# Patient Record
Sex: FEMALE | Race: OTHER | HISPANIC OR LATINO | URBAN - METROPOLITAN AREA
[De-identification: names, ages, dates, MRNs, and addresses within clinical notes are randomized per-mention and may not be internally consistent; named-entity substitution may affect disease eponyms.]

---

## 2020-08-05 ENCOUNTER — INPATIENT (INPATIENT)
Facility: HOSPITAL | Age: 80
LOS: 14 days | Discharge: HOME HEALTH | End: 2020-08-20
Attending: INTERNAL MEDICINE | Admitting: INTERNAL MEDICINE
Payer: MEDICARE

## 2020-08-05 VITALS
OXYGEN SATURATION: 99 % | TEMPERATURE: 97 F | DIASTOLIC BLOOD PRESSURE: 47 MMHG | SYSTOLIC BLOOD PRESSURE: 73 MMHG | RESPIRATION RATE: 18 BRPM | HEART RATE: 78 BPM

## 2020-08-05 DIAGNOSIS — E87.5 HYPERKALEMIA: ICD-10-CM

## 2020-08-05 DIAGNOSIS — N19 UNSPECIFIED KIDNEY FAILURE: ICD-10-CM

## 2020-08-05 DIAGNOSIS — N17.9 ACUTE KIDNEY FAILURE, UNSPECIFIED: ICD-10-CM

## 2020-08-05 DIAGNOSIS — E87.2 ACIDOSIS: ICD-10-CM

## 2020-08-05 LAB
ALBUMIN SERPL ELPH-MCNC: 4.2 G/DL — SIGNIFICANT CHANGE UP (ref 3.3–5)
ALP SERPL-CCNC: 74 U/L — SIGNIFICANT CHANGE UP (ref 40–120)
ALT FLD-CCNC: 21 U/L — SIGNIFICANT CHANGE UP (ref 4–33)
ANION GAP SERPL CALC-SCNC: 36 MMO/L — HIGH (ref 7–14)
ANION GAP SERPL CALC-SCNC: 45 MMO/L — HIGH (ref 7–14)
APPEARANCE UR: SIGNIFICANT CHANGE UP
APTT BLD: 19.7 SEC — LOW (ref 27–36.3)
AST SERPL-CCNC: 35 U/L — HIGH (ref 4–32)
BACTERIA # UR AUTO: SIGNIFICANT CHANGE UP
BASE EXCESS BLDV CALC-SCNC: -11.6 MMOL/L — SIGNIFICANT CHANGE UP
BASE EXCESS BLDV CALC-SCNC: -13.4 MMOL/L — SIGNIFICANT CHANGE UP
BASOPHILS # BLD AUTO: 0.02 K/UL — SIGNIFICANT CHANGE UP (ref 0–0.2)
BASOPHILS NFR BLD AUTO: 0.2 % — SIGNIFICANT CHANGE UP (ref 0–2)
BILIRUB SERPL-MCNC: 0.5 MG/DL — SIGNIFICANT CHANGE UP (ref 0.2–1.2)
BILIRUB UR-MCNC: SIGNIFICANT CHANGE UP
BLOOD GAS VENOUS - CREATININE: 12.5 MG/DL — HIGH (ref 0.5–1.3)
BLOOD GAS VENOUS - FIO2: 21 — SIGNIFICANT CHANGE UP
BLOOD UR QL VISUAL: SIGNIFICANT CHANGE UP
BUN SERPL-MCNC: 153 MG/DL — HIGH (ref 7–23)
BUN SERPL-MCNC: 176 MG/DL — HIGH (ref 7–23)
CALCIUM SERPL-MCNC: 10.1 MG/DL — SIGNIFICANT CHANGE UP (ref 8.4–10.5)
CALCIUM SERPL-MCNC: 10.4 MG/DL — SIGNIFICANT CHANGE UP (ref 8.4–10.5)
CHLORIDE BLDV-SCNC: 96 MMOL/L — SIGNIFICANT CHANGE UP (ref 96–108)
CHLORIDE SERPL-SCNC: 71 MMOL/L — LOW (ref 98–107)
CHLORIDE SERPL-SCNC: 86 MMOL/L — LOW (ref 98–107)
CO2 SERPL-SCNC: 11 MMOL/L — LOW (ref 22–31)
CO2 SERPL-SCNC: 13 MMOL/L — LOW (ref 22–31)
COLOR SPEC: SIGNIFICANT CHANGE UP
CREAT SERPL-MCNC: 14.07 MG/DL — HIGH (ref 0.5–1.3)
CREAT SERPL-MCNC: 18.74 MG/DL — HIGH (ref 0.5–1.3)
EOSINOPHIL # BLD AUTO: 0 K/UL — SIGNIFICANT CHANGE UP (ref 0–0.5)
EOSINOPHIL NFR BLD AUTO: 0 % — SIGNIFICANT CHANGE UP (ref 0–6)
GAS PNL BLDV: 126 MMOL/L — LOW (ref 136–146)
GLUCOSE BLDV-MCNC: 165 MG/DL — HIGH (ref 70–99)
GLUCOSE SERPL-MCNC: 127 MG/DL — HIGH (ref 70–99)
GLUCOSE SERPL-MCNC: 178 MG/DL — HIGH (ref 70–99)
GLUCOSE UR-MCNC: 30 — SIGNIFICANT CHANGE UP
HCO3 BLDV-SCNC: 13 MMOL/L — LOW (ref 20–27)
HCO3 BLDV-SCNC: 14 MMOL/L — LOW (ref 20–27)
HCT VFR BLD CALC: 43.2 % — SIGNIFICANT CHANGE UP (ref 34.5–45)
HCT VFR BLDV CALC: 38 % — SIGNIFICANT CHANGE UP (ref 34.5–45)
HGB BLD-MCNC: 14.3 G/DL — SIGNIFICANT CHANGE UP (ref 11.5–15.5)
HGB BLDV-MCNC: 12.3 G/DL — SIGNIFICANT CHANGE UP (ref 11.5–15.5)
IMM GRANULOCYTES NFR BLD AUTO: 0.7 % — SIGNIFICANT CHANGE UP (ref 0–1.5)
INR BLD: 1.04 — SIGNIFICANT CHANGE UP (ref 0.88–1.16)
KETONES UR-MCNC: NEGATIVE — SIGNIFICANT CHANGE UP
LACTATE BLDV-MCNC: 3.8 MMOL/L — HIGH (ref 0.5–2)
LEUKOCYTE ESTERASE UR-ACNC: SIGNIFICANT CHANGE UP
LIDOCAIN IGE QN: 396.8 U/L — HIGH (ref 7–60)
LYMPHOCYTES # BLD AUTO: 1 K/UL — SIGNIFICANT CHANGE UP (ref 1–3.3)
LYMPHOCYTES # BLD AUTO: 12 % — LOW (ref 13–44)
MCHC RBC-ENTMCNC: 24.4 PG — LOW (ref 27–34)
MCHC RBC-ENTMCNC: 33.1 % — SIGNIFICANT CHANGE UP (ref 32–36)
MCV RBC AUTO: 73.7 FL — LOW (ref 80–100)
MONOCYTES # BLD AUTO: 0.99 K/UL — HIGH (ref 0–0.9)
MONOCYTES NFR BLD AUTO: 11.9 % — SIGNIFICANT CHANGE UP (ref 2–14)
NEUTROPHILS # BLD AUTO: 6.26 K/UL — SIGNIFICANT CHANGE UP (ref 1.8–7.4)
NEUTROPHILS NFR BLD AUTO: 75.2 % — SIGNIFICANT CHANGE UP (ref 43–77)
NITRITE UR-MCNC: NEGATIVE — SIGNIFICANT CHANGE UP
NRBC # FLD: 0 K/UL — SIGNIFICANT CHANGE UP (ref 0–0)
PCO2 BLDV: 35 MMHG — LOW (ref 41–51)
PCO2 BLDV: 39 MMHG — LOW (ref 41–51)
PH BLDV: 7.2 PH — CRITICAL LOW (ref 7.32–7.43)
PH BLDV: 7.21 PH — LOW (ref 7.32–7.43)
PH UR: 5 — SIGNIFICANT CHANGE UP (ref 5–8)
PLATELET # BLD AUTO: 332 K/UL — SIGNIFICANT CHANGE UP (ref 150–400)
PMV BLD: 10.8 FL — SIGNIFICANT CHANGE UP (ref 7–13)
PO2 BLDV: 28 MMHG — LOW (ref 35–40)
PO2 BLDV: 31 MMHG — LOW (ref 35–40)
POTASSIUM BLDV-SCNC: 4.7 MMOL/L — HIGH (ref 3.4–4.5)
POTASSIUM SERPL-MCNC: 4.9 MMOL/L — SIGNIFICANT CHANGE UP (ref 3.5–5.3)
POTASSIUM SERPL-MCNC: 6.8 MMOL/L — CRITICAL HIGH (ref 3.5–5.3)
POTASSIUM SERPL-SCNC: 4.9 MMOL/L — SIGNIFICANT CHANGE UP (ref 3.5–5.3)
POTASSIUM SERPL-SCNC: 6.8 MMOL/L — CRITICAL HIGH (ref 3.5–5.3)
PROT SERPL-MCNC: 8.1 G/DL — SIGNIFICANT CHANGE UP (ref 6–8.3)
PROT UR-MCNC: 50 — SIGNIFICANT CHANGE UP
PROTHROM AB SERPL-ACNC: 11.9 SEC — SIGNIFICANT CHANGE UP (ref 10.6–13.6)
RBC # BLD: 5.86 M/UL — HIGH (ref 3.8–5.2)
RBC # FLD: 14.6 % — HIGH (ref 10.3–14.5)
RBC CASTS # UR COMP ASSIST: SIGNIFICANT CHANGE UP (ref 0–?)
SAO2 % BLDV: 31.4 % — LOW (ref 60–85)
SAO2 % BLDV: 36.2 % — LOW (ref 60–85)
SODIUM SERPL-SCNC: 129 MMOL/L — LOW (ref 135–145)
SODIUM SERPL-SCNC: 133 MMOL/L — LOW (ref 135–145)
SP GR SPEC: 1.03 — SIGNIFICANT CHANGE UP (ref 1–1.04)
SQUAMOUS # UR AUTO: SIGNIFICANT CHANGE UP
UROBILINOGEN FLD QL: NORMAL — SIGNIFICANT CHANGE UP
WBC # BLD: 8.33 K/UL — SIGNIFICANT CHANGE UP (ref 3.8–10.5)
WBC # FLD AUTO: 8.33 K/UL — SIGNIFICANT CHANGE UP (ref 3.8–10.5)
WBC UR QL: SIGNIFICANT CHANGE UP (ref 0–?)

## 2020-08-05 PROCEDURE — 71045 X-RAY EXAM CHEST 1 VIEW: CPT | Mod: 26

## 2020-08-05 PROCEDURE — 74176 CT ABD & PELVIS W/O CONTRAST: CPT | Mod: 26

## 2020-08-05 PROCEDURE — 99291 CRITICAL CARE FIRST HOUR: CPT

## 2020-08-05 RX ORDER — CALCIUM GLUCONATE 100 MG/ML
2 VIAL (ML) INTRAVENOUS ONCE
Refills: 0 | Status: COMPLETED | OUTPATIENT
Start: 2020-08-05 | End: 2020-08-05

## 2020-08-05 RX ORDER — SODIUM CHLORIDE 9 MG/ML
1000 INJECTION INTRAMUSCULAR; INTRAVENOUS; SUBCUTANEOUS ONCE
Refills: 0 | Status: COMPLETED | OUTPATIENT
Start: 2020-08-05 | End: 2020-08-05

## 2020-08-05 RX ORDER — SODIUM BICARBONATE 1 MEQ/ML
50 SYRINGE (ML) INTRAVENOUS ONCE
Refills: 0 | Status: COMPLETED | OUTPATIENT
Start: 2020-08-05 | End: 2020-08-05

## 2020-08-05 RX ORDER — SODIUM CHLORIDE 9 MG/ML
1000 INJECTION, SOLUTION INTRAVENOUS
Refills: 0 | Status: DISCONTINUED | OUTPATIENT
Start: 2020-08-05 | End: 2020-08-05

## 2020-08-05 RX ORDER — SODIUM ZIRCONIUM CYCLOSILICATE 10 G/10G
10 POWDER, FOR SUSPENSION ORAL ONCE
Refills: 0 | Status: COMPLETED | OUTPATIENT
Start: 2020-08-05 | End: 2020-08-05

## 2020-08-05 RX ORDER — INSULIN HUMAN 100 [IU]/ML
10 INJECTION, SOLUTION SUBCUTANEOUS ONCE
Refills: 0 | Status: DISCONTINUED | OUTPATIENT
Start: 2020-08-05 | End: 2020-08-05

## 2020-08-05 RX ORDER — INSULIN HUMAN 100 [IU]/ML
5 INJECTION, SOLUTION SUBCUTANEOUS ONCE
Refills: 0 | Status: COMPLETED | OUTPATIENT
Start: 2020-08-05 | End: 2020-08-05

## 2020-08-05 RX ORDER — ONDANSETRON 8 MG/1
4 TABLET, FILM COATED ORAL ONCE
Refills: 0 | Status: COMPLETED | OUTPATIENT
Start: 2020-08-05 | End: 2020-08-05

## 2020-08-05 RX ORDER — DEXTROSE 50 % IN WATER 50 %
50 SYRINGE (ML) INTRAVENOUS ONCE
Refills: 0 | Status: COMPLETED | OUTPATIENT
Start: 2020-08-05 | End: 2020-08-05

## 2020-08-05 RX ORDER — SODIUM CHLORIDE 9 MG/ML
1000 INJECTION, SOLUTION INTRAVENOUS
Refills: 0 | Status: DISCONTINUED | OUTPATIENT
Start: 2020-08-05 | End: 2020-08-06

## 2020-08-05 RX ADMIN — Medication 200 GRAM(S): at 20:11

## 2020-08-05 RX ADMIN — Medication 50 MILLILITER(S): at 20:12

## 2020-08-05 RX ADMIN — INSULIN HUMAN 5 UNIT(S): 100 INJECTION, SOLUTION SUBCUTANEOUS at 20:11

## 2020-08-05 RX ADMIN — Medication 50 MILLIEQUIVALENT(S): at 20:12

## 2020-08-05 RX ADMIN — SODIUM CHLORIDE 1000 MILLILITER(S): 9 INJECTION INTRAMUSCULAR; INTRAVENOUS; SUBCUTANEOUS at 17:52

## 2020-08-05 RX ADMIN — ONDANSETRON 4 MILLIGRAM(S): 8 TABLET, FILM COATED ORAL at 18:10

## 2020-08-05 RX ADMIN — SODIUM ZIRCONIUM CYCLOSILICATE 10 GRAM(S): 10 POWDER, FOR SUSPENSION ORAL at 22:44

## 2020-08-05 RX ADMIN — SODIUM CHLORIDE 150 MILLILITER(S): 9 INJECTION, SOLUTION INTRAVENOUS at 23:30

## 2020-08-05 NOTE — ED PROVIDER NOTE - ATTENDING CONTRIBUTION TO CARE
79F poor historian with unknown PMH p/w 1 day n/v. Pt reports pain only vomiting. +dark stools. No diarrhea or lui blood in bowel movements. Last BM this AM. Denies fever/chills. No sick contacts/travels. Contacted son for collateral info but unable to provide.  Gen: appears slow but unclear baseline, answering questions, following commands  CV: rrr, no murmur  Pulm: clear lungs  Abd: obese, soft, nt, nd  Ext: no edema, no rashes, FROM  MDM: 79F poor historian with unknown PMH p/w n/v x1 day, hypotensive to 70s/50s on arrival - 2L IVF now, check CBC, CMP, type/screen, lipase, VBG with lacate, cultures, UA, CXR, EKG, ct abd/.pel -

## 2020-08-05 NOTE — ED ADULT NURSE REASSESSMENT NOTE - NS ED NURSE REASSESS COMMENT FT1
RN facilitator: pt awake and alert, c/o feeling cold. rectal temp 95.9 rectally. no pressure ulcers noted. CritiCore Metcalf inserted using sterile technique. , minimal output noted. COVID swab sent. RN unable to draw repeat labs due to difficult access. MD Mott aware. RN facilitator: pt awake and alert, c/o feeling cold. rectal temp 95.9. no pressure ulcers noted. CritiCore Metcalf inserted using sterile technique. minimal output noted. COVID swab sent. RN unable to draw repeat labs due to difficult access. MD Mott aware.

## 2020-08-05 NOTE — ED PROVIDER NOTE - PROGRESS NOTE DETAILS
Daniel PGY-3:  Signout from Karsten GREENWOOD:  nephrology consulted awaiting recs | unclear etiology of elevated lipase no tenderness to abdomen awaitin CT A/P | hyperkalemic given cocktail, no changes on EKG, pt awake and alert BP systolic 115. Awaiting MICU and nephrology input POCUS showing no obstruction. Metcalf placed with ~100cc UOP. Will give hyperK+ cocktail, repeat labs 1-2hr post-meds. Renal following Daniel PGY-3:  D/W hospitalist need for GI consult based on CT abd read and lipase

## 2020-08-05 NOTE — CONSULT NOTE ADULT - PROBLEM SELECTOR RECOMMENDATION 3
Pt. with anion gap metabolic acidosis in the setting of kidney failure and starvation ketosis. Serum CO2 low at 13 on admission. Venous pH of 7.21. Pt. s/p one amp of sodium bicarbonate in ER. Pt. with likely an underlying metabolic alkalosis secondary to vomiting in addition to primary metabolic acidosis. Check serum lactate. Repeat BMP. Recommend ABG to better assess acid base physiology. Consider bicarbonate drip if acidosis persists. Plans for HD as outlined above. Monitor CO2.    If you have any questions, please feel free to contact me  Quentin Salinas  Nephrology Fellow  [phone]  (After 5pm or on weekends please page the on-call fellow)

## 2020-08-05 NOTE — ED ADULT NURSE NOTE - OBJECTIVE STATEMENT
80 y/o female presents to Trauma room A complaining of abdominal pain and chest pain. Pt endorses vomiting for 3 days. Pt presents hypotensive on arrival. Vitals as per flowsheets. MD at bedside for evaluation. 18g IV placed to right and left AC. Labs obtained as per orders. Will monitor.

## 2020-08-05 NOTE — ED PROVIDER NOTE - OBJECTIVE STATEMENT
79 y.o female with a PMhx of HTN, dementia, brought in as patient has been having abdominal pain for the past 3 days along with vomiting. As per brother she was eating  and a few hours later she was having several episodes of vomiting, as per brother it was mostly "black fluids". As per patient she hasn't been able to tolerate PO and has not been drinking many fluids,, patient states that she has been urinating, last urine was 2 hours prior. Pt states that her last bowel movement was last night and was also normal.  pt denies having any nausea, vomiting, diarrhea, CP, SOB, FANG, headaches, dizziness. 79 y.o female with a PMhx of HTN, dementia, brought in as patient has been having abdominal pain for the past 3 days along with vomiting. As per brother she was eating  and a few hours later she was having several episodes of vomiting, as per brother it was mostly "black fluids". As per patient she hasn't been able to tolerate PO and has not been drinking many fluids,, patient states that she has been urinating, last urine was 2 hours prior. Pt states that her last bowel movement was last night and was also normal.  pt denies having any diarrhea, CP, SOB, FANG, headaches, dizziness. Pt denies having any history of kidney failure or kidney disease.

## 2020-08-05 NOTE — ED ADULT TRIAGE NOTE - CHIEF COMPLAINT QUOTE
Patient has c/o abdominal pain and vomiting for three days. Very low blood pressure, Patient has c/o abdominal pain and vomiting for three days. Very low blood pressure,  Brothers: Michael: [phone]/ Kelin: [phone]

## 2020-08-05 NOTE — ED ADULT NURSE NOTE - CHIEF COMPLAINT QUOTE
Patient has c/o abdominal pain and vomiting for three days. Very low blood pressure,  Brothers: Michael: [phone]/ Kelin: [phone]

## 2020-08-05 NOTE — CONSULT NOTE ADULT - SUBJECTIVE AND OBJECTIVE BOX
[facility] DIVISION OF KIDNEY DISEASES AND HYPERTENSION -- [phone]  -- INITIAL CONSULT NOTE  --------------------------------------------------------------------------------  HPI: 79 year old female with unclear underlying medical history with questionable dementia was admitted to [facility] on 8/5/2020 with abdominal pain and N/V of 2 days duration. Admission labs relevant for Scr of 18.74 with a potassium level of 6.8 for which Nephrology consultation was requested. Upon review of [facility]/ [facility] / [facility], no prior labs available for review. History is limited and primarily obtained via provider hand-off/chart review. Pt. has been complaining of abdominal pain for the last 2-3 days and has had episodes of vomiting. Pt. was accompanied by brother Steven Neville who mentioned that pt. has not been eating/drinking well for the last 2 days and also complained of abdominal pain associated with vomiting episodes. On arrival to the ER pt. noted to have BP of 73/47 with SCr of 18.74, K of 6.8, serum CO2 of 13 and venous pH of 7.21. Straight catheterization done in ER with 100cc of UOP. POCUS of kidney and bladder per ER team not concerning for urinary obstruction. Pt. initiated on IV fluids with 2 liters of NS and received one ampule sodium bicarbonate. Pt. also scheduled to receive Insulin with D50 and Calcium gluconate for medical management of hyperkalemia.    Pt. seen and evaluated at the bedside. Pt. appeared in NAD and mentioned complaining of acid reflux and abdominal pain. Pt. alert and awake, however unable to provide complete ROS. Pt. denies shortness of breath or chest pain. BP at the time of evaluation was noted to be 112/70.      PAST HISTORY  --------------------------------------------------------------------------------  PAST MEDICAL & SURGICAL HISTORY: Unable to obtain due to medical condition.    FAMILY HISTORY: Unclear    PAST SOCIAL HISTORY: Lives with her brother. Limited due to medical condition.     ALLERGIES & MEDICATIONS  --------------------------------------------------------------------------------  Allergies    No Known Allergies    Intolerances    Standing Inpatient Medications    REVIEW OF SYSTEMS  --------------------------------------------------------------------------------  Limited ROS due to medical condition. See HPI.    VITALS/PHYSICAL EXAM  --------------------------------------------------------------------------------  T(C): 36.1 (08-05-20 @ 16:30), Max: 36.1 (08-05-20 @ 16:30)  HR: 78 (08-05-20 @ 19:00) (78 - 78)  BP: 112/70 (08-05-20 @ 19:00) (71/52 - 112/70)  RR: 20 (08-05-20 @ 19:00) (18 - 20)  SpO2: 98% (08-05-20 @ 19:00) (98% - 99%)  Wt(kg): --    Physical Exam:  	Gen: NAD  	HEENT: No JVD  	Pulm: Fair air entry B/L on anterior auscultation  	CV: S1S2, tachycardiac  	Abd: Soft, +BS, no distention  	Ext: No LE edema B/L  	Neuro: Awake, alerted , however oriented to self/person only  	Skin: Warm and dry    LABS/STUDIES  --------------------------------------------------------------------------------              14.3   8.33  >-----------<  332      [08-05-20 @ 17:40]              43.2     129  |  71  |  176  ----------------------------<  127      [08-05-20 @ 17:40]  6.8   |  13  |  18.74        Ca     10.1     [08-05-20 @ 17:40]    Creatinine Trend:  SCr 18.74 [08-05 @ 17:40]    Urinalysis - [08-05-20 @ 17:33]      Color DK YELLOW / Appearance TURBID / SG 1.028 / pH 5.0      Gluc 30 / Ketone NEGATIVE  / Bili NEGATIVEN / Urobili NORMAL       Blood TRACE / Protein 50 / Leuk Est TRACE / Nitrite NEGATIVE      RBC 3-5 / WBC 3-5 / Hyaline  / Gran  / Sq Epi FEW / Non Sq Epi  / Bacteria FEW [facility] DIVISION OF KIDNEY DISEASES AND HYPERTENSION -- [phone]  -- INITIAL CONSULT NOTE  --------------------------------------------------------------------------------  HPI: 79 year old female with unclear underlying medical history with questionable dementia was admitted to [facility] on 8/5/2020 with abdominal pain and N/V of 2 days duration. Admission labs relevant for Scr of 18.74 with a potassium level of 6.8 for which Nephrology consultation was requested. Upon review of [facility]/ [facility], no prior labs available for review. History is limited and primarily obtained via provider hand-off/chart review. Pt. has been complaining of abdominal pain for the last 2-3 days and has had episodes of vomiting. Pt. was accompanied by brother Steven Neville who mentioned that pt. has not been eating/drinking well for the last 2 days and also complained of abdominal pain associated with vomiting episodes. On arrival to the ER pt. noted to have BP of 73/47 with SCr of 18.74, K of 6.8, serum CO2 of 13 and venous pH of 7.21. Straight catheterization done in ER with 100cc of UOP. POCUS of kidney and bladder per ER team not concerning for urinary obstruction. Pt. initiated on IV fluids with 2 liters of NS and received one ampule sodium bicarbonate. Pt. also scheduled to receive Insulin with D50 and Calcium gluconate for medical management of hyperkalemia.    Pt. seen and evaluated at the bedside. Pt. appeared in NAD and mentioned complaining of acid reflux and abdominal pain. Pt. alert and awake, however unable to provide complete ROS. Pt. denies shortness of breath or chest pain. BP at the time of evaluation was noted to be 112/70.    PAST HISTORY  --------------------------------------------------------------------------------  PAST MEDICAL & SURGICAL HISTORY: Unable to obtain due to medical condition.    FAMILY HISTORY: Unclear    PAST SOCIAL HISTORY: Lives with her brother. Limited due to medical condition.     ALLERGIES & MEDICATIONS  --------------------------------------------------------------------------------  Allergies    No Known Allergies    Intolerances    Standing Inpatient Medications    REVIEW OF SYSTEMS  --------------------------------------------------------------------------------  Limited ROS due to medical condition. See HPI.    VITALS/PHYSICAL EXAM  --------------------------------------------------------------------------------  T(C): 36.1 (08-05-20 @ 16:30), Max: 36.1 (08-05-20 @ 16:30)  HR: 78 (08-05-20 @ 19:00) (78 - 78)  BP: 112/70 (08-05-20 @ 19:00) (71/52 - 112/70)  RR: 20 (08-05-20 @ 19:00) (18 - 20)  SpO2: 98% (08-05-20 @ 19:00) (98% - 99%)  Wt(kg): --    Physical Exam:  	Gen: NAD  	HEENT: No JVD  	Pulm: Fair air entry B/L on anterior auscultation  	CV: S1S2, tachycardiac  	Abd: Soft, +BS, no distention  	Ext: No LE edema B/L  	Neuro: Awake, alerted , however oriented to self/person only  	Skin: Warm and dry    LABS/STUDIES  --------------------------------------------------------------------------------              14.3   8.33  >-----------<  332      [08-05-20 @ 17:40]              43.2     129  |  71  |  176  ----------------------------<  127      [08-05-20 @ 17:40]  6.8   |  13  |  18.74        Ca     10.1     [08-05-20 @ 17:40]    08-05    133<L>  |  86<L>  |  153<H>  ----------------------------<  178<H>  4.9   |  11<L>  |  14.07<H>    Ca    10.4      05 Aug 2020 21:23  Phos  14.2     08-05  Mg     3.1     08-05    TPro  8.1  /  Alb  4.2  /  TBili  0.5  /  DBili  x   /  AST  35<H>  /  ALT  21  /  AlkPhos  74  08-05    Creatinine Trend:  SCr 18.74 [08-05 @ 17:40]    Urinalysis - [08-05-20 @ 17:33]      Color DK YELLOW / Appearance TURBID / SG 1.028 / pH 5.0      Gluc 30 / Ketone NEGATIVE  / Bili NEGATIVEN / Urobili NORMAL       Blood TRACE / Protein 50 / Leuk Est TRACE / Nitrite NEGATIVE      RBC 3-5 / WBC 3-5 / Hyaline  / Gran  / Sq Epi FEW / Non Sq Epi  / Bacteria FEW

## 2020-08-05 NOTE — ED PROVIDER NOTE - CRITICAL CARE PROVIDED
direct patient care (not related to procedure) additional history taking/direct patient care (not related to procedure)/interpretation of diagnostic studies/consultation with other physicians/documentation/telephone consultation with the patient's family

## 2020-08-05 NOTE — ED PROVIDER NOTE - SECONDARY DIAGNOSIS.
Hyperkalemia Metabolic acidosis Acute pancreatitis, unspecified complication status, unspecified pancreatitis type

## 2020-08-05 NOTE — ED PROVIDER NOTE - CARE PLAN
Principal Discharge DX:	VANDANA (acute kidney injury)  Secondary Diagnosis:	Hyperkalemia  Secondary Diagnosis:	Metabolic acidosis  Secondary Diagnosis:	Acute pancreatitis, unspecified complication status, unspecified pancreatitis type

## 2020-08-05 NOTE — CONSULT NOTE ADULT - PROBLEM SELECTOR RECOMMENDATION 9
Pt with kidney failure in the setting of significant hypotension and poor PO intake. Exact duration of kidney failure is however unknown. On arrival pt with SBP in the 70s. Scr elevated at 18.74 on admission labs with BUN of 176. No prior labs available for review on [facility]/ [facility]. It is unclear if pt. has underlying CKD. UA grossly unremarkable, POCUS kidney and bladder USG as per ER team not concerning for urinary obstruction. Pt. with hemodynamically mediated kidney failure with likely ATN physiology. Pt. s/p 2 L of NS in ER, agree with IV hydration for now. Repeat stat BMP. Recommend fu catheter placement and getting formal renal sonogram/non-contrast CT abdomen pelvis to rule out hydronephrosis. Closely monitor UOP. Check urine electrolytes, spot urine TP/CR. Recommend Fu catheter placement. Agree with IVF NS for now.  Will need to consider HD if renal failure continues to worsen. Pt's brother consented for possible dialysis therapy via phone. Consent placed in chart. Pt. will need to MICU evaluation in case urgent HD is planned. Monitor labs. Avoid NSAIDs, ACEI/ARBS, RCA and nephrotoxins. Dose medications as per eGFR.     Note: Please obtain records from PCP office. Brother personally advised to bring in medications in the morning. Pt with kidney failure in the setting of significant hypotension and poor PO intake. Exact duration of kidney failure is however unknown. On arrival pt with SBP in the 70s. Scr elevated at 18.74 on admission labs with BUN of 176. No prior labs available for review on [facility]/ [facility]. It is unclear if pt. has underlying CKD. UA grossly unremarkable, POCUS of kidney and bladder per ER team not concerning for urinary obstruction. Pt. with hemodynamically mediated kidney failure with likely ATN physiology. Pt. s/p 2 L of NS in ER, agree with IV hydration for now. Repeat stat BMP. Recommend fu catheter placement and getting formal renal sonogram/non-contrast CT abdomen pelvis to rule out hydronephrosis. Closely monitor UOP. Check urine electrolytes, spot urine TP/CR. Recommend Fu catheter placement. Agree with IVF NS for now.  Will need to consider HD if renal failure continues to worsen. Pt's brother consented for possible dialysis therapy via phone. Consent placed in chart. Pt. will need to MICU evaluation in case urgent HD is planned. Monitor labs. Avoid NSAIDs, ACEI/ARBS, RCA and nephrotoxins. Dose medications as per eGFR.     Note: Please obtain records from PCP office. Brother personally advised to bring in medications in the morning. Pt with kidney failure in the setting of significant hypotension and poor PO intake. Exact duration of kidney failure is however unknown. On arrival pt with SBP in the 70s. Scr elevated at 18.74 on admission labs with BUN of 176. No prior labs available for review on [facility]/ [facility]. It is unclear if pt. has underlying CKD. UA grossly unremarkable, POCUS of kidney and bladder per ER team not concerning for urinary obstruction. Pt. with hemodynamically mediated kidney failure with likely ATN physiology. Pt. s/p 2 L of NS in ER, agree with IV hydration for now. Repeat stat BMP. Recommend fu catheter placement and getting formal renal sonogram/non-contrast CT abdomen pelvis to rule out hydronephrosis. Closely monitor UOP. Check urine electrolytes, spot urine TP/CR. Will need to consider HD if renal failure continues to worsen. Pt's brother consented for possible dialysis therapy via phone. Consent placed in chart. Pt. will need to MICU evaluation in case urgent HD is planned. Monitor labs. Avoid NSAIDs, ACEI/ARBS, RCA and nephrotoxins. Dose medications as per eGFR.     Note: Please obtain records from PCP office. Brother personally advised to bring in medications in the morning. Pt. with kidney failure in the setting of significant hypotension and poor PO intake. Exact duration of kidney failure is however unknown. On arrival pt with SBP in the 70s. Scr elevated at 18.74 on admission labs with BUN of 176. No prior labs available for review on [facility]/ [facility]. It is unclear if pt. has underlying CKD. UA grossly unremarkable, POCUS of kidney and bladder per ER team not concerning for urinary obstruction. Pt. with hemodynamically mediated kidney failure with likely ATN physiology. Pt. s/p 2 L of NS in ER, agree with IV hydration for now. Repeat stat BMP. Recommend fu catheter placement and getting formal renal sonogram/non-contrast CT abdomen pelvis to rule out hydronephrosis. Closely monitor UOP. Check urine electrolytes, spot urine TP/CR. Will need to consider HD if renal failure continues to worsen. Pt's brother consented for possible dialysis therapy via phone. Consent placed in chart. Pt. will need to MICU evaluation in case urgent HD is planned. Monitor labs. Avoid NSAIDs, ACEI/ARBS, RCA and nephrotoxins. Dose medications as per eGFR.     Note: Please obtain records from PCP office. Brother personally advised to bring in medications in the morning.

## 2020-08-05 NOTE — CONSULT NOTE ADULT - PROBLEM SELECTOR RECOMMENDATION 2
Pt. with moderate hyperkalemia in the setting of kidney failure. Serum potassium elevated at 6.8 on admission labs, although specimen moderately hemolyzed. No evidence of EKG changes. Pt. s/p 2 L of NS, one amp of bicarbonate. Agree with Insulin/D50 and Calcium as ordered by ER. Recommend one dose of Lokelma 10mg. If BP tolerated can consider one dose of IV Lasix 40mg. Plans for HD as outlined above if hyperkalemia persists. Monitor serum potassium. Pt. with moderate hyperkalemia in the setting of kidney failure. Serum potassium elevated at 6.8 on admission labs, although specimen moderately hemolyzed. No evidence of EKG changes. Pt. s/p 2 L of NS, one amp of bicarbonate. Agree with Insulin/D50 and Calcium as ordered by ER. Recommend one dose of Lokelma 10mg. If BP tolerates can consider one dose of IV Lasix 60mg. Plans for HD as outlined above if hyperkalemia persists. Monitor serum potassium.

## 2020-08-05 NOTE — ED PROVIDER NOTE - CLINICAL SUMMARY MEDICAL DECISION MAKING FREE TEXT BOX
79 y.o female with a PMhx of HTN, dementia, brought in as patient has been having abdominal pain for the past 3 days along with vomiting. Abdominal pain-labs, medication, ct abd/pelvis, hypotension-iv fluids, labs, ekg, chest xray, ct abd/pelvis will reassess

## 2020-08-06 DIAGNOSIS — E87.2 ACIDOSIS: ICD-10-CM

## 2020-08-06 DIAGNOSIS — I82.409 ACUTE EMBOLISM AND THROMBOSIS OF UNSPECIFIED DEEP VEINS OF UNSPECIFIED LOWER EXTREMITY: ICD-10-CM

## 2020-08-06 DIAGNOSIS — T68.XXXA HYPOTHERMIA, INITIAL ENCOUNTER: ICD-10-CM

## 2020-08-06 DIAGNOSIS — K80.50 CALCULUS OF BILE DUCT WITHOUT CHOLANGITIS OR CHOLECYSTITIS WITHOUT OBSTRUCTION: ICD-10-CM

## 2020-08-06 DIAGNOSIS — I95.9 HYPOTENSION, UNSPECIFIED: ICD-10-CM

## 2020-08-06 DIAGNOSIS — N19 UNSPECIFIED KIDNEY FAILURE: ICD-10-CM

## 2020-08-06 DIAGNOSIS — M79.602 PAIN IN LEFT ARM: ICD-10-CM

## 2020-08-06 DIAGNOSIS — Z02.9 ENCOUNTER FOR ADMINISTRATIVE EXAMINATIONS, UNSPECIFIED: ICD-10-CM

## 2020-08-06 DIAGNOSIS — N17.9 ACUTE KIDNEY FAILURE, UNSPECIFIED: ICD-10-CM

## 2020-08-06 DIAGNOSIS — Z29.9 ENCOUNTER FOR PROPHYLACTIC MEASURES, UNSPECIFIED: ICD-10-CM

## 2020-08-06 LAB
ALBUMIN SERPL ELPH-MCNC: 3.2 G/DL — LOW (ref 3.3–5)
ALP SERPL-CCNC: 61 U/L — SIGNIFICANT CHANGE UP (ref 40–120)
ALT FLD-CCNC: 18 U/L — SIGNIFICANT CHANGE UP (ref 4–33)
ANION GAP SERPL CALC-SCNC: 31 MMO/L — HIGH (ref 7–14)
ANION GAP SERPL CALC-SCNC: 38 MMO/L — HIGH (ref 7–14)
APTT BLD: 98.4 SEC — HIGH (ref 27–36.3)
AST SERPL-CCNC: 34 U/L — HIGH (ref 4–32)
BASE EXCESS BLDV CALC-SCNC: -7.1 MMOL/L — SIGNIFICANT CHANGE UP
BILIRUB SERPL-MCNC: 0.5 MG/DL — SIGNIFICANT CHANGE UP (ref 0.2–1.2)
BUN SERPL-MCNC: 161 MG/DL — HIGH (ref 7–23)
BUN SERPL-MCNC: 180 MG/DL — HIGH (ref 7–23)
CALCIUM SERPL-MCNC: 9.1 MG/DL — SIGNIFICANT CHANGE UP (ref 8.4–10.5)
CALCIUM SERPL-MCNC: 9.9 MG/DL — SIGNIFICANT CHANGE UP (ref 8.4–10.5)
CHLORIDE SERPL-SCNC: 80 MMOL/L — LOW (ref 98–107)
CHLORIDE SERPL-SCNC: 84 MMOL/L — LOW (ref 98–107)
CO2 SERPL-SCNC: 15 MMOL/L — LOW (ref 22–31)
CO2 SERPL-SCNC: 19 MMOL/L — LOW (ref 22–31)
CREAT ?TM UR-MCNC: 225.9 MG/DL — SIGNIFICANT CHANGE UP
CREAT SERPL-MCNC: 10.75 MG/DL — HIGH (ref 0.5–1.3)
CREAT SERPL-MCNC: 13.08 MG/DL — HIGH (ref 0.5–1.3)
GAS PNL BLDV: 129 MMOL/L — LOW (ref 136–146)
GLUCOSE BLDV-MCNC: 99 MG/DL — SIGNIFICANT CHANGE UP (ref 70–99)
GLUCOSE SERPL-MCNC: 131 MG/DL — HIGH (ref 70–99)
GLUCOSE SERPL-MCNC: 95 MG/DL — SIGNIFICANT CHANGE UP (ref 70–99)
HBA1C BLD-MCNC: 6.3 % — HIGH (ref 4–5.6)
HCO3 BLDV-SCNC: 19 MMOL/L — LOW (ref 20–27)
HCT VFR BLD CALC: 34.9 % — SIGNIFICANT CHANGE UP (ref 34.5–45)
HCT VFR BLD CALC: 38.3 % — SIGNIFICANT CHANGE UP (ref 34.5–45)
HCT VFR BLDV CALC: 40.3 % — SIGNIFICANT CHANGE UP (ref 34.5–45)
HGB BLD-MCNC: 11.3 G/DL — LOW (ref 11.5–15.5)
HGB BLD-MCNC: 12.8 G/DL — SIGNIFICANT CHANGE UP (ref 11.5–15.5)
HGB BLDV-MCNC: 13.1 G/DL — SIGNIFICANT CHANGE UP (ref 11.5–15.5)
MAGNESIUM SERPL-MCNC: 2.7 MG/DL — HIGH (ref 1.6–2.6)
MAGNESIUM SERPL-MCNC: 3.1 MG/DL — HIGH (ref 1.6–2.6)
MAGNESIUM SERPL-MCNC: 3.2 MG/DL — HIGH (ref 1.6–2.6)
MCHC RBC-ENTMCNC: 24.1 PG — LOW (ref 27–34)
MCHC RBC-ENTMCNC: 24.1 PG — LOW (ref 27–34)
MCHC RBC-ENTMCNC: 32.4 % — SIGNIFICANT CHANGE UP (ref 32–36)
MCHC RBC-ENTMCNC: 33.4 % — SIGNIFICANT CHANGE UP (ref 32–36)
MCV RBC AUTO: 72 FL — LOW (ref 80–100)
MCV RBC AUTO: 74.6 FL — LOW (ref 80–100)
NRBC # FLD: 0 K/UL — SIGNIFICANT CHANGE UP (ref 0–0)
NRBC # FLD: 0 K/UL — SIGNIFICANT CHANGE UP (ref 0–0)
PCO2 BLDV: 28 MMHG — LOW (ref 41–51)
PH BLDV: 7.4 PH — SIGNIFICANT CHANGE UP (ref 7.32–7.43)
PHOSPHATE SERPL-MCNC: 10.7 MG/DL — HIGH (ref 2.5–4.5)
PHOSPHATE SERPL-MCNC: 13.5 MG/DL — HIGH (ref 2.5–4.5)
PHOSPHATE SERPL-MCNC: 14.2 MG/DL — HIGH (ref 2.5–4.5)
PLATELET # BLD AUTO: 232 K/UL — SIGNIFICANT CHANGE UP (ref 150–400)
PLATELET # BLD AUTO: 243 K/UL — SIGNIFICANT CHANGE UP (ref 150–400)
PMV BLD: 10.3 FL — SIGNIFICANT CHANGE UP (ref 7–13)
PMV BLD: 10.8 FL — SIGNIFICANT CHANGE UP (ref 7–13)
PO2 BLDV: 193 MMHG — HIGH (ref 35–40)
POTASSIUM BLDV-SCNC: 4.3 MMOL/L — SIGNIFICANT CHANGE UP (ref 3.4–4.5)
POTASSIUM SERPL-MCNC: 4.2 MMOL/L — SIGNIFICANT CHANGE UP (ref 3.5–5.3)
POTASSIUM SERPL-MCNC: 4.6 MMOL/L — SIGNIFICANT CHANGE UP (ref 3.5–5.3)
POTASSIUM SERPL-SCNC: 4.2 MMOL/L — SIGNIFICANT CHANGE UP (ref 3.5–5.3)
POTASSIUM SERPL-SCNC: 4.6 MMOL/L — SIGNIFICANT CHANGE UP (ref 3.5–5.3)
PROT SERPL-MCNC: 6.4 G/DL — SIGNIFICANT CHANGE UP (ref 6–8.3)
PROT UR-MCNC: 67.9 MG/DL — SIGNIFICANT CHANGE UP
RBC # BLD: 4.68 M/UL — SIGNIFICANT CHANGE UP (ref 3.8–5.2)
RBC # BLD: 5.32 M/UL — HIGH (ref 3.8–5.2)
RBC # FLD: 14.5 % — SIGNIFICANT CHANGE UP (ref 10.3–14.5)
RBC # FLD: 14.6 % — HIGH (ref 10.3–14.5)
SAO2 % BLDV: 98.8 % — HIGH (ref 60–85)
SARS-COV-2 IGG SERPL QL IA: NEGATIVE — SIGNIFICANT CHANGE UP
SARS-COV-2 IGM SERPL IA-ACNC: <3.8 AU/ML — SIGNIFICANT CHANGE UP
SARS-COV-2 RNA SPEC QL NAA+PROBE: SIGNIFICANT CHANGE UP
SODIUM SERPL-SCNC: 133 MMOL/L — LOW (ref 135–145)
SODIUM SERPL-SCNC: 134 MMOL/L — LOW (ref 135–145)
SODIUM UR-SCNC: 44 MMOL/L — SIGNIFICANT CHANGE UP
UUN UR-MCNC: 569 MG/DL — SIGNIFICANT CHANGE UP
WBC # BLD: 6.3 K/UL — SIGNIFICANT CHANGE UP (ref 3.8–10.5)
WBC # BLD: 7.04 K/UL — SIGNIFICANT CHANGE UP (ref 3.8–10.5)
WBC # FLD AUTO: 6.3 K/UL — SIGNIFICANT CHANGE UP (ref 3.8–10.5)
WBC # FLD AUTO: 7.04 K/UL — SIGNIFICANT CHANGE UP (ref 3.8–10.5)

## 2020-08-06 PROCEDURE — 99223 1ST HOSP IP/OBS HIGH 75: CPT | Mod: GC

## 2020-08-06 PROCEDURE — 93931 UPPER EXTREMITY STUDY: CPT | Mod: 26,LT

## 2020-08-06 PROCEDURE — 12345: CPT | Mod: NC,GC

## 2020-08-06 PROCEDURE — 93971 EXTREMITY STUDY: CPT | Mod: 26

## 2020-08-06 PROCEDURE — 93010 ELECTROCARDIOGRAM REPORT: CPT

## 2020-08-06 PROCEDURE — 99222 1ST HOSP IP/OBS MODERATE 55: CPT | Mod: GC

## 2020-08-06 RX ORDER — SODIUM CHLORIDE 9 MG/ML
1000 INJECTION, SOLUTION INTRAVENOUS
Refills: 0 | Status: DISCONTINUED | OUTPATIENT
Start: 2020-08-06 | End: 2020-08-06

## 2020-08-06 RX ORDER — SODIUM CHLORIDE 9 MG/ML
1000 INJECTION, SOLUTION INTRAVENOUS ONCE
Refills: 0 | Status: COMPLETED | OUTPATIENT
Start: 2020-08-06 | End: 2020-08-06

## 2020-08-06 RX ORDER — SODIUM CHLORIDE 9 MG/ML
1000 INJECTION INTRAMUSCULAR; INTRAVENOUS; SUBCUTANEOUS
Refills: 0 | Status: DISCONTINUED | OUTPATIENT
Start: 2020-08-06 | End: 2020-08-07

## 2020-08-06 RX ORDER — HEPARIN SODIUM 5000 [USP'U]/ML
INJECTION INTRAVENOUS; SUBCUTANEOUS
Qty: 25000 | Refills: 0 | Status: DISCONTINUED | OUTPATIENT
Start: 2020-08-06 | End: 2020-08-07

## 2020-08-06 RX ORDER — SODIUM CHLORIDE 9 MG/ML
500 INJECTION INTRAMUSCULAR; INTRAVENOUS; SUBCUTANEOUS ONCE
Refills: 0 | Status: COMPLETED | OUTPATIENT
Start: 2020-08-06 | End: 2020-08-06

## 2020-08-06 RX ORDER — PIPERACILLIN AND TAZOBACTAM 4; .5 G/20ML; G/20ML
3.38 INJECTION, POWDER, LYOPHILIZED, FOR SOLUTION INTRAVENOUS ONCE
Refills: 0 | Status: COMPLETED | OUTPATIENT
Start: 2020-08-06 | End: 2020-08-06

## 2020-08-06 RX ORDER — PIPERACILLIN AND TAZOBACTAM 4; .5 G/20ML; G/20ML
3.38 INJECTION, POWDER, LYOPHILIZED, FOR SOLUTION INTRAVENOUS EVERY 12 HOURS
Refills: 0 | Status: DISCONTINUED | OUTPATIENT
Start: 2020-08-06 | End: 2020-08-10

## 2020-08-06 RX ADMIN — SODIUM CHLORIDE 75 MILLILITER(S): 9 INJECTION, SOLUTION INTRAVENOUS at 12:36

## 2020-08-06 RX ADMIN — SODIUM CHLORIDE 100 MILLILITER(S): 9 INJECTION INTRAMUSCULAR; INTRAVENOUS; SUBCUTANEOUS at 16:39

## 2020-08-06 RX ADMIN — SODIUM CHLORIDE 1000 MILLILITER(S): 9 INJECTION INTRAMUSCULAR; INTRAVENOUS; SUBCUTANEOUS at 18:45

## 2020-08-06 RX ADMIN — PIPERACILLIN AND TAZOBACTAM 25 GRAM(S): 4; .5 INJECTION, POWDER, LYOPHILIZED, FOR SOLUTION INTRAVENOUS at 16:39

## 2020-08-06 RX ADMIN — SODIUM CHLORIDE 75 MILLILITER(S): 9 INJECTION, SOLUTION INTRAVENOUS at 01:56

## 2020-08-06 RX ADMIN — HEPARIN SODIUM 1400 UNIT(S)/HR: 5000 INJECTION INTRAVENOUS; SUBCUTANEOUS at 11:29

## 2020-08-06 RX ADMIN — PIPERACILLIN AND TAZOBACTAM 200 GRAM(S): 4; .5 INJECTION, POWDER, LYOPHILIZED, FOR SOLUTION INTRAVENOUS at 05:29

## 2020-08-06 RX ADMIN — SODIUM CHLORIDE 2000 MILLILITER(S): 9 INJECTION, SOLUTION INTRAVENOUS at 10:50

## 2020-08-06 RX ADMIN — SODIUM CHLORIDE 75 MILLILITER(S): 9 INJECTION, SOLUTION INTRAVENOUS at 00:17

## 2020-08-06 RX ADMIN — SODIUM CHLORIDE 100 MILLILITER(S): 9 INJECTION, SOLUTION INTRAVENOUS at 13:41

## 2020-08-06 NOTE — PROGRESS NOTE ADULT - ASSESSMENT
79F with PMHx probable HTN, probable dementia presents with hyperkalemia and uremia 2/2 acute renal failure of unknown etiology. 79F with PMHx probable HTN, probable dementia presents with hyperkalemia (resolved) and uremia 2/2 acute renal failure of unknown etiology. 79F with PMHx probable HTN, probable dementia presents with hyperkalemia (resolved) and uremia 2/2 acute renal failure of unknown etiology (possibly pre-renal). Course c/b hypothermia, hypotension, imaging +choledocholelithiasis, on broad spectrum antibiotics, also found to have LUE DVT, started on therapeutic heparin gtt.

## 2020-08-06 NOTE — PROGRESS NOTE ADULT - ATTENDING COMMENTS
Patient seen and examined, d/w Dr. Black, agree with above.    Estonian translation provided by Dr. Tripathi. 78 yo F w/ dementia, HTN, p/w c/o abdominal pain, nausea/vomiting/inability to tolerate PO, a/w acute renal failure c/b hyperkalemia and metabolic acidosis, with hypothermia/hypotension and choledocholithiasis noted on imaging, found to have LUE DVT.  FeUrea 23.2% suggesting pre-renal etiology setting of poor PO intake, hypotension, Cr downtrending (18.74 to 10.75) with IV fluids. Likewise hyperkalemia also resolved (K now 4.2), with improvement in AGMA (AG 45 -> 31), serum bicarb increasing 13->19, pH 7.4 on recent vBG. With episodes of hypothermia and hypotension (SBP 70s this AM, but in no acute distress, s/p additional IV fluids with improvement to SBP 90s), will continue with broad spectrum antibiotics (Renally dosed IV Zosyn) pending culture results, clinical improvement, GI input appreciated, to reassess need for potential ERCP after stabilization. C/w heparin gtt (therapeutic A/C) for LUE DVT noted on duplex, monitor pulses (LUE cooler to touch) and for signs of bleed.

## 2020-08-06 NOTE — H&P ADULT - PROBLEM SELECTOR PLAN 3
K 6.8->4.9 with Lokelma, Insulin 5u/D50 1 amp, Bicarb.  - Monitor BMP q6h  - Currently on 1/2NS+Bicarb gtt  - EKG without HyperK changes, s/p 2g Calcium gluconate K 6.8->4.9 with Lokelma, Insulin 5u/D50, 1 amp of sodium bicarb. In setting of acute renal failure and acidosis.   - Monitor BMP q6hrs for now  - Currently on bicarb gtt, which should help with hyperkalemia   - EKG without HyperK changes, s/p 2g IV calcium gluconate  - Monitor on tele for now, as pt at increased risk of recurrent hyperkalemia given extent of acute renal failure

## 2020-08-06 NOTE — PROVIDER CONTACT NOTE (OTHER) - SITUATION
patient still hypotensive but trending up s/p bolus. patient asymptomatic. no acute distress noted. patient alert and able to follow commands. as per MD, will order more fluids and reassess in 1hr.

## 2020-08-06 NOTE — H&P ADULT - NSHPPHYSICALEXAM_GEN_ALL_CORE
PHYSICAL EXAM:  Vital Signs Last 24 Hrs  T(C): 35.6 (08-06-20 @ 02:05)  T(F): 96.1 (08-06-20 @ 02:05), Max: 97.9 (08-05-20 @ 23:48)  HR: 75 (08-06-20 @ 02:05) (73 - 83)  BP: 97/63 (08-06-20 @ 02:05)  BP(mean): --  RR: 16 (08-06-20 @ 02:05) (16 - 20)  SpO2: 96% (08-06-20 @ 02:05) (96% - 100%)  Wt(kg): --    Constitutional: NAD, awake and alert, comfortable appearing  EYES: EOMI  ENT:  Normal Hearing, no tonsillar exudates, MMM  Neck: Soft and supple, no thyromegaly   Respiratory: Breath sounds are clear bilaterally, No wheezing, rales or rhonchi  Cardiovascular: S1 and S2, regular rate and rhythm, soft systolic murmur best heard at RUSB, gallops or rubs, no JVD,    Gastrointestinal: Bowel Sounds present, soft, nontender, nondistended, no guarding, no rebound  : Metcalf catheter in place, draining clear yellow urine  Extremities: Bilateral upper extremities cool to touch. Left upper extremity tender above elbow, painful with movement, skin appears slightly purple. 2+ radial pulses bilaterally, equal.  Vascular: 2+ peripheral pulses lower ex  Neurological: No focal deficits, CN II-XII intact bilaterally, sensation to light touch intact in all extremities. Pupils are equally reactive to light and symmetrical in size.   Musculoskeletal: 5/5 strength b/l upper and lower extremities; no joint swelling.  Skin: No rashes  Psych:  AAOx1-2, knows is in hospital, not which one, knows year but not president.  HEME: no bruises, no nose bleeds PHYSICAL EXAM:  Vital Signs Last 24 Hrs  T(C): 35.6 (08-06-20 @ 02:05)  T(F): 96.1 (08-06-20 @ 02:05), Max: 97.9 (08-05-20 @ 23:48)  HR: 75 (08-06-20 @ 02:05) (73 - 83)  BP: 97/63 (08-06-20 @ 02:05)  BP(mean): --  RR: 16 (08-06-20 @ 02:05) (16 - 20)  SpO2: 96% (08-06-20 @ 02:05) (96% - 100%)  Wt(kg): --    Constitutional: NAD, awake and alert, comfortable appearing  EYES: EOMI  ENT:  Normal Hearing, no tonsillar exudates, MMM  Neck: Soft and supple, no thyromegaly   Respiratory: Breath sounds are clear bilaterally, No wheezing, rales or rhonchi  Cardiovascular: S1 and S2, regular rate and rhythm, soft systolic murmur best heard at RUSB, gallops or rubs, no JVD,    Gastrointestinal: Bowel Sounds present, soft, nontender, nondistended, no guarding, no rebound, Negative Lockhart sign  : Metcalf catheter in place, draining clear yellow urine  Extremities: Bilateral upper extremities cool to touch. Left upper extremity tender above elbow, painful with movement, skin appears slightly purple. 2+ radial pulses bilaterally, equal.  Vascular: 2+ peripheral pulses lower ex  Neurological: No focal deficits, CN II-XII intact bilaterally, sensation to light touch intact in all extremities. Pupils are equally reactive to light and symmetrical in size.   Musculoskeletal: 5/5 strength b/l upper and lower extremities; no joint swelling.  Skin: No rashes  Psych:  AAOx1-2, knows is in hospital, not which one, knows year but not president.  HEME: no bruises, no nose bleeds PHYSICAL EXAM:  Vital Signs Last 24 Hrs  T(C): 35.6 (08-06-20 @ 02:05)  T(F): 96.1 (08-06-20 @ 02:05), Max: 97.9 (08-05-20 @ 23:48)  HR: 75 (08-06-20 @ 02:05) (73 - 83)  BP: 97/63 (08-06-20 @ 02:05)  BP(mean): --  RR: 16 (08-06-20 @ 02:05) (16 - 20)  SpO2: 96% (08-06-20 @ 02:05) (96% - 100%)  Wt(kg): --    Constitutional: NAD, awake and alert, comfortable appearing  Head: Normocephalic, atraumatic  Eyes: EOMI, no conjunctival pallor or scleral icterus  Mouth: Dry MM, no oropharyngeal exudates  Neck: Soft and supple, no thyromegaly, trachea midline, no JVD, no LAD   Respiratory: Nonlabored breathing. Good inspiratory effort. Breath sounds are clear bilaterally, no wheezing, rales or rhonchi  Cardiovascular: Normal S1 and S2, regular rate and rhythm, soft systolic murmur best heard at RUSB, no gallops or rubs. No LE edema b/l.  Gastrointestinal: Bowel sounds present, soft, mild tenderness in epigastric area, nondistended, no guarding, no rebound  : Metcalf catheter in place, draining clear yellow urine  Extremities: Bilateral upper extremities cool to touch. Left upper extremity tender above elbow, painful with movement, skin appears slightly purple. 2+ radial pulses bilaterally, equal.  Neurological: A&Ox1-2 (to person and place). No focal deficits, CN II-XII grossly intact, sensation to light touch intact in all extremities, 5/5 strength in b/l upper and lower extremities. Pupils are equally reactive to light and symmetrical in size.   Musculoskeletal: No spinal or paraspinal tenderness.

## 2020-08-06 NOTE — H&P ADULT - PROBLEM SELECTOR PLAN 9
DVT PPx: Holding in case of need for catheter placement  Diet: DASH diet, renal restrictions  Med Rec- Must be obtained, brother to bring in medications in AM DVT PPx: Holding in case of need for catheter placement  Diet: NPO until GI eval  Med Rec- Must be obtained, brother to bring in medications in AM DVT PPx: Holding for now pending GI eval for CBD dilation/choledocholithiasis  Diet: NPO until GI eval  Med Rec: Must be obtained, brother to bring in medications in AM

## 2020-08-06 NOTE — H&P ADULT - PROBLEM SELECTOR PLAN 5
Possibly 2/2 hypovolemia -> vasoconstriction  - C/w IVF  - Monitor temps, improving Fluid responsive, initially hypotensive to 70s/40s. Likely from recent N/V and poor PO intake.  - Currently on fluids with 1/2NS at 75cc/hr  - Bolus as necessary to keep MAP>65  - Monitor urine output

## 2020-08-06 NOTE — PROGRESS NOTE ADULT - PROBLEM SELECTOR PLAN 4
AMS possible i/s/o uremic encephalopathy worsening already present dementia.  A&Ox1-2 on exam.  - Monitor for improvement or decline. Will attempt during the day to reach out to family regarding baseline mental status, as attempts unsuccessful overnight.  - Hold off on CTH for now, given likely sources of AMS  - Monitor for uremic bleeding  - Aspiration precautions, fall risk protocol Fluid responsive, initially hypotensive to 70s/40s. Likely from recent N/V and poor PO intake.  - IVF   - Bolus as necessary to keep MAP>65  - Monitor urine output  - SBP in 90s this afternoon

## 2020-08-06 NOTE — H&P ADULT - NSHPSOCIALHISTORY_GEN_ALL_CORE
Lives with brother  Denies alcohol, tobacco, or other drug use Lives with brother  Denies alcohol, tobacco, or illicit drug use

## 2020-08-06 NOTE — H&P ADULT - PROBLEM SELECTOR PLAN 8
Moderately severe L arm pain after IV infiltration, however entire arm appears purple, possibly 2/2 vasoconstriction, but asymmetric compared to right.  Pulses intact, strength intact.  - F/u VA Duplex venous, arterial LUE  - F.u lactate Moderately severe L arm pain after IV infiltration, however entire arm appears purple, possibly 2/2 vasoconstriction, but asymmetric compared to right.  Pulses intact, strength intact.  - F/u VA Duplex venous, arterial LUE

## 2020-08-06 NOTE — CONSULT NOTE ADULT - SUBJECTIVE AND OBJECTIVE BOX
Chief Complaint:  Patient is a 79y old  Female who presents with a chief complaint of Acute renal failure, high anion gap metabolic acidosis (06 Aug 2020 02:49)    HPI:  79 year old woman with hx of dementia and Hypertension presented with two days of epigastric abdominal pain, nausea, vomiting x 2 days. Patient reports epigastric pain that was constant, radiating "everywhere" and associated with black emesis. No recent reports of fevers, chills, chest pain, shortness of breath, melena, hematochezia, hematemesis and cough. GI consulted for elevated lipase and choledocholithiasis on imaging.  Today she reports her abdominal pain has resolved    In ED, Tmin 94.8, BP 73/47, improved to 112/70 with 2L NS, HR 78, SpO2 99% on RA, RR 18.   Na 129, K 6.8, AG 45, , Cr 18.74 -> 14.07  Given 2g Calcium gluconate, 5u Insulin/1 amp D50, Lokelma x1 - K improved to 4.9. Started Bicarb gtt @ 75cc/hr  Last Colonoscopy:  Last Endoscopy:    Allergies:  No Known Allergies    Home Medications:  Reviewed    Hospital Medications:  heparin  Infusion.  Unit(s)/Hr IV Continuous <Continuous>  piperacillin/tazobactam IVPB.. 3.375 Gram(s) IV Intermittent every 12 hours  sodium chloride 0.45% 1000 milliLiter(s) IV Continuous <Continuous>    PMHX/PSHX:  Dementia  Hypertension  No significant past surgical history    Family history:  No pertinent family history in first degree relatives    Social History:   No history of tobacco use, EtOH use or illicit drug use     ROS:   General:  No fevers, chills or night sweats.  ENT:  No sore throat or dysphagia  CV:  No pain or palpitations  Resp:  No dyspnea, cough, wheezing  GI:  No pain, No nausea, No vomiting, No diarrhea, No constipation, No weight loss, No pruritis, No rectal bleeding, No tarry stools  Skin:  No rash or edema    PHYSICAL EXAM:   GENERAL:  NAD, Appears stated age  HEENT:  NC/AT,  conjunctivae clear and pink, sclera -anicteric  CHEST:  CTA B/L, Normal effort  HEART:  RRR S1/S2, No murmurs  ABDOMEN:  Soft, non-tender, non-distended, BS+  EXTEREMITIES:  No cyanosis  SKIN:  Warm & Dry.  NEURO:  Alert, oriented    Vital Signs:  Vital Signs Last 24 Hrs  T(C): 36.3 (06 Aug 2020 10:40), Max: 36.6 (05 Aug 2020 23:48)  T(F): 97.3 (06 Aug 2020 10:40), Max: 97.9 (05 Aug 2020 23:48)  HR: 78 (06 Aug 2020 10:40) (73 - 83)  BP: 76/54 (06 Aug 2020 10:40) (71/52 - 112/70)  BP(mean): --  RR: 16 (06 Aug 2020 10:40) (16 - 20)  SpO2: 100% (06 Aug 2020 10:40) (96% - 100%)  Daily Height in cm: 154.94 (06 Aug 2020 02:05)    Daily     LABS:                        12.8   7.04  )-----------( 243      ( 06 Aug 2020 06:00 )             38.3     Mean Cell Volume: 72.0 fL (-20 @ 06:00)    08-06    133<L>  |  80<L>  |  180<H>  ----------------------------<  95  4.6   |  15<L>  |  13.08<H>    Ca    9.9      06 Aug 2020 06:00  Phos  13.5     08-06  Mg     3.2     08-06    TPro  6.4  /  Alb  3.2<L>  /  TBili  0.5  /  DBili  x   /  AST  34<H>  /  ALT  18  /  AlkPhos  61  08-06    LIVER FUNCTIONS - ( 06 Aug 2020 06:00 )  Alb: 3.2 g/dL / Pro: 6.4 g/dL / ALK PHOS: 61 u/L / ALT: 18 u/L / AST: 34 u/L / GGT: x           PT/INR - ( 05 Aug 2020 17:40 )   PT: 11.9 SEC;   INR: 1.04          PTT - ( 05 Aug 2020 17:40 )  PTT:19.7 SEC  Urinalysis Basic - ( 05 Aug 2020 17:33 )    Color: DK YELLOW / Appearance: TURBID / S.028 / pH: 5.0  Gluc: 30 / Ketone: NEGATIVE  / Bili: NEGATIVEN / Urobili: NORMAL   Blood: TRACE / Protein: 50 / Nitrite: NEGATIVE   Leuk Esterase: TRACE / RBC: 3-5 / WBC 3-5   Sq Epi: FEW / Non Sq Epi: x / Bacteria: FEW    Amylase Serum--      Lipase [id number]       Ammonia--                          12.8   7.04  )-----------( 243      ( 06 Aug 2020 06:00 )             38.3                         14.3   8.33  )-----------( 332      ( 05 Aug 2020 17:40 )             43.2     Imaging: Chief Complaint:  Patient is a 79y old  Female who presents with a chief complaint of Acute renal failure, high anion gap metabolic acidosis (06 Aug 2020 02:49)    HPI:  79 year old woman with hx of dementia and Hypertension presented with two days of epigastric abdominal pain, nausea, vomiting x 2 days. Patient reports epigastric pain that was constant, radiating "everywhere" and associated with black emesis. No recent reports of fevers, chills, chest pain, shortness of breath, melena, hematochezia, hematemesis and cough. GI consulted for elevated lipase and choledocholithiasis on imaging.  Today she reports her abdominal pain has resolved    Allergies:  No Known Allergies    Home Medications:  Reviewed    Hospital Medications:  heparin  Infusion.  Unit(s)/Hr IV Continuous <Continuous>  piperacillin/tazobactam IVPB.. 3.375 Gram(s) IV Intermittent every 12 hours  sodium chloride 0.45% 1000 milliLiter(s) IV Continuous <Continuous>    PMHX/PSHX:  Dementia  Hypertension  No significant past surgical history    Family history:  No pertinent family history in first degree relatives    Social History:   No history of tobacco use, EtOH use or illicit drug use     ROS:   General:  No fevers, chills or night sweats.  ENT:  No sore throat or dysphagia  CV:  No pain or palpitations  Resp:  No dyspnea, cough, wheezing  GI:  No pain, No nausea, No vomiting, No diarrhea, No constipation, No weight loss, No pruritis, No rectal bleeding, No tarry stools  Skin:  No rash or edema    PHYSICAL EXAM:   GENERAL:  NAD, Appears stated age  HEENT:  NC/AT,  conjunctivae clear and pink, sclera -anicteric  CHEST:  CTA B/L, Normal effort  HEART:  RRR S1/S2, No murmurs  ABDOMEN:  Soft, non-tender, non-distended, BS+  EXTEREMITIES:  No cyanosis  SKIN:  Warm & Dry.  NEURO:  Alert, oriented    Vital Signs:  Vital Signs Last 24 Hrs  T(C): 36.3 (06 Aug 2020 10:40), Max: 36.6 (05 Aug 2020 23:48)  T(F): 97.3 (06 Aug 2020 10:40), Max: 97.9 (05 Aug 2020 23:48)  HR: 78 (06 Aug 2020 10:40) (73 - 83)  BP: 76/54 (06 Aug 2020 10:40) (71/52 - 112/70)  BP(mean): --  RR: 16 (06 Aug 2020 10:40) (16 - 20)  SpO2: 100% (06 Aug 2020 10:40) (96% - 100%)  Daily Height in cm: 154.94 (06 Aug 2020 02:05)    Daily     LABS:                        12.8   7.04  )-----------( 243      ( 06 Aug 2020 06:00 )             38.3     Mean Cell Volume: 72.0 fL (20 @ 06:00)    08-    133<L>  |  80<L>  |  180<H>  ----------------------------<  95  4.6   |  15<L>  |  13.08<H>    Ca    9.9      06 Aug 2020 06:00  Phos  13.5     08-06  Mg     3.2     08-06    TPro  6.4  /  Alb  3.2<L>  /  TBili  0.5  /  DBili  x   /  AST  34<H>  /  ALT  18  /  AlkPhos  61  08-06    LIVER FUNCTIONS - ( 06 Aug 2020 06:00 )  Alb: 3.2 g/dL / Pro: 6.4 g/dL / ALK PHOS: 61 u/L / ALT: 18 u/L / AST: 34 u/L / GGT: x           PT/INR - ( 05 Aug 2020 17:40 )   PT: 11.9 SEC;   INR: 1.04          PTT - ( 05 Aug 2020 17:40 )  PTT:19.7 SEC  Urinalysis Basic - ( 05 Aug 2020 17:33 )    Color: DK YELLOW / Appearance: TURBID / S.028 / pH: 5.0  Gluc: 30 / Ketone: NEGATIVE  / Bili: NEGATIVEN / Urobili: NORMAL   Blood: TRACE / Protein: 50 / Nitrite: NEGATIVE   Leuk Esterase: TRACE / RBC: 3-5 / WBC 3-5   Sq Epi: FEW / Non Sq Epi: x / Bacteria: FEW    Amylase Serum--      Lipase [id number]       Ammonia--                          12.8   7.04  )-----------( 243      ( 06 Aug 2020 06:00 )             38.3                         14.3   8.33  )-----------( 332      ( 05 Aug 2020 17:40 )             43.2     Imaging:

## 2020-08-06 NOTE — PROGRESS NOTE ADULT - PROBLEM SELECTOR PLAN 9
DVT PPx: on heparin ggt, LUE DVT  Diet: DASH/TLC diet  Med Rec: Must be obtained, brother to bring in medications Transitions of Care Status:  1.  Name of PCP:  2.  PCP Contacted on Admission: [ ] Y    [x] N    3.  PCP contacted at Discharge: [ ] Y    [ ] N    [ ] N/A  4.  Post-Discharge Appointment Date and Location:  5.  Summary of Handoff given to PCP:

## 2020-08-06 NOTE — H&P ADULT - PROBLEM SELECTOR PLAN 6
AMS possible i/s/o uremic encephalopathy worsening already present dementia.  A&Ox1-2  - Monitor for improvement or decline  - Hold off on CTH for now, given obvious source of AMS  - Monitor for uremic bleeding Possibly in setting of infection vs. excess fluid loss from pancreatitis.  - Monitor temps, improving  - Plan as below for choledocholithiasis

## 2020-08-06 NOTE — H&P ADULT - HISTORY OF PRESENT ILLNESS
79 year old woman with PMHx dementia, HTN, unclear other medical history initially presents with abdominal pain, nausea, and vomiting "black fluids" for three days, with inability to tolerate PO, found to be in acute renal failure with hyperkalemia. History is limited by patient's dementia, unable to contact patient's brother, with whom she lives. Patient reports she came in because she had "lots of acid in my stomach." On arrival, noted to by hypotensive and in acute renal failure with high anion gap metabolic acidosis, hyperkalemic. Evaluated by Nephrology, determined not to be candidate for urgent HD. Denies chest pain, shortness of breath, vomiting, diarrhea, constipation, though history not reliable.     In ED, Tmin 94.8, BP 73/47, improved to 112/70 with 2L IVF,B HR 78, SpO2 99% on RA, RR 18. 79 year old woman with PMHx dementia, HTN, unclear other medical history initially presents with abdominal pain, nausea, and vomiting "black fluids" for three days, with inability to tolerate PO, found to be in acute renal failure with hyperkalemia. History is limited by patient's dementia, unable to contact patient's brother, with whom she lives. Patient reports she came in because she had "lots of acid in my stomach." On arrival, noted to by hypotensive and in acute renal failure with high anion gap metabolic acidosis, hyperkalemic. Evaluated by Nephrology, determined not to be candidate for urgent HD. Denies chest pain, shortness of breath, vomiting, diarrhea, constipation, though history not reliable.     On interview, complains of left arm pain.     In ED, Tmin 94.8, BP 73/47, improved to 112/70 with 2L NS, HR 78, SpO2 99% on RA, RR 18.   Na 129, K 6.8, AG 45, , Cr 18.74 -> 14.07  Given Calcium gluconate, 5u Insulin/1 amp D50, Lokelma x1 - K improved to 4.9. Started Bicarb gtt @ 75cc/hr 79 year old woman with PMHx dementia, HTN, unclear other medical history initially presents with abdominal pain, nausea, and vomiting "black fluids" for three days, with inability to tolerate PO, found to be in acute renal failure with hyperkalemia. History is limited by patient's dementia, unable to contact patient's brother, with whom she lives. Patient reports she came in because she had "lots of acid in my stomach." On arrival, noted to by hypotensive and in acute renal failure with high anion gap metabolic acidosis, hyperkalemic. Evaluated by Nephrology, determined not to be candidate for urgent HD. Denies chest pain, shortness of breath, vomiting, diarrhea, constipation, though history not reliable.     On interview, complains of left arm pain.     In ED, Tmin 94.8, BP 73/47, improved to 112/70 with 2L NS, HR 78, SpO2 99% on RA, RR 18.   Na 129, K 6.8, AG 45, , Cr 18.74 -> 14.07  Given 2g Calcium gluconate, 5u Insulin/1 amp D50, Lokelma x1 - K improved to 4.9. Started Bicarb gtt @ 75cc/hr 79 year old woman with PMHx dementia, HTN, unclear other medical history initially presents with abdominal pain, nausea, and vomiting "black fluids" for three days, with inability to tolerate PO, found to be in acute renal failure with hyperkalemia. History is limited by patient's dementia, unable to contact patient's brother, with whom she lives. Patient reports she came in because she had "lots of acid in my stomach." On arrival, noted to by hypotensive and in acute renal failure with high anion gap metabolic acidosis and hyperkalemia. Evaluated by Nephrology, determined not to be candidate for urgent HD. Denies chest pain, shortness of breath, vomiting, diarrhea, constipation, though history not reliable.     On interview, complains of left arm pain.     In ED, Tmin 94.8, BP 73/47, improved to 112/70 with 2L NS, HR 78, SpO2 99% on RA, RR 18.   Na 129, K 6.8, AG 45, , Cr 18.74 -> 14.07  Given 2g Calcium gluconate, 5u Insulin/1 amp D50, Lokelma x1 - K improved to 4.9. Started Bicarb gtt @ 75cc/hr

## 2020-08-06 NOTE — PROVIDER CONTACT NOTE (OTHER) - SITUATION
patient blood pressure has been trending low. patient asymptomatic patient denies SOB, chest pain. NAD noted. no signs and symptoms of respiratory distress.

## 2020-08-06 NOTE — PROGRESS NOTE ADULT - PROBLEM SELECTOR PLAN 3
K 6.8->4.9 with Lokelma, Insulin 5u/D50, 1 amp of sodium bicarb. In setting of acute renal failure and acidosis.   - Monitor BMP q6hrs for now   - EKG without HyperK changes, s/p 2g IV calcium gluconate  - Monitor on tele for now, as pt at increased risk of recurrent hyperkalemia given extent of acute renal failure  - K of 4.6 this am AMS possible i/s/o uremic encephalopathy worsening already present dementia.  A&Ox1-2 on exam.  - Monitor for improvement or decline. Will attempt during the day to reach out to family regarding baseline mental status, as attempts unsuccessful overnight.  - Hold off on CTH for now, given likely sources of AMS  - Monitor for uremic bleeding  - Aspiration precautions, fall risk protocol

## 2020-08-06 NOTE — H&P ADULT - PROBLEM SELECTOR PLAN 1
Cr 18->14 with IVF, continues to make urine. POCUS in ED and Abdominal CT shows no obvious hydroureteronephrosis, only mild perinephric stranding bilaterally. Determined not to be urgent HD candidate.  - If creatinine worsens or fails to improve, or if UOP drops, consider MICU eval for urgent HD.  - Nephrology following, appreciate recs  - Avoid nephrotoxic medications  - Obtain outpatient records regarding baseline kidney function  - Monitor BMP  - Consider 60mg IVP lasix if BP improves, currently still low Cr 18->14 with IVF, continues to make urine. POCUS in ED and Abdominal CT shows no obvious hydroureteronephrosis, only mild perinephric stranding bilaterally. Determined not to be urgent HD candidate by Renal.  - Low threshold for MICU eval for urgent HD if hyperkalemia persists, acidosis worsens, or acute renal failure worsens  - Nephrology following, appreciate recs  - Avoid NSAIDs, ACEi/ARBs, contrast, nephrotoxic medications  - Obtain outpatient records regarding baseline kidney function  - Monitor BMP - HCO3, K, Ca, Phos  - Will likely need to be started on phosphate binder  - Metcalf catheter placed in ED to accurately monitor I&Os

## 2020-08-06 NOTE — H&P ADULT - ASSESSMENT
79F with PMHx probably HTN, probable dementia presents with hyperkalemia and uremia 2/2 acute renal failure of unknown etiology. 79F with PMHx probable HTN, probable dementia presents with hyperkalemia and uremia 2/2 acute renal failure of unknown etiology.

## 2020-08-06 NOTE — PROVIDER CONTACT NOTE (OTHER) - SITUATION
patient hypotensive with blood pressure 76/54. blood pressure checked manually and is consistent with hypotensive. patient denies SOB, chest pain, dizziness. NAD noted.

## 2020-08-06 NOTE — H&P ADULT - NSHPLABSRESULTS_GEN_ALL_CORE
14.3   8.33  )-----------( 332      ( 05 Aug 2020 17:40 )             43.2     08-05    133<L>  |  86<L>  |  153<H>  ----------------------------<  178<H>  4.9   |  11<L>  |  14.07<H>    Ca    10.4      05 Aug 2020 21:23  Phos  14.2     08-05  Mg     3.1     08-05    TPro  8.1  /  Alb  4.2  /  TBili  0.5  /  DBili  x   /  AST  35<H>  /  ALT  21  /  AlkPhos  74  08-05    PT/INR - ( 05 Aug 2020 17:40 )   PT: 11.9 SEC;   INR: 1.04       PTT - ( 05 Aug 2020 17:40 )  PTT:19.7 SEC    Urinalysis (08.05.20 @ 17:33)    Color: DK YELLOW    Urine Appearance: TURBID    Glucose: 30    Bilirubin: NEGATIVE    Ketone - Urine: NEGATIVE    Specific Gravity: 1.028    Blood: TRACE    pH - Urine: 5.0    Protein, Urine: 50    Urobilinogen: NORMAL    Nitrite: NEGATIVE    Leukocyte Esterase Concentration: TRACE    Red Blood Cell - Urine: 3-5    White Blood Cell - Urine: 3-5    Bacteria: FEW    Squamous Epithelial: FEW      < from: CT Abdomen and Pelvis No Cont (08.05.20 @ 21:13) >    IMPRESSION:    Choledocholithiasis as described. Recommend clinical correlation to assess acute cholangitis.  No significant peripancreatic inflammatory change or drainable fluid collection. Recommend clinical correlation to assess acute pancreatitis.  Endocervical thickening. Neoplasm cannot be excluded in a postmenopausal patient. Recommend clinical correlation and follow-up.  Additional findings as described.  < end of copied text >    < from: Xray Chest 1 View-PORTABLE IMMEDIATE (08.05.20 @ 19:09) >  INTERPRETATION:  Clearlungs.  < end of copied text >    EKG NSR 14.3   8.33  )-----------( 332      ( 05 Aug 2020 17:40 )             43.2     08-05    133<L>  |  86<L>  |  153<H>  ----------------------------<  178<H>  4.9   |  11<L>  |  14.07<H>    Ca    10.4      05 Aug 2020 21:23  Phos  14.2     08-05  Mg     3.1     08-05    TPro  8.1  /  Alb  4.2  /  TBili  0.5  /  DBili  x   /  AST  35<H>  /  ALT  21  /  AlkPhos  74  08-05    PT/INR - ( 05 Aug 2020 17:40 )   PT: 11.9 SEC;   INR: 1.04       PTT - ( 05 Aug 2020 17:40 )  PTT:19.7 SEC    Blood Gas Venous Comprehensive (08.05.20 @ 21:23)    Blood Gas Venous - Lactate: 3.8: Please note updated reference range. mmol/L    Blood Gas Venous - Chloride: 96 mmol/L    Blood Gas Venous - Creatinine: 12.50 mg/dL    pH, Venous: 7.20 pH    pCO2, Venous: 35 mmHg    pO2, Venous: 31 mmHg    HCO3, Venous: 13 mmol/L    Blood Gas Venous - FIO2: 21    Base Excess, Venous: -13.4: REFERENCE RANGE = -3 + 2 mmol/L mmol/L    Oxygen Saturation, Venous: 36.2 %    Blood Gas Venous - Sodium: 126 mmol/L    Blood Gas Venous - Potassium: 4.7 mmol/L    Blood Gas Venous - Glucose: 165 mg/dL    Blood Gas Venous - Hemoglobin: 12.3 g/dL    Blood Gas Venous - Hematocrit: 38.0 %      Urinalysis (08.05.20 @ 17:33)    Color: DK YELLOW    Urine Appearance: TURBID    Glucose: 30    Bilirubin: NEGATIVE    Ketone - Urine: NEGATIVE    Specific Gravity: 1.028    Blood: TRACE    pH - Urine: 5.0    Protein, Urine: 50    Urobilinogen: NORMAL    Nitrite: NEGATIVE    Leukocyte Esterase Concentration: TRACE    Red Blood Cell - Urine: 3-5    White Blood Cell - Urine: 3-5    Bacteria: FEW    Squamous Epithelial: FEW      < from: CT Abdomen and Pelvis No Cont (08.05.20 @ 21:13) >    IMPRESSION:    Choledocholithiasis as described. Recommend clinical correlation to assess acute cholangitis.  No significant peripancreatic inflammatory change or drainable fluid collection. Recommend clinical correlation to assess acute pancreatitis.  Endocervical thickening. Neoplasm cannot be excluded in a postmenopausal patient. Recommend clinical correlation and follow-up.  Additional findings as described.  < end of copied text >    < from: Xray Chest 1 View-PORTABLE IMMEDIATE (08.05.20 @ 19:09) >  INTERPRETATION:  Clearlungs.  < end of copied text >    EKG NSR 14.3   8.33  )-----------( 332      ( 05 Aug 2020 17:40 )             43.2     08-05    133<L>  |  86<L>  |  153<H>  ----------------------------<  178<H>  4.9   |  11<L>  |  14.07<H>    Ca    10.4      05 Aug 2020 21:23  Phos  14.2     08-05  Mg     3.1     08-05    TPro  8.1  /  Alb  4.2  /  TBili  0.5  /  DBili  x   /  AST  35<H>  /  ALT  21  /  AlkPhos  74  08-05    PT/INR - ( 05 Aug 2020 17:40 )   PT: 11.9 SEC;   INR: 1.04       PTT - ( 05 Aug 2020 17:40 )  PTT:19.7 SEC    Blood Gas Venous Comprehensive (08.05.20 @ 21:23)    Blood Gas Venous - Lactate: 3.8: Please note updated reference range. mmol/L    Blood Gas Venous - Chloride: 96 mmol/L    Blood Gas Venous - Creatinine: 12.50 mg/dL    pH, Venous: 7.20 pH    pCO2, Venous: 35 mmHg    pO2, Venous: 31 mmHg    HCO3, Venous: 13 mmol/L    Blood Gas Venous - FIO2: 21    Base Excess, Venous: -13.4: REFERENCE RANGE = -3 + 2 mmol/L mmol/L    Oxygen Saturation, Venous: 36.2 %    Blood Gas Venous - Sodium: 126 mmol/L    Blood Gas Venous - Potassium: 4.7 mmol/L    Blood Gas Venous - Glucose: 165 mg/dL    Blood Gas Venous - Hemoglobin: 12.3 g/dL    Blood Gas Venous - Hematocrit: 38.0 %      Urinalysis (08.05.20 @ 17:33)    Color: DK YELLOW    Urine Appearance: TURBID    Glucose: 30    Bilirubin: NEGATIVE    Ketone - Urine: NEGATIVE    Specific Gravity: 1.028    Blood: TRACE    pH - Urine: 5.0    Protein, Urine: 50    Urobilinogen: NORMAL    Nitrite: NEGATIVE    Leukocyte Esterase Concentration: TRACE    Red Blood Cell - Urine: 3-5    White Blood Cell - Urine: 3-5    Bacteria: FEW    Squamous Epithelial: FEW    Imaging personally reviewed.  < from: CT Abdomen and Pelvis No Cont (08.05.20 @ 21:13) >    IMPRESSION:    Choledocholithiasis as described. Recommend clinical correlation to assess acute cholangitis.  No significant peripancreatic inflammatory change or drainable fluid collection. Recommend clinical correlation to assess acute pancreatitis.  Endocervical thickening. Neoplasm cannot be excluded in a postmenopausal patient. Recommend clinical correlation and follow-up.  Additional findings as described.  < end of copied text >    < from: Xray Chest 1 View-PORTABLE IMMEDIATE (08.05.20 @ 19:09) >  INTERPRETATION:  Clearlungs.    EKG personally reviewed.  NSR at 73 bpm. No acute ischemic changes. QTc 453 ms.

## 2020-08-06 NOTE — PROGRESS NOTE ADULT - PROBLEM SELECTOR PLAN 7
Incidentally found on abdominal CT. Possible acute biliary pancreatitis vs. cholangitis given lipase elevated at 400 and dilated CBD with presence of stone. No current abdominal pain or symptoms. Mild epigastric tenderness on exam.   - GI consult to be emailed / possible MRCP vs. ERCP  - Zosyn for now to cover for possible cholangitis given hypothermia, hypotension, both possibly in setting of infection  - C/w IVF for possible acute biliary pancreatitis Incidentally found on abdominal CT. Possible acute biliary pancreatitis vs. cholangitis given lipase elevated at 400 and dilated CBD with presence of stone. No current abdominal pain or symptoms. Mild epigastric tenderness on exam.   - GI consult to be emailed / possible MRCP vs. ERCP once stable  - Zosyn for now to cover for possible cholangitis given hypothermia, hypotension, both possibly in setting of infection  - C/w IVF for possible acute biliary pancreatitis

## 2020-08-06 NOTE — H&P ADULT - PROBLEM SELECTOR PLAN 2
pH 7.2, AG 45->36  - Currently on NaBicarb in 1/2NS at 75cc/hr  - Monitor VBGs  - Will obtain ABG  - Trend lactate pH 7.2, HCO3 13->11, AG 45->36. Likely 2/2 acute renal failure and starvation ketosis.  - S/p 1 amp of sodium bicarb in ED. Currently on Bicarb gtt with 1/2NS at 75cc/hr as per Nephrology recs.  - Monitor BMP and VBG  - Will obtain ABG x1 to better assess acid-base status  - Trend lactate

## 2020-08-06 NOTE — PROGRESS NOTE ADULT - SUBJECTIVE AND OBJECTIVE BOX
Britany Black, PGY-1    CHIEF COMPLAINT: Patient is a 79y old  Female who presents with a chief complaint of Acute renal failure, high anion gap metabolic acidosis (06 Aug 2020 13:38)      INTERVAL HPI/OVERNIGHT EVENTS: Pt admitted ON, hypothermic to 96.1F, HR 70s, BP 90-97/54-63, RR 16, SpO2% 96-98 on RA. This am pt w/ drop in BP to 76/54, asymptomatic at this time. Pt lying in bed upon arrival, denies HA, CP, SOB, abd pain, fever, chills, weakness. Offered  and attempted to call niece for Faroese translation, however pt prefers to speak in English during interview.    MEDICATIONS (STANDING):  heparin  Infusion.  Unit(s)/Hr IV Continuous <Continuous>  lactated ringers. 1000 milliLiter(s) IV Continuous <Continuous>  piperacillin/tazobactam IVPB.. 3.375 Gram(s) IV Intermittent every 12 hours    MEDICATIONS  (PRN):      REVIEW OF SYSTEMS:  CONSTITUTIONAL: No fever  RESPIRATORY: No shortness of breath  CARDIOVASCULAR: No chest pain, dizziness  GASTROINTESTINAL: No abdominal or epigastric pain. No diarrhea or constipation. No melena or hematochezia  GENITOURINARY: No dysuria, hematuria  NEUROLOGICAL: No headaches  MUSCULOSKELETAL: No muscle or joint pain    T(F): 97.3 (20 @ 12:24), Max: 97.9 (20 @ 23:48)  HR: 78 (20 @ 13:35) (73 - 83)  BP: 88/51 (20 @ 13:35) (71/52 - 112/70)  RR: 18 (20 @ 13:35) (16 - 20)  SpO2: 100% (20 @ 13:35) (96% - 100%)  Wt(kg): --  CAPILLARY BLOOD GLUCOSE      POCT Blood Glucose.: 106 mg/dL (05 Aug 2020 19:54)    I&O's Summary      PHYSICAL EXAM:  GENERAL: Obese appearing woman, anxious appearing in context of nurse placing IV, however pleasant to interview.   HEAD: Atraumatic, Normocephalic  EYES: PERRL, conjunctiva and sclera clear, wearing glasses  ENMT: No tonsillar erythema, exudates, or enlargement; +dry mucous membranes  NECK: Supple  CHEST/LUNG: Clear to auscultation bilaterally; No rales, rhonchi, wheezing, or rubs  HEART: Regular rate and rhythm; No murmurs, rubs, or gallops  ABDOMEN: Soft, Nontender, Nondistended; Bowel sounds present  EXTREMITIES:  Pulses present, No edema. +cool extremities, LUE with purple discoloration and relatively more cool to palpation    LABS:                        12.8   7.04  )-----------( 243      ( 06 Aug 2020 06:00 )             38.3     08-06    133<L>  |  80<L>  |  180<H>  ----------------------------<  95  4.6   |  15<L>  |  13.08<H>    Ca    9.9      06 Aug 2020 06:00  Phos  13.5     08-06  Mg     3.2     08-06    TPro  6.4  /  Alb  3.2<L>  /  TBili  0.5  /  DBili  x   /  AST  34<H>  /  ALT  18  /  AlkPhos  61  08-06    PT/INR - ( 05 Aug 2020 17:40 )   PT: 11.9 SEC;   INR: 1.04          PTT - ( 05 Aug 2020 17:40 )  PTT:19.7 SEC  Urinalysis Basic - ( 05 Aug 2020 17:33 )    Color: DK YELLOW / Appearance: TURBID / S.028 / pH: 5.0  Gluc: 30 / Ketone: NEGATIVE  / Bili: NEGATIVEN / Urobili: NORMAL   Blood: TRACE / Protein: 50 / Nitrite: NEGATIVE   Leuk Esterase: TRACE / RBC: 3-5 / WBC 3-5   Sq Epi: FEW / Non Sq Epi: x / Bacteria: FEW          RADIOLOGY & ADDITIONAL TESTS: Britany Black, PGY-1    CHIEF COMPLAINT: Patient is a 79y old  Female who presents with a chief complaint of Acute renal failure, high anion gap metabolic acidosis (06 Aug 2020 13:38)      INTERVAL HPI/OVERNIGHT EVENTS: Pt admitted ON, hypothermic to 96.1F, HR 70s, BP 90-97/54-63, RR 16, SpO2% 96-98 on RA. This am pt w/ drop in BP to 76/54, asymptomatic at this time. Pt lying in bed upon arrival, denies HA, CP, SOB, abd pain, fever, chills, weakness. Offered  and attempted to call niece for Persian translation, however pt prefers to speak in English during interview.    MEDICATIONS (STANDING):  heparin  Infusion.  Unit(s)/Hr IV Continuous <Continuous>  lactated ringers. 1000 milliLiter(s) IV Continuous <Continuous>  piperacillin/tazobactam IVPB.. 3.375 Gram(s) IV Intermittent every 12 hours    MEDICATIONS  (PRN):      REVIEW OF SYSTEMS:  CONSTITUTIONAL: No fever  RESPIRATORY: No shortness of breath  CARDIOVASCULAR: No chest pain, dizziness  GASTROINTESTINAL: No abdominal or epigastric pain. No diarrhea or constipation. No melena or hematochezia  GENITOURINARY: No dysuria, hematuria  NEUROLOGICAL: No headaches  MUSCULOSKELETAL: No muscle or joint pain    T(F): 97.3 (20 @ 12:24), Max: 97.9 (20 @ 23:48)  HR: 78 (20 @ 13:35) (73 - 83)  BP: 88/51 (20 @ 13:35) (71/52 - 112/70)  RR: 18 (20 @ 13:35) (16 - 20)  SpO2: 100% (20 @ 13:35) (96% - 100%)  Wt(kg): --  CAPILLARY BLOOD GLUCOSE      POCT Blood Glucose.: 106 mg/dL (05 Aug 2020 19:54)    I&O's Summary      PHYSICAL EXAM:  GENERAL: Obese appearing woman, anxious appearing in context of nurse placing IV, however pleasant to interview.   HEAD: Atraumatic, Normocephalic  EYES: PERRL, conjunctiva and sclera clear, wearing glasses  ENMT: No tonsillar erythema, exudates, or enlargement; +dry mucous membranes  NECK: Supple  CHEST/LUNG: Clear to auscultation bilaterally; No rales, rhonchi, wheezing, or rubs  HEART: Regular rate and rhythm; No murmurs, rubs, or gallops  ABDOMEN: Soft, Nontender, Nondistended; Bowel sounds present  EXTREMITIES:  Pulses present, No edema. +cool extremities, LUE with purple discoloration and relatively more cool to palpation    LABS:                        12.8   7.04  )-----------( 243      ( 06 Aug 2020 06:00 )             38.3     08-06    133<L>  |  80<L>  |  180<H>  ----------------------------<  95  4.6   |  15<L>  |  13.08<H>    Ca    9.9      06 Aug 2020 06:00  Phos  13.5     08-06  Mg     3.2     08-06    TPro  6.4  /  Alb  3.2<L>  /  TBili  0.5  /  DBili  x   /  AST  34<H>  /  ALT  18  /  AlkPhos  61  08-06    PT/INR - ( 05 Aug 2020 17:40 )   PT: 11.9 SEC;   INR: 1.04          PTT - ( 05 Aug 2020 17:40 )  PTT:19.7 SEC  Urinalysis Basic - ( 05 Aug 2020 17:33 )    Color: DK YELLOW / Appearance: TURBID / S.028 / pH: 5.0  Gluc: 30 / Ketone: NEGATIVE  / Bili: NEGATIVEN / Urobili: NORMAL   Blood: TRACE / Protein: 50 / Nitrite: NEGATIVE   Leuk Esterase: TRACE / RBC: 3-5 / WBC 3-5   Sq Epi: FEW / Non Sq Epi: x / Bacteria: FEW    Telemetry personally reviewed: Sinus 70s    Consultant notes reviewed: GI    RADIOLOGY & ADDITIONAL TESTS:    < from: VA Duplex Ext Veins Upper Comp, Left. (20 @ 09:15) >  RESULT:  ------------------------------------------------------------------------  LEFT:  Deep venous thrombosis: Yes  Superficial venous thrombosis: Yes  ------------------------------------------------------------------------  Right Findings: Superficial vein thrombosis visualized in  the basilic (upper arm) vein.  Left Findings: Deep vein thrombosis visualized in the left  brachial and radial veins.  Superficial vein thrombosis visualized in the basilic and  cephalic veins.  The left internal jugular, subclavian, axillary, and ulnar  veins are patent, and demonstrate full compressibility  with phasic Doppler waveforms.  No evidence of thrombosis.  ------------------------------------------------------------------------  Summary/Impressions:  Deep vein thrombosis visualized in the left brachial and  radial veins.  Superficial vein thrombosis visualized in the basilic and  cephalic veins.    < end of copied text >

## 2020-08-06 NOTE — PROGRESS NOTE ADULT - PROBLEM SELECTOR PLAN 6
Possibly in setting of infection vs. excess fluid loss from pancreatitis.  - Monitor temps, improving  - Plan as below for choledocholithiasis

## 2020-08-06 NOTE — H&P ADULT - NSHPREVIEWOFSYSTEMS_GEN_ALL_CORE
ROS limited by patient's mental status  CONSTITUTIONAL: No fevers or chills  NECK: No pain  RESPIRATORY: No cough, wheezing, hemoptysis; No shortness of breath  CARDIOVASCULAR: No chest pain or palpitations  GASTROINTESTINAL: No abdominal or epigastric pain. No nausea, vomiting, (reported vomiting dark fluid at home from brother), No diarrhea or constipation. No melena or hematochezia.  GENITOURINARY: No dysuria, frequency or hematuria  SKIN: No itching, burning, rashes, or lesions   EXTREMITIES: +Left arm pain  All other review of systems is negative unless indicated above. ROS limited by patient's dementia and poor mental status  CONSTITUTIONAL: No fevers or chills  NECK: No pain  RESPIRATORY: No SOB, cough, wheezing, or hemoptysis  CARDIOVASCULAR: No chest pain or palpitations  GASTROINTESTINAL: No abdominal or epigastric pain. No nausea, vomiting, (reported vomiting dark fluid at home from brother). No diarrhea or constipation. No melena or hematochezia.  GENITOURINARY: No dysuria, urinary frequency, or hematuria  SKIN: No itching, burning, rashes, or lesions   EXTREMITIES: +Left arm pain  All other review of systems is negative unless indicated above.

## 2020-08-06 NOTE — H&P ADULT - PROBLEM SELECTOR PLAN 7
Incidentally found on abdominal CT. No current abdominal pain or symptoms. Abdomen nontender.  - Consider surgery consult Incidentally found on abdominal CT. Possible acute biliary pancreatitis. No current abdominal pain or symptoms. Abdomen nontender on exam. Lipase elevated at 400. Dilated CBD, stone in CBD  - Consider surgery consult  - GI consult/ consider MRCP or ERCP  - Zosyn to cover for acute pancreatitis or cholangitis given hypothermia, hypotension. Incidentally found on abdominal CT. Possible acute biliary pancreatitis vs. cholangitis given lipase elevated at 400 and dilated CBD with presence of stone. No current abdominal pain or symptoms. Mild epigastric tenderness on exam.   - GI consult to be emailed / possible MRCP vs. ERCP  - Zosyn for now to cover for possible cholangitis given hypothermia, hypotension, both possibly in setting of infection  - C/w IVF for possible acute biliary pancreatitis

## 2020-08-06 NOTE — PROGRESS NOTE ADULT - PROBLEM SELECTOR PLAN 8
Moderately severe L arm pain after IV infiltration, however entire arm appears purple, possibly 2/2 vasoconstriction, but asymmetric compared to right.  Pulses intact, strength intact.  - F/u VA Duplex venous, arterial LUE  - preliminary read LUE DVT - will f/u official report  - started on heparin ggt Moderately severe L arm pain after IV infiltration, however entire arm appears purple, possibly 2/2 vasoconstriction, but asymmetric compared to right.  Pulses intact, strength intact.  - F/u VA Duplex venous, arterial LUE  - VA duplex venous w/ evidence of DVT in L brachial and radial veins, superficial venous thrombosis in L basilic and cephalic and R basilic.   - no evidence of arterial occlusion LUE  - started on heparin ggt DVT PPx: on heparin ggt, LUE DVT  Diet: DASH/TLC diet  Med Rec: Must be obtained, brother to bring in medications    Moderately severe L arm pain after IV infiltration, however entire arm appears purple, possibly 2/2 vasoconstriction, but asymmetric compared to right.  Pulses intact, strength intact.  - F/u VA Duplex venous, arterial LUE  - VA duplex venous w/ evidence of DVT in L brachial and radial veins, superficial venous thrombosis in L basilic and cephalic and R basilic.   - no evidence of arterial occlusion LUE  - started on heparin ggt DVT PPx: on heparin ggt, LUE DVT  Diet: DASH/TLC diet  Med Rec: Must be obtained, brother to bring in medications    Moderately severe L arm pain after IV infiltration, however entire arm appears purple, possibly 2/2 vasoconstriction, but asymmetric compared to right.  Pulses intact, strength intact.  - VA duplex venous w/ evidence of DVT in L brachial and radial veins, superficial venous thrombosis in L basilic and cephalic and R basilic.   - no evidence of arterial occlusion LUE  - started on heparin ggt

## 2020-08-06 NOTE — H&P ADULT - PROBLEM SELECTOR PLAN 4
Fluid responsive, initially to 70s/40s  - Currently on fluids at 75cc/hr  - Bolus as necessary to keep MAP>65  - Monitor urine output AMS possible i/s/o uremic encephalopathy worsening already present dementia.  A&Ox1-2 on exam.  - Monitor for improvement or decline. Will attempt during the day to reach out to family regarding baseline mental status, as attempts unsuccessful overnight.  - Hold off on CTH for now, given likely sources of AMS  - Monitor for uremic bleeding  - Aspiration precautions, fall risk protocol

## 2020-08-06 NOTE — PROGRESS NOTE ADULT - PROBLEM SELECTOR PLAN 5
Fluid responsive, initially hypotensive to 70s/40s. Likely from recent N/V and poor PO intake.  - Currently on fluids with 1/2NS at 75cc/hr  - Bolus as necessary to keep MAP>65  - Monitor urine output Moderately severe L arm pain after IV infiltration, however entire arm appears purple, possibly 2/2 vasoconstriction, but asymmetric compared to right.  Pulses intact, strength intact.  - VA duplex venous w/ evidence of DVT in L brachial and radial veins, superficial venous thrombosis in L basilic and cephalic and R basilic.   - no evidence of arterial occlusion LUE  - started on heparin ggt Moderately severe L arm pain after IV infiltration, however entire arm appears purple, possibly 2/2 vasoconstriction, but asymmetric compared to right.  Pulses intact, strength intact.  - VA duplex venous w/ evidence of DVT in L brachial and radial veins, superficial venous thrombosis in L basilic and cephalic and R basilic.   - no evidence of arterial occlusion LUE  - started on heparin gtt

## 2020-08-06 NOTE — CONSULT NOTE ADULT - ASSESSMENT
Impression:  # Choledocholithiasis Impression:  # Choledocholithiasis (1 x .5) with dilated CBD. Bile duct also dilated with normal liver enzymes. No fevers/leukocytosis and normal bilirubin - low suspicion for acute cholangitis  # Abdominal Pain: Concerning pancreatitis based on elevated lipase and pain. However description of pain not consistent with pancreatitis. CT unremarkable, however limited due to lack of contrast. Elevations in lipase may be seen in the setting of renal insufficiency and hypotension.    Recommendation:  - Pending optimization [renal failure, electrolytes, etc.) can consider ERCP for stone removal  - Trend CMP and monitor for signs of sepsis  - Supportive care per primary team    Jennifer Hurt MD  Gastroenterology Fellow  [phone]  Available on Microsoft Teams  Please page on call fellow on weekends and after 5pm on weekdays: [phone]

## 2020-08-06 NOTE — PROGRESS NOTE ADULT - PROBLEM SELECTOR PLAN 2
pH 7.2, HCO3 13->11, AG 45->36. Likely 2/2 acute renal failure and starvation ketosis.  - S/p 1 amp of sodium bicarb in ED. Currently on Bicarb gtt with 1/2NS at 75cc/hr as per Nephrology recs.  - Monitor BMP and VBG  - Will obtain ABG x1 to better assess acid-base status  - Trend lactate  - AG 38 this am pH 7.2, HCO3 13->11, AG 45->36. Likely 2/2 acute renal failure and starvation ketosis.  - S/p 1 amp of sodium bicarb in ED.  - Monitor BMP and VBG  - Trend lactate  - AG 38 this am pH 7.2, HCO3 13->11, AG 45->36. Likely 2/2 acute renal failure and starvation ketosis.  - S/p 1 amp of sodium bicarb in ED.  - Monitor BMP and VBG  - Trend lactate  - AG 38 this am  - appreciate renal recs

## 2020-08-07 DIAGNOSIS — N17.9 ACUTE KIDNEY FAILURE, UNSPECIFIED: ICD-10-CM

## 2020-08-07 DIAGNOSIS — E83.39 OTHER DISORDERS OF PHOSPHORUS METABOLISM: ICD-10-CM

## 2020-08-07 LAB
ANION GAP SERPL CALC-SCNC: 22 MMO/L — HIGH (ref 7–14)
APTT BLD: 91.3 SEC — HIGH (ref 27–36.3)
APTT BLD: > 200 SEC — CRITICAL HIGH (ref 27–36.3)
APTT BLD: > 200 SEC — CRITICAL HIGH (ref 27–36.3)
BUN SERPL-MCNC: 154 MG/DL — HIGH (ref 7–23)
CALCIUM SERPL-MCNC: 8.2 MG/DL — LOW (ref 8.4–10.5)
CHLORIDE SERPL-SCNC: 93 MMOL/L — LOW (ref 98–107)
CO2 SERPL-SCNC: 20 MMOL/L — LOW (ref 22–31)
CREAT SERPL-MCNC: 4.31 MG/DL — HIGH (ref 0.5–1.3)
GLUCOSE SERPL-MCNC: 120 MG/DL — HIGH (ref 70–99)
HCT VFR BLD CALC: 27.5 % — LOW (ref 34.5–45)
HCT VFR BLD CALC: 30.8 % — LOW (ref 34.5–45)
HCT VFR BLD CALC: 34.4 % — LOW (ref 34.5–45)
HGB BLD-MCNC: 10.3 G/DL — LOW (ref 11.5–15.5)
HGB BLD-MCNC: 10.7 G/DL — LOW (ref 11.5–15.5)
HGB BLD-MCNC: 8.9 G/DL — LOW (ref 11.5–15.5)
MAGNESIUM SERPL-MCNC: 2.1 MG/DL — SIGNIFICANT CHANGE UP (ref 1.6–2.6)
MCHC RBC-ENTMCNC: 24 PG — LOW (ref 27–34)
MCHC RBC-ENTMCNC: 24 PG — LOW (ref 27–34)
MCHC RBC-ENTMCNC: 24.2 PG — LOW (ref 27–34)
MCHC RBC-ENTMCNC: 31.1 % — LOW (ref 32–36)
MCHC RBC-ENTMCNC: 32.4 % — SIGNIFICANT CHANGE UP (ref 32–36)
MCHC RBC-ENTMCNC: 33.4 % — SIGNIFICANT CHANGE UP (ref 32–36)
MCV RBC AUTO: 72.5 FL — LOW (ref 80–100)
MCV RBC AUTO: 74.1 FL — LOW (ref 80–100)
MCV RBC AUTO: 77.3 FL — LOW (ref 80–100)
NRBC # FLD: 0 K/UL — SIGNIFICANT CHANGE UP (ref 0–0)
OB PNL STL: POSITIVE — SIGNIFICANT CHANGE UP
PHOSPHATE SERPL-MCNC: 6 MG/DL — HIGH (ref 2.5–4.5)
PLATELET # BLD AUTO: 163 K/UL — SIGNIFICANT CHANGE UP (ref 150–400)
PLATELET # BLD AUTO: 191 K/UL — SIGNIFICANT CHANGE UP (ref 150–400)
PLATELET # BLD AUTO: 209 K/UL — SIGNIFICANT CHANGE UP (ref 150–400)
PMV BLD: 10.7 FL — SIGNIFICANT CHANGE UP (ref 7–13)
PMV BLD: 11.1 FL — SIGNIFICANT CHANGE UP (ref 7–13)
PMV BLD: 9.9 FL — SIGNIFICANT CHANGE UP (ref 7–13)
POTASSIUM SERPL-MCNC: 3.4 MMOL/L — LOW (ref 3.5–5.3)
POTASSIUM SERPL-SCNC: 3.4 MMOL/L — LOW (ref 3.5–5.3)
RBC # BLD: 3.71 M/UL — LOW (ref 3.8–5.2)
RBC # BLD: 4.25 M/UL — SIGNIFICANT CHANGE UP (ref 3.8–5.2)
RBC # BLD: 4.45 M/UL — SIGNIFICANT CHANGE UP (ref 3.8–5.2)
RBC # FLD: 14.6 % — HIGH (ref 10.3–14.5)
RBC # FLD: 14.6 % — HIGH (ref 10.3–14.5)
RBC # FLD: 14.7 % — HIGH (ref 10.3–14.5)
SODIUM SERPL-SCNC: 135 MMOL/L — SIGNIFICANT CHANGE UP (ref 135–145)
WBC # BLD: 6.7 K/UL — SIGNIFICANT CHANGE UP (ref 3.8–10.5)
WBC # BLD: 7.55 K/UL — SIGNIFICANT CHANGE UP (ref 3.8–10.5)
WBC # BLD: 7.95 K/UL — SIGNIFICANT CHANGE UP (ref 3.8–10.5)
WBC # FLD AUTO: 6.7 K/UL — SIGNIFICANT CHANGE UP (ref 3.8–10.5)
WBC # FLD AUTO: 7.55 K/UL — SIGNIFICANT CHANGE UP (ref 3.8–10.5)
WBC # FLD AUTO: 7.95 K/UL — SIGNIFICANT CHANGE UP (ref 3.8–10.5)

## 2020-08-07 PROCEDURE — 93306 TTE W/DOPPLER COMPLETE: CPT | Mod: 26

## 2020-08-07 PROCEDURE — 99233 SBSQ HOSP IP/OBS HIGH 50: CPT | Mod: GC

## 2020-08-07 PROCEDURE — 99232 SBSQ HOSP IP/OBS MODERATE 35: CPT | Mod: GC

## 2020-08-07 PROCEDURE — 93010 ELECTROCARDIOGRAM REPORT: CPT

## 2020-08-07 RX ORDER — SODIUM CHLORIDE 9 MG/ML
1000 INJECTION INTRAMUSCULAR; INTRAVENOUS; SUBCUTANEOUS
Refills: 0 | Status: DISCONTINUED | OUTPATIENT
Start: 2020-08-07 | End: 2020-08-07

## 2020-08-07 RX ORDER — SODIUM CHLORIDE 9 MG/ML
500 INJECTION INTRAMUSCULAR; INTRAVENOUS; SUBCUTANEOUS ONCE
Refills: 0 | Status: COMPLETED | OUTPATIENT
Start: 2020-08-07 | End: 2020-08-07

## 2020-08-07 RX ORDER — CALCIUM ACETATE 667 MG
667 TABLET ORAL
Refills: 0 | Status: DISCONTINUED | OUTPATIENT
Start: 2020-08-07 | End: 2020-08-08

## 2020-08-07 RX ORDER — POTASSIUM CHLORIDE 20 MEQ
40 PACKET (EA) ORAL EVERY 4 HOURS
Refills: 0 | Status: COMPLETED | OUTPATIENT
Start: 2020-08-07 | End: 2020-08-07

## 2020-08-07 RX ORDER — SODIUM BICARBONATE 1 MEQ/ML
650 SYRINGE (ML) INTRAVENOUS
Refills: 0 | Status: DISCONTINUED | OUTPATIENT
Start: 2020-08-07 | End: 2020-08-11

## 2020-08-07 RX ORDER — HEPARIN SODIUM 5000 [USP'U]/ML
3000 INJECTION INTRAVENOUS; SUBCUTANEOUS EVERY 6 HOURS
Refills: 0 | Status: DISCONTINUED | OUTPATIENT
Start: 2020-08-07 | End: 2020-08-08

## 2020-08-07 RX ORDER — HEPARIN SODIUM 5000 [USP'U]/ML
6500 INJECTION INTRAVENOUS; SUBCUTANEOUS EVERY 6 HOURS
Refills: 0 | Status: DISCONTINUED | OUTPATIENT
Start: 2020-08-07 | End: 2020-08-08

## 2020-08-07 RX ORDER — HEPARIN SODIUM 5000 [USP'U]/ML
1100 INJECTION INTRAVENOUS; SUBCUTANEOUS
Qty: 25000 | Refills: 0 | Status: DISCONTINUED | OUTPATIENT
Start: 2020-08-07 | End: 2020-08-08

## 2020-08-07 RX ORDER — SODIUM CHLORIDE 9 MG/ML
1000 INJECTION INTRAMUSCULAR; INTRAVENOUS; SUBCUTANEOUS
Refills: 0 | Status: DISCONTINUED | OUTPATIENT
Start: 2020-08-07 | End: 2020-08-08

## 2020-08-07 RX ADMIN — Medication 650 MILLIGRAM(S): at 17:32

## 2020-08-07 RX ADMIN — HEPARIN SODIUM 0 UNIT(S)/HR: 5000 INJECTION INTRAVENOUS; SUBCUTANEOUS at 13:33

## 2020-08-07 RX ADMIN — Medication 40 MILLIEQUIVALENT(S): at 14:31

## 2020-08-07 RX ADMIN — SODIUM CHLORIDE 1000 MILLILITER(S): 9 INJECTION INTRAMUSCULAR; INTRAVENOUS; SUBCUTANEOUS at 14:32

## 2020-08-07 RX ADMIN — SODIUM CHLORIDE 100 MILLILITER(S): 9 INJECTION INTRAMUSCULAR; INTRAVENOUS; SUBCUTANEOUS at 18:06

## 2020-08-07 RX ADMIN — SODIUM CHLORIDE 100 MILLILITER(S): 9 INJECTION INTRAMUSCULAR; INTRAVENOUS; SUBCUTANEOUS at 14:31

## 2020-08-07 RX ADMIN — PIPERACILLIN AND TAZOBACTAM 25 GRAM(S): 4; .5 INJECTION, POWDER, LYOPHILIZED, FOR SOLUTION INTRAVENOUS at 06:03

## 2020-08-07 RX ADMIN — SODIUM CHLORIDE 100 MILLILITER(S): 9 INJECTION INTRAMUSCULAR; INTRAVENOUS; SUBCUTANEOUS at 06:03

## 2020-08-07 RX ADMIN — SODIUM CHLORIDE 100 MILLILITER(S): 9 INJECTION INTRAMUSCULAR; INTRAVENOUS; SUBCUTANEOUS at 22:01

## 2020-08-07 RX ADMIN — Medication 667 MILLIGRAM(S): at 17:32

## 2020-08-07 RX ADMIN — HEPARIN SODIUM 800 UNIT(S)/HR: 5000 INJECTION INTRAVENOUS; SUBCUTANEOUS at 14:30

## 2020-08-07 RX ADMIN — SODIUM CHLORIDE 1000 MILLILITER(S): 9 INJECTION INTRAMUSCULAR; INTRAVENOUS; SUBCUTANEOUS at 18:07

## 2020-08-07 RX ADMIN — PIPERACILLIN AND TAZOBACTAM 25 GRAM(S): 4; .5 INJECTION, POWDER, LYOPHILIZED, FOR SOLUTION INTRAVENOUS at 17:32

## 2020-08-07 RX ADMIN — Medication 40 MILLIEQUIVALENT(S): at 17:32

## 2020-08-07 RX ADMIN — HEPARIN SODIUM 800 UNIT(S)/HR: 5000 INJECTION INTRAVENOUS; SUBCUTANEOUS at 22:01

## 2020-08-07 RX ADMIN — SODIUM CHLORIDE 1000 MILLILITER(S): 9 INJECTION INTRAMUSCULAR; INTRAVENOUS; SUBCUTANEOUS at 05:03

## 2020-08-07 RX ADMIN — HEPARIN SODIUM 1100 UNIT(S)/HR: 5000 INJECTION INTRAVENOUS; SUBCUTANEOUS at 04:48

## 2020-08-07 RX ADMIN — HEPARIN SODIUM 0 UNIT(S)/HR: 5000 INJECTION INTRAVENOUS; SUBCUTANEOUS at 03:43

## 2020-08-07 NOTE — PROGRESS NOTE ADULT - PROBLEM SELECTOR PLAN 2
Pt. with moderate hyperkalemia in the setting of kidney failure. Serum potassium elevated at 6.8 on admission. Pt received medical management. Serum potassium improved to 4.2 today.  Monitor serum potassium. Low potassium diet. Pt. with hyperkalemia in the setting of kidney failure. Serum potassium elevated at 6.8 on admission. Pt received medical management. Serum potassium improved to 4.2 today. Low potassium diet. Monitor serum potassium

## 2020-08-07 NOTE — CHART NOTE - NSCHARTNOTEFT_GEN_A_CORE
Paged by bedside RN    old dark stool not large volume. Discussed elevated aPTTs to >200 and need to titrate down heparin drip. Was lowered by day team and we will wait for this repeat aPTT to come back, pause the heparin, and then lower the rate.     RN please page me when aPTT comes back and I will adjust heparin    Bianca Yuen

## 2020-08-07 NOTE — PROGRESS NOTE ADULT - ASSESSMENT
79F with PMHx probable HTN, probable dementia presents with hyperkalemia (resolved) and uremia 2/2 acute renal failure of unknown etiology (possibly pre-renal). Course c/b hypothermia, hypotension, imaging +choledocholelithiasis, on broad spectrum antibiotics, also found to have LUE DVT, started on therapeutic heparin gtt. 79F with HTN, dementia presents with hyperkalemia (resolved) and AGMA, uremia 2/2 acute renal failure (suspecting pre-renal etiology). Course c/b hypothermia, hypotension, imaging choledocholithiasis, on broad spectrum antibiotics, also found to have LUE DVT, started on therapeutic heparin gtt.

## 2020-08-07 NOTE — PROGRESS NOTE ADULT - PROBLEM SELECTOR PLAN 2
pH 7.2, HCO3 13->11, AG 45->36. Likely 2/2 acute renal failure and starvation ketosis.  - S/p 1 amp of sodium bicarb in ED.  - Monitor BMP and VBG  - Trend lactate  - AG 38 this am  - appreciating renal recs as above pH 7.2, HCO3 13->11, AG 45->36. Likely 2/2 acute renal failure and starvation ketosis.  - S/p 1 amp of sodium bicarb in ED.  - Monitor BMP and VBG  - Trend lactate  - AG down to 22 this am  - appreciating renal recs, starting sodium bicarb as above

## 2020-08-07 NOTE — PROGRESS NOTE ADULT - PROBLEM SELECTOR PLAN 7
Incidentally found on abdominal CT. Possible acute biliary pancreatitis vs. cholangitis given lipase elevated at 400 and dilated CBD with presence of stone. No current abdominal pain or symptoms. Mild epigastric tenderness on exam.   - GI consulted: low suspicion of acute cholangitis, consider ERCP for stone removal pending medical optimization  - Zosyn for now to cover for possible cholangitis given hypothermia, hypotension, both possibly in setting of infection  - C/w IVF for possible acute biliary pancreatitis

## 2020-08-07 NOTE — PROGRESS NOTE ADULT - ATTENDING COMMENTS
Patient seen and examined, d/w Dr. Black, agree with above.     Acute renal failure c/b hyperkalemia (resolved) and metabolic acidosis (improving) with episodes of hypothermia/hypotension and choledocholithiasis noted on imaging, found to have LUE DVT.  FeUrea 23.2% suggesting pre-renal etiology setting of poor PO intake, hypotension, Cr continues to downtrend (18.74 to 4.31) with IV fluids, renal input appreciated, will start sodium bicarb for metabolic acidosis and phoslo for hyperphosphatemia as per renal. May have been on AceI as outpatient, will continue to hold. Avoid hypotension, TTE reviewed, patient with normal LV systolic function, EF 61%, c/w IV fluids, monitor renal function/volume status.  AM cortisol pending. Choledocholithiasis, will continue with broad spectrum antibiotics (renally dosed IV Zosyn), GI input appreciated, to reassess need for potential ERCP after stabilization. C/w heparin gtt (therapeutic A/C) for LUE DVT noted on duplex, monitor for bleed, to determine coumadin vs possible doac pending renal function. Daughter Violet updated on hospital course over phone per patient/family request.

## 2020-08-07 NOTE — PROGRESS NOTE ADULT - SUBJECTIVE AND OBJECTIVE BOX
Chief Complaint:  Patient is a 79y old  Female who presents with a chief complaint of Acute renal failure, high anion gap metabolic acidosis (07 Aug 2020 10:04)    Interval Events:   No acute overnight events  No fevers, chills, chest pain, shortness of breath, nausea, vomiting, diarrhea     Allergies:  No Known Allergies    Hospital Medications:  aluminum hydroxide/magnesium hydroxide/simethicone Suspension 30 milliLiter(s) Oral every 6 hours PRN  heparin   Injectable 6500 Unit(s) IV Push every 6 hours PRN  heparin   Injectable 3000 Unit(s) IV Push every 6 hours PRN  heparin  Infusion. 1100 Unit(s)/Hr IV Continuous <Continuous>  piperacillin/tazobactam IVPB.. 3.375 Gram(s) IV Intermittent every 12 hours  sodium chloride 0.9%. 1000 milliLiter(s) IV Continuous <Continuous>    PMHX/PSHX:  Dementia  Hypertension  No significant past surgical history    ROS:   General:  No fevers, chills  ENT:  No sore throat or dysphagia  CV:  No pain or palpitations  Resp:  No dyspnea, cough or  wheezing  GI:  No pain, No nausea, No vomiting, No diarrhea, No rectal bleeding, No tarry stools,  Skin:  No rash or edema    PHYSICAL EXAM:   Vital Signs:  Vital Signs Last 24 Hrs  T(C): 36.6 (07 Aug 2020 09:12), Max: 36.6 (07 Aug 2020 09:12)  T(F): 97.9 (07 Aug 2020 09:12), Max: 97.9 (07 Aug 2020 09:12)  HR: 73 (07 Aug 2020 09:12) (72 - 83)  BP: 83/48 (07 Aug 2020 09:12) (77/48 - 94/56)  BP(mean): --  RR: 18 (07 Aug 2020 09:12) (16 - 18)  SpO2: 98% (07 Aug 2020 09:12) (95% - 100%)  Daily     Daily     GENERAL:  NAD, Appears stated age  HEENT:  NC/AT,  conjunctivae clear and pink, sclera -anicteric  CHEST:  Normal Effort, Breath sounds clear  HEART:  RRR, S1 + S2, no murmurs  ABDOMEN:  Soft, non-tender, non-distended, BS+  EXTEREMITIES:  no cyanosis  SKIN:  Warm & Dry.  NEURO:  Alert, oriented    LABS:                        10.7   7.95  )-----------( 163      ( 07 Aug 2020 08:00 )             34.4     Mean Cell Volume: 77.3 fL (-20 @ 08:00)    08-06    134<L>  |  84<L>  |  161<H>  ----------------------------<  131<H>  4.2   |  19<L>  |  10.75<H>    Ca    9.1      06 Aug 2020 14:37  Phos  10.7     08-  Mg     2.7     08-    TPro  6.4  /  Alb  3.2<L>  /  TBili  0.5  /  DBili  x   /  AST  34<H>  /  ALT  18  /  AlkPhos  61  0806    LIVER FUNCTIONS - ( 06 Aug 2020 06:00 )  Alb: 3.2 g/dL / Pro: 6.4 g/dL / ALK PHOS: 61 u/L / ALT: 18 u/L / AST: 34 u/L / GGT: x           PT/INR - ( 05 Aug 2020 17:40 )   PT: 11.9 SEC;   INR: 1.04          PTT - ( 07 Aug 2020 02:40 )  PTT:> 200.0 SEC  Urinalysis Basic - ( 05 Aug 2020 17:33 )    Color: DK YELLOW / Appearance: TURBID / S.028 / pH: 5.0  Gluc: 30 / Ketone: NEGATIVE  / Bili: NEGATIVEN / Urobili: NORMAL   Blood: TRACE / Protein: 50 / Nitrite: NEGATIVE   Leuk Esterase: TRACE / RBC: 3-5 / WBC 3-5   Sq Epi: FEW / Non Sq Epi: x / Bacteria: FEW                        10.7   7.95  )-----------( 163      ( 07 Aug 2020 08:00 )             34.4                         11.3   6.30  )-----------( 232      ( 06 Aug 2020 20:09 )             34.9                         12.8   7.04  )-----------( 243      ( 06 Aug 2020 06:00 )             38.3                         14.3   8.33  )-----------( 332      ( 05 Aug 2020 17:40 )             43.2       Imaging:

## 2020-08-07 NOTE — PROGRESS NOTE ADULT - PROBLEM SELECTOR PLAN 3
Pt. with metabolic acidosis in the setting of VANDANA. Serum CO2 low at 19 today. Recommend start oral sodium bicarbonate 650 mg BID. Monitor serum CO2. Pt. with metabolic acidosis in the setting of renal failure. Serum CO2 low at 19 today. Recommend start oral sodium bicarbonate 650 mg BID. Monitor serum CO2. Pt. with metabolic acidosis in the setting of renal failure. Serum CO2 low at 19 today. Recommend start oral sodium bicarbonate 650 mg BID. Monitor serum CO2

## 2020-08-07 NOTE — PROGRESS NOTE ADULT - PROBLEM SELECTOR PLAN 6
Possibly in setting of infection vs. excess fluid loss from pancreatitis.  - Monitor temps, improved  - Plan as below for choledocholithiasis

## 2020-08-07 NOTE — PROVIDER CONTACT NOTE (OTHER) - REASON
pt had small black colored stook BM; pt on heparin drip; CBC done- came back to be 8.9/27.5- dropped from 10.3/ 30.8

## 2020-08-07 NOTE — PHYSICAL THERAPY INITIAL EVALUATION ADULT - PERTINENT HX OF CURRENT PROBLEM, REHAB EVAL
79 year old woman with PMHx dementia, HTN presents with abdominal pain, nausea, and vomiting "black fluids", with inability to tolerate PO, found to be in acute renal failure with hyperkalemia

## 2020-08-07 NOTE — PROGRESS NOTE ADULT - PROBLEM SELECTOR PLAN 8
DVT PPx: on heparin ggt, LUE DVT  Diet: DASH/TLC diet  Med Rec: Must be obtained, brother to bring in medications DVT PPx: on heparin ggt, LUE DVT  Diet: DASH/TLC diet  Med Rec: Lotrel (amlodipine 10 mg / benazepril 20 mg) daily                Omeprazole 20 mg daily                asa 81 mg

## 2020-08-07 NOTE — PHYSICAL THERAPY INITIAL EVALUATION ADULT - LEVEL OF INDEPENDENCE: SIT/STAND, REHAB EVAL
unable to perform/pt. deferring further functional mobility secondary to wanting to lay back down in bed due to fatigue, will assess as feasible

## 2020-08-07 NOTE — PROGRESS NOTE ADULT - PROBLEM SELECTOR PLAN 4
Initially hypotensive to 70s/40s. Likely from recent N/V and poor PO intake.  - IVF   - Bolus as necessary to keep MAP>65  - Monitor urine output  - SBP in 70s this afternoon despite fluids. Obtaining TTE to evaluate cardiac function  in context of necessity for further fluids as pt persistently hypotensive Initially hypotensive to 70s/40s. Likely from recent N/V and poor PO intake/hypovolemia  - c/w IVF   - Bolus as necessary to keep MAP>65  - Monitor urine output  - continued pisodes of hypotension to SBP 70s. Obtained TTE to evaluate cardiac function  in context of necessity for further fluids as pt persistently hypotensive  - echo with EF 61%, normal LV systolic function, c/w IV fluids, check AM cortisol

## 2020-08-07 NOTE — PROGRESS NOTE ADULT - PROBLEM SELECTOR PLAN 1
Cr 18->14 with IVF, continues to make urine. POCUS in ED and Abdominal CT shows no obvious hydroureteronephrosis, only mild perinephric stranding bilaterally. Determined not to be urgent HD candidate by Renal.  - Low threshold for MICU eval for urgent HD if hyperkalemia persists, acidosis worsens, or acute renal failure worsens. Pt persistently hypotensive, low threshold for MICU eval for pressor support  - Nephrology following, appreciate recs  - Avoid NSAIDs, ACEi/ARBs, contrast, nephrotoxic medications  - Obtain outpatient records regarding baseline kidney function  - Monitor BMP - HCO3, K, Ca, Phos  - started on calcium carb 667mg TID w/ low phos diet, sodium bicarb 650mg BID, low potassium diet per nephro recs  - nephro recommending renal US w/ doppler, will consider   - Metcalf catheter placed in ED to accurately monitor I&Os  - SCr downtrend to 4.31  - avoid hypotension, IVF Cr 18.74->4.31 with IVF, continues to make urine. POCUS in ED and Abdominal CT shows no obvious hydroureteronephrosis, only mild perinephric stranding bilaterally. Determined not to be urgent HD candidate by Renal.  - Pt w/ episodes of hypotension, wish to avoid hypotension, c/w IV fluids  - Nephrology following, appreciate recs  - Avoid NSAIDs, ACEi/ARBs, contrast, nephrotoxic medications  - Obtain outpatient records regarding baseline kidney function  - Monitor BMP - HCO3, K, Ca, Phos  - started on calcium carb 667mg TID w/ low phos diet, sodium bicarb 650mg BID, low potassium diet per nephro recs  - nephro recommending renal US w/ doppler, will consider   - Metcalf catheter placed in ED to accurately monitor I&Os

## 2020-08-07 NOTE — PROGRESS NOTE ADULT - PROBLEM SELECTOR PLAN 1
Pt. with kidney failure in the setting of significant hypotension and poor PO intake. Exact duration of kidney failure is however unknown. On arrival pt with SBP in the 70s. Scr elevated at 18.74 on admission labs with BUN of 176. No prior labs available for review on [facility]/ [facility]. It is unclear if pt. has underlying CKD. UA grossly unremarkable. Pt. with hemodynamically mediated kidney failure with likely ATN physiology.  Pt received IVF hydration with SCr 14.07 (8/5/20) and to 10.7 yesterday (8/6/20). Pt non-oliguric. Pt not on IV fluids. No labs for review today. Pt with BP low. Recommend continue IV fluid hydration with NS at 75 cc/hour  Check Renal Sonogram with Dopplers. Will need to consider HD if renal failure continues to worsen.. Monitor labs. Avoid NSAIDs, ACEI/ARBS, RCA and nephrotoxins. Dose medications as per eGFR. Pt. with kidney failure in the setting of significant hypotension and poor PO intake. Exact duration of kidney failure is however unknown. On arrival pt with SBP in the 70s. Scr elevated at 18.74 on admission labs with BUN of 176. No prior labs available for review on [facility]/ [facility]. It is unclear if pt. has underlying CKD. UA grossly unremarkable. Pt. with hemodynamically mediated kidney failure with likely ATN physiology.  Pt received IVF hydration with SCr 14.07 (8/5/20) and to 10.7 yesterday (8/6/20). Pt non-oliguric. Pt not on IV fluids. No labs for review today. Pt with BP low. Recommend continue IV fluid hydration with NS at 75 cc/hour. Check Renal Sonogram with Dopplers. Will need to consider HD if renal failure continues to worsen.. Monitor labs. Avoid NSAIDs, ACEI/ARBS, RCA and nephrotoxins. Dose medications as per eGFR. Pt. with kidney failure in the setting of hypotension and poor PO intake. Exact duration of kidney failure is however unknown. Scr elevated at 18.74 on admission, decreased to 10.7 yesterday with IVFs. No prior labs available for review on [facility]/ [facility]. It is unclear if pt. has underlying CKD. UA grossly unremarkable. Pt. non-oliguric. Pt. with persistent hypotension, consider resuming IVFs. Check kidney sonogram with doppler study. Monitor labs. Avoid NSAIDs, ACEI/ARBS, RCA and nephrotoxins. Dose medications as per eGFR.

## 2020-08-07 NOTE — PROGRESS NOTE ADULT - PROBLEM SELECTOR PLAN 5
Moderately severe L arm pain after IV infiltration, however entire arm appears purple, possibly 2/2 vasoconstriction, but asymmetric compared to right.  Pulses intact, strength intact.  - VA duplex venous w/ evidence of DVT in L brachial and radial veins, superficial venous thrombosis in L basilic and cephalic and R basilic.   - no evidence of arterial occlusion LUE  - started on heparin gtt, w/ ptt > 200 on two occasions requiring temporary stop to gtt

## 2020-08-07 NOTE — PROGRESS NOTE ADULT - PROBLEM SELECTOR PLAN 3
AMS possible i/s/o uremic encephalopathy worsening already present dementia.  A&Ox1-2 on exam.  - Monitor for improvement or decline. Will attempt during the day to reach out to family regarding baseline mental status, as attempts unsuccessful overnight.  - Hold off on CTH for now, given likely sources of AMS  - Monitor for uremic bleeding  - Aspiration precautions, fall risk protocol AMS possible i/s/o uremic encephalopathy worsening already present dementia.  A&Ox1-2 on exam.  - Monitor for improvement or decline. per family patient still appears a bit off  - Hold off on CTH for now, given likely sources of AMS  - Monitor for uremic bleeding  - Aspiration precautions, fall risk protocol

## 2020-08-07 NOTE — PROGRESS NOTE ADULT - PROBLEM SELECTOR PLAN 4
Pt with hyperphosphatemia in the setting. Serum phosphorus elevated at 10.7 today. Recommend start oral calcium carbonate 667mg TID with meals. Monitor serum phos. Low phosphorus diet.    Jamar Merino  Nephrology Fellow  Pager: [phone] Pt with hyperphosphatemia in the setting of renal failure. Serum phosphorus elevated at 10.7 today. Recommend start oral calcium carbonate 667mg TID with meals. Monitor serum phos. Low phosphorus diet.    Jamar Merino  Nephrology Fellow  Pager: [phone] Pt. with hyperphosphatemia in the setting of renal failure. Serum phosphorus elevated at 10.7 today. Recommend start oral calcium carbonate 667mg TID with meals. Low phosphorus diet. Monitor serum phosphorus    Jamar Merino  Nephrology Fellow  Pager: [phone]

## 2020-08-07 NOTE — PROGRESS NOTE ADULT - ASSESSMENT
Impression:  # Choledocholithiasis (1 x 0.5) with dilated CBD. Bile duct also dilated with normal liver enzymes. No fevers/leukocytosis and normal bilirubin - low suspicion for acute cholangitis  # Abdominal Pain: Resolved, concerning pancreatitis based on elevated lipase and pain. However description of pain not consistent with pancreatitis. CT unremarkable, however limited due to lack of contrast. Elevations in lipase may be seen in the setting of renal insufficiency and hypotension.    Recommendation:  - Diet as tolerated  - Pending optimization [renal failure, electrolytes, etc.) can consider ERCP for stone removal  - Trend CMP and monitor for signs of sepsis  - Supportive care per primary team    Jennifer Hurt MD  Gastroenterology Fellow  [phone]  Available on [employer] Teams  Please page on call fellow on weekends and after 5pm on weekdays: [phone]

## 2020-08-07 NOTE — PROGRESS NOTE ADULT - SUBJECTIVE AND OBJECTIVE BOX
Britany Black, PGY-1    CHIEF COMPLAINT: Patient is a 79y old  Female who presents with a chief complaint of Acute renal failure, high anion gap metabolic acidosis (07 Aug 2020 11:17)      INTERVAL HPI/OVERNIGHT EVENTS: Pt remained hypotensive 77/48-83/47 ON, received NS 500cc bolus, still hypotensive this am. Blood noticed around mouth by night float, ptt > 200, heparin ggt halted temporarily. Ptt > 200 again this afternoon, heparin ggt halted x 1 hr again. This am pt in bed, uncomfortable appearing, with headache and LUE pain. She yawns and burps frequently during interview. She denies CP, SOB, abd pain, fever, chills, dysuria, and has not had BM since admission.     MEDICATIONS (STANDING):  calcium acetate 667 milliGRAM(s) Oral three times a day with meals  heparin  Infusion. 1100 Unit(s)/Hr IV Continuous <Continuous>  piperacillin/tazobactam IVPB.. 3.375 Gram(s) IV Intermittent every 12 hours  potassium chloride   Powder 40 milliEquivalent(s) Oral every 4 hours  sodium bicarbonate 650 milliGRAM(s) Oral two times a day  sodium chloride 0.9% Bolus 500 milliLiter(s) IV Bolus once  sodium chloride 0.9%. 1000 milliLiter(s) IV Continuous <Continuous>    MEDICATIONS  (PRN):  aluminum hydroxide/magnesium hydroxide/simethicone Suspension 30 milliLiter(s) Oral every 6 hours PRN  heparin   Injectable 6500 Unit(s) IV Push every 6 hours PRN  heparin   Injectable 3000 Unit(s) IV Push every 6 hours PRN      REVIEW OF SYSTEMS:  CONSTITUTIONAL: No fever.   RESPIRATORY: No shortness of breath  CARDIOVASCULAR: No chest pain, dizziness  GASTROINTESTINAL: No abdominal or epigastric pain. No melena or hematochezia. +without BM since admission  GENITOURINARY: Pt w/ fu but denies dysuria  NEUROLOGICAL: +headache (points to frontal area, does not characterize further)  MUSCULOSKELETAL: +LUE swelling, pain    T(F): 97.3 (20 @ 11:45), Max: 97.9 (20 @ 09:12)  HR: 72 (20 @ 11:45) (72 - 83)  BP: 78/51 (20 @ 11:45) (77/48 - 94/56)  RR: 18 (20 @ 11:45) (17 - 18)  SpO2: 97% (20 @ 11:45) (95% - 99%)  Wt(kg): --  CAPILLARY BLOOD GLUCOSE        I&O's Summary    06 Aug 2020 07:01  -  07 Aug 2020 07:00  --------------------------------------------------------  IN: 0 mL / OUT: 1725 mL / NET: -1725 mL        PHYSICAL EXAM:  GENERAL: Pt uncomfortable appearing, in pain, slouched in bed   HEAD: Atraumatic, Normocephalic  EYES: PERRL, conjunctiva and sclera clear  ENMT: No tonsillar erythema, exudates, or enlargement; Moist mucous membranes  NECK: Supple  NERVOUS SYSTEM:  Alert & Oriented X3  CHEST/LUNG: Clear to auscultation bilaterally; No rales, rhonchi, wheezing, or rubs  HEART: Regular rate and rhythm; No murmurs, rubs, or gallops  ABDOMEN: Soft, Nontender, Nondistended; +hypoactive BSx4  EXTREMITIES: LUE cooler than RUE (also cool), LUE edematous, erythematous, very tender to palpation. Pulses present b/l. No LE edema.       LABS:                        10.3   7.55  )-----------( 209      ( 07 Aug 2020 10:49 )             30.8     08-    135  |  93<L>  |  154<H>  ----------------------------<  120<H>  3.4<L>   |  20<L>  |  4.31<H>    Ca    8.2<L>      07 Aug 2020 10:49  Phos  6.0     08-  Mg     2.1     -    TPro  6.4  /  Alb  3.2<L>  /  TBili  0.5  /  DBili  x   /  AST  34<H>  /  ALT  18  /  AlkPhos  61  08-06    PT/INR - ( 05 Aug 2020 17:40 )   PT: 11.9 SEC;   INR: 1.04          PTT - ( 07 Aug 2020 10:49 )  PTT:> 200.0 SEC  Urinalysis Basic - ( 05 Aug 2020 17:33 )    Color: DK YELLOW / Appearance: TURBID / S.028 / pH: 5.0  Gluc: 30 / Ketone: NEGATIVE  / Bili: NEGATIVEN / Urobili: NORMAL   Blood: TRACE / Protein: 50 / Nitrite: NEGATIVE   Leuk Esterase: TRACE / RBC: 3-5 / WBC 3-5   Sq Epi: FEW / Non Sq Epi: x / Bacteria: FEW          RADIOLOGY & ADDITIONAL TESTS: Britany Black, PGY-1    CHIEF COMPLAINT: Patient is a 79y old  Female who presents with a chief complaint of Acute renal failure, high anion gap metabolic acidosis (07 Aug 2020 11:17)      INTERVAL HPI/OVERNIGHT EVENTS: Pt remained hypotensive 77/48-83/47 ON, received NS 500cc bolus, still hypotensive this am. Blood noticed around mouth by night float, ptt > 200, heparin ggt halted temporarily. Ptt > 200 again this afternoon, heparin ggt halted x 1 hr again. This am pt in bed, uncomfortable appearing, with headache and LUE pain. She yawns and burps frequently during interview. She denies CP, SOB, abd pain, fever, chills, dysuria, and has not had BM since admission.     MEDICATIONS (STANDING):  calcium acetate 667 milliGRAM(s) Oral three times a day with meals  heparin  Infusion. 1100 Unit(s)/Hr IV Continuous <Continuous>  piperacillin/tazobactam IVPB.. 3.375 Gram(s) IV Intermittent every 12 hours  potassium chloride   Powder 40 milliEquivalent(s) Oral every 4 hours  sodium bicarbonate 650 milliGRAM(s) Oral two times a day  sodium chloride 0.9% Bolus 500 milliLiter(s) IV Bolus once  sodium chloride 0.9%. 1000 milliLiter(s) IV Continuous <Continuous>    MEDICATIONS  (PRN):  aluminum hydroxide/magnesium hydroxide/simethicone Suspension 30 milliLiter(s) Oral every 6 hours PRN  heparin   Injectable 6500 Unit(s) IV Push every 6 hours PRN  heparin   Injectable 3000 Unit(s) IV Push every 6 hours PRN      REVIEW OF SYSTEMS:  CONSTITUTIONAL: No fever.   RESPIRATORY: No shortness of breath  CARDIOVASCULAR: No chest pain, dizziness  GASTROINTESTINAL: No abdominal or epigastric pain. No melena or hematochezia. +without BM since admission  GENITOURINARY: Pt w/ fu but denies dysuria  NEUROLOGICAL: +headache (points to frontal area, does not characterize further)  MUSCULOSKELETAL: +LUE swelling, pain    T(F): 97.3 (20 @ 11:45), Max: 97.9 (20 @ 09:12)  HR: 72 (20 @ 11:45) (72 - 83)  BP: 78/51 (20 @ 11:45) (77/48 - 94/56)  RR: 18 (20 @ 11:45) (17 - 18)  SpO2: 97% (20 @ 11:45) (95% - 99%)  Wt(kg): --  CAPILLARY BLOOD GLUCOSE        I&O's Summary    06 Aug 2020 07:01  -  07 Aug 2020 07:00  --------------------------------------------------------  IN: 0 mL / OUT: 1725 mL / NET: -1725 mL        PHYSICAL EXAM:  GENERAL: Pt uncomfortable appearing, in pain, slouched in bed   HEAD: Atraumatic, Normocephalic  EYES: PERRL, conjunctiva and sclera clear  ENMT: No tonsillar erythema, exudates, or enlargement; Moist mucous membranes  NECK: Supple  NERVOUS SYSTEM:  Alert & Oriented X3  CHEST/LUNG: Clear to auscultation bilaterally; No rales, rhonchi, wheezing, or rubs  HEART: Regular rate and rhythm; No murmurs, rubs, or gallops  ABDOMEN: Soft, Nontender, Nondistended; +hypoactive BSx4  EXTREMITIES: LUE cooler than RUE (also cool), LUE edematous, erythematous, very tender to palpation. Pulses present b/l. No LE edema.       LABS:                        10.3   7.55  )-----------( 209      ( 07 Aug 2020 10:49 )             30.8     08-    135  |  93<L>  |  154<H>  ----------------------------<  120<H>  3.4<L>   |  20<L>  |  4.31<H>    Ca    8.2<L>      07 Aug 2020 10:49  Phos  6.0     08-  Mg     2.1     -    TPro  6.4  /  Alb  3.2<L>  /  TBili  0.5  /  DBili  x   /  AST  34<H>  /  ALT  18  /  AlkPhos  61  08-06    PT/INR - ( 05 Aug 2020 17:40 )   PT: 11.9 SEC;   INR: 1.04     PTT - ( 07 Aug 2020 10:49 )  PTT:> 200.0 SEC    Urinalysis Basic - ( 05 Aug 2020 17:33 )    Color: DK YELLOW / Appearance: TURBID / S.028 / pH: 5.0  Gluc: 30 / Ketone: NEGATIVE  / Bili: NEGATIVEN / Urobili: NORMAL   Blood: TRACE / Protein: 50 / Nitrite: NEGATIVE   Leuk Esterase: TRACE / RBC: 3-5 / WBC 3-5   Sq Epi: FEW / Non Sq Epi: x / Bacteria: FEW      Telemetry personally reviewed: sinus, no events    Consultant notes reviewed: Renal, GI    RADIOLOGY & ADDITIONAL TESTS:    < from: Transthoracic Echocardiogram (20 @ 13:01) >  Ejection Fraction (Teicholtz): 61 %  ------------------------------------------------------------------------  OBSERVATIONS:  Mitral Valve: Mitral annular calcification, otherwise  normal mitral valve. Minimal mitral regurgitation.  Aortic Root: Normal aortic root.  Aortic Valve: Aortic valve leaflet morphology not well  visualized. Mild aortic regurgitation.  Left Atrium: Normal left atrium.  LA volume index = 31  cc/m2.  Left Ventricle: Normal left ventricular systolic function.  No segmental wall motion abnormalities. Normal left  ventricular internal dimensions and wall thicknesses. Mild  diastolic dysfunction (Stage I).  Right Heart: Normal right atrium. Normal right ventricular  size and function. Normal tricuspid valve. Minimal  tricuspid regurgitation. Normal pulmonic valve.  Minimal  pulmonic regurgitation.  Pericardium/PleuraNormal pericardium with no pericardial  effusion.  ------------------------------------------------------------------------  CONCLUSIONS:  1. Mitral annular calcification, otherwise normal mitral  valve. Minimal mitral regurgitation.  2. Aorticvalve leaflet morphology not well visualized.  Mild aortic regurgitation.  3. Normal left ventricular internal dimensions and wall  thicknesses.  4. Normal left ventricular systolic function. No segmental  wall motion abnormalities.  5. Mild diastolic dysfunction (Stage I).  6. Normal right ventricular size and function.    < end of copied text >

## 2020-08-07 NOTE — PROGRESS NOTE ADULT - SUBJECTIVE AND OBJECTIVE BOX
[facility] Division of Kidney Diseases & Hypertension  FOLLOW UP NOTE  [phone]--------------------------------------------------------------------------------    HPI: 79 year old female with unclear underlying medical history with questionable dementia was admitted to [facility] on 8/5/2020 with abdominal pain and N/V of 2 days duration. On admission, pt with elevated Scr of 18.74 (8/5/20) with serum potassium of 6.8. Pt being seen by Nephrology team for renal failure. Upon review of [facility]/ [facility], no prior labs available for review. On arrival to the ER pt. noted to have BP of 73/47. Pt. initiated on IV fluid and received medical management for hyperkalemia  with repeat  SCr improved to 14.07 and serum potassium 4.9. Pt continued on IVF hydration. Pt with SCr improved to 10.7 yesterday (8/6/20). No labs today for review.    Patient seen and evaluated at bedside this morning. Pt appears comfortable without signs of distress. BP overnight remained low. Last documented BP 83/48. Pt awake but minimally responds to commands. Pt with limited ROS obtained. No chest pain or abdominal pain. NO SOB. Urine output ~1.7L over the past 24 hours.    PAST HISTORY  --------------------------------------------------------------------------------  No significant changes to PMH, PSH, FHx, SHx, unless otherwise noted    ALLERGIES & MEDICATIONS  --------------------------------------------------------------------------------  Allergies  No Known Allergies  Intolerances    Standing Inpatient Medications  heparin  Infusion. 1100 Unit(s)/Hr IV Continuous <Continuous>  piperacillin/tazobactam IVPB.. 3.375 Gram(s) IV Intermittent every 12 hours  sodium chloride 0.9%. 1000 milliLiter(s) IV Continuous <Continuous>    PRN Inpatient Medications  aluminum hydroxide/magnesium hydroxide/simethicone Suspension 30 milliLiter(s) Oral every 6 hours PRN  heparin   Injectable 6500 Unit(s) IV Push every 6 hours PRN  heparin   Injectable 3000 Unit(s) IV Push every 6 hours PRN    REVIEW OF SYSTEMS  --------------------------------------------------------------------------------  Limited ROS obtained  Respiratory: No SOB  CV: No chest pain  GI: No abdominal pain  MSK: + arm swelling    All other systems were reviewed and are negative, except as noted.    VITALS/PHYSICAL EXAM  --------------------------------------------------------------------------------  T(C): 36.6 (08-07-20 @ 09:12), Max: 36.6 (08-07-20 @ 09:12)  HR: 73 (08-07-20 @ 09:12) (72 - 83)  BP: 83/48 (08-07-20 @ 09:12) (76/54 - 94/56)  RR: 18 (08-07-20 @ 09:12) (16 - 18)  SpO2: 98% (08-07-20 @ 09:12) (95% - 100%)  Wt(kg): --  Height (cm): 154.9 (08-06-20 @ 02:05)  Weight (kg): 78.8 (08-06-20 @ 02:05)  BMI (kg/m2): 32.8 (08-06-20 @ 02:05)  BSA (m2): 1.78 (08-06-20 @ 02:05)    08-06-20 @ 07:01  -  08-07-20 @ 07:00  --------------------------------------------------------  IN: 0 mL / OUT: 1725 mL / NET: -1725 mL    Physical Exam:  	Gen: NAD  	HEENT: No JVD  	Pulm: Fair air entry B/L on anterior auscultation  	CV: S1S2  	Abd: Soft, +BS, no distention  	Ext: No LE edema B/L, + LUE swellinge  	Neuro: Awake but minimal response  to commands  	Skin: Warm and dry    LABS/STUDIES  --------------------------------------------------------------------------------              10.7   7.95  >-----------<  163      [08-07-20 @ 08:00]              34.4     134  |  84  |  161  ----------------------------<  131      [08-06-20 @ 14:37]  4.2   |  19  |  10.75        Ca     9.1     [08-06-20 @ 14:37]      Mg     2.7     [08-06-20 @ 14:37]      Phos  10.7     [08-06-20 @ 14:37]    TPro  6.4  /  Alb  3.2  /  TBili  0.5  /  DBili  x   /  AST  34  /  ALT  18  /  AlkPhos  61  [08-06-20 @ 06:00]    PT/INR: PT 11.9 , INR 1.04       [08-05-20 @ 17:40]  PTT: > 200.0      [08-07-20 @ 02:40]    Creatinine Trend:  SCr 10.75 [08-06 @ 14:37]  SCr 13.08 [08-06 @ 06:00]  SCr 14.07 [08-05 @ 21:23]  SCr 18.74 [08-05 @ 17:40]    Urinalysis - [08-05-20 @ 17:33]      Color DK YELLOW / Appearance TURBID / SG 1.028 / pH 5.0      Gluc 30 / Ketone NEGATIVE  / Bili NEGATIVEN / Urobili NORMAL       Blood TRACE / Protein 50 / Leuk Est TRACE / Nitrite NEGATIVE      RBC 3-5 / WBC 3-5 / Hyaline  / Gran  / Sq Epi FEW / Non Sq Epi  / Bacteria FEW    Urine Creatinine 225.90      [08-06-20 @ 07:45]  Urine Protein 67.9      [08-06-20 @ 07:45]  Urine Sodium 44      [08-06-20 @ 07:45]  Urine Urea Nitrogen 569.0      [08-06-20 @ 07:45] [facility] Division of Kidney Diseases & Hypertension  FOLLOW UP NOTE  [phone]--------------------------------------------------------------------------------    HPI: 79 year old female with unclear underlying medical history with questionable dementia was admitted to [facility] on 8/5/2020 with abdominal pain and N/V of 2 days duration. On admission, pt with elevated Scr of 18.74 (8/5/20) with serum potassium of 6.8. Pt being seen by Nephrology team for renal failure. [facility], no prior labs available for review. On arrival to the ER pt. noted to have BP of 73/47. Pt. initiated on IV fluid and received medical management for hyperkalemia. Scr improved to 10.7 and serum potassium decreased to 4.2 yesterday (8/6/20).     Pt. seen and evaluated at bedside this morning. BP overnight remained low. Last documented BP 83/48. Pt awake but able to provide limited ROS. No CP, SOB or abdominal pain. Pt. gives history of LUE pain/redness. Urine output ~1.7L over the past 24 hours.    PAST HISTORY  --------------------------------------------------------------------------------  No significant changes to PMH, PSH, FHx, SHx, unless otherwise noted    ALLERGIES & MEDICATIONS  --------------------------------------------------------------------------------  Allergies  No Known Allergies  Intolerances    Standing Inpatient Medications  heparin  Infusion. 1100 Unit(s)/Hr IV Continuous <Continuous>  piperacillin/tazobactam IVPB.. 3.375 Gram(s) IV Intermittent every 12 hours  sodium chloride 0.9%. 1000 milliLiter(s) IV Continuous <Continuous>    PRN Inpatient Medications  aluminum hydroxide/magnesium hydroxide/simethicone Suspension 30 milliLiter(s) Oral every 6 hours PRN  heparin   Injectable 6500 Unit(s) IV Push every 6 hours PRN  heparin   Injectable 3000 Unit(s) IV Push every 6 hours PRN    REVIEW OF SYSTEMS  --------------------------------------------------------------------------------  Limited ROS obtained  Respiratory: No SOB  CV: No chest pain  GI: No abdominal pain  MSK: +LUE swelling    VITALS/PHYSICAL EXAM  --------------------------------------------------------------------------------  T(C): 36.6 (08-07-20 @ 09:12), Max: 36.6 (08-07-20 @ 09:12)  HR: 73 (08-07-20 @ 09:12) (72 - 83)  BP: 83/48 (08-07-20 @ 09:12) (76/54 - 94/56)  RR: 18 (08-07-20 @ 09:12) (16 - 18)  SpO2: 98% (08-07-20 @ 09:12) (95% - 100%)  Wt(kg): --  Height (cm): 154.9 (08-06-20 @ 02:05)  Weight (kg): 78.8 (08-06-20 @ 02:05)  BMI (kg/m2): 32.8 (08-06-20 @ 02:05)  BSA (m2): 1.78 (08-06-20 @ 02:05)    08-06-20 @ 07:01  -  08-07-20 @ 07:00  --------------------------------------------------------  IN: 0 mL / OUT: 1725 mL / NET: -1725 mL    Physical Exam:  	Gen: resting  	HEENT: No JVD  	Pulm: Fair air entry B/L, clear on anterior auscultation  	CV: S1S2+  	Abd: Soft, +BS, no distention  	Ext: No LE edema B/L, LUE swelling/redness  	Neuro: Awake but provides limited ROS  	Skin: LUE erythematous rash seen    LABS/STUDIES  --------------------------------------------------------------------------------              10.7   7.95  >-----------<  163      [08-07-20 @ 08:00]              34.4     134  |  84  |  161  ----------------------------<  131      [08-06-20 @ 14:37]  4.2   |  19  |  10.75        Ca     9.1     [08-06-20 @ 14:37]      Mg     2.7     [08-06-20 @ 14:37]      Phos  10.7     [08-06-20 @ 14:37]    TPro  6.4  /  Alb  3.2  /  TBili  0.5  /  DBili  x   /  AST  34  /  ALT  18  /  AlkPhos  61  [08-06-20 @ 06:00]    Creatinine Trend:  SCr 10.75 [08-06 @ 14:37]  SCr 13.08 [08-06 @ 06:00]  SCr 14.07 [08-05 @ 21:23]  SCr 18.74 [08-05 @ 17:40]    Urinalysis - [08-05-20 @ 17:33]      Color DK YELLOW / Appearance TURBID / SG 1.028 / pH 5.0      Gluc 30 / Ketone NEGATIVE  / Bili NEGATIVEN / Urobili NORMAL       Blood TRACE / Protein 50 / Leuk Est TRACE / Nitrite NEGATIVE      RBC 3-5 / WBC 3-5 / Hyaline  / Gran  / Sq Epi FEW / Non Sq Epi  / Bacteria FEW    Urine Creatinine 225.90      [08-06-20 @ 07:45]  Urine Protein 67.9      [08-06-20 @ 07:45]  Urine Sodium 44      [08-06-20 @ 07:45]  Urine Urea Nitrogen 569.0      [08-06-20 @ 07:45]

## 2020-08-08 DIAGNOSIS — D64.9 ANEMIA, UNSPECIFIED: ICD-10-CM

## 2020-08-08 DIAGNOSIS — E87.2 ACIDOSIS: ICD-10-CM

## 2020-08-08 LAB
ALBUMIN SERPL ELPH-MCNC: 2.5 G/DL — LOW (ref 3.3–5)
ALP SERPL-CCNC: 43 U/L — SIGNIFICANT CHANGE UP (ref 40–120)
ALT FLD-CCNC: 83 U/L — HIGH (ref 4–33)
ANION GAP SERPL CALC-SCNC: 11 MMO/L — SIGNIFICANT CHANGE UP (ref 7–14)
ANION GAP SERPL CALC-SCNC: 13 MMO/L — SIGNIFICANT CHANGE UP (ref 7–14)
ANISOCYTOSIS BLD QL: SLIGHT — SIGNIFICANT CHANGE UP
AST SERPL-CCNC: 112 U/L — HIGH (ref 4–32)
BASOPHILS # BLD AUTO: 0.05 K/UL — SIGNIFICANT CHANGE UP (ref 0–0.2)
BASOPHILS NFR BLD AUTO: 0.7 % — SIGNIFICANT CHANGE UP (ref 0–2)
BASOPHILS NFR SPEC: 0 % — SIGNIFICANT CHANGE UP (ref 0–2)
BILIRUB SERPL-MCNC: 0.3 MG/DL — SIGNIFICANT CHANGE UP (ref 0.2–1.2)
BLASTS # FLD: 0 % — SIGNIFICANT CHANGE UP (ref 0–0)
BLD GP AB SCN SERPL QL: NEGATIVE — SIGNIFICANT CHANGE UP
BUN SERPL-MCNC: 104 MG/DL — HIGH (ref 7–23)
BUN SERPL-MCNC: 115 MG/DL — HIGH (ref 7–23)
CALCIUM SERPL-MCNC: 8.1 MG/DL — LOW (ref 8.4–10.5)
CALCIUM SERPL-MCNC: 8.1 MG/DL — LOW (ref 8.4–10.5)
CHLORIDE SERPL-SCNC: 105 MMOL/L — SIGNIFICANT CHANGE UP (ref 98–107)
CHLORIDE SERPL-SCNC: 107 MMOL/L — SIGNIFICANT CHANGE UP (ref 98–107)
CO2 SERPL-SCNC: 20 MMOL/L — LOW (ref 22–31)
CO2 SERPL-SCNC: 21 MMOL/L — LOW (ref 22–31)
CORTIS SERPL-MCNC: 9.8 UG/DL — SIGNIFICANT CHANGE UP (ref 2.7–18.4)
CREAT SERPL-MCNC: 1.28 MG/DL — SIGNIFICANT CHANGE UP (ref 0.5–1.3)
CREAT SERPL-MCNC: 1.87 MG/DL — HIGH (ref 0.5–1.3)
ELLIPTOCYTES BLD QL SMEAR: SLIGHT — SIGNIFICANT CHANGE UP
EOSINOPHIL # BLD AUTO: 0.15 K/UL — SIGNIFICANT CHANGE UP (ref 0–0.5)
EOSINOPHIL NFR BLD AUTO: 2 % — SIGNIFICANT CHANGE UP (ref 0–6)
EOSINOPHIL NFR FLD: 0.9 % — SIGNIFICANT CHANGE UP (ref 0–6)
GIANT PLATELETS BLD QL SMEAR: PRESENT — SIGNIFICANT CHANGE UP
GLUCOSE SERPL-MCNC: 86 MG/DL — SIGNIFICANT CHANGE UP (ref 70–99)
GLUCOSE SERPL-MCNC: 89 MG/DL — SIGNIFICANT CHANGE UP (ref 70–99)
HCT VFR BLD CALC: 23.7 % — LOW (ref 34.5–45)
HCT VFR BLD CALC: 25.4 % — LOW (ref 34.5–45)
HGB BLD-MCNC: 7.7 G/DL — LOW (ref 11.5–15.5)
HGB BLD-MCNC: 7.8 G/DL — LOW (ref 11.5–15.5)
HYPOCHROMIA BLD QL: SLIGHT — SIGNIFICANT CHANGE UP
IMM GRANULOCYTES NFR BLD AUTO: 5.8 % — HIGH (ref 0–1.5)
LYMPHOCYTES # BLD AUTO: 1.55 K/UL — SIGNIFICANT CHANGE UP (ref 1–3.3)
LYMPHOCYTES # BLD AUTO: 20.3 % — SIGNIFICANT CHANGE UP (ref 13–44)
LYMPHOCYTES NFR SPEC AUTO: 15.8 % — SIGNIFICANT CHANGE UP (ref 13–44)
MAGNESIUM SERPL-MCNC: 1.4 MG/DL — LOW (ref 1.6–2.6)
MAGNESIUM SERPL-MCNC: 1.6 MG/DL — SIGNIFICANT CHANGE UP (ref 1.6–2.6)
MCHC RBC-ENTMCNC: 24 PG — LOW (ref 27–34)
MCHC RBC-ENTMCNC: 24.8 PG — LOW (ref 27–34)
MCHC RBC-ENTMCNC: 30.7 % — LOW (ref 32–36)
MCHC RBC-ENTMCNC: 32.5 % — SIGNIFICANT CHANGE UP (ref 32–36)
MCV RBC AUTO: 76.5 FL — LOW (ref 80–100)
MCV RBC AUTO: 78.2 FL — LOW (ref 80–100)
METAMYELOCYTES # FLD: 0.9 % — SIGNIFICANT CHANGE UP (ref 0–1)
MICROCYTES BLD QL: SLIGHT — SIGNIFICANT CHANGE UP
MONOCYTES # BLD AUTO: 0.66 K/UL — SIGNIFICANT CHANGE UP (ref 0–0.9)
MONOCYTES NFR BLD AUTO: 8.6 % — SIGNIFICANT CHANGE UP (ref 2–14)
MONOCYTES NFR BLD: 5.3 % — SIGNIFICANT CHANGE UP (ref 2–9)
MYELOCYTES NFR BLD: 0.9 % — HIGH (ref 0–0)
NEUTROPHIL AB SER-ACNC: 74.5 % — SIGNIFICANT CHANGE UP (ref 43–77)
NEUTROPHILS # BLD AUTO: 4.8 K/UL — SIGNIFICANT CHANGE UP (ref 1.8–7.4)
NEUTROPHILS NFR BLD AUTO: 62.6 % — SIGNIFICANT CHANGE UP (ref 43–77)
NEUTS BAND # BLD: 1.7 % — SIGNIFICANT CHANGE UP (ref 0–6)
NRBC # BLD: 1 /100WBC — SIGNIFICANT CHANGE UP
NRBC # FLD: 0 K/UL — SIGNIFICANT CHANGE UP (ref 0–0)
NRBC # FLD: 0 K/UL — SIGNIFICANT CHANGE UP (ref 0–0)
OTHER - HEMATOLOGY %: 0 — SIGNIFICANT CHANGE UP
PHOSPHATE SERPL-MCNC: 2.4 MG/DL — LOW (ref 2.5–4.5)
PHOSPHATE SERPL-MCNC: 2.8 MG/DL — SIGNIFICANT CHANGE UP (ref 2.5–4.5)
PLATELET # BLD AUTO: 172 K/UL — SIGNIFICANT CHANGE UP (ref 150–400)
PLATELET # BLD AUTO: 181 K/UL — SIGNIFICANT CHANGE UP (ref 150–400)
PLATELET COUNT - ESTIMATE: NORMAL — SIGNIFICANT CHANGE UP
PMV BLD: 10.8 FL — SIGNIFICANT CHANGE UP (ref 7–13)
PMV BLD: 11 FL — SIGNIFICANT CHANGE UP (ref 7–13)
POIKILOCYTOSIS BLD QL AUTO: SLIGHT — SIGNIFICANT CHANGE UP
POLYCHROMASIA BLD QL SMEAR: SLIGHT — SIGNIFICANT CHANGE UP
POTASSIUM SERPL-MCNC: 4.6 MMOL/L — SIGNIFICANT CHANGE UP (ref 3.5–5.3)
POTASSIUM SERPL-MCNC: 4.8 MMOL/L — SIGNIFICANT CHANGE UP (ref 3.5–5.3)
POTASSIUM SERPL-SCNC: 4.6 MMOL/L — SIGNIFICANT CHANGE UP (ref 3.5–5.3)
POTASSIUM SERPL-SCNC: 4.8 MMOL/L — SIGNIFICANT CHANGE UP (ref 3.5–5.3)
PROMYELOCYTES # FLD: 0 % — SIGNIFICANT CHANGE UP (ref 0–0)
PROT SERPL-MCNC: 4.9 G/DL — LOW (ref 6–8.3)
RBC # BLD: 3.1 M/UL — LOW (ref 3.8–5.2)
RBC # BLD: 3.25 M/UL — LOW (ref 3.8–5.2)
RBC # FLD: 14.6 % — HIGH (ref 10.3–14.5)
RBC # FLD: 14.7 % — HIGH (ref 10.3–14.5)
RH IG SCN BLD-IMP: POSITIVE — SIGNIFICANT CHANGE UP
SMUDGE CELLS # BLD: PRESENT — SIGNIFICANT CHANGE UP
SODIUM SERPL-SCNC: 138 MMOL/L — SIGNIFICANT CHANGE UP (ref 135–145)
SODIUM SERPL-SCNC: 139 MMOL/L — SIGNIFICANT CHANGE UP (ref 135–145)
TROPONIN T, HIGH SENSITIVITY: 41 NG/L — SIGNIFICANT CHANGE UP (ref ?–14)
TROPONIN T, HIGH SENSITIVITY: 53 NG/L — CRITICAL HIGH (ref ?–14)
VARIANT LYMPHS # BLD: 0 % — SIGNIFICANT CHANGE UP
WBC # BLD: 6.53 K/UL — SIGNIFICANT CHANGE UP (ref 3.8–10.5)
WBC # BLD: 7.65 K/UL — SIGNIFICANT CHANGE UP (ref 3.8–10.5)
WBC # FLD AUTO: 6.53 K/UL — SIGNIFICANT CHANGE UP (ref 3.8–10.5)
WBC # FLD AUTO: 7.65 K/UL — SIGNIFICANT CHANGE UP (ref 3.8–10.5)

## 2020-08-08 PROCEDURE — 99233 SBSQ HOSP IP/OBS HIGH 50: CPT | Mod: GC

## 2020-08-08 PROCEDURE — 93010 ELECTROCARDIOGRAM REPORT: CPT

## 2020-08-08 PROCEDURE — 99232 SBSQ HOSP IP/OBS MODERATE 35: CPT

## 2020-08-08 RX ORDER — PANTOPRAZOLE SODIUM 20 MG/1
8 TABLET, DELAYED RELEASE ORAL
Qty: 80 | Refills: 0 | Status: DISCONTINUED | OUTPATIENT
Start: 2020-08-09 | End: 2020-08-09

## 2020-08-08 RX ORDER — PANTOPRAZOLE SODIUM 20 MG/1
80 TABLET, DELAYED RELEASE ORAL ONCE
Refills: 0 | Status: COMPLETED | OUTPATIENT
Start: 2020-08-08 | End: 2020-08-08

## 2020-08-08 RX ORDER — SODIUM CHLORIDE 9 MG/ML
1000 INJECTION INTRAMUSCULAR; INTRAVENOUS; SUBCUTANEOUS
Refills: 0 | Status: DISCONTINUED | OUTPATIENT
Start: 2020-08-08 | End: 2020-08-08

## 2020-08-08 RX ORDER — MAGNESIUM SULFATE 500 MG/ML
1 VIAL (ML) INJECTION ONCE
Refills: 0 | Status: COMPLETED | OUTPATIENT
Start: 2020-08-08 | End: 2020-08-08

## 2020-08-08 RX ORDER — SODIUM CHLORIDE 9 MG/ML
1000 INJECTION INTRAMUSCULAR; INTRAVENOUS; SUBCUTANEOUS ONCE
Refills: 0 | Status: COMPLETED | OUTPATIENT
Start: 2020-08-08 | End: 2020-08-08

## 2020-08-08 RX ADMIN — Medication 650 MILLIGRAM(S): at 17:24

## 2020-08-08 RX ADMIN — SODIUM CHLORIDE 1000 MILLILITER(S): 9 INJECTION INTRAMUSCULAR; INTRAVENOUS; SUBCUTANEOUS at 18:01

## 2020-08-08 RX ADMIN — Medication 650 MILLIGRAM(S): at 04:37

## 2020-08-08 RX ADMIN — SODIUM CHLORIDE 100 MILLILITER(S): 9 INJECTION INTRAMUSCULAR; INTRAVENOUS; SUBCUTANEOUS at 07:48

## 2020-08-08 RX ADMIN — PANTOPRAZOLE SODIUM 80 MILLIGRAM(S): 20 TABLET, DELAYED RELEASE ORAL at 11:57

## 2020-08-08 RX ADMIN — Medication 100 GRAM(S): at 18:31

## 2020-08-08 RX ADMIN — PIPERACILLIN AND TAZOBACTAM 25 GRAM(S): 4; .5 INJECTION, POWDER, LYOPHILIZED, FOR SOLUTION INTRAVENOUS at 04:37

## 2020-08-08 RX ADMIN — Medication 667 MILLIGRAM(S): at 07:47

## 2020-08-08 RX ADMIN — PIPERACILLIN AND TAZOBACTAM 25 GRAM(S): 4; .5 INJECTION, POWDER, LYOPHILIZED, FOR SOLUTION INTRAVENOUS at 20:50

## 2020-08-08 RX ADMIN — SODIUM CHLORIDE 100 MILLILITER(S): 9 INJECTION INTRAMUSCULAR; INTRAVENOUS; SUBCUTANEOUS at 05:53

## 2020-08-08 NOTE — PROGRESS NOTE ADULT - ASSESSMENT
Impression:  # Choledocholithiasis (1 x 0.5) with dilated CBD. Bile duct also dilated with normal liver enzymes. No fevers/leukocytosis and normal bilirubin - low suspicion for acute cholangitis  # Abdominal Pain: Resolved, concerning pancreatitis based on elevated lipase and pain. However description of pain not consistent with pancreatitis. CT unremarkable, however limited due to lack of contrast. Elevations in lipase may be seen in the setting of renal insufficiency and hypotension.  # Anemia - worsening this admission with reports of dark stools possible upper GI source    Recommendation:  - clear liquids  - NPO after midnight tomorrow  - PPI gtt  - monitor hemoglobin  - transfuse as needed  - two active IVs at all times  - active type and screen  - Pending optimization [renal failure, electrolytes, etc.) can consider ERCP for stone removal with likely need EGD first now with new reports  - Trend CMP and monitor for signs of sepsis  - Supportive care per primary team    Sepideh Camilo  Gastroenterology Fellow  Pager x [phone] or [phone]  (From 8 am - 5 pm or on weekends and holidays please page GI on call at [phone])

## 2020-08-08 NOTE — PROGRESS NOTE ADULT - PROBLEM SELECTOR PLAN 3
Pt. with metabolic acidosis in the setting of renal failure. Serum CO2 low at 20 today. Pt. on oral sodium bicarbonate 650 mg BID. Monitor serum CO2

## 2020-08-08 NOTE — PROGRESS NOTE ADULT - PROBLEM SELECTOR PLAN 6
Moderately severe L arm pain after IV infiltration, however entire arm appears purple, possibly 2/2 vasoconstriction, but asymmetric compared to right.  Pulses intact, strength intact.  - VA duplex venous w/ evidence of DVT in L brachial and radial veins, superficial venous thrombosis in L basilic and cephalic and R basilic.   - no evidence of arterial occlusion LUE  - started on heparin gtt, w/ ptt > 200 on two occasions requiring temporary stop to gtt, reported melena ON, ggt stopped due to concern for UGI bleed

## 2020-08-08 NOTE — CONSULT NOTE ADULT - SUBJECTIVE AND OBJECTIVE BOX
CHIEF COMPLAINT:     HPI:  79 year old woman with PMHx dementia, HTN, unclear other medical history initially presents with abdominal pain, nausea, and vomiting "black fluids" for three days, with inability to tolerate PO, found to be in acute renal failure with hyperkalemia. History is limited by patient's dementia, unable to contact patient's brother, with whom she lives. Patient reports she came in because she had "lots of acid in my stomach." On arrival, noted to by hypotensive and in acute renal failure with high anion gap metabolic acidosis and hyperkalemia. Evaluated by Nephrology, determined not to be candidate for urgent HD. Denies chest pain, shortness of breath, vomiting, diarrhea, constipation, though history not reliable.     On interview, complains of left arm pain.     In ED, Tmin 94.8, BP 73/47, improved to 112/70 with 2L NS, HR 78, SpO2 99% on RA, RR 18.   Na 129, K 6.8, AG 45, , Cr 18.74 -> 14.07  Given 2g Calcium gluconate, 5u Insulin/1 amp D50, Lokelma x1 - K improved to 4.9. Started Bicarb gtt @ 75cc/hr (06 Aug 2020 02:49)    So formally,     79F with HTN, dementia presents with hyperkalemia (resolved) and AGMA, uremia 2/2 acute renal failure (suspecting pre-renal etiology) not started on HD. Course c/b hypothermia, hypotension, imaging choledocholithiasis, on broad spectrum antibiotics, also found to have LUE DVT, heparin drip stopped due to melena ON, concern for UGI bleed with significant Hgb drop 12.8--> 7.7.     MICU called for hypotension of 75/50 with possible decrease from systolics of >100, with new hgb of 7.7 with also concern that patient could have developed PE given known DVT while on heparin and coumadin but had to be discontinued ON . Earlier evaluated by GI, low suspicion of upper GI bleed givne no more bowel movements, and rectal exam negative, although patient is scheduled for EGD on Monday. On exam, patient was mentating AO x3, slightly lethargic, similiar to baseline as per daughter, currently receiving 1 prbc by team. Started on 500 cc bolus. Vitals initially low, however improved to 85/59, , SPo2 99% on Ra. Reviewed tele, some irregularity, however still sinus not on afib, supposedly earlier during the course.   Of note patient presenting with soft BPs throughout the course  patient denying palpitations, denying change in vision, headache, dizziness, chest pain, shortness of breath, no new leg swelling      FAMILY HISTORY:  No pertinent family history in first degree relatives      SOCIAL HISTORY:  Smoking: __ packs x ___ years  EtOH Use: unknown  Marital Status:      Allergies    No Known Allergies    Intolerances        HOME MEDICATIONS:    REVIEW OF SYSTEMS:  see HPI    OBJECTIVE:  ICU Vital Signs Last 24 Hrs  T(C): 36.6 (08 Aug 2020 17:31), Max: 37.1 (08 Aug 2020 15:54)  T(F): 97.8 (08 Aug 2020 17:31), Max: 98.7 (08 Aug 2020 15:54)  HR: 110 (08 Aug 2020 17:31) (69 - 115)  BP: 85/59 (08 Aug 2020 17:31) (75/50 - 111/57)  BP(mean): --  ABP: --  ABP(mean): --  RR: 17 (08 Aug 2020 17:31) (15 - 19)  SpO2: 99% (08 Aug 2020 17:31) (98% - 100%)        08-07 @ 07:01 - 08-08 @ 07:00  --------------------------------------------------------  IN: 3278 mL / OUT: 1700 mL / NET: 1578 mL    08-08 @ 07:01 - 08-08 @ 17:49  --------------------------------------------------------  IN: 0 mL / OUT: 1000 mL / NET: -1000 mL      CAPILLARY BLOOD GLUCOSE          PHYSICAL EXAM:  General: NAD  HEENT: wide neck  Lymph Nodes: no lymphadenopathy  Respiratory: normal breath sounds bilaterally  Cardiovascular: RRR, normal s1/S2, no murmurs, rubs or gallops  Abdomen: soft, nondistended, mild tenderness to palpation   Skin: pulses 2+ throughout, significant tender pitting edema of the left arm,   Neurological: A/O x 3, cranial nerves grossly intact  Psychiatry:    HOSPITAL MEDICATIONS:  MEDICATIONS  (STANDING):  magnesium sulfate  IVPB 1 Gram(s) IV Intermittent once  piperacillin/tazobactam IVPB.. 3.375 Gram(s) IV Intermittent every 12 hours  sodium bicarbonate 650 milliGRAM(s) Oral two times a day    MEDICATIONS  (PRN):  aluminum hydroxide/magnesium hydroxide/simethicone Suspension 30 milliLiter(s) Oral every 6 hours PRN Dyspepsia      LABS:                        7.7    6.53  )-----------( 172      ( 08 Aug 2020 10:21 )             23.7     08-08    139  |  107  |  104<H>  ----------------------------<  89  4.8   |  21<L>  |  1.28    Ca    8.1<L>      08 Aug 2020 10:21  Phos  2.4     08-08  Mg     1.4     08-08    TPro  4.9<L>  /  Alb  2.5<L>  /  TBili  0.3  /  DBili  x   /  AST  112<H>  /  ALT  83<H>  /  AlkPhos  43  08-08    PTT - ( 07 Aug 2020 20:45 )  PTT:91.3 SEC          MICROBIOLOGY:     RADIOLOGY:  [ ] Reviewed and interpreted by me    EKG:

## 2020-08-08 NOTE — PROGRESS NOTE ADULT - PROBLEM SELECTOR PLAN 5
Initially hypotensive to 70s/40s. Likely from recent N/V and poor PO intake/hypovolemia  - c/w IVF   - Bolus as necessary to keep MAP>65  - Monitor urine output  - continued episodes of hypotension to SBP 70s. Obtained TTE to evaluate cardiac function  in context of necessity for further fluids as pt persistently hypotensive  - echo with EF 61%, normal LV systolic function, c/w IV fluids, AM cortisol wnl  - this am pt w/ BPs improved 110/53

## 2020-08-08 NOTE — PROGRESS NOTE ADULT - SUBJECTIVE AND OBJECTIVE BOX
Chief Complaint:  Patient is a 79y old  Female who presents with a chief complaint of Acute renal failure, high anion gap metabolic acidosis (08 Aug 2020 08:28)      Interval Events:   Called by team patient with reported dark stools and dropping hemoglobin.  HD stable and no stools since last night    Allergies:  No Known Allergies      Hospital Medications:  aluminum hydroxide/magnesium hydroxide/simethicone Suspension 30 milliLiter(s) Oral every 6 hours PRN  piperacillin/tazobactam IVPB.. 3.375 Gram(s) IV Intermittent every 12 hours  sodium bicarbonate 650 milliGRAM(s) Oral two times a day  sodium chloride 0.9%. 1000 milliLiter(s) IV Continuous <Continuous>  sodium chloride 0.9%. 1000 milliLiter(s) IV Continuous <Continuous>      PMHX/PSHX:  Dementia  Hypertension  No significant past surgical history      Family history:  No pertinent family history in first degree relatives      ROS:  General: no night sweats, wt loss  CV: no pain, palpitation  Resp: no cough wheezing  Muscles: no weakness or pain  Endocrine: no polyuria, polydipsia, cold/heat intolerance  Heme: No petechia, ecchymosis    PHYSICAL EXAM:   GENERAL:  NAD  HEENT:  NC/AT,  conjunctivae clear, sclera -anicteric  CHEST:  Full & symmetric excursion, no increased  HEART:  Regular rhythm  ABDOMEN:  Soft, non-tender, non-distended, normoactive bowel sounds,  no masses ,no hepato-splenomegaly,   EXTREMITIES:  no cyanosis,clubbing or edema  SKIN:  No rash/erythema/ecchymoses/petechiae/wounds/abscess/warm/dry  NEURO:  Alert, oriented    Vital Signs:  Vital Signs Last 24 Hrs  T(C): 36.8 (08 Aug 2020 07:45), Max: 36.8 (07 Aug 2020 17:24)  T(F): 98.3 (08 Aug 2020 07:45), Max: 98.3 (08 Aug 2020 07:45)  HR: 115 (08 Aug 2020 09:12) (69 - 115)  BP: 110/53 (08 Aug 2020 09:12) (89/60 - 111/57)  BP(mean): --  RR: 19 (08 Aug 2020 09:12) (15 - 19)  SpO2: 98% (08 Aug 2020 09:12) (98% - 100%)  Daily     Daily     LABS:                        7.7    6.53  )-----------( 172      ( 08 Aug 2020 10:21 )             23.7     08-08    139  |  107  |  104<H>  ----------------------------<  89  4.8   |  21<L>  |  1.28    Ca    8.1<L>      08 Aug 2020 10:21  Phos  2.4     08-08  Mg     1.4     08-08    TPro  4.9<L>  /  Alb  2.5<L>  /  TBili  0.3  /  DBili  x   /  AST  112<H>  /  ALT  83<H>  /  AlkPhos  43  08-08    LIVER FUNCTIONS - ( 08 Aug 2020 03:35 )  Alb: 2.5 g/dL / Pro: 4.9 g/dL / ALK PHOS: 43 u/L / ALT: 83 u/L / AST: 112 u/L / GGT: x           PTT - ( 07 Aug 2020 20:45 )  PTT:91.3 SEC        Imaging:

## 2020-08-08 NOTE — PROGRESS NOTE ADULT - ASSESSMENT
79F with HTN, dementia presents with hyperkalemia (resolved) and AGMA, uremia 2/2 acute renal failure (suspecting pre-renal etiology). Course c/b hypothermia, hypotension, imaging choledocholithiasis, on broad spectrum antibiotics, also found to have LUE DVT, heparin drip stopped due to melena ON, concern for UGI bleed.

## 2020-08-08 NOTE — PROGRESS NOTE ADULT - PROBLEM SELECTOR PLAN 1
Pt. with VANDANA in the setting of hypotension and poor PO intake. Exact duration of VANDANA however unknown. Scr elevated at 18.74 on admission, decreased to 1.87 today with IVFs. Pt. non-oliguric, maintaining good urine output. Continue IVFs. Check kidney sonogram with doppler study. Monitor labs and urine output. Avoid any potential nephrotoxins. Dose medications as per eGFR. Discontinue calcium acetate.

## 2020-08-08 NOTE — PROGRESS NOTE ADULT - SUBJECTIVE AND OBJECTIVE BOX
Britany Black, PGY-1    CHIEF COMPLAINT: Patient is a 79y old  Female who presents with a chief complaint of Acute renal failure, high anion gap metabolic acidosis (08 Aug 2020 12:17)      INTERVAL HPI/OVERNIGHT EVENTS: Pt w/reported dark stool/melena ON, heparin ggt stopped. BPs 89/60-96/56, tele without events ON, this am with BPs in 100s and tachycardic 120s on tele. Pt reports headache and LUE pain unchanged from yesterday, some dizziness. She denies HA, SOB, abd pain, fever, chills, myalgias. She briefly pointed to chest and reported pain and therefore EKG (unchanged from baseline) and troponins (downtrended) ordered.     MEDICATIONS (STANDING):  piperacillin/tazobactam IVPB.. 3.375 Gram(s) IV Intermittent every 12 hours  sodium bicarbonate 650 milliGRAM(s) Oral two times a day  sodium chloride 0.9%. 1000 milliLiter(s) IV Continuous <Continuous>  sodium chloride 0.9%. 1000 milliLiter(s) IV Continuous <Continuous>    MEDICATIONS  (PRN):  aluminum hydroxide/magnesium hydroxide/simethicone Suspension 30 milliLiter(s) Oral every 6 hours PRN      REVIEW OF SYSTEMS:  CONSTITUTIONAL: No fever  RESPIRATORY: No shortness of breath  CARDIOVASCULAR: +chest pain as above, dizziness  GASTROINTESTINAL: No abdominal or epigastric pain. +melena as above  GENITOURINARY: w/ Metcalf in place  NEUROLOGICAL: +headache unchanged  MUSCULOSKELETAL: +LUE pain unchanged as above    T(F): 98.3 (08-08-20 @ 07:45), Max: 98.3 (08-08-20 @ 07:45)  HR: 115 (08-08-20 @ 09:12) (69 - 115)  BP: 110/53 (08-08-20 @ 09:12) (89/60 - 111/57)  RR: 19 (08-08-20 @ 09:12) (15 - 19)  SpO2: 98% (08-08-20 @ 09:12) (98% - 100%)  Wt(kg): --  CAPILLARY BLOOD GLUCOSE        I&O's Summary    07 Aug 2020 07:01  -  08 Aug 2020 07:00  --------------------------------------------------------  IN: 3278 mL / OUT: 1700 mL / NET: 1578 mL        PHYSICAL EXAM:  GENERAL: well-groomed, well-developed +appears uncomfortable  HEAD:  Atraumatic, Normocephalic  EYES: PERRL, conjunctiva and sclera clear  ENMT: No tonsillar erythema, exudates, or enlargement; Moist mucous membranes  NECK: Supple  NERVOUS SYSTEM:  Alert & Oriented X2 (doesn't know name of hospital)  CHEST/LUNG: Clear to auscultation bilaterally; No rales, rhonchi, wheezing, or rubs  HEART: Tachycardic, irregular rhythm; No murmurs, rubs, or gallops  ABDOMEN: Soft, Nontender, Nondistended; Bowel sounds present  EXTREMITIES: Pulses present, No edema. +erythematous, swollen LUE very tender to palpation  SKIN: As above    LABS:                        7.7    6.53  )-----------( 172      ( 08 Aug 2020 10:21 )             23.7     08-08    139  |  107  |  104<H>  ----------------------------<  89  4.8   |  21<L>  |  1.28    Ca    8.1<L>      08 Aug 2020 10:21  Phos  2.4     08-08  Mg     1.4     08-08    TPro  4.9<L>  /  Alb  2.5<L>  /  TBili  0.3  /  DBili  x   /  AST  112<H>  /  ALT  83<H>  /  AlkPhos  43  08-08    PTT - ( 07 Aug 2020 20:45 )  PTT:91.3 SEC        RADIOLOGY & ADDITIONAL TESTS:

## 2020-08-08 NOTE — CONSULT NOTE ADULT - ATTENDING COMMENTS
Hypotension in the setting of acute drop in Hb while on heparin gtt for LUE DVT  PRBC, serial cbc  if not responding to transfusion consider RP bleed   GI eval, NPO, occult blood  reconsult as needed
Patient seen and examined with the GI fellow. I agree with the above assessment and plan. Thank you for allowing us to care for your patient.    Plan for EUS/ERCP when optimized.
Pt. admitted with advanced renal failure, hyperkalemia and metabolic acidosis. Pt. with likely VANDANA however exact duration of renal failure unknown. Pt. seen and examined today. Scr decreased to 14 (from 18.7) yesterday. Serum potassium WNL after medical management. Pt. on IVF. Follow-up results of AM labs. Monitor labs and urine output. Avoid any potential nephrotoxins. Dose medications as per eGFR.

## 2020-08-08 NOTE — CHART NOTE - NSCHARTNOTEFT_GEN_A_CORE
Covering team was called for BP in 70/50. Pt was drowsy but mentating fine. 1 unit pRBCs was given, and 1L bolus NS was given. MICU was called to evaluate the pt, determined not a candidate. Oxygenation initially was incorrectly measured, repeat O2 with good wave form was 94-95%. Once pt head of bed elevated O2 status went up to 97%, no need for O2 at this time. MICU recommended non-urgent CT abd/pelvis when pt is able. After transfusion and bolus NS, pt was mentating at baseline and BP improved, MAP was 63. Monitor. Team will check post transfusion CBC in 2 hours post transfusion

## 2020-08-08 NOTE — PROGRESS NOTE ADULT - ATTENDING COMMENTS
ON events noted. Pt reported to have dark stools and H/H trending down. Also w/ episode of CP. However, EKG reviewed and w/o acute ischemic changes and hst w/ unremarkable delta.     -continue to hold hep ggt for now given risk  of bleed at this time   -give Protonix 80mg x1 IV then start protonix ggt   -CBC q6 for now   -GI consulted. Plan for possible EGD/ERCP on 8/10. NPO after midnight tomorrow.   -obtain iron studies  -VANDANA continue to improve and likely in setting of hypotension and decreased PO. However, given UE DVT would check renal doppler. Monitor urine output. If cr continues to improve would dc fu tomorrow am.   -c/w zosyn for now given pt met SIRS criteria and evidence of choledochitis. ON events noted. Pt reported to have dark stools and H/H trending down. Also w/ episode of CP. However, EKG reviewed and w/o acute ischemic changes and hst w/ unremarkable delta.     -Continue to hold hep ggt for now given risk  of bleed at this time   -give Protonix 80mg x1 IV then start protonix ggt   -CBC q6 for now   -GI consulted. Plan for possible EGD/ERCP on 8/10. NPO after midnight tomorrow.   -obtain iron studies  -VANDANA continue to improve and likely in setting of hypotension and decreased PO. However, given UE DVT would check renal doppler. Monitor urine output. If cr continues to improve would dc fu tomorrow am. C/w ivfs for now.   -c/w zosyn for now given pt was hypothermic w/ hypotension  and evidence of choledocholithiasis w/ Common bile duct dilatation. Will likely dc soon based on clinical picture and GI plan. ON events noted. Pt reported to have dark stools and H/H trending down. Also w/ episode of CP. However, EKG reviewed and w/o acute ischemic changes and hst w/ unremarkable delta.     -Continue to hold hep ggt for now given risk  of bleed at this time   -give Protonix 80mg x1 IV then start protonix ggt   -CBC q6 for now   -GI consulted. Plan for possible EGD/ERCP on 8/10. NPO after midnight tomorrow.   -obtain iron studies  -VANDANA continue to improve and likely in setting of hypotension and decreased PO. Given UE DVT could consider renal doppler. However, creatinine continues to significantly improve and this study would not change overall management at ths time. Monitor urine output. If cr continues to improve would dc fu tomorrow am. C/w ivfs for now.   -c/w zosyn for now given pt was hypothermic w/ hypotension  and evidence of choledocholithiasis w/ Common bile duct dilatation. Will likely dc soon based on clinical picture and GI plan.

## 2020-08-08 NOTE — PROGRESS NOTE ADULT - PROBLEM SELECTOR PLAN 4
AMS possible i/s/o uremic encephalopathy worsening already present dementia.  A&Ox1-2 on exam.  - Monitor for improvement or decline. per family patient still appears a bit off  - Hold off on CTH for now, given likely sources of AMS  - Monitor for uremic bleeding  - Aspiration precautions, fall risk protocol

## 2020-08-08 NOTE — CHART NOTE - NSCHARTNOTEFT_GEN_A_CORE
Gi Bleed versus LUE DVT treatment    Patient with hgb drop in context of dark stool bowel movement FOBT+. Previously supratherapeutic >200 and most recent aPTT therapeutic 90s. Risks and benefits of therapeutic anticoagulation for LUE DVT versus GI bleed discussed with Night Admit Resident. We will DC heparin for now. Begin CBC q6h. Type and Screen placed. Patient is known to GI service already.     Suman Yuen

## 2020-08-08 NOTE — PROGRESS NOTE ADULT - PROBLEM SELECTOR PLAN 3
pH 7.2, HCO3 13->11, AG 45->36. Likely 2/2 acute renal failure and starvation ketosis.  - S/p 1 amp of sodium bicarb in ED.  - Monitor BMP and VBG  - Trend lactate  - AG closed to 13   - appreciating renal recs, starting sodium bicarb as above

## 2020-08-08 NOTE — PROGRESS NOTE ADULT - SUBJECTIVE AND OBJECTIVE BOX
[facility] DIVISION OF KIDNEY DISEASES AND HYPERTENSION --    HPI: 79-year-old female with unclear underlying medical history with questionable dementia was admitted to [facility] on 8/5/2020 with abdominal pain and N/V of 2 days duration. On admission, pt. found to have elevated Scr of 18.74 (8/5/20) with serum potassium of 6.8, hence nephrology consulted. [facility], no prior labs available for review. On arrival to the ER pt. noted to have BP of 73/47. Pt. initiated on IVFs and received medical management for hyperkalemia. Serum potassium WNL and Scr improved to 1.87 today (8/7/20).     Pt. seen and evaluated at bedside this morning. BP overnight remained low. BP reading improved today morning. Pt. more awake, alert. Pt. provides limited ROS. No CP, SOB or abdominal pain. Pt. gives history of LUE pain/redness. Urine output ~1.7L over the past 24 hours.    PAST HISTORY  --------------------------------------------------------------------------------  No significant changes to PMH, PSH, FHx, SHx, unless otherwise noted    ALLERGIES & MEDICATIONS  --------------------------------------------------------------------------------  Allergies    No Known Allergies    Intolerances    Standing Inpatient Medications  calcium acetate 667 milliGRAM(s) Oral three times a day with meals  piperacillin/tazobactam IVPB.. 3.375 Gram(s) IV Intermittent every 12 hours  sodium bicarbonate 650 milliGRAM(s) Oral two times a day  sodium chloride 0.9%. 1000 milliLiter(s) IV Continuous <Continuous>  sodium chloride 0.9%. 1000 milliLiter(s) IV Continuous <Continuous>    PRN Inpatient Medications  aluminum hydroxide/magnesium hydroxide/simethicone Suspension 30 milliLiter(s) Oral every 6 hours PRN    REVIEW OF SYSTEMS  --------------------------------------------------------------------------------  Limited ROS obtained  Gen: No fever  Respiratory: No SOB  CV: No chest pain  GI: No abdominal pain  LE: No edema  MSK: +LUE swelling    VITALS/PHYSICAL EXAM  --------------------------------------------------------------------------------  T(C): 36.8 (08-08-20 @ 07:45), Max: 36.8 (08-07-20 @ 17:24)  HR: 72 (08-08-20 @ 07:45) (69 - 78)  BP: 111/57 (08-08-20 @ 07:45) (78/51 - 111/57)  RR: 17 (08-08-20 @ 07:45) (15 - 18)  SpO2: 98% (08-08-20 @ 07:45) (97% - 100%)  Wt(kg): --    08-07-20 @ 07:01  -  08-08-20 @ 07:00  --------------------------------------------------------  IN: 3278 mL / OUT: 1700 mL / NET: 1578 mL    Physical Exam:  	Gen: resting  	HEENT: No JVD  	Pulm: Fair air entry B/L, clear on anterior auscultation  	CV: S1S2+  	Abd: Soft, +BS, no distention  	Ext: No LE edema B/L, LUE swelling/redness  	Neuro: Awake but provides limited ROS  	Skin: LUE erythematous rash seen    LABS/STUDIES  --------------------------------------------------------------------------------              7.8    7.65  >-----------<  181      [08-08-20 @ 03:24]              25.4     138  |  105  |  115  ----------------------------<  86      [08-08-20 @ 03:35]  4.6   |  20  |  1.87        Ca     8.1     [08-08-20 @ 03:35]      Mg     1.6     [08-08-20 @ 03:35]      Phos  2.8     [08-08-20 @ 03:35]    TPro  4.9  /  Alb  2.5  /  TBili  0.3  /  DBili  x   /  AST  112  /  ALT  83  /  AlkPhos  43  [08-08-20 @ 03:35]    Creatinine Trend:  SCr 1.87 [08-08 @ 03:35]  SCr 4.31 [08-07 @ 10:49]  SCr 10.75 [08-06 @ 14:37]  SCr 13.08 [08-06 @ 06:00]  SCr 14.07 [08-05 @ 21:23]    Urine Creatinine 225.90      [08-06-20 @ 07:45]  Urine Protein 67.9      [08-06-20 @ 07:45]  Urine Sodium 44      [08-06-20 @ 07:45]  Urine Urea Nitrogen 569.0      [08-06-20 @ 07:45]

## 2020-08-08 NOTE — PROGRESS NOTE ADULT - PROBLEM SELECTOR PLAN 10
Transitions of Care Status:  1.  Name of PCP:  2.  PCP Contacted on Admission: [ ] Y    [x] N    3.  PCP contacted at Discharge: [ ] Y    [ ] N    [ ] N/A  4.  Post-Discharge Appointment Date and Location:  5.  Summary of Handoff given to PCP:
Transitions of Care Status:  1.  Name of PCP:  2.  PCP Contacted on Admission: [ ] Y    [x] N    3.  PCP contacted at Discharge: [ ] Y    [ ] N    [ ] N/A  4.  Post-Discharge Appointment Date and Location:  5.  Summary of Handoff given to PCP:

## 2020-08-08 NOTE — PROGRESS NOTE ADULT - PROBLEM SELECTOR PLAN 9
DVT PPx: heparin ggt stopped due to concern for UGI bleed  Diet: DASH/TLC diet  Med Rec: Lotrel (amlodipine 10 mg / benazepril 20 mg) daily                Omeprazole 20 mg daily                asa 81 mg

## 2020-08-08 NOTE — CONSULT NOTE ADULT - ASSESSMENT
79F with HTN, dementia presents with hyperkalemia (resolved) and AGMA, uremia 2/2 acute renal failure (suspecting pre-renal etiology) not started on HD. Course c/b hypothermia, hypotension, imaging choledocholithiasis, on broad spectrum antibiotics, also found to have LUE DVT, heparin drip stopped due to melena ON, concern for UGI bleed with significant Hgb drop 12.8--> 7.7 and MICU consulted for possible hemmorrhagic shock vs obstructive secondary to new PE given that she is off full anticoagulation.       #recommendations  Currently not a MICU candidate  patient baseline with soft BPs as seen on admission,  currently mentating and at baseline as per daughter although slightly more lethargic  satting well on room air so low concern that patient is throwing new PE although concern is justified  reviewed tele with covering team and tele tech, likely sinus tach in the setting of hypovolemic possibly hemorrhagic shock  patient with recent TTE with grossly normal LV RV and mild diastolic dysfxn  should continue to fluid resuscitate up to 2L via bolus, and continue to resuscitate with pRBCs, if not responding please reconsult  once hemodynamically stable please obtain CT abdomen pelvis to rule out occult RP bleed  primary team was at bedside aware of plan    Damien Fabian PGY 2 MICU

## 2020-08-08 NOTE — PROGRESS NOTE ADULT - PROBLEM SELECTOR PLAN 1
Pt w/ microcytic anemia, Hgb 14.3 on admission, 10.3 yesterday, downtrend to 7.7 today.   - In context of reported melena ON on heparin ggt. Ggt stopped GI consulted. Gave IV protonix 80 x 1, ordered protonix ggt to start tomorrow. EGD/ERCP planned for monday, monitoring q6h CBC  - Less likely dilution in context of fluids, due to severity of change  - Iron studies ordered, will f/u Pt w/ microcytic anemia, Hgb 14.3 on admission, 10.3 yesterday, downtrend to 7.7 today.   - In context of reported melena ON on heparin ggt. Ggt stopped GI consulted. Gave IV protonix 80 x 1, ordered protonix ggt to start tomorrow. EGD/ERCP planned for monday, monitoring q6h CBC  - Less likely dilution in context of fluids, due to severity of change  - Iron studies ordered, will f/u  - Metcalf bag w/o evidence of hematuria

## 2020-08-08 NOTE — PROGRESS NOTE ADULT - PROBLEM SELECTOR PLAN 2
Cr 18.74->4.31 with IVF, continues to make urine. POCUS in ED and Abdominal CT shows no obvious hydroureteronephrosis, only mild perinephric stranding bilaterally. Determined not to be urgent HD candidate by Renal.  - Pt w/ episodes of hypotension, wish to avoid hypotension, c/w IV fluids  - Nephrology following, appreciate recs  - Avoid NSAIDs, ACEi/ARBs, contrast, nephrotoxic medications  - Obtain outpatient records regarding baseline kidney function  - Monitor BMP - HCO3, K, Ca, Phos  - started on calcium carb 667mg TID w/ low phos diet, sodium bicarb 650mg BID, low potassium diet per nephro recs  - nephro recommending renal US w/ doppler, ordered  - Metcalf catheter placed in ED to accurately monitor I&Os  - SCr improved to 1.87

## 2020-08-09 ENCOUNTER — TRANSCRIPTION ENCOUNTER (OUTPATIENT)
Age: 80
End: 2020-08-09

## 2020-08-09 LAB
ALBUMIN SERPL ELPH-MCNC: 2.4 G/DL — LOW (ref 3.3–5)
ALP SERPL-CCNC: 46 U/L — SIGNIFICANT CHANGE UP (ref 40–120)
ALT FLD-CCNC: 55 U/L — HIGH (ref 4–33)
ANION GAP SERPL CALC-SCNC: 13 MMO/L — SIGNIFICANT CHANGE UP (ref 7–14)
AST SERPL-CCNC: 57 U/L — HIGH (ref 4–32)
BASOPHILS # BLD AUTO: 0.07 K/UL — SIGNIFICANT CHANGE UP (ref 0–0.2)
BASOPHILS NFR BLD AUTO: 1 % — SIGNIFICANT CHANGE UP (ref 0–2)
BILIRUB SERPL-MCNC: 0.3 MG/DL — SIGNIFICANT CHANGE UP (ref 0.2–1.2)
BUN SERPL-MCNC: 45 MG/DL — HIGH (ref 7–23)
CALCIUM SERPL-MCNC: 8 MG/DL — LOW (ref 8.4–10.5)
CHLORIDE SERPL-SCNC: 110 MMOL/L — HIGH (ref 98–107)
CO2 SERPL-SCNC: 18 MMOL/L — LOW (ref 22–31)
CREAT SERPL-MCNC: 0.77 MG/DL — SIGNIFICANT CHANGE UP (ref 0.5–1.3)
EOSINOPHIL # BLD AUTO: 0.15 K/UL — SIGNIFICANT CHANGE UP (ref 0–0.5)
EOSINOPHIL NFR BLD AUTO: 2.1 % — SIGNIFICANT CHANGE UP (ref 0–6)
GLUCOSE SERPL-MCNC: 82 MG/DL — SIGNIFICANT CHANGE UP (ref 70–99)
HCT VFR BLD CALC: 28.8 % — LOW (ref 34.5–45)
HGB BLD-MCNC: 9.3 G/DL — LOW (ref 11.5–15.5)
IMM GRANULOCYTES NFR BLD AUTO: 6.5 % — HIGH (ref 0–1.5)
LYMPHOCYTES # BLD AUTO: 1.39 K/UL — SIGNIFICANT CHANGE UP (ref 1–3.3)
LYMPHOCYTES # BLD AUTO: 19.5 % — SIGNIFICANT CHANGE UP (ref 13–44)
MCHC RBC-ENTMCNC: 25.2 PG — LOW (ref 27–34)
MCHC RBC-ENTMCNC: 32.3 % — SIGNIFICANT CHANGE UP (ref 32–36)
MCV RBC AUTO: 78 FL — LOW (ref 80–100)
MONOCYTES # BLD AUTO: 0.65 K/UL — SIGNIFICANT CHANGE UP (ref 0–0.9)
MONOCYTES NFR BLD AUTO: 9.1 % — SIGNIFICANT CHANGE UP (ref 2–14)
NEUTROPHILS # BLD AUTO: 4.4 K/UL — SIGNIFICANT CHANGE UP (ref 1.8–7.4)
NEUTROPHILS NFR BLD AUTO: 61.8 % — SIGNIFICANT CHANGE UP (ref 43–77)
NRBC # FLD: 0 K/UL — SIGNIFICANT CHANGE UP (ref 0–0)
PLATELET # BLD AUTO: 152 K/UL — SIGNIFICANT CHANGE UP (ref 150–400)
PMV BLD: 10.1 FL — SIGNIFICANT CHANGE UP (ref 7–13)
POTASSIUM SERPL-MCNC: 4.8 MMOL/L — SIGNIFICANT CHANGE UP (ref 3.5–5.3)
POTASSIUM SERPL-SCNC: 4.8 MMOL/L — SIGNIFICANT CHANGE UP (ref 3.5–5.3)
PROT SERPL-MCNC: 4.9 G/DL — LOW (ref 6–8.3)
RBC # BLD: 3.69 M/UL — LOW (ref 3.8–5.2)
RBC # FLD: 15 % — HIGH (ref 10.3–14.5)
SODIUM SERPL-SCNC: 141 MMOL/L — SIGNIFICANT CHANGE UP (ref 135–145)
WBC # BLD: 7.12 K/UL — SIGNIFICANT CHANGE UP (ref 3.8–10.5)
WBC # FLD AUTO: 7.12 K/UL — SIGNIFICANT CHANGE UP (ref 3.8–10.5)

## 2020-08-09 PROCEDURE — 99232 SBSQ HOSP IP/OBS MODERATE 35: CPT

## 2020-08-09 PROCEDURE — 74176 CT ABD & PELVIS W/O CONTRAST: CPT | Mod: 26

## 2020-08-09 PROCEDURE — 99233 SBSQ HOSP IP/OBS HIGH 50: CPT

## 2020-08-09 PROCEDURE — 99232 SBSQ HOSP IP/OBS MODERATE 35: CPT | Mod: GC

## 2020-08-09 RX ORDER — PANTOPRAZOLE SODIUM 20 MG/1
40 TABLET, DELAYED RELEASE ORAL EVERY 12 HOURS
Refills: 0 | Status: DISCONTINUED | OUTPATIENT
Start: 2020-08-09 | End: 2020-08-20

## 2020-08-09 RX ADMIN — Medication 650 MILLIGRAM(S): at 17:06

## 2020-08-09 RX ADMIN — PANTOPRAZOLE SODIUM 10 MG/HR: 20 TABLET, DELAYED RELEASE ORAL at 00:49

## 2020-08-09 RX ADMIN — PANTOPRAZOLE SODIUM 40 MILLIGRAM(S): 20 TABLET, DELAYED RELEASE ORAL at 17:51

## 2020-08-09 RX ADMIN — PIPERACILLIN AND TAZOBACTAM 25 GRAM(S): 4; .5 INJECTION, POWDER, LYOPHILIZED, FOR SOLUTION INTRAVENOUS at 05:09

## 2020-08-09 RX ADMIN — PIPERACILLIN AND TAZOBACTAM 25 GRAM(S): 4; .5 INJECTION, POWDER, LYOPHILIZED, FOR SOLUTION INTRAVENOUS at 17:06

## 2020-08-09 RX ADMIN — Medication 650 MILLIGRAM(S): at 05:09

## 2020-08-09 NOTE — PROGRESS NOTE ADULT - PROBLEM SELECTOR PLAN 1
Pt. with VANDANA in the setting of hypotension and poor PO intake. Exact duration of VANDANA however unknown. Scr elevated at 18.74 on admission. Pt. received IVFs. Scr  decreased to 0.77 today. Pt. with resolving VANDANA. Pt. non-oliguric. Monitor labs and urine output. Avoid any potential nephrotoxins

## 2020-08-09 NOTE — PROGRESS NOTE ADULT - PROBLEM SELECTOR PLAN 3
In setting of severe VANDANA and use of heparin gtt; now w/ reported dark stools. Currently improving  - Monitor BMPs

## 2020-08-09 NOTE — DISCHARGE NOTE PROVIDER - PROVIDER TOKENS
PROVIDER:[TOKEN:[[id number]]] PROVIDER:[TOKEN:[[id number]]],FREE:[LAST:[Jean],FIRST:[Janie],PHONE:[(   )    -],FAX:[(   )    -]] PROVIDER:[TOKEN:[[id number]]],FREE:[LAST:[Jean],FIRST:[Janie],PHONE:[phone]:[(   )    -],ADDRESS:[[address] [place] [address]],FOLLOWUP:[2 weeks]]

## 2020-08-09 NOTE — PROGRESS NOTE ADULT - PROBLEM SELECTOR PLAN 5
- VA duplex venous w/ evidence of DVT in L brachial and radial veins, superficial venous thrombosis in L basilic and cephalic and R basilic.   - no evidence of arterial occlusion LUE  - Holding heparin gtt in setting of suspected GIB & RP bleed  - Will consider restarting heparin gtt pending EGD and CT scan

## 2020-08-09 NOTE — DISCHARGE NOTE PROVIDER - NSDCCPCAREPLAN_GEN_ALL_CORE_FT
PRINCIPAL DISCHARGE DIAGNOSIS  Diagnosis: VANDANA (acute kidney injury)  Assessment and Plan of Treatment:       SECONDARY DISCHARGE DIAGNOSES  Diagnosis: Upper GI bleed  Assessment and Plan of Treatment: Upper GI bleed    Diagnosis: Anemia  Assessment and Plan of Treatment: Anemia    Diagnosis: DVT (deep venous thrombosis)  Assessment and Plan of Treatment: DVT (deep venous thrombosis) PRINCIPAL DISCHARGE DIAGNOSIS  Diagnosis: VANDANA (acute kidney injury)  Assessment and Plan of Treatment: You were admitted      SECONDARY DISCHARGE DIAGNOSES  Diagnosis: Upper GI bleed  Assessment and Plan of Treatment: Upper GI bleed    Diagnosis: Anemia  Assessment and Plan of Treatment: Anemia    Diagnosis: DVT (deep venous thrombosis)  Assessment and Plan of Treatment: DVT (deep venous thrombosis) PRINCIPAL DISCHARGE DIAGNOSIS  Diagnosis: VANDANA (acute kidney injury)  Assessment and Plan of Treatment: You were admitted with kidney failure which we think was caused by low blood pressure and dehydration. Your kidney function improved dramatically with IV fluids.      SECONDARY DISCHARGE DIAGNOSES  Diagnosis: Upper GI bleed  Assessment and Plan of Treatment: You were found to be bleeding in your intestines. On endoscopy they found an ulcer in you esophagus which they treated as well as some bleeding at the site where they removed the gallstone which was also treated. You need to be on blood thinners for your blood clot so there is a possibility that you might bleed again. This may be seen with red blood in the stool or dark, sticky stools as well as blood in the vomit. If you see any of these things or have other bleeding which you can't stop then go directly to the hospital.    Diagnosis: DVT (deep venous thrombosis)  Assessment and Plan of Treatment: You were found to have a clot in the veins in your left arm. We're not sure why this clot formed but we needed to treat it because a piece of the clot can break off and travel to the lungs where it can cause a pulmonary emobolism which can be life threatening. Please take your blood thinners as prescribed and return to the hospital if you have any symptoms of a clot in your extremities (swelling and pain) or your lungs (shortness of breath, chest pain, rapid heart rate at rest).    Diagnosis: Anemia  Assessment and Plan of Treatment: You had low levels of hemoglobin (which is the protein that carries oxygen around in the blood) most likely because of the bleeding. You were given two blood transfusions to keep your hemoglobin at a tolerable level. PRINCIPAL DISCHARGE DIAGNOSIS  Diagnosis: VANDANA (acute kidney injury)  Assessment and Plan of Treatment: You are found to have some kidney injury. We give you fluids to help the kidney recover and monitor your labs for kidney function.  Please monitor your urine output at home and keep your self hydrated.  If you are unable to make urine, please report to the emergency room immediately.  It is important to follow up with your doctor after discharge so they draw blood to monitor your  kidney numbers.  Medications that can cause acute kidney injury, including antibiotics and diureitics.      SECONDARY DISCHARGE DIAGNOSES  Diagnosis: Upper GI bleed  Assessment and Plan of Treatment: You were found to be bleeding in your intestines. On endoscopy they found an ulcer in you esophagus which they treated as well as some bleeding at the site where they removed the gallstone which was also treated. You need to be on blood thinners for your blood clot so there is a possibility that you might bleed again. This may be seen with red blood in the stool or dark, sticky stools as well as blood in the vomit. If you see any of these things or have other bleeding which you can't stop then go directly to the hospital.    Diagnosis: DVT (deep venous thrombosis)  Assessment and Plan of Treatment: You were found to have a clot in the veins in your left arm. We're not sure why this clot formed but we needed to treat it because a piece of the clot can break off and travel to the lungs where it can cause a pulmonary emobolism which can be life threatening. Please take your blood thinners as prescribed and return to the hospital if you have any symptoms of a clot in your extremities (swelling and pain) or your lungs (shortness of breath, chest pain, rapid heart rate at rest).    Diagnosis: Anemia  Assessment and Plan of Treatment: You had low levels of hemoglobin (which is the protein that carries oxygen around in the blood) most likely because of the bleeding. You were given two blood transfusions to keep your hemoglobin at a tolerable level. PRINCIPAL DISCHARGE DIAGNOSIS  Diagnosis: VANDANA (acute kidney injury)  Assessment and Plan of Treatment: You are found to have some kidney injury. We give you fluids to help the kidney recover and monitor your labs for kidney function.  Please monitor your urine output at home and keep your self hydrated.  If you are unable to make urine, please report to the emergency room immediately.  It is important to follow up with your doctor after discharge so they draw blood to monitor your  kidney numbers.  Medications that can cause acute kidney injury, including antibiotics and diureitics.  Please follow-up with your PCP in 1 week for blood work to monitor your kidney function.      SECONDARY DISCHARGE DIAGNOSES  Diagnosis: DVT (deep venous thrombosis)  Assessment and Plan of Treatment: You were found to have a clot in the veins in your left arm. We're not sure why this clot formed but we needed to treat it because a piece of the clot can break off and travel to the lungs where it can cause a pulmonary emobolism which can be life threatening. Please take your blood thinners as prescribed and return to the hospital if you have any symptoms of a clot in your extremities (swelling and pain) or your lungs (shortness of breath, chest pain, rapid heart rate at rest).    Diagnosis: Anemia  Assessment and Plan of Treatment: You had low levels of hemoglobin (which is the protein that carries oxygen around in the blood) most likely because of the bleeding. You were given two blood transfusions to keep your hemoglobin at a tolerable level.  Please follow-up with your PCP in 1 week for a blood count.    Diagnosis: Upper GI bleed  Assessment and Plan of Treatment: You had an endoscopy which found an ulcer in you esophagus which they treated as well as some bleeding at the site where they removed the gallstone which was also treated. You need to be on blood thinners for your blood clot so there is a possibility that you might bleed again. This may be seen with red blood in the stool or dark, sticky stools as well as blood in the vomit. If you see any of these things or have other bleeding which you can't stop then go directly to the hospital.

## 2020-08-09 NOTE — PROGRESS NOTE ADULT - ASSESSMENT
79F with HTN, dementia presents with hyperkalemia (resolved) and AGMA, uremia 2/2 acute renal failure (pre-renal etiology; now resolved). Course c/b hypothermia, hypotension, choledocholithiasis, acute blood loss anemia (s/p 1U blood) on broad spectrum antibiotics, also found to have LUE DVT, heparin drip stopped due to melena ON on 8/8, concern for UGI bleed.

## 2020-08-09 NOTE — PROGRESS NOTE ADULT - SUBJECTIVE AND OBJECTIVE BOX
[facility] DIVISION OF KIDNEY DISEASES AND HYPERTENSION --    HPI: 79-year-old female admitted to [facility] on 8/5/2020 with abdominal pain and N/V of 2 days duration. On admission, pt. found to have elevated Scr of 18.74 (8/5/20) with serum potassium of 6.8, hence nephrology consulted. [facility], no prior labs available for review. On arrival to the ER pt. noted to have BP of 73/47. Pt. initiated on IVFs and received medical management for hyperkalemia. Serum potassium WNL and Scr improved to WNL (0.77) today (8/9/20).     Pt. seen and evaluated at bedside this morning. Pt. more awake, alert. Pt. feels better and denies CP, SOB or abdominal pain. Pt. gives history of LUE pain/redness. Urine output ~2.6L over the past 24 hours.    PAST HISTORY  --------------------------------------------------------------------------------  No significant changes to PMH, PSH, FHx, SHx, unless otherwise noted    ALLERGIES & MEDICATIONS  --------------------------------------------------------------------------------  Allergies    No Known Allergies    Intolerances    Standing Inpatient Medications  pantoprazole Infusion 8 mG/Hr IV Continuous <Continuous>  piperacillin/tazobactam IVPB.. 3.375 Gram(s) IV Intermittent every 12 hours  sodium bicarbonate 650 milliGRAM(s) Oral two times a day    PRN Inpatient Medications  aluminum hydroxide/magnesium hydroxide/simethicone Suspension 30 milliLiter(s) Oral every 6 hours PRN    REVIEW OF SYSTEMS  --------------------------------------------------------------------------------  Gen: No fever  Respiratory: No SOB  CV: No chest pain  GI: No abdominal pain  LE: No edema  MSK: +LUE swelling    All other systems were reviewed and are negative, except as noted.    VITALS/PHYSICAL EXAM  --------------------------------------------------------------------------------  T(C): 36.4 (08-09-20 @ 04:50), Max: 37.2 (08-08-20 @ 19:44)  HR: 71 (08-09-20 @ 04:50) (71 - 115)  BP: 129/62 (08-09-20 @ 04:50) (75/50 - 129/62)  RR: 18 (08-09-20 @ 04:50) (17 - 19)  SpO2: 100% (08-09-20 @ 04:50) (95% - 100%)  Wt(kg): --    08-08-20 @ 07:01  -  08-09-20 @ 07:00  --------------------------------------------------------  IN: 0 mL / OUT: 2600 mL / NET: -2600 mL    Physical Exam:  	Gen: resting  	HEENT: No JVD  	Pulm: Fair air entry B/L, clear on anterior auscultation  	CV: S1S2+  	Abd: Soft, +BS, no distention  	Ext: No LE edema B/L, LUE swelling/decreased redness  	Neuro: Awake, alert  	Skin: LUE erythematous rash    LABS/STUDIES  --------------------------------------------------------------------------------              9.3    7.12  >-----------<  152      [08-09-20 @ 08:00]              28.8     141  |  110  |  45  ----------------------------<  82      [08-09-20 @ 05:40]  4.8   |  18  |  0.77        Ca     8.0     [08-09-20 @ 05:40]      Mg     1.4     [08-08-20 @ 10:21]      Phos  2.4     [08-08-20 @ 10:21]    TPro  4.9  /  Alb  2.4  /  TBili  0.3  /  DBili  x   /  AST  57  /  ALT  55  /  AlkPhos  46  [08-09-20 @ 05:40]    Creatinine Trend:  SCr 0.77 [08-09 @ 05:40]  SCr 1.28 [08-08 @ 10:21]  SCr 1.87 [08-08 @ 03:35]  SCr 4.31 [08-07 @ 10:49]  SCr 10.75 [08-06 @ 14:37]

## 2020-08-09 NOTE — PROGRESS NOTE ADULT - ASSESSMENT
Impression:  # Choledocholithiasis (1 x 0.5) with dilated CBD. Bile duct also dilated with normal liver enzymes. No fevers/leukocytosis and normal bilirubin - low suspicion for acute cholangitis  # Abdominal Pain: Resolved, concerning pancreatitis based on elevated lipase and pain. However description of pain not consistent with pancreatitis. CT unremarkable, however limited due to lack of contrast. Elevations in lipase may be seen in the setting of renal insufficiency and hypotension.  # Anemia - worsening this admission with reports of dark stools possible upper GI source    Recommendation:  - NPO after midnight  - PPI gtt  - monitor hemoglobin  - transfuse as needed  - two active IVs at all times  - active type and screen  - Pending optimization [renal failure, electrolytes, etc.) can consider ERCP for stone removal with likely need EGD first now with new reports  - Trend CMP and monitor for signs of sepsis  - Supportive care per primary team    Sepideh Camilo  Gastroenterology Fellow  Pager x [phone] or [phone]  (From 8 am - 5 pm or on weekends and holidays please page GI on call at [phone]) Impression:  # Choledocholithiasis (1 x 0.5) with dilated CBD. Bile duct also dilated with normal liver enzymes. No fevers/leukocytosis and normal bilirubin - low suspicion for acute cholangitis  # Abdominal Pain: Resolved, concerning pancreatitis based on elevated lipase and pain. However description of pain not consistent with pancreatitis. CT unremarkable, however limited due to lack of contrast. Elevations in lipase may be seen in the setting of renal insufficiency and hypotension.  # Anemia - worsening this admission with reports of dark stools possible upper GI source    Recommendation:  - NPO after midnight  - PPI gtt  - please HOLD hepatin gtt at midnight if restarted  - monitor hemoglobin  - transfuse as needed  - two active IVs at all times  - active type and screen  - Pending optimization [renal failure, electrolytes, etc.) can consider ERCP for stone removal with likely need EGD first now with new reports  - Trend CMP and monitor for signs of sepsis  - Supportive care per primary team    Sepideh Camilo  Gastroenterology Fellow  Pager x [phone] or [phone]  (From 8 am - 5 pm or on weekends and holidays please page GI on call at [phone])

## 2020-08-09 NOTE — PROGRESS NOTE ADULT - PROBLEM SELECTOR PLAN 7
Incidentally found on abdominal CT. Pending EGD/ERCP on 8/10 to evaluated for GIB and possible stone removal.  - NPO at midnight  - F/u GI recs

## 2020-08-09 NOTE — PROGRESS NOTE ADULT - SUBJECTIVE AND OBJECTIVE BOX
Estevan Tripathi MD  PGY 3 Department of Internal Medicine  Pager: [phone]/ [phone]    Patient is a 79y old  Female who presents with a chief complaint of Acute renal failure, high anion gap metabolic acidosis (08 Aug 2020 17:49)      SUBJECTIVE / OVERNIGHT EVENTS: Pt seen and examined. No acute overnight events. Pt reported no fever, chills, CP, SOB, abdominal pain, N/V/D, dysuria, or new joint aches.     MEDICATIONS  (STANDING):  pantoprazole Infusion 8 mG/Hr (10 mL/Hr) IV Continuous <Continuous>  piperacillin/tazobactam IVPB.. 3.375 Gram(s) IV Intermittent every 12 hours  sodium bicarbonate 650 milliGRAM(s) Oral two times a day    MEDICATIONS  (PRN):  aluminum hydroxide/magnesium hydroxide/simethicone Suspension 30 milliLiter(s) Oral every 6 hours PRN Dyspepsia      I&O's Summary    08 Aug 2020 07:01  -  09 Aug 2020 07:00  --------------------------------------------------------  IN: 0 mL / OUT: 2600 mL / NET: -2600 mL        Vital Signs Last 24 Hrs  T(C): 36.4 (09 Aug 2020 04:50), Max: 37.2 (08 Aug 2020 19:44)  T(F): 97.5 (09 Aug 2020 04:50), Max: 98.9 (08 Aug 2020 19:44)  HR: 71 (09 Aug 2020 04:50) (71 - 112)  BP: 129/62 (09 Aug 2020 04:50) (75/50 - 129/62)  BP(mean): --  RR: 18 (09 Aug 2020 04:50) (17 - 19)  SpO2: 100% (09 Aug 2020 04:50) (95% - 100%)    CAPILLARY BLOOD GLUCOSE          PHYSICAL EXAM:  GENERAL: NAD,   HEAD:  Atraumatic, Normocephalic  EYES: EOMI, PERRL, conjunctiva and sclera clear  NECK: No JVD  CHEST/LUNG: Clear to auscultation bilaterally; No wheeze  HEART: Regular rate and rhythm; No murmurs, rubs, or gallops  ABDOMEN: Soft, Nontender, Nondistended; Bowel sounds present  EXTREMITIES:  2+ Peripheral Pulses, No clubbing, cyanosis, or edema  PSYCH: AAOx3  NEUROLOGY: non-focal  SKIN: No rashes or lesions    LABS:                        9.3    7.12  )-----------( 152      ( 09 Aug 2020 08:00 )             28.8     Auto Eosinophil # 0.15  / Auto Eosinophil % 2.1   / Auto Neutrophil # 4.40  / Auto Neutrophil % 61.8  / BANDS % x                            7.7    6.53  )-----------( 172      ( 08 Aug 2020 10:21 )             23.7     Auto Eosinophil # x     / Auto Eosinophil % x     / Auto Neutrophil # x     / Auto Neutrophil % x     / BANDS % x                            7.8    7.65  )-----------( 181      ( 08 Aug 2020 03:24 )             25.4     Auto Eosinophil # 0.15  / Auto Eosinophil % 2.0   / Auto Neutrophil # 4.80  / Auto Neutrophil % 62.6  / BANDS % 1.7      08-09    141  |  110<H>  |  45<H>  ----------------------------<  82  4.8   |  18<L>  |  0.77  08-08    139  |  107  |  104<H>  ----------------------------<  89  4.8   |  21<L>  |  1.28  08-08    138  |  105  |  115<H>  ----------------------------<  86  4.6   |  20<L>  |  1.87<H>    Ca    8.0<L>      09 Aug 2020 05:40  Mg     1.4     08-08  Phos  2.4     08-08  TPro  4.9<L>  /  Alb  2.4<L>  /  TBili  0.3  /  DBili  x   /  AST  57<H>  /  ALT  55<H>  /  AlkPhos  46  08-09  TPro  4.9<L>  /  Alb  2.5<L>  /  TBili  0.3  /  DBili  x   /  AST  112<H>  /  ALT  83<H>  /  AlkPhos  43  08-08    PTT - ( 07 Aug 2020 20:45 )  PTT:91.3 SEC              RADIOLOGY & ADDITIONAL TESTS:    Imaging Personally Reviewed:    Consultant(s) Notes Reviewed:      Care Discussed with Consultants/Other Providers: Estevan Tripathi MD  PGY 3 Department of Internal Medicine  Pager: [phone]/ [phone]    Patient is a 79y old  Female who presents with a chief complaint of Acute renal failure, high anion gap metabolic acidosis (08 Aug 2020 17:49)      SUBJECTIVE / OVERNIGHT EVENTS: Pt seen and examined. No acute overnight events. Interval hx obtained in patients native language by provider fluent in Sami. Pt reported continued LUE pain w/ palpation.  Pt reported no fever, chills, CP, SOB, abdominal pain, N/V/D, dysuria, or new joint aches.     MEDICATIONS  (STANDING):  pantoprazole Infusion 8 mG/Hr (10 mL/Hr) IV Continuous <Continuous>  piperacillin/tazobactam IVPB.. 3.375 Gram(s) IV Intermittent every 12 hours  sodium bicarbonate 650 milliGRAM(s) Oral two times a day    MEDICATIONS  (PRN):  aluminum hydroxide/magnesium hydroxide/simethicone Suspension 30 milliLiter(s) Oral every 6 hours PRN Dyspepsia      I&O's Summary    08 Aug 2020 07:01  -  09 Aug 2020 07:00  --------------------------------------------------------  IN: 0 mL / OUT: 2600 mL / NET: -2600 mL        Vital Signs Last 24 Hrs  T(C): 36.4 (09 Aug 2020 04:50), Max: 37.2 (08 Aug 2020 19:44)  T(F): 97.5 (09 Aug 2020 04:50), Max: 98.9 (08 Aug 2020 19:44)  HR: 71 (09 Aug 2020 04:50) (71 - 112)  BP: 129/62 (09 Aug 2020 04:50) (75/50 - 129/62)  BP(mean): --  RR: 18 (09 Aug 2020 04:50) (17 - 19)  SpO2: 100% (09 Aug 2020 04:50) (95% - 100%)    CAPILLARY BLOOD GLUCOSE          PHYSICAL EXAM:  GENERAL: well-groomed, well-developed +appears uncomfortable  HEAD:  Atraumatic, Normocephalic  EYES: PERRL, conjunctiva and sclera clear  ENMT: No tonsillar erythema, exudates, or enlargement; Moist mucous membranes  NECK: Supple  NERVOUS SYSTEM:  Alert & Oriented X2 (doesn't know name of hospital)  CHEST/LUNG: Clear to auscultation bilaterally; No rales, rhonchi, wheezing, or rubs  HEART: Tachycardic, irregular rhythm; No murmurs, rubs, or gallops  ABDOMEN: Soft, Nontender, Nondistended; Bowel sounds present  EXTREMITIES: Pulses present, No edema. +erythematous, swollen LUE very tender to palpation  SKIN: As above    LABS:                        9.3    7.12  )-----------( 152      ( 09 Aug 2020 08:00 )             28.8     Auto Eosinophil # 0.15  / Auto Eosinophil % 2.1   / Auto Neutrophil # 4.40  / Auto Neutrophil % 61.8  / BANDS % x                            7.7    6.53  )-----------( 172      ( 08 Aug 2020 10:21 )             23.7     Auto Eosinophil # x     / Auto Eosinophil % x     / Auto Neutrophil # x     / Auto Neutrophil % x     / BANDS % x                            7.8    7.65  )-----------( 181      ( 08 Aug 2020 03:24 )             25.4     Auto Eosinophil # 0.15  / Auto Eosinophil % 2.0   / Auto Neutrophil # 4.80  / Auto Neutrophil % 62.6  / BANDS % 1.7      08-09    141  |  110<H>  |  45<H>  ----------------------------<  82  4.8   |  18<L>  |  0.77  08-08    139  |  107  |  104<H>  ----------------------------<  89  4.8   |  21<L>  |  1.28  08-08    138  |  105  |  115<H>  ----------------------------<  86  4.6   |  20<L>  |  1.87<H>    Ca    8.0<L>      09 Aug 2020 05:40  Mg     1.4     08-08  Phos  2.4     08-08  TPro  4.9<L>  /  Alb  2.4<L>  /  TBili  0.3  /  DBili  x   /  AST  57<H>  /  ALT  55<H>  /  AlkPhos  46  08-09  TPro  4.9<L>  /  Alb  2.5<L>  /  TBili  0.3  /  DBili  x   /  AST  112<H>  /  ALT  83<H>  /  AlkPhos  43  08-08    PTT - ( 07 Aug 2020 20:45 )  PTT:91.3 SEC              RADIOLOGY & ADDITIONAL TESTS:    Imaging Personally Reviewed:    Consultant(s) Notes Reviewed:      Care Discussed with Consultants/Other Providers:

## 2020-08-09 NOTE — PROCEDURE NOTE - NSPROCDETAILS_GEN_ALL_CORE
sterile technique, catheter placed/ultrasound utilization/dressing applied/flushes easily/secured in place/blood seen on insertion/location identified, draped/prepped, sterile technique used

## 2020-08-09 NOTE — PROGRESS NOTE ADULT - ATTENDING COMMENTS
I have seen and examined this patient with the GI fellow. Agree with above.    CBD stone   melena    Rec:  EGD with ERCP   CT scan r/o RP bleed in setting of heparin gtt

## 2020-08-09 NOTE — DISCHARGE NOTE PROVIDER - HOSPITAL COURSE
79F PMHx dementia, HTN, unclear other history who presented w/ abd pain, nausea, vomiting "black fluids" x 3 days, unable to tolerate po, found to be in acute renal failure, with high anion gap metabolic acidosis, hyperkalemic, hypotensive on arrival. Nephrology eval determined not to be candidate for urgent HD. Pt complained of L arm pain on admission.        ED Course:    Vitals: Tmin 94.8, BP 73/47 improved to 112/70 s/p 2L NS, HR 78, SpO2 99% RA, RR 18    Labs: notable for Na 129, K 6.8, AG 45, , Cr 18.74 downtrended to 14.07    Receieved: 2g Calcium gluconate, 5u insulin/1 amp D50, Lokelma x 1, K improved to 4.9. Started bicarb gtt @ 75 cc/hr        Patient continued to produce urine, imaging without obvious hydroureteronephrosis, only mild perinephric stranding bilat. Nephrology continued to follow, fu catheter placed for strict Is/Os. Placed on tele with regard to electrolyte abnormalities. 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 Pt was maintained on IVF, with boluses as necessary.Started on calcium carb 667mg TID w/ low phos diet, sodium bicarb 650mg BID, low potassium diet per nephro recs.SCr continued to improve.        Pt persistenly hypotensive, TTE for cardiac eval in context of necessity for further fluids, EF 61% w/ normal LV systolic function. Episode of reported melena, with Hgb downtrend 14.3 on admission to 7.7, heparin ggt stopped. Protonix 80mg IV x 1, and ggt initiated with plan for GI EGD/ERCP.        Choledocholithiasis incidentally found on CT abd, concern for possible acute biliary pancreatitis vs cholangitis given lipase elevation and dilated CBD w/ presence of stone. GI was consulted, Zosyn initiated for coverage given hypothermia, hypotension possibly in setting of infection.         LUE US venous/arterial ordered for LUE pain, found to have DVT in L brachial and radial veins, superficial venous thrombosis in L basilic and cephalic, and R basilic veins. Started on heparin ggt, complicated by instances of supratherapeutic aptt requiring temporarily halt of ggt. 79F PMHx dementia, HTN, unclear other history who presented w/ abd pain, nausea, vomiting "black fluids" x 3 days, unable to tolerate po, found to be in acute renal failure, with high anion gap metabolic acidosis, hyperkalemic, hypotensive on arrival. Nephrology eval determined not to be candidate for urgent HD. Pt complained of L arm pain on admission.        ED Course:    Vitals: Tmin 94.8, BP 73/47 improved to 112/70 s/p 2L NS, HR 78, SpO2 99% RA, RR 18    Labs: notable for Na 129, K 6.8, AG 45, , Cr 18.74 downtrended to 14.07    Receieved: 2g Calcium gluconate, 5u insulin/1 amp D50, Lokelma x 1, K improved to 4.9. Started bicarb gtt @ 75 cc/hr        Patient continued to produce urine, imaging without obvious hydroureteronephrosis, only mild perinephric stranding bilat. Nephrology continued to follow, fu catheter placed for strict Is/Os. Placed on tele with regard to electrolyte abnormalities. 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 Pt was maintained on IVF, with boluses as necessary.Started on calcium carb 667mg TID w/ low phos diet, sodium bicarb 650mg BID, low potassium diet per nephro recs. SCr continued to improve.        Pt persistenly hypotensive, TTE for cardiac eval in context of necessity for further fluids, EF 61% w/ normal LV systolic function. Episode of reported melena, with Hgb downtrend 14.3 on admission to 7.7, heparin ggt stopped. Protonix 80mg IV x 1, and ggt initiated with plan for GI EGD/ERCP.        Choledocholithiasis incidentally found on CT abd, concern for possible acute biliary pancreatitis vs cholangitis given lipase elevation and dilated CBD w/ presence of stone. GI was consulted, Zosyn initiated for coverage given hypothermia, hypotension possibly in setting of infection.         LUE US venous/arterial ordered for LUE pain, found to have DVT in L brachial and radial veins, superficial venous thrombosis in L basilic and cephalic, and R basilic veins. Started on heparin ggt, complicated by instances of supratherapeutic aptt requiring temporarily halt of ggt.         Patient received EGD/ERCP with removal of CBD stone but only esophagitis noted on EGD. Patient placed back on heparin ggt. Patient had two episodes of hematemesis with mixture of vomitus and blood associated with decrease in Hgb to 7.1 requiring a unit of pRBCs. Patient received repeat EGD with findings of esophageal ulcer with overlying clot and bleeding at sphincterotomy site - both sites of bleeding treated.          Patient remained stable post procedure and was started on therapeutic lovenox and had diet slowly advanced with maintenance of normal hemoglobin level. Patient was medically optimized, stable and ready for discharge. Plan of care and return precautions were discussed with the patient who verbally stated understanding. 79F PMHx dementia, HTN, unclear other history who presented w/ abd pain, nausea, vomiting "black fluids" x 3 days, unable to tolerate po, found to be in acute renal failure, with high anion gap metabolic acidosis, hyperkalemic, hypotensive on arrival. Nephrology eval determined not to be candidate for urgent HD. Pt complained of L arm pain on admission.        ED Course:    Vitals: Tmin 94.8, BP 73/47 improved to 112/70 s/p 2L NS, HR 78, SpO2 99% RA, RR 18    Labs: notable for Na 129, K 6.8, AG 45, , Cr 18.74 downtrended to 14.07    Receieved: 2g Calcium gluconate, 5u insulin/1 amp D50, Lokelma x 1, K improved to 4.9. Started bicarb gtt @ 75 cc/hr        Patient continued to produce urine, imaging without obvious hydroureteronephrosis, only mild perinephric stranding bilat. Nephrology continued to follow, fu catheter placed for strict Is/Os. Placed on tele with regard to electrolyte abnormalities. 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 Pt was maintained on IVF, with boluses as necessary.Started on calcium carb 667mg TID w/ low phos diet, sodium bicarb 650mg BID, low potassium diet per nephro recs. SCr continued to improve with IVF and resolved to normal.  Likely pre-renal in setting of hypotension.         Pt persistenly hypotensive, TTE for cardiac eval in context of necessity for further fluids, EF 61% w/ normal LV systolic function.         Episode of reported melena, with Hgb downtrend 14.3 on admission to 7.7, heparin ggt stopped. Protonix 80mg IV x 1, and ggt initiated with plan for GI EGD/ERCP.        Choledocholithiasis incidentally found on CT abd, concern for possible acute biliary pancreatitis vs cholangitis given lipase elevation and dilated CBD w/ presence of stone. GI was consulted, Zosyn initiated for coverage given hypothermia, hypotension possibly in setting of infection.         LUE US venous/arterial ordered for LUE pain, found to have DVT in L brachial and radial veins, superficial venous thrombosis in L basilic and cephalic, and R basilic veins. Started on heparin ggt, complicated by instances of supratherapeutic aptt requiring temporarily halt of ggt.         Patient underwent EGD/ERCP 8/10 with removal of CBD stone but only esophagitis noted on EGD. Patient placed back on heparin ggt. Patient had two episodes of hematemesis with mixture of vomitus and blood associated with decrease in Hgb to 7.1 requiring a unit of pRBCs. Patient received repeat EGD on 8/12 with findings of esophageal ulcer with overlying clot and bleeding at sphincterotomy site - both sites of bleeding treated.          Patient remained stable post procedure and was started on therapeutic lovenox for DVT and had diet slowly advanced with maintenance of normal hemoglobin level. Family willing to pay out of pocket for DOAC as insurance did not cover. Patient was medically optimized, stable and ready for discharge for home w/ home care. Plan of care and return precautions were discussed with the patient who verbally stated understanding.

## 2020-08-09 NOTE — DISCHARGE NOTE PROVIDER - NSDCFUADDAPPT_GEN_ALL_CORE_FT
Please follow up with your primary care doctor within two weeks.    Please also follow up with a gastroenterologist about your intestinal bleeding.    Please also follow up with Dr. Guthrie about your blood clot. Please follow up with your primary care doctor (Dr. Pena) within two weeks.    Please also follow up with a gastroenterologist about your intestinal bleeding.    Please also follow up with Dr. Guthrie about your blood clot.

## 2020-08-09 NOTE — DISCHARGE NOTE PROVIDER - NSDCMRMEDTOKEN_GEN_ALL_CORE_FT
alendronate 70 mg oral tablet: 1 tab(s) orally once a week  amlodipine-benazepril 10 mg-20 mg oral capsule: 1 cap(s) orally once a day  aspirin 81 mg oral tablet:   atorvastatin 80 mg oral tablet: 1 tab(s) orally once a day  omeprazole 20 mg oral delayed release tablet: 1 tab(s) orally once a day  Oysco 500 with D 500 mg-200 intl units (5 mcg) oral tablet: 1 tab(s) orally 2 times a day alendronate 70 mg oral tablet: 1 tab(s) orally once a week  amlodipine-benazepril 10 mg-20 mg oral capsule: 1 cap(s) orally once a day  aspirin 81 mg oral tablet:   atorvastatin 80 mg oral tablet: 1 tab(s) orally once a day  Eliquis Starter Pack for Treatment of DVT and PE 5 mg oral tablet: 2 tab(s) orally 2 times a day   omeprazole 20 mg oral delayed release tablet: 1 tab(s) orally once a day  Oysco 500 with D 500 mg-200 intl units (5 mcg) oral tablet: 1 tab(s) orally 2 times a day  sucralfate 1 g/10 mL oral suspension: 10 milliliter(s) orally 4 times a day alendronate 70 mg oral tablet: 1 tab(s) orally once a week  amlodipine-benazepril 10 mg-20 mg oral capsule: 1 cap(s) orally once a day  aspirin 81 mg oral tablet:   atorvastatin 80 mg oral tablet: 1 tab(s) orally once a day  Eliquis Starter Pack for Treatment of DVT and PE 5 mg oral tablet: 2 tab(s) orally 2 times a day   enoxaparin 80 mg/0.8 mL injectable solution: 80 milligram(s) subcutaneously 2 times a day   omeprazole 20 mg oral delayed release tablet: 1 tab(s) orally once a day  Oysco 500 with D 500 mg-200 intl units (5 mcg) oral tablet: 1 tab(s) orally 2 times a day  sucralfate 1 g/10 mL oral suspension: 10 milliliter(s) orally 4 times a day alendronate 70 mg oral tablet: 1 tab(s) orally once a week  amlodipine-benazepril 10 mg-20 mg oral capsule: 1 cap(s) orally once a day  aspirin 81 mg oral tablet:   atorvastatin 80 mg oral tablet: 1 tab(s) orally once a day  omeprazole 20 mg oral delayed release tablet: 1 tab(s) orally once a day  Oysco 500 with D 500 mg-200 intl units (5 mcg) oral tablet: 1 tab(s) orally 2 times a day  rolling walker: alendronate 70 mg oral tablet: 1 tab(s) orally once a week  apixaban 5 mg oral tablet: 1 tab(s) orally every 12 hours  will need total 3 months   aspirin 81 mg oral tablet:   atorvastatin 80 mg oral tablet: 1 tab(s) orally once a day  omeprazole 20 mg oral delayed release tablet: 1 tab(s) orally once a day  Oysco 500 with D 500 mg-200 intl units (5 mcg) oral tablet: 1 tab(s) orally 2 times a day  rolling walker:   sucralfate 1 g/10 mL oral suspension: 10 milliliter(s) orally 4 times a day

## 2020-08-09 NOTE — PROGRESS NOTE ADULT - SUBJECTIVE AND OBJECTIVE BOX
Chief Complaint:  Patient is a 79y old  Female who presents with a chief complaint of Acute renal failure, high anion gap metabolic acidosis (09 Aug 2020 09:10)      Interval Events:   Patient with episode of hypotension yesterday, now resolved.  No overt GI bleeding observed during or after this time.  rectal exam performed by GI and Medicine team separately and scant brown stool each time.  No BMs.      Allergies:  No Known Allergies      Hospital Medications:  aluminum hydroxide/magnesium hydroxide/simethicone Suspension 30 milliLiter(s) Oral every 6 hours PRN  pantoprazole Infusion 8 mG/Hr IV Continuous <Continuous>  piperacillin/tazobactam IVPB.. 3.375 Gram(s) IV Intermittent every 12 hours  sodium bicarbonate 650 milliGRAM(s) Oral two times a day      PMHX/PSHX:  Dementia  Hypertension  No significant past surgical history      Family history:  No pertinent family history in first degree relatives      ROS:  General: no night sweats, wt loss  CV: no pain, palpitation  Resp: no cough wheezing  Muscles: no weakness or pain  Endocrine: no polyuria, polydipsia, cold/heat intolerance  Heme: No petechia, ecchymosis    PHYSICAL EXAM:   GENERAL:  NAD  HEENT:  NC/AT,  conjunctivae clear, sclera -anicteric  CHEST:  Full & symmetric excursion, no increased  HEART:  Regular rhythm  ABDOMEN:  Soft, non-tender, non-distended, normoactive bowel sounds,  no masses ,no hepato-splenomegaly,   EXTREMITIES:  no cyanosis,clubbing or edema  SKIN:  No rash/erythema/ecchymoses/petechiae/wounds/abscess/warm/dry  NEURO:  Alert, oriented    Vital Signs:  Vital Signs Last 24 Hrs  T(C): 36.6 (09 Aug 2020 08:30), Max: 37.2 (08 Aug 2020 19:44)  T(F): 97.8 (09 Aug 2020 08:30), Max: 98.9 (08 Aug 2020 19:44)  HR: 76 (09 Aug 2020 08:30) (71 - 112)  BP: 118/64 (09 Aug 2020 08:30) (75/50 - 129/62)  BP(mean): --  RR: 18 (09 Aug 2020 08:30) (17 - 19)  SpO2: 100% (09 Aug 2020 08:30) (95% - 100%)  Daily     Daily     LABS:                        9.3    7.12  )-----------( 152      ( 09 Aug 2020 08:00 )             28.8     08-09    141  |  110<H>  |  45<H>  ----------------------------<  82  4.8   |  18<L>  |  0.77    Ca    8.0<L>      09 Aug 2020 05:40  Phos  2.4     08-08  Mg     1.4     08-08    TPro  4.9<L>  /  Alb  2.4<L>  /  TBili  0.3  /  DBili  x   /  AST  57<H>  /  ALT  55<H>  /  AlkPhos  46  08-09    LIVER FUNCTIONS - ( 09 Aug 2020 05:40 )  Alb: 2.4 g/dL / Pro: 4.9 g/dL / ALK PHOS: 46 u/L / ALT: 55 u/L / AST: 57 u/L / GGT: x           PTT - ( 07 Aug 2020 20:45 )  PTT:91.3 SEC        Imaging: Chief Complaint:  Patient is a 79y old  Female who presents with a chief complaint of Acute renal failure, high anion gap metabolic acidosis (09 Aug 2020 09:10)      Interval Events:   Patient with episode of hypotension yesterday, now resolved.  No overt GI bleeding observed during or after this time.  Rectal exam performed by GI and Medicine team separately and scant brown stool each time.  No BMs.      Allergies:  No Known Allergies      Hospital Medications:  aluminum hydroxide/magnesium hydroxide/simethicone Suspension 30 milliLiter(s) Oral every 6 hours PRN  pantoprazole Infusion 8 mG/Hr IV Continuous <Continuous>  piperacillin/tazobactam IVPB.. 3.375 Gram(s) IV Intermittent every 12 hours  sodium bicarbonate 650 milliGRAM(s) Oral two times a day      PMHX/PSHX:  Dementia  Hypertension  No significant past surgical history      Family history:  No pertinent family history in first degree relatives      ROS:  General: no night sweats, wt loss  CV: no pain, palpitation  Resp: no cough wheezing  Muscles: no weakness or pain  Endocrine: no polyuria, polydipsia, cold/heat intolerance  Heme: No petechia, ecchymosis    PHYSICAL EXAM:   GENERAL:  NAD  HEENT:  NC/AT,  conjunctivae clear, sclera -anicteric  CHEST:  Full & symmetric excursion, no increased  HEART:  Regular rhythm  ABDOMEN:  Soft, non-tender, non-distended, normoactive bowel sounds,  no masses ,no hepato-splenomegaly,   EXTREMITIES:  no cyanosis,clubbing or edema  SKIN:  No rash/erythema/ecchymoses/petechiae/wounds/abscess/warm/dry  NEURO:  Alert, oriented    Vital Signs:  Vital Signs Last 24 Hrs  T(C): 36.6 (09 Aug 2020 08:30), Max: 37.2 (08 Aug 2020 19:44)  T(F): 97.8 (09 Aug 2020 08:30), Max: 98.9 (08 Aug 2020 19:44)  HR: 76 (09 Aug 2020 08:30) (71 - 112)  BP: 118/64 (09 Aug 2020 08:30) (75/50 - 129/62)  BP(mean): --  RR: 18 (09 Aug 2020 08:30) (17 - 19)  SpO2: 100% (09 Aug 2020 08:30) (95% - 100%)  Daily     Daily     LABS:                        9.3    7.12  )-----------( 152      ( 09 Aug 2020 08:00 )             28.8     08-09    141  |  110<H>  |  45<H>  ----------------------------<  82  4.8   |  18<L>  |  0.77    Ca    8.0<L>      09 Aug 2020 05:40  Phos  2.4     08-08  Mg     1.4     08-08    TPro  4.9<L>  /  Alb  2.4<L>  /  TBili  0.3  /  DBili  x   /  AST  57<H>  /  ALT  55<H>  /  AlkPhos  46  08-09    LIVER FUNCTIONS - ( 09 Aug 2020 05:40 )  Alb: 2.4 g/dL / Pro: 4.9 g/dL / ALK PHOS: 46 u/L / ALT: 55 u/L / AST: 57 u/L / GGT: x           PTT - ( 07 Aug 2020 20:45 )  PTT:91.3 SEC        Imaging:

## 2020-08-09 NOTE — DISCHARGE NOTE PROVIDER - CARE PROVIDER_API CALL
Aditya Guthrie  CARDIOLOGY  [address] [address] [place] [address]  Phone: [phone]  Fax: [phone]  Follow Up Time: Aditya Guthrie  CARDIOLOGY  [address] [address] [place] [address]  Phone: [phone]  Fax: [phone]  Follow Up Time:     Janie Pena  Phone: (   )    -  Fax: (   )    -  Follow Up Time: Aditya Guthrie  CARDIOLOGY  [address] [address] [place] [address]  Phone: [phone]  Fax: [phone]  Follow Up Time:     Janie Pena  [address] [place] [address]  Phone: [phone]  Fax: (   )    -  Follow Up Time: 2 weeks

## 2020-08-09 NOTE — PROGRESS NOTE ADULT - ATTENDING COMMENTS
BPs improved today. Pt now s/p 1UPRBCs on 8/9     -Continue to hold hep ggt for now given risk of bleed at this time and possible underlying GIB. CT A/P already performed to r/o RP bleed. Pending official read from radiology.   -c/w protonix ggt   -Can monitor CBC BID for now  -GI recs appreciated. Plan for possible EGD/ERCP on tomorrow 8/10. NPO after midnight.   -VANDANA continues to improve. Monitor urine output. Would dc fu today.   -c/w zosyn for now given pt was hypothermic w/ hypotension  and evidence of choledocholithiasis w/ Common bile duct dilatation. Will likely dc soon based on clinical picture and GI plan.

## 2020-08-09 NOTE — PROGRESS NOTE ADULT - PROBLEM SELECTOR PLAN 1
Pt w/ microcytic anemia w/ slow Hgb downtrend in the setting of severe VANDANA and hypotension require IV fluid resuscitation, now w/ melena. Component of new anemia can be attributed to hemodilution. Pt w/ reported dark stools  soft BPs there is concern for UGIB. Pending EGD on 8/10.   - CT A/P w/ IV contrast to assess for RP bleed.   - Trend CBC q12.   - Transfuse for Hgb < 7 or if patient symptomatic. Pt w/ microcytic anemia w/ slow Hgb downtrend in the setting of severe VANDANA and hypotension require IV fluid resuscitation, now w/ melena. Component of new anemia can be attributed to hemodilution. Pt w/ reported dark stools  soft BPs there is concern for UGIB. Pending EGD on 8/10.   - CT A/P w/ IV contrast to assess for RP bleed.   - Trend CBC q12.   - Transfuse for Hgb < 7 or if patient symptomatic.  -pending iron studies

## 2020-08-09 NOTE — PROGRESS NOTE ADULT - PROBLEM SELECTOR PLAN 3
Serum CO2 low at 18 today. Pt. on oral sodium bicarbonate 650 mg BID. Discontinue oral sodium bicarbonate when serum CO2 normalizes. Monitor serum CO2.    Will discontinue follow-up. Reconsult as needed

## 2020-08-09 NOTE — DISCHARGE NOTE PROVIDER - CARE PROVIDERS DIRECT ADDRESSES
,[email or web address],DirectAddress_Unknown

## 2020-08-10 DIAGNOSIS — K92.2 GASTROINTESTINAL HEMORRHAGE, UNSPECIFIED: ICD-10-CM

## 2020-08-10 LAB
ALBUMIN SERPL ELPH-MCNC: 2.4 G/DL — LOW (ref 3.3–5)
ALP SERPL-CCNC: 57 U/L — SIGNIFICANT CHANGE UP (ref 40–120)
ALT FLD-CCNC: 53 U/L — HIGH (ref 4–33)
ANION GAP SERPL CALC-SCNC: 14 MMO/L — SIGNIFICANT CHANGE UP (ref 7–14)
APTT BLD: 25.9 SEC — LOW (ref 27–36.3)
AST SERPL-CCNC: 53 U/L — HIGH (ref 4–32)
BILIRUB SERPL-MCNC: 0.4 MG/DL — SIGNIFICANT CHANGE UP (ref 0.2–1.2)
BUN SERPL-MCNC: 20 MG/DL — SIGNIFICANT CHANGE UP (ref 7–23)
CALCIUM SERPL-MCNC: 7.1 MG/DL — LOW (ref 8.4–10.5)
CHLORIDE SERPL-SCNC: 111 MMOL/L — HIGH (ref 98–107)
CO2 SERPL-SCNC: 17 MMOL/L — LOW (ref 22–31)
CREAT SERPL-MCNC: 0.66 MG/DL — SIGNIFICANT CHANGE UP (ref 0.5–1.3)
CULTURE RESULTS: SIGNIFICANT CHANGE UP
CULTURE RESULTS: SIGNIFICANT CHANGE UP
GLUCOSE SERPL-MCNC: 90 MG/DL — SIGNIFICANT CHANGE UP (ref 70–99)
HCT VFR BLD CALC: 27.2 % — LOW (ref 34.5–45)
HCT VFR BLD CALC: 29.1 % — LOW (ref 34.5–45)
HGB BLD-MCNC: 8.8 G/DL — LOW (ref 11.5–15.5)
HGB BLD-MCNC: 9 G/DL — LOW (ref 11.5–15.5)
INR BLD: 1.08 — SIGNIFICANT CHANGE UP (ref 0.88–1.16)
MAGNESIUM SERPL-MCNC: 1.1 MG/DL — LOW (ref 1.6–2.6)
MCHC RBC-ENTMCNC: 25.4 PG — LOW (ref 27–34)
MCHC RBC-ENTMCNC: 26.1 PG — LOW (ref 27–34)
MCHC RBC-ENTMCNC: 30.9 % — LOW (ref 32–36)
MCHC RBC-ENTMCNC: 32.4 % — SIGNIFICANT CHANGE UP (ref 32–36)
MCV RBC AUTO: 80.7 FL — SIGNIFICANT CHANGE UP (ref 80–100)
MCV RBC AUTO: 82 FL — SIGNIFICANT CHANGE UP (ref 80–100)
NRBC # FLD: 0 K/UL — SIGNIFICANT CHANGE UP (ref 0–0)
NRBC # FLD: 0 K/UL — SIGNIFICANT CHANGE UP (ref 0–0)
PHOSPHATE SERPL-MCNC: 1.2 MG/DL — LOW (ref 2.5–4.5)
PLATELET # BLD AUTO: 145 K/UL — LOW (ref 150–400)
PLATELET # BLD AUTO: 160 K/UL — SIGNIFICANT CHANGE UP (ref 150–400)
PMV BLD: 10.7 FL — SIGNIFICANT CHANGE UP (ref 7–13)
PMV BLD: 10.7 FL — SIGNIFICANT CHANGE UP (ref 7–13)
POTASSIUM SERPL-MCNC: 5.2 MMOL/L — SIGNIFICANT CHANGE UP (ref 3.5–5.3)
POTASSIUM SERPL-SCNC: 5.2 MMOL/L — SIGNIFICANT CHANGE UP (ref 3.5–5.3)
PROT SERPL-MCNC: 4.9 G/DL — LOW (ref 6–8.3)
PROTHROM AB SERPL-ACNC: 12.4 SEC — SIGNIFICANT CHANGE UP (ref 10.6–13.6)
RBC # BLD: 3.37 M/UL — LOW (ref 3.8–5.2)
RBC # BLD: 3.55 M/UL — LOW (ref 3.8–5.2)
RBC # FLD: 15.4 % — HIGH (ref 10.3–14.5)
RBC # FLD: 15.5 % — HIGH (ref 10.3–14.5)
SODIUM SERPL-SCNC: 142 MMOL/L — SIGNIFICANT CHANGE UP (ref 135–145)
SPECIMEN SOURCE: SIGNIFICANT CHANGE UP
SPECIMEN SOURCE: SIGNIFICANT CHANGE UP
WBC # BLD: 7.92 K/UL — SIGNIFICANT CHANGE UP (ref 3.8–10.5)
WBC # BLD: 8.92 K/UL — SIGNIFICANT CHANGE UP (ref 3.8–10.5)
WBC # FLD AUTO: 7.92 K/UL — SIGNIFICANT CHANGE UP (ref 3.8–10.5)
WBC # FLD AUTO: 8.92 K/UL — SIGNIFICANT CHANGE UP (ref 3.8–10.5)

## 2020-08-10 PROCEDURE — 43262 ENDO CHOLANGIOPANCREATOGRAPH: CPT | Mod: GC

## 2020-08-10 PROCEDURE — 99233 SBSQ HOSP IP/OBS HIGH 50: CPT | Mod: GC

## 2020-08-10 PROCEDURE — 43239 EGD BIOPSY SINGLE/MULTIPLE: CPT | Mod: GC

## 2020-08-10 PROCEDURE — 43264 ERCP REMOVE DUCT CALCULI: CPT | Mod: GC

## 2020-08-10 RX ORDER — DIPHENHYDRAMINE HCL 50 MG
25 CAPSULE ORAL ONCE
Refills: 0 | Status: DISCONTINUED | OUTPATIENT
Start: 2020-08-10 | End: 2020-08-10

## 2020-08-10 RX ORDER — MAGNESIUM SULFATE 500 MG/ML
2 VIAL (ML) INJECTION ONCE
Refills: 0 | Status: COMPLETED | OUTPATIENT
Start: 2020-08-10 | End: 2020-08-10

## 2020-08-10 RX ADMIN — PANTOPRAZOLE SODIUM 40 MILLIGRAM(S): 20 TABLET, DELAYED RELEASE ORAL at 05:05

## 2020-08-10 RX ADMIN — PIPERACILLIN AND TAZOBACTAM 25 GRAM(S): 4; .5 INJECTION, POWDER, LYOPHILIZED, FOR SOLUTION INTRAVENOUS at 05:05

## 2020-08-10 RX ADMIN — PANTOPRAZOLE SODIUM 40 MILLIGRAM(S): 20 TABLET, DELAYED RELEASE ORAL at 18:08

## 2020-08-10 RX ADMIN — Medication 85 MILLIMOLE(S): at 15:24

## 2020-08-10 RX ADMIN — Medication 50 GRAM(S): at 10:00

## 2020-08-10 NOTE — PROGRESS NOTE ADULT - PROBLEM SELECTOR PLAN 1
Pt w/ microcytic anemia w/ slow Hgb downtrend in the setting of severe VANDANA and hypotension require IV fluid resuscitation, now w/ melena. Component of new anemia can be attributed to hemodilution. Pt w/ reported dark stools  soft BPs there is concern for UGIB. Pending EGD on 8/10.   - CT A/P w/ IV contrast to assess for RP bleed. No evidence of RP bleed.  - Trend CBC q12.   - Transfuse for Hgb < 7 or if patient symptomatic.  -pending iron studies Incidentally found on abdominal CT. Pending EGD/ERCP on 8/10 to evaluated for UGIB and possible stone removal.  - low suspicion for cholangitis (no fever, no jaundice, mild abdominal pain, no leukocytosis) or pancreatitis   - F/u GI recs

## 2020-08-10 NOTE — PROGRESS NOTE ADULT - PROBLEM SELECTOR PLAN 4
Initially hypotensive to 70s/40s. Likely from recent N/V and poor PO intake/hypovolemia  - this am pt w/ BPs improved 141/81  - can c/w maintenance IVF if pt remains NPO for extended period of time  - Bolus as necessary to keep MAP>65  - Monitor urine output  - continued episodes of hypotension to SBP 70s. Obtained TTE to evaluate cardiac function  in context of necessity for further fluids as pt persistently hypotensive  - echo with EF 61%, normal LV systolic function, c/w IV fluids, AM cortisol wnl Pt w/ microcytic anemia w/ slow Hgb downtrend in the setting of severe VANDANA and hypotension require IV fluid resuscitation, now w/ melena. Component of new anemia can be attributed to hemodilution. Pt w/ reported dark stools  soft BPs there is concern for UGIB. Pending EGD on 8/10.   - CT A/P w/ IV contrast to assess for RP bleed. No evidence of RP bleed.  - Trend CBC q12.   - Transfuse for Hgb < 7 or if patient symptomatic.  -pending iron studies Pt w/ microcytic anemia w/ slow Hgb downtrend in the setting of severe VANDANA and hypotension require IV fluid resuscitation, now w/ melena. Component of new anemia can be attributed to hemodilution. Pt w/ reported dark stools  soft BPs there is concern for UGIB. Pending EGD on 8/10.   - CT A/P without evidence of RP bleed.  - Trend CBC q12.   - Transfuse for Hgb < 7 or if patient symptomatic.

## 2020-08-10 NOTE — PROGRESS NOTE ADULT - ATTENDING COMMENTS
Patient seen and examined, chart and labs reviewed. Case discussed with house staff.      #: [id number]  79F with HTN, dementia presented with hyperkalemia and AGMA, uremia 2/2 acute renal failure 2/2 prerenal azotemia (now resolved). Course c/b hypothermia, hypotension, choledocholithiasis and acute blood loss anemia (s/p 1U blood), also found to have LUE DVT, heparin drip stopped due to melena ON on 8/8, concern for UGI bleed.    #Melena/UGIB  - S/p EGD today showing esophagitis.  - C/w PPI IV BID  - Will f/u GI regarding restarting heparin gtt   - Monitor CBC  - Also s/p ERCP s/p biliary sphincterotomy and removal of CBD stone     #VANDANA   - Cr 18.74 on admission, now normalized. Likely VANDANA in setting of decreased PO intake + hypotension   - C/w sodium bicarbonate 650 mg BID PO until CO2 normalizes   -VANDANA continues to improve. Monitor urine output. Would dc tank today.    #Dispo: PT recs rehab Patient seen and examined, chart and labs reviewed. Case discussed with house staff.      #: [id number]  79F with HTN, dementia presented with hyperkalemia and AGMA, uremia 2/2 acute renal failure 2/2 prerenal azotemia (now resolved). Course c/b hypothermia, hypotension, choledocholithiasis and acute blood loss anemia (s/p 1U blood), also found to have LUE DVT, heparin drip stopped due to melena ON on 8/8, concern for UGI bleed.    #Melena/UGIB  - S/p EGD today showing esophagitis.  - C/w PPI IV BID  - Will f/u GI regarding restarting heparin gtt   - Monitor CBC  - Also s/p ERCP s/p biliary sphincterotomy and removal of CBD stone. Can d/c Zosyn as low suspicion of infection.     #VANDANA   - Cr 18.74 on admission, now normalized. Likely VANDANA in setting of decreased PO intake + hypotension   - C/w sodium bicarbonate 650 mg BID PO until CO2 normalizes   -VANDANA continues to improve. Monitor urine output. Would dc tank today.    #Dispo: PT recs rehab    Spoke with patient's niece Violet Neville [phone] and updated.

## 2020-08-10 NOTE — PROGRESS NOTE ADULT - ASSESSMENT
79F with HTN, dementia presents with hyperkalemia (resolved) and AGMA, uremia 2/2 acute renal failure (pre-renal etiology; now resolved). Course c/b hypothermia, hypotension, choledocholithiasis, acute blood loss anemia (s/p 1U blood) on broad spectrum antibiotics, also found to have LUE DVT, heparin drip stopped due to melena ON on 8/8, concern for UGI bleed. 79F with HTN, dementia presented with hyperkalemia and AGMA, uremia 2/2 acute renal failure 2/2 prerenal azotemia (now resolved). Course c/b hypothermia, hypotension, choledocholithiasis and ?cholangitis, acute blood loss anemia (s/p 1U blood) on broad spectrum antibiotics, also found to have LUE DVT, heparin drip stopped due to melena ON on 8/8, concern for UGI bleed.

## 2020-08-10 NOTE — PROGRESS NOTE ADULT - PROBLEM SELECTOR PLAN 2
Now resolved.  Cr. 18.74(admission) -->0.66 today Patient presented with complaint of dark colored emesis and had an episode of melena after heparin ggt was started  -hold heparin ggt  -s/p 1 U pRBC 8/8  -EGD today

## 2020-08-10 NOTE — PROGRESS NOTE ADULT - PROBLEM SELECTOR PLAN 7
Incidentally found on abdominal CT. Pending EGD/ERCP on 8/10 to evaluated for GIB and possible stone removal.  - NPO at midnight  - F/u GI recs Resolved.

## 2020-08-10 NOTE — PROGRESS NOTE ADULT - PROBLEM SELECTOR PLAN 3
In setting of severe VANDANA and use of heparin gtt; now w/ reported dark stools. Currently improving.   -Resolved today BUN 20  - Monitor BMPs - VA duplex venous w/ evidence of DVT in L brachial and radial veins, superficial venous thrombosis in L basilic and cephalic and R basilic.   - no evidence of arterial occlusion LUE  - Holding heparin gtt in setting of suspected GIB & RP bleed  - Will consider restarting heparin gtt pending EGD and CT scan - VA duplex venous w/ evidence of DVT in L brachial and radial veins, superficial venous thrombosis in L basilic and cephalic and R basilic.   - no evidence of arterial occlusion LUE  - Holding heparin gtt in setting of suspected GIB & RP bleed  - Will consider restarting heparin gtt pending EGD

## 2020-08-10 NOTE — PROGRESS NOTE ADULT - SUBJECTIVE AND OBJECTIVE BOX
*INCOMPLETE NOTE*  *******************************************************  Kenny Stock MD PGY-1  Internal Medicine  Pager [phone] / [phone]  *******************************************************    Patient is a 79y old  Female who presents with a chief complaint of Acute renal failure, high anion gap metabolic acidosis (09 Aug 2020 11:25)      SUBJECTIVE / OVERNIGHT EVENTS:  - Patient seen and evaluated at bedside.  - Patient required urgent venous access overnight and had successful placement of peripheral IV  - Denies fevers/chills, headache, SOB at rest, chest pain, palpitations, abdominal pain, nausea/vomiting/diarrhea/constipation    ADDITIONAL REVIEW OF SYSTEMS:    CONSTITUTIONAL: No weakness, fevers or chills  EYES/ENT: No visual changes;  No vertigo or throat pain.  NECK: No pain or stiffness.  RESPIRATORY: No cough, wheezing, hemoptysis; No shortness of breath.  CARDIOVASCULAR: No chest pain or palpitations  GASTROINTESTINAL: No abdominal pain. No nausea, vomiting, diarrhea, constipation, or melena.  GENITOURINARY: No dysuria, frequency or hematuria  NEUROLOGICAL: No numbness or weakness  SKIN: No itching, burning, rashes, or lesions   All other review of systems is negative unless indicated above.    MEDICATIONS  (STANDING):  pantoprazole  Injectable 40 milliGRAM(s) IV Push every 12 hours  piperacillin/tazobactam IVPB.. 3.375 Gram(s) IV Intermittent every 12 hours  sodium bicarbonate 650 milliGRAM(s) Oral two times a day    MEDICATIONS  (PRN):  aluminum hydroxide/magnesium hydroxide/simethicone Suspension 30 milliLiter(s) Oral every 6 hours PRN Dyspepsia      CAPILLARY BLOOD GLUCOSE        I&O's Summary    09 Aug 2020 07:01  -  10 Aug 2020 07:00  --------------------------------------------------------  IN: 220 mL / OUT: 1100 mL / NET: -880 mL      Daily Height in cm: 154.94 (10 Aug 2020 03:03)    Daily     PHYSICAL EXAM:  Vital Signs Last 24 Hrs  T(C): 36.6 (10 Aug 2020 05:03), Max: 36.8 (09 Aug 2020 15:30)  T(F): 97.8 (10 Aug 2020 05:03), Max: 98.3 (09 Aug 2020 15:30)  HR: 83 (10 Aug 2020 05:03) (63 - 83)  BP: 141/81 (10 Aug 2020 05:03) (107/59 - 141/85)  RR: 17 (10 Aug 2020 05:03) (17 - 18)  SpO2: 100% (10 Aug 2020 05:03) (98% - 100%)    CONSTITUTIONAL: NAD, well-developed  RESPIRATORY: Normal respiratory effort; lungs are clear to auscultation bilaterally  CARDIOVASCULAR: Regular rate, normal S1 and S2, no murmur/rub/gallop; No lower extremity edema; Peripheral pulses are 2+ bilaterally  ABDOMEN: Nontender to palpation, normoactive bowel sounds, no rebound/guarding; No hepatosplenomegaly  MUSCLOSKELETAL: no cyanosis of digits; no joint swelling or tenderness to palpation, full strength all extremities.  NEURO: No focal deficits, CN II-XII grossly intact b/l; sensation to light touch intact in all extremities, gait intact.  PSYCH: A+O to person, place, and time; affect appropriate.    LABS:                        9.0    7.92  )-----------( 160      ( 09 Aug 2020 23:30 )             29.1     08-10    142  |  111<H>  |  20  ----------------------------<  90  5.2   |  17<L>  |  0.66    Ca    7.1<L>      10 Aug 2020 05:00  Phos  2.4     08-08    Mg     1.4     08-08    TPro  4.9<L>  /  Alb  2.4<L>  /  TBili  0.4  /  DBili  x   /  AST  53<H>  /  ALT  53<H>  /  AlkPhos  57  08-10          RADIOLOGY & ADDITIONAL TESTS:  Results Reviewed:     < from: CT Abdomen and Pelvis No Cont (08.09.20 @ 13:19) >  IMPRESSION:  1.  Cholecystectomy.  2.  Choledocholithiasis.    < end of copied text >    Imaging Personally Reviewed:      COORDINATION OF CARE:  Care Discussed with Consultants/Other Providers [Y/N]:   Prior or Outpatient Records Reviewed [Y/N]: *******************************************************  Kenny Stock MD PGY-1  Internal Medicine  Pager [phone] / [phone]  *******************************************************    Patient is a 79y old  Female who presents with a chief complaint of Acute renal failure, high anion gap metabolic acidosis (09 Aug 2020 11:25)      SUBJECTIVE / OVERNIGHT EVENTS:  - Patient seen and evaluated at bedside.  - Patient required urgent venous access overnight and had successful placement of peripheral IV however overnight team unable to obtain AM CBC  - Denies fevers/chills, headache, SOB at rest, chest pain, palpitations, abdominal pain, nausea/vomiting/diarrhea/constipation    ADDITIONAL REVIEW OF SYSTEMS:    CONSTITUTIONAL: No weakness, fevers or chills  EYES/ENT: No visual changes;  No vertigo or throat pain.  NECK: No pain or stiffness.  RESPIRATORY: No cough, wheezing, hemoptysis; No shortness of breath.  CARDIOVASCULAR: No chest pain or palpitations  GASTROINTESTINAL: No abdominal pain. No nausea, vomiting, diarrhea, constipation, or melena.  GENITOURINARY: No dysuria, frequency or hematuria  NEUROLOGICAL: No numbness or weakness  SKIN: No itching, burning, rashes, or lesions   All other review of systems is negative unless indicated above.    MEDICATIONS  (STANDING):  pantoprazole  Injectable 40 milliGRAM(s) IV Push every 12 hours  piperacillin/tazobactam IVPB.. 3.375 Gram(s) IV Intermittent every 12 hours  sodium bicarbonate 650 milliGRAM(s) Oral two times a day    MEDICATIONS  (PRN):  aluminum hydroxide/magnesium hydroxide/simethicone Suspension 30 milliLiter(s) Oral every 6 hours PRN Dyspepsia      CAPILLARY BLOOD GLUCOSE        I&O's Summary    09 Aug 2020 07:01  -  10 Aug 2020 07:00  --------------------------------------------------------  IN: 220 mL / OUT: 1100 mL / NET: -880 mL      Daily Height in cm: 154.94 (10 Aug 2020 03:03)    Daily     PHYSICAL EXAM:  Vital Signs Last 24 Hrs  T(C): 36.6 (10 Aug 2020 05:03), Max: 36.8 (09 Aug 2020 15:30)  T(F): 97.8 (10 Aug 2020 05:03), Max: 98.3 (09 Aug 2020 15:30)  HR: 83 (10 Aug 2020 05:03) (63 - 83)  BP: 141/81 (10 Aug 2020 05:03) (107/59 - 141/85)  RR: 17 (10 Aug 2020 05:03) (17 - 18)  SpO2: 100% (10 Aug 2020 05:03) (98% - 100%)    CONSTITUTIONAL: NAD, well-developed  RESPIRATORY: Normal respiratory effort; lungs are clear to auscultation bilaterally  CARDIOVASCULAR: Regular rate, normal S1 and S2, no murmur/rub/gallop; No lower extremity edema; Peripheral pulses are 2+ bilaterally  ABDOMEN: Nontender to palpation, normoactive bowel sounds, no rebound/guarding; No hepatosplenomegaly  MUSCLOSKELETAL: no cyanosis of digits; no joint swelling or tenderness to palpation, full strength all extremities.  NEURO: No focal deficits, CN II-XII grossly intact b/l; sensation to light touch intact in all extremities, gait intact.  PSYCH: A+O to person, place, and time; affect appropriate.    LABS:                        9.0    7.92  )-----------( 160      ( 09 Aug 2020 23:30 )             29.1     08-10    142  |  111<H>  |  20  ----------------------------<  90  5.2   |  17<L>  |  0.66    Ca    7.1<L>      10 Aug 2020 05:00  Phos  2.4     08-08    Mg     1.4     08-08    TPro  4.9<L>  /  Alb  2.4<L>  /  TBili  0.4  /  DBili  x   /  AST  53<H>  /  ALT  53<H>  /  AlkPhos  57  08-10          RADIOLOGY & ADDITIONAL TESTS:  Results Reviewed:     < from: CT Abdomen and Pelvis No Cont (08.09.20 @ 13:19) >  IMPRESSION:  1.  Cholecystectomy.  2.  Choledocholithiasis.    < end of copied text >    Imaging Personally Reviewed:      COORDINATION OF CARE:  Care Discussed with Consultants/Other Providers [Y/N]:   Prior or Outpatient Records Reviewed [Y/N]: *******************************************************  Kenny Stock MD PGY-1  Internal Medicine  Pager [phone] / [phone]  *******************************************************    Patient is a 79y old  Female who presents with a chief complaint of Acute renal failure, high anion gap metabolic acidosis (09 Aug 2020 11:25)      SUBJECTIVE / OVERNIGHT EVENTS:  - Patient seen and evaluated at bedside.  - Patient required urgent venous access overnight and had successful placement of peripheral IV however overnight team unable to obtain AM CBC  - Denies fevers/chills, headache, SOB at rest, chest pain, palpitations, abdominal pain, nausea/vomiting/diarrhea/constipation    ADDITIONAL REVIEW OF SYSTEMS:    CONSTITUTIONAL: No weakness, fevers or chills  EYES/ENT: No visual changes;  No vertigo or throat pain.  NECK: No pain or stiffness.  RESPIRATORY: No cough, wheezing, hemoptysis; No shortness of breath.  CARDIOVASCULAR: No chest pain or palpitations  GASTROINTESTINAL: No abdominal pain. No nausea, vomiting, diarrhea, constipation, or melena.  GENITOURINARY: No dysuria, frequency or hematuria  NEUROLOGICAL: No numbness or weakness  SKIN: No itching, burning, rashes, or lesions   All other review of systems is negative unless indicated above.    MEDICATIONS  (STANDING):  pantoprazole  Injectable 40 milliGRAM(s) IV Push every 12 hours  piperacillin/tazobactam IVPB.. 3.375 Gram(s) IV Intermittent every 12 hours  sodium bicarbonate 650 milliGRAM(s) Oral two times a day    MEDICATIONS  (PRN):  aluminum hydroxide/magnesium hydroxide/simethicone Suspension 30 milliLiter(s) Oral every 6 hours PRN Dyspepsia      CAPILLARY BLOOD GLUCOSE        I&O's Summary    09 Aug 2020 07:01  -  10 Aug 2020 07:00  --------------------------------------------------------  IN: 220 mL / OUT: 1100 mL / NET: -880 mL      Daily Height in cm: 154.94 (10 Aug 2020 03:03)    Daily     PHYSICAL EXAM:  Vital Signs Last 24 Hrs  T(C): 36.6 (10 Aug 2020 05:03), Max: 36.8 (09 Aug 2020 15:30)  T(F): 97.8 (10 Aug 2020 05:03), Max: 98.3 (09 Aug 2020 15:30)  HR: 83 (10 Aug 2020 05:03) (63 - 83)  BP: 141/81 (10 Aug 2020 05:03) (107/59 - 141/85)  RR: 17 (10 Aug 2020 05:03) (17 - 18)  SpO2: 100% (10 Aug 2020 05:03) (98% - 100%)    CONSTITUTIONAL: NAD, well-developed, obese   RESPIRATORY: Normal respiratory effort; lungs are clear to auscultation bilaterally  CARDIOVASCULAR: Regular rate, normal S1 and S2, no murmur/rub/gallop; No lower extremity edema; Peripheral pulses are 2+ bilaterally  ABDOMEN: Nontender to palpation, normoactive bowel sounds, no rebound/guarding; No hepatosplenomegaly  MUSCLOSKELETAL: no cyanosis of digits; no joint swelling or tenderness to palpation, full strength all extremities.  NEURO: No focal deficits, answering questions appropriately   PSYCH: A+Ox person, place, not time/date    LABS:                        9.0    7.92  )-----------( 160      ( 09 Aug 2020 23:30 )             29.1     08-10    142  |  111<H>  |  20  ----------------------------<  90  5.2   |  17<L>  |  0.66    Ca    7.1<L>      10 Aug 2020 05:00  Phos  2.4     08-08    Mg     1.4     08-08    TPro  4.9<L>  /  Alb  2.4<L>  /  TBili  0.4  /  DBili  x   /  AST  53<H>  /  ALT  53<H>  /  AlkPhos  57  08-10          RADIOLOGY & ADDITIONAL TESTS:  Results Reviewed:     < from: CT Abdomen and Pelvis No Cont (08.09.20 @ 13:19) >  IMPRESSION:  1.  Cholecystectomy.  2.  Choledocholithiasis.    < end of copied text >    Imaging Personally Reviewed:      COORDINATION OF CARE:  Care Discussed with Consultants/Other Providers [Y/N]:   Prior or Outpatient Records Reviewed [Y/N]:

## 2020-08-10 NOTE — PROGRESS NOTE ADULT - PROBLEM SELECTOR PLAN 5
- VA duplex venous w/ evidence of DVT in L brachial and radial veins, superficial venous thrombosis in L basilic and cephalic and R basilic.   - no evidence of arterial occlusion LUE  - Holding heparin gtt in setting of suspected GIB & RP bleed  - Will consider restarting heparin gtt pending EGD and CT scan Now resolved.  Cr. 18.74 (admission) -->0.66 today Now resolved, likely in setting of decreased PO intake and hypotension   Cr. 18.74 (admission) -->0.66 today

## 2020-08-11 LAB
ALBUMIN SERPL ELPH-MCNC: 2.8 G/DL — LOW (ref 3.3–5)
ALP SERPL-CCNC: 139 U/L — HIGH (ref 40–120)
ALT FLD-CCNC: 101 U/L — HIGH (ref 4–33)
ANION GAP SERPL CALC-SCNC: 10 MMO/L — SIGNIFICANT CHANGE UP (ref 7–14)
APTT BLD: 28.5 SEC — SIGNIFICANT CHANGE UP (ref 27–36.3)
AST SERPL-CCNC: 169 U/L — HIGH (ref 4–32)
BILIRUB SERPL-MCNC: 1.9 MG/DL — HIGH (ref 0.2–1.2)
BUN SERPL-MCNC: 12 MG/DL — SIGNIFICANT CHANGE UP (ref 7–23)
CALCIUM SERPL-MCNC: 7.9 MG/DL — LOW (ref 8.4–10.5)
CHLORIDE SERPL-SCNC: 107 MMOL/L — SIGNIFICANT CHANGE UP (ref 98–107)
CO2 SERPL-SCNC: 23 MMOL/L — SIGNIFICANT CHANGE UP (ref 22–31)
CREAT SERPL-MCNC: 0.72 MG/DL — SIGNIFICANT CHANGE UP (ref 0.5–1.3)
GLUCOSE SERPL-MCNC: 87 MG/DL — SIGNIFICANT CHANGE UP (ref 70–99)
HCT VFR BLD CALC: 24.5 % — LOW (ref 34.5–45)
HCT VFR BLD CALC: 24.9 % — LOW (ref 34.5–45)
HGB BLD-MCNC: 7.9 G/DL — LOW (ref 11.5–15.5)
HGB BLD-MCNC: 8 G/DL — LOW (ref 11.5–15.5)
INR BLD: 1.11 — SIGNIFICANT CHANGE UP (ref 0.88–1.16)
MAGNESIUM SERPL-MCNC: 1.5 MG/DL — LOW (ref 1.6–2.6)
MCHC RBC-ENTMCNC: 25.5 PG — LOW (ref 27–34)
MCHC RBC-ENTMCNC: 26 PG — LOW (ref 27–34)
MCHC RBC-ENTMCNC: 32.1 % — SIGNIFICANT CHANGE UP (ref 32–36)
MCHC RBC-ENTMCNC: 32.2 % — SIGNIFICANT CHANGE UP (ref 32–36)
MCV RBC AUTO: 79.3 FL — LOW (ref 80–100)
MCV RBC AUTO: 80.6 FL — SIGNIFICANT CHANGE UP (ref 80–100)
NRBC # FLD: 0 K/UL — SIGNIFICANT CHANGE UP (ref 0–0)
NRBC # FLD: 0 K/UL — SIGNIFICANT CHANGE UP (ref 0–0)
PHOSPHATE SERPL-MCNC: 1.4 MG/DL — LOW (ref 2.5–4.5)
PLATELET # BLD AUTO: 130 K/UL — LOW (ref 150–400)
PLATELET # BLD AUTO: 138 K/UL — LOW (ref 150–400)
PMV BLD: 10.5 FL — SIGNIFICANT CHANGE UP (ref 7–13)
PMV BLD: 10.6 FL — SIGNIFICANT CHANGE UP (ref 7–13)
POTASSIUM SERPL-MCNC: 3.9 MMOL/L — SIGNIFICANT CHANGE UP (ref 3.5–5.3)
POTASSIUM SERPL-SCNC: 3.9 MMOL/L — SIGNIFICANT CHANGE UP (ref 3.5–5.3)
PROT SERPL-MCNC: 5.1 G/DL — LOW (ref 6–8.3)
PROTHROM AB SERPL-ACNC: 12.6 SEC — SIGNIFICANT CHANGE UP (ref 10.6–13.6)
RBC # BLD: 3.04 M/UL — LOW (ref 3.8–5.2)
RBC # BLD: 3.14 M/UL — LOW (ref 3.8–5.2)
RBC # FLD: 15.8 % — HIGH (ref 10.3–14.5)
RBC # FLD: 15.9 % — HIGH (ref 10.3–14.5)
SODIUM SERPL-SCNC: 140 MMOL/L — SIGNIFICANT CHANGE UP (ref 135–145)
WBC # BLD: 6.47 K/UL — SIGNIFICANT CHANGE UP (ref 3.8–10.5)
WBC # BLD: 6.63 K/UL — SIGNIFICANT CHANGE UP (ref 3.8–10.5)
WBC # FLD AUTO: 6.47 K/UL — SIGNIFICANT CHANGE UP (ref 3.8–10.5)
WBC # FLD AUTO: 6.63 K/UL — SIGNIFICANT CHANGE UP (ref 3.8–10.5)

## 2020-08-11 PROCEDURE — 99231 SBSQ HOSP IP/OBS SF/LOW 25: CPT | Mod: GC

## 2020-08-11 PROCEDURE — 99223 1ST HOSP IP/OBS HIGH 75: CPT

## 2020-08-11 PROCEDURE — 99233 SBSQ HOSP IP/OBS HIGH 50: CPT | Mod: GC

## 2020-08-11 RX ORDER — SODIUM,POTASSIUM PHOSPHATES 278-250MG
1 POWDER IN PACKET (EA) ORAL EVERY 4 HOURS
Refills: 0 | Status: COMPLETED | OUTPATIENT
Start: 2020-08-11 | End: 2020-08-11

## 2020-08-11 RX ORDER — MAGNESIUM SULFATE 500 MG/ML
2 VIAL (ML) INJECTION ONCE
Refills: 0 | Status: COMPLETED | OUTPATIENT
Start: 2020-08-11 | End: 2020-08-11

## 2020-08-11 RX ORDER — HEPARIN SODIUM 5000 [USP'U]/ML
3000 INJECTION INTRAVENOUS; SUBCUTANEOUS EVERY 6 HOURS
Refills: 0 | Status: DISCONTINUED | OUTPATIENT
Start: 2020-08-11 | End: 2020-08-11

## 2020-08-11 RX ORDER — POTASSIUM PHOSPHATE, MONOBASIC POTASSIUM PHOSPHATE, DIBASIC 236; 224 MG/ML; MG/ML
15 INJECTION, SOLUTION INTRAVENOUS ONCE
Refills: 0 | Status: COMPLETED | OUTPATIENT
Start: 2020-08-11 | End: 2020-08-11

## 2020-08-11 RX ORDER — HEPARIN SODIUM 5000 [USP'U]/ML
6500 INJECTION INTRAVENOUS; SUBCUTANEOUS ONCE
Refills: 0 | Status: DISCONTINUED | OUTPATIENT
Start: 2020-08-11 | End: 2020-08-11

## 2020-08-11 RX ORDER — HEPARIN SODIUM 5000 [USP'U]/ML
6500 INJECTION INTRAVENOUS; SUBCUTANEOUS EVERY 6 HOURS
Refills: 0 | Status: DISCONTINUED | OUTPATIENT
Start: 2020-08-11 | End: 2020-08-11

## 2020-08-11 RX ORDER — HEPARIN SODIUM 5000 [USP'U]/ML
INJECTION INTRAVENOUS; SUBCUTANEOUS
Qty: 25000 | Refills: 0 | Status: DISCONTINUED | OUTPATIENT
Start: 2020-08-11 | End: 2020-08-12

## 2020-08-11 RX ADMIN — Medication 1 PACKET(S): at 14:36

## 2020-08-11 RX ADMIN — Medication 50 GRAM(S): at 16:07

## 2020-08-11 RX ADMIN — HEPARIN SODIUM 1400 UNIT(S)/HR: 5000 INJECTION INTRAVENOUS; SUBCUTANEOUS at 18:04

## 2020-08-11 RX ADMIN — Medication 650 MILLIGRAM(S): at 05:29

## 2020-08-11 RX ADMIN — Medication 650 MILLIGRAM(S): at 17:00

## 2020-08-11 RX ADMIN — PANTOPRAZOLE SODIUM 40 MILLIGRAM(S): 20 TABLET, DELAYED RELEASE ORAL at 17:00

## 2020-08-11 RX ADMIN — POTASSIUM PHOSPHATE, MONOBASIC POTASSIUM PHOSPHATE, DIBASIC 62.5 MILLIMOLE(S): 236; 224 INJECTION, SOLUTION INTRAVENOUS at 11:51

## 2020-08-11 RX ADMIN — Medication 1 PACKET(S): at 11:23

## 2020-08-11 RX ADMIN — PANTOPRAZOLE SODIUM 40 MILLIGRAM(S): 20 TABLET, DELAYED RELEASE ORAL at 05:29

## 2020-08-11 NOTE — PROGRESS NOTE ADULT - PROBLEM SELECTOR PLAN 6
resolved  - this am pt w/ BPs improved 141/81  - can c/w maintenance IVF if pt remains NPO for extended period of time  - Monitor urine output  - Continue to hold home BP meds for now resolved  - this am pt w/ BPs improved 131/54  - Continue to hold home BP meds for now

## 2020-08-11 NOTE — CONSULT NOTE ADULT - REASON FOR ADMISSION
Acute renal failure, high anion gap metabolic acidosis
Abdominal Pain

## 2020-08-11 NOTE — PROGRESS NOTE ADULT - PROBLEM SELECTOR PLAN 2
Patient presented with complaint of dark colored emesis and had an episode of melena after heparin ggt was started  -hold heparin ggt  -s/p 1 U pRBC 8/8  -EGD today Patient presented with complaint of dark colored emesis and had an episode of melena after heparin ggt was started  -hold heparin ggt  -s/p 1 U pRBC 8/8  -EGD without evidence of bleed  - Pt Hb 7.9 this AM, will repeat

## 2020-08-11 NOTE — PROGRESS NOTE ADULT - SUBJECTIVE AND OBJECTIVE BOX
*INCOMPLETE NOTE*  *******************************************************  Kenny Stock MD PGY-1  Internal Medicine  Pager [phone] / [phone]  *******************************************************  Patient is a 79y old  Female who presents with a chief complaint of Acute renal failure, high anion gap metabolic acidosis (10 Aug 2020 07:29)      SUBJECTIVE / OVERNIGHT EVENTS:  - Patient seen and evaluated at bedside.  - No acute events overnight.   - Denies fevers/chills, headache, SOB at rest, chest pain, palpitations, abdominal pain, nausea/vomiting/diarrhea/constipation    ADDITIONAL REVIEW OF SYSTEMS:    CONSTITUTIONAL: No weakness, fevers or chills  EYES/ENT: No visual changes;  No vertigo or throat pain.  NECK: No pain or stiffness.  RESPIRATORY: No cough, wheezing, hemoptysis; No shortness of breath.  CARDIOVASCULAR: No chest pain or palpitations  GASTROINTESTINAL: No abdominal pain. No nausea, vomiting, diarrhea, constipation, or melena.  GENITOURINARY: No dysuria, frequency or hematuria  NEUROLOGICAL: No numbness or weakness  SKIN: No itching, burning, rashes, or lesions   All other review of systems is negative unless indicated above.    MEDICATIONS  (STANDING):  pantoprazole  Injectable 40 milliGRAM(s) IV Push every 12 hours  sodium bicarbonate 650 milliGRAM(s) Oral two times a day    MEDICATIONS  (PRN):  aluminum hydroxide/magnesium hydroxide/simethicone Suspension 30 milliLiter(s) Oral every 6 hours PRN Dyspepsia      CAPILLARY BLOOD GLUCOSE        I&O's Summary    Daily     Daily     PHYSICAL EXAM:  Vital Signs Last 24 Hrs  T(C): 36.8 (11 Aug 2020 05:28), Max: 36.9 (10 Aug 2020 15:30)  T(F): 98.2 (11 Aug 2020 05:28), Max: 98.5 (10 Aug 2020 15:30)  HR: 68 (11 Aug 2020 05:28) (62 - 91)  BP: 131/54 (11 Aug 2020 05:28) (116/90 - 180/70)  BP(mean): 99 (10 Aug 2020 14:00) (97 - 109)  RR: 18 (11 Aug 2020 05:28) (14 - 19)  SpO2: 100% (11 Aug 2020 05:28) (97% - 100%)    CONSTITUTIONAL: NAD, well-developed  RESPIRATORY: Normal respiratory effort; lungs are clear to auscultation bilaterally  CARDIOVASCULAR: Regular rate, normal S1 and S2, no murmur/rub/gallop; No lower extremity edema; Peripheral pulses are 2+ bilaterally  ABDOMEN: Nontender to palpation, normoactive bowel sounds, no rebound/guarding; No hepatosplenomegaly  MUSCLOSKELETAL: no cyanosis of digits; no joint swelling or tenderness to palpation, full strength all extremities.  NEURO: No focal deficits, CN II-XII grossly intact b/l; sensation to light touch intact in all extremities, gait intact.  PSYCH: A+O to person, place, and time; affect appropriate.    LABS:                        8.8    8.92  )-----------( 145      ( 10 Aug 2020 16:29 )             27.2     08-10    142  |  111<H>  |  20  ----------------------------<  90  5.2   |  17<L>  |  0.66    Ca    7.1<L>      10 Aug 2020 05:00  Phos  1.2     08-10  Mg     1.1     08-10    TPro  4.9<L>  /  Alb  2.4<L>  /  TBili  0.4  /  DBili  x   /  AST  53<H>  /  ALT  53<H>  /  AlkPhos  57  08-10    PT/INR - ( 10 Aug 2020 16:29 )   PT: 12.4 SEC;   INR: 1.08          PTT - ( 10 Aug 2020 16:29 )  PTT:25.9 SEC            RADIOLOGY & ADDITIONAL TESTS:  Results Reviewed:   Imaging Personally Reviewed:  Electrocardiogram Personally Reviewed:    COORDINATION OF CARE:  Care Discussed with Consultants/Other Providers [Y/N]:   Prior or Outpatient Records Reviewed [Y/N]: *******************************************************  Kenny Stock MD PGY-1  Internal Medicine  Pager [phone] / [phone]  *******************************************************  Patient is a 79y old  Female who presents with a chief complaint of Acute renal failure, high anion gap metabolic acidosis (10 Aug 2020 07:29)      SUBJECTIVE / OVERNIGHT EVENTS:  - Patient seen and evaluated at bedside.  - No acute events overnight.   - complaining of pain/swelling in left hand  - Denies fevers/chills, headache, SOB at rest, chest pain, palpitations, abdominal pain, nausea/vomiting/diarrhea/constipation    ADDITIONAL REVIEW OF SYSTEMS:    CONSTITUTIONAL: No weakness, fevers or chills  EYES/ENT: No visual changes;  No vertigo or throat pain.  NECK: No pain or stiffness.  RESPIRATORY: No cough, wheezing, hemoptysis; No shortness of breath.  CARDIOVASCULAR: No chest pain or palpitations  GASTROINTESTINAL: No abdominal pain. No nausea, vomiting, diarrhea, constipation, or melena.  GENITOURINARY: No dysuria, frequency or hematuria  NEUROLOGICAL: No numbness or weakness  SKIN: No itching, burning, rashes, or lesions   All other review of systems is negative unless indicated above.    MEDICATIONS  (STANDING):  pantoprazole  Injectable 40 milliGRAM(s) IV Push every 12 hours  sodium bicarbonate 650 milliGRAM(s) Oral two times a day    MEDICATIONS  (PRN):  aluminum hydroxide/magnesium hydroxide/simethicone Suspension 30 milliLiter(s) Oral every 6 hours PRN Dyspepsia      CAPILLARY BLOOD GLUCOSE          PHYSICAL EXAM:  Vital Signs Last 24 Hrs  T(C): 36.8 (11 Aug 2020 05:28), Max: 36.9 (10 Aug 2020 15:30)  T(F): 98.2 (11 Aug 2020 05:28), Max: 98.5 (10 Aug 2020 15:30)  HR: 68 (11 Aug 2020 05:28) (62 - 91)  BP: 131/54 (11 Aug 2020 05:28) (116/90 - 180/70)  BP(mean): 99 (10 Aug 2020 14:00) (97 - 109)  RR: 18 (11 Aug 2020 05:28) (14 - 19)  SpO2: 100% (11 Aug 2020 05:28) (97% - 100%)    CONSTITUTIONAL: NAD, well-developed  RESPIRATORY: Normal respiratory effort; lungs are clear to auscultation bilaterally  CARDIOVASCULAR: L hand swollen and tender with cap refill 4s, +jvd, Regular rate, normal S1 and S2, no murmur/rub/gallop; No lower extremity edema; Peripheral pulses are 2+ bilaterally  ABDOMEN: Nontender to palpation, normoactive bowel sounds, no rebound/guarding; No hepatosplenomegaly  MUSCLOSKELETAL: no cyanosis of digits; no joint swelling or tenderness to palpation, full strength all extremities.  NEURO: No focal deficits, CN II-XII grossly intact b/l; sensation to light touch intact in all extremities, gait intact.  PSYCH: A+O to person, place, and time; affect appropriate.    LABS:                        8.8    8.92  )-----------( 145      ( 10 Aug 2020 16:29 )             27.2     08-10    142  |  111<H>  |  20  ----------------------------<  90  5.2   |  17<L>  |  0.66    Ca    7.1<L>      10 Aug 2020 05:00  Phos  1.2     08-10  Mg     1.1     08-10    TPro  4.9<L>  /  Alb  2.4<L>  /  TBili  0.4  /  DBili  x   /  AST  53<H>  /  ALT  53<H>  /  AlkPhos  57  08-10    PT/INR - ( 10 Aug 2020 16:29 )   PT: 12.4 SEC;   INR: 1.08     PTT - ( 10 Aug 2020 16:29 )  PTT:25.9 SEC            RADIOLOGY & ADDITIONAL TESTS:  Results Reviewed:   Imaging Personally Reviewed:  Electrocardiogram Personally Reviewed:    COORDINATION OF CARE:  Care Discussed with Consultants/Other Providers [Y/N]:   Prior or Outpatient Records Reviewed [Y/N]: *******************************************************  Kenny Stock MD PGY-1  Internal Medicine  Pager [phone] / [phone]  *******************************************************  Patient is a 79y old  Female who presents with a chief complaint of Acute renal failure, high anion gap metabolic acidosis (10 Aug 2020 07:29)      SUBJECTIVE / OVERNIGHT EVENTS:  - Patient seen and evaluated at bedside.  - No acute events overnight.   - complaining of pain/swelling in left hand  - Denies fevers/chills, headache, SOB at rest, chest pain, palpitations, abdominal pain, nausea/vomiting/diarrhea/constipation      MEDICATIONS  (STANDING):  pantoprazole  Injectable 40 milliGRAM(s) IV Push every 12 hours  sodium bicarbonate 650 milliGRAM(s) Oral two times a day    MEDICATIONS  (PRN):  aluminum hydroxide/magnesium hydroxide/simethicone Suspension 30 milliLiter(s) Oral every 6 hours PRN Dyspepsia      PHYSICAL EXAM:  Vital Signs Last 24 Hrs  T(C): 36.8 (11 Aug 2020 05:28), Max: 36.9 (10 Aug 2020 15:30)  T(F): 98.2 (11 Aug 2020 05:28), Max: 98.5 (10 Aug 2020 15:30)  HR: 68 (11 Aug 2020 05:28) (62 - 91)  BP: 131/54 (11 Aug 2020 05:28) (116/90 - 180/70)  BP(mean): 99 (10 Aug 2020 14:00) (97 - 109)  RR: 18 (11 Aug 2020 05:28) (14 - 19)  SpO2: 100% (11 Aug 2020 05:28) (97% - 100%)    CONSTITUTIONAL: NAD, well-developed  RESPIRATORY: Normal respiratory effort; lungs are clear to auscultation bilaterally  CARDIOVASCULAR: L hand swollen and tender with cap refill 4s, +jvd, Regular rate, normal S1 and S2, no murmur/rub/gallop; No lower extremity edema; Peripheral pulses are 2+ bilaterally  ABDOMEN: Nontender to palpation, normoactive bowel sounds, no rebound/guarding; No hepatosplenomegaly  MUSCLOSKELETAL: no cyanosis of digits; no joint swelling or tenderness to palpation, full strength all extremities.  NEURO: No focal deficits, moving all ext   PSYCH: A+O to person, place, and time; affect appropriate.    LABS:                        8.8    8.92  )-----------( 145      ( 10 Aug 2020 16:29 )             27.2     08-10    142  |  111<H>  |  20  ----------------------------<  90  5.2   |  17<L>  |  0.66    Ca    7.1<L>      10 Aug 2020 05:00  Phos  1.2     08-10  Mg     1.1     08-10    TPro  4.9<L>  /  Alb  2.4<L>  /  TBili  0.4  /  DBili  x   /  AST  53<H>  /  ALT  53<H>  /  AlkPhos  57  08-10    PT/INR - ( 10 Aug 2020 16:29 )   PT: 12.4 SEC;   INR: 1.08     PTT - ( 10 Aug 2020 16:29 )  PTT:25.9 SEC            RADIOLOGY & ADDITIONAL TESTS:  Results Reviewed:   Imaging Personally Reviewed:  Electrocardiogram Personally Reviewed:    COORDINATION OF CARE:  Care Discussed with Consultants/Other Providers [Y/N]:   Prior or Outpatient Records Reviewed [Y/N]:

## 2020-08-11 NOTE — PROGRESS NOTE ADULT - PROBLEM SELECTOR PLAN 4
Pt w/ microcytic anemia w/ slow Hgb downtrend in the setting of severe VANDANA and hypotension require IV fluid resuscitation, now w/ melena. Component of new anemia can be attributed to hemodilution. Pt w/ reported dark stools  soft BPs there is concern for UGIB. Pending EGD on 8/10.   - CT A/P without evidence of RP bleed.  - Trend CBC q12.   - Transfuse for Hgb < 7 or if patient symptomatic.

## 2020-08-11 NOTE — PROGRESS NOTE ADULT - PROBLEM SELECTOR PLAN 3
- VA duplex venous w/ evidence of DVT in L brachial and radial veins, superficial venous thrombosis in L basilic and cephalic and R basilic.   - no evidence of arterial occlusion LUE  - Holding heparin gtt in setting of suspected GIB & RP bleed  - Will consider restarting heparin gtt pending EGD - VA duplex venous w/ evidence of DVT in L brachial and radial veins, superficial venous thrombosis in L basilic and cephalic and R basilic.   - no evidence of arterial occlusion LUE  - Holding heparin gtt per GI  - now with L hand swelling and with delayed cap refill  - will consult Dr. Guthrie - vascular cardiology regarding how to procede given bleeding/clotting risks - AC vs. targeted TPA vs endovascular procedure

## 2020-08-11 NOTE — PROGRESS NOTE ADULT - ASSESSMENT
Impression:  # Choledocholithiasis (1 x 0.5) with dilated CBD s/p ERCP with sphincterotomy & stone removal with basket.   # Abdominal Pain: Resolved, concerning pancreatitis based on elevated lipase and pain. However description of pain not consistent with pancreatitis. CT unremarkable, however limited due to lack of contrast. Elevations in lipase may be seen in the setting of renal insufficiency and hypotension.  # Anemia - worsening this admission with reports of dark stools, potentially related to esophagitis seen on EGD; No active bleeding seen.  Lower suspicion for lower GI source.    Recommendation:  - PPI BID  - Monitor for signs of bleeding  - Trend Hb and transfuse to Hb > 7.0  - Repeat Hb in evening; if stable - can restart Heparin ggt  - Clear liquid diet today, if Hb stable, can advance  - Supportive care per primary team    Jennifer Hurt MD  Gastroenterology Fellow  [phone]  Available on [employer] Teams  Please page on call fellow on weekends and after 5pm on weekdays: [phone]

## 2020-08-11 NOTE — PROGRESS NOTE ADULT - PROBLEM SELECTOR PLAN 1
Incidentally found on abdominal CT. Pending EGD/ERCP on 8/10 to evaluated for UGIB and possible stone removal.  - low suspicion for cholangitis (no fever, no jaundice, mild abdominal pain, no leukocytosis) or pancreatitis   - F/u GI recs Incidentally found on abdominal CT. s/p EGD/ERCP on 8/10 with removal of gallstone from CBD with sphincterotomy. EGD revealed gastritis without bleeding  -d/c zosyn  -LFTs in AM elevated, per GI most likely 2/2 biliary tract manipulation during ERCP; will trend  - F/u GI recs

## 2020-08-11 NOTE — PROGRESS NOTE ADULT - ASSESSMENT
79F with HTN, dementia presented with hyperkalemia and AGMA, uremia 2/2 acute renal failure 2/2 prerenal azotemia (now resolved). Course c/b hypothermia, hypotension, choledocholithiasis and ?cholangitis, acute blood loss anemia (s/p 1U blood) on broad spectrum antibiotics, also found to have LUE DVT, heparin drip stopped due to melena ON on 8/8, concern for UGI bleed. 79F with HTN, dementia presented with hyperkalemia and AGMA, uremia 2/2 acute renal failure 2/2 prerenal azotemia (now resolved). Course c/b hypothermia, hypotension, choledocholithiasis and ?cholangitis, acute blood loss anemia (s/p 1U blood) on broad spectrum antibiotics, also found to have LUE DVT, heparin drip stopped due to melena ON on 8/8, concern for UGI bleed. ERCP with removal of gallstone, no UGIB.

## 2020-08-11 NOTE — CONSULT NOTE ADULT - SUBJECTIVE AND OBJECTIVE BOX
Cardiology/Vascular Medicine Inpatient Consultation Note      HPI:  Patient is a 80 yo woman with HTN, dementia presented with hyperkalemia and AGMA, uremia 2/2 acute renal failure 2/2 prerenal azotemia (now resolved).     Her current hospital course has been complicated by hypothermia, hypotension, choledocholithiasis and ?cholangitis, acute blood loss anemia (s/p pRBC transfusion), and now also on broad spectrum antibiotics.    For her complaint of left arm swelling, erythema, and tenderness, she underwent LUE venous duplex US which noted acute DVT in the left brachial and radial veins.  She was started on heparin gtt but was stopped due to melena noted on 8/8/20 due to concern for UGI bleed.   She denies prior known instrumentation in her left upper extremity.    EGD/ERCP performed on 8/10/20with removal of gallstone, but did not note UGIB.     At the time of my examination, the patient was reporting discomfort in her left upper extremity.  She otherwise remained hemodynamically stable.      Given presumed acute blood loss anemia, unable to restart anticoagulation at this point.  Will await clearance from the GI team to restart anticoagulation.  If time off anticoagulation is greater than 2-3 days, can consider surveillance imaging of her LUE to monitor for DVT propagation.    Duration of anticoagulation therapy for what appears to be unprovoked VTE will be ~6 months if she can tolerate therapy, with re-evaluation at that time to see if she needs extended therapy or can be transitioned to low dose aspirin.  Time frame of anticoagulation treatment therapy may be shortened, or she may be treated with aspirin therapy from this point if she persists to have higher bleeding risks.         Problem/Plan - 3:  ·  Problem: DVT (deep venous thrombosis).  Plan: - VA duplex venous w/ evidence of DVT in L brachial and radial veins, superficial venous thrombosis in L basilic and cephalic and R basilic.   - no evidence of arterial occlusion LUE  - Holding heparin gtt per GI  - now with L hand swelling and with delayed cap refill  - will consult Dr. Guthrie - vascular cardiology regarding how to procede given bleeding/clotting risks - AC vs. targeted TPA vs endovascular procedure.     Problem/Plan - 4:  ·  Problem: Anemia.  Plan: Pt w/ microcytic anemia w/ slow Hgb downtrend in the setting of severe VANDANA and hypotension require IV fluid resuscitation, now w/ melena. Component of new anemia can be attributed to hemodilution. Pt w/ reported dark stools  soft BPs there is concern for UGIB. Pending EGD on 8/10.   - CT A/P without evidence of RP bleed.  - Trend CBC q12.   - Transfuse for Hgb < 7 or if patient symptomatic.      Problem/Plan - 5:  ·  Problem: Acute renal failure.  Plan: Now resolved, likely in setting of decreased PO intake and hypotension   Cr. 18.74 (admission) -->0.66 today.      Problem/Plan - 6:  Problem: Hypotension. Plan: resolved  - this am pt w/ BPs improved 131/54  - Continue to hold home BP meds for now.     Problem/Plan - 7:  ·  Problem: Hypothermia.  Plan: Resolved.      Problem/Plan - 8:  ·  Problem: Need for prophylactic measure.  Plan: DVT PPx: heparin ggt stopped due to concern for UGI bleed  Diet: DASH/TLC diet  Med Rec: Lotrel (amlodipine 10 mg / benazepril 20 mg) daily                Omeprazole 20 mg daily                asa 81 mg.         Allergies  No Known Allergies    MEDICATIONS:  aluminum hydroxide/magnesium hydroxide/simethicone Suspension 30 milliLiter(s) Oral every 6 hours PRN  pantoprazole  Injectable 40 milliGRAM(s) IV Push every 12 hours  magnesium sulfate  IVPB 2 Gram(s) IV Intermittent once  sodium bicarbonate 650 milliGRAM(s) Oral two times a day    PAST MEDICAL & SURGICAL HISTORY:  Dementia  Hypertension  No significant past surgical history    FAMILY HISTORY:  No pertinent family history in first degree relatives    SOCIAL HISTORY:    As above    REVIEW OF SYSTEMS:  As above, as per chart history    PHYSICAL EXAM:  T(C): 36.6 (08-11-20 @ 12:12), Max: 36.8 (08-11-20 @ 05:28)  HR: 98 (08-11-20 @ 12:12) (62 - 98)  BP: 139/77 (08-11-20 @ 12:12) (129/60 - 139/77)  RR: 17 (08-11-20 @ 12:12) (16 - 18)  SpO2: 100% (08-11-20 @ 12:12) (98% - 100%)    Appearance: NAD, laying flat in bed, chronically-ill in appearance, Cuban-speaking	  Cardiovascular: Normal S1 S2, No discernible JVD  Respiratory: Decreased breath sounds bilaterally  Psychiatry: Awake, alert  Gastrointestinal:  Soft, Non-tender, + BS	  Skin: No rashes, No ecchymoses, No cyanosis	  Neurologic: Non-focal  Extremities: +Edematous LUE, erythema, tender to palpation, radial pulse intact    LABS:	 	                          7.9    6.63  )-----------( 130      ( 11 Aug 2020 06:45 )             24.5     08-11    140  |  107  |  12  ----------------------------<  87  3.9   |  23  |  0.72  08-10    142  |  111<H>  |  20  ----------------------------<  90  5.2   |  17<L>  |  0.66    Ca    7.9<L>      11 Aug 2020 06:45  Ca    7.1<L>      10 Aug 2020 05:00  Phos  1.4     08-11  Phos  1.2     08-10  Mg     1.5     08-11  Mg     1.1     08-10    TPro  5.1<L>  /  Alb  2.8<L>  /  TBili  1.9<H>  /  DBili  x   /  AST  169<H>  /  ALT  101<H>  /  AlkPhos  139<H>  08-11  TPro  4.9<L>  /  Alb  2.4<L>  /  TBili  0.4  /  DBili  x   /  AST  53<H>  /  ALT  53<H>  /  AlkPhos  57  08-10    < from: CT Abdomen and Pelvis No Cont (08.09.20 @ 13:19) >    EXAM:  CT ABDOMEN AND PELVIS        PROCEDURE DATE:  Aug  9 2020         INTERPRETATION:  CLINICAL INFORMATION: Anemia. Acute renal insufficiency. Evaluate for retroperitoneal hemorrhage.    COMPARISON: Ultrasound kidney 8/20/2020. CT abdomen and pelvis 8/5/2020.    PROCEDURE:  CT of the Abdomen and Pelvis was performed without intravenous contrast.  Intravenous contrast: None.  Oral contrast: None.  Sagittal and coronal reformats were performed.    FINDINGS:  LOWER CHEST: Small bilateral pleural effusions. Hiatal hernia.    LIVER: Within normal limits.  BILE DUCTS: The 10 x 5 mm stone in the dilated common duct is unchanged.  GALLBLADDER: Cholecystectomy.  SPLEEN: Within normal limits.  PANCREAS: Within normal limits.  ADRENALS: Within normal limits.  KIDNEYS/URETERS: Within normal limits.    BLADDER: Metcalf catheter is in the decompressed bladder. Air in the bladder secondary to catheterization.  REPRODUCTIVE ORGANS: Uterus is heterogeneous. The endometrium is not well evaluated.    BOWEL: The stomach is decompressed. The small bowel is normal in caliber. Diverticulosis. Normal appendix. No free air. No free fluid.  PERITONEUM: No ascites.  VESSELS: The aorta is normal in caliber. Aortic calcifications.  RETROPERITONEUM/LYMPH NODES: No lymphadenopathy.  ABDOMINAL WALL: Findings no hernia containing fat.  BONES: Within normal limits.    IMPRESSION:  1.  Cholecystectomy.  2.  Choledocholithiasis.    ROLY MUNIZ M.D., ATTENDING RADIOLOGIST  This document has been electronically signed. Aug  9 2020  1:59PM          < from: Transthoracic Echocardiogram (08.07.20 @ 13:01) >    Patient name: KYE RÍOS  YOB: 1940   Age: 79 (F)   MR#: [id number]  Study Date: 8/7/2020  Location: [id number]: Windy Hawkins RDCS  Study quality: Technically Difficult  Referring Physician: Sunil Mckeon MD  Blood Pressure: 97/51 mmHg  Height: 154 cm  Weight: 78 kg  BSA: 1.8 m2  ------------------------------------------------------------------------  PROCEDURE: Transthoracic echocardiogram with 2-D, M-Mode  and complete spectral and color flow Doppler.  INDICATION: Shock, unspecified (R57.9)  ------------------------------------------------------------------------  DIMENSIONS:  Dimensions:     Normal Values:  LA:     3.6 cm    2.0 - 4.0 cm  Ao:     2.8 cm    2.0 - 3.8 cm  SEPTUM: 0.7 cm    0.6 - 1.2 cm  PWT:    0.8 cm    0.6 - 1.1 cm  LVIDd:  4.3 cm    3.0 - 5.6 cm  LVIDs:  2.9 cm    1.8 - 4.0 cm  Derived Variables:  LVMI: 55 g/m2  RWT: 0.37  Fractional short: 33 %  Ejection Fraction (Teicholtz): 61 %  ------------------------------------------------------------------------  OBSERVATIONS:  Mitral Valve: Mitral annular calcification, otherwise  normal mitral valve. Minimal mitral regurgitation.  Aortic Root: Normal aortic root.  Aortic Valve: Aortic valve leaflet morphology not well  visualized. Mild aortic regurgitation.  Left Atrium: Normal left atrium.  LA volume index = 31  cc/m2.  Left Ventricle: Normal left ventricular systolic function.  No segmental wall motion abnormalities. Normal left  ventricular internal dimensions and wall thicknesses. Mild  diastolic dysfunction (Stage I).  Right Heart: Normal right atrium. Normal right ventricular  size and function. Normal tricuspid valve. Minimal  tricuspid regurgitation. Normal pulmonic valve.  Minimal  pulmonic regurgitation.  Pericardium/PleuraNormal pericardium with no pericardial  effusion.  ------------------------------------------------------------------------  CONCLUSIONS:  1. Mitral annular calcification, otherwise normal mitral  valve. Minimal mitral regurgitation.  2. Aorticvalve leaflet morphology not well visualized.  Mild aortic regurgitation.  3. Normal left ventricular internal dimensions and wall  thicknesses.  4. Normal left ventricular systolic function. No segmental  wall motion abnormalities.  5. Mild diastolic dysfunction (Stage I).  6. Normal right ventricular size and function.  ------------------------------------------------------------------------  Confirmed on  8/7/2020 - 16:41:30 by Marky Arenas M.D.  ------------------------------------------------------------------------    < end of copied text >      < from: VA Duplex Arterial Upper Ext, Left. (08.06.20 @ 09:43) >    Patient name: KYE RÍOS  Date of test: 8/6/2020  MR#: [id number]  [facility] #: [id number]    Location: [facility] Physician(s): , Sunil Mckeon MD  Interpreted by: Carmelita Irwin M.D. RPVI  Tech: Faizan Young RVT  Type of Test: Upper Extremity Arterial  ------------------------------------------------------------------------  Procedure: Real-time grayscale and color Duplex  ultrasonography was used to interrogate the left upper  extremity arteries.  Indications: Atheroembolism of left upper extremity  (I75.012)  ------------------------------------------------------------------------  VELOCITY:  ------------------------------------------------------------------------  LEFT:  Subclavian velocity (cm/sec): 60  Axillary velocity (cm/sec): 97  Brachial velocity (cm/sec): 21  Radial velocity (cm/sec): 21  Ulnar velocity (cm/sec):  ------------------------------------------------------------------------  PRESSURES:  ------------------------------------------------------------------------  LEFT:  Upper Arm Pressure (mm Hg)  Lower Arm PRessure (mm Hg):  ------------------------------------------------------------------------  Left Findings: --Left subclavian artery: Patent vessel.  Normal velocities with triphasic waveforms.  --Leftaxillary artery: Patent vessel. Normal velocities  with triphasic waveforms.  --Left brachial artery: Patent vessel. Normal velocities  with triphasic waveforms.  --Left radial artery: Patent vessel. Normal velocities  with triphasic waveforms.  --Left ulnar artery: Patent vessel. Normal velocities with  triphasic waveforms.  ------------------------------------------------------------------------  Summary/Impressions:  No evidence of occlusive arterial disease in the  visualized Left upper extremity.  ------------------------------------------------------------------------  Confirmed on  8/6/2020 - 2:23 PM by YEIMI Pacheco  By signing this report, the attending physician certifies  that he or she has personally supervised andinterpreted  the vascular study and has reviewed and or edited and  agrees with the written comments contained within the  report.    < end of copied text >      < from: VA Duplex Ext Veins Upper Comp, Left. (08.06.20 @ 09:15) >    Patient name: KYE RÍOS  Date of test: 8/6/2020  MR#: [id number]  [facility] #: [id number]    Location: [facility] Physician(s): Sunil MD  Interpreted by: Carmelita Irwin M.D. RPVI  Tech: aFizan Young RVT  Type of Test: Upper Extremity Venous  ------------------------------------------------------------------------  Procedure: Real-time grayscale and color Duplex  ultrasonography was used to interrogate the deep veins of  the left upper extremity.  Indications: Localized swelling, mass and lump, left upper  limb (R22.32)  ------------------------------------------------------------------------  RESULT:  ------------------------------------------------------------------------  LEFT:  Deep venous thrombosis: Yes  Superficial venous thrombosis: Yes  ------------------------------------------------------------------------  Right Findings: Superficial vein thrombosis visualized in  the basilic (upper arm) vein.  Left Findings: Deep vein thrombosis visualized in the left  brachial and radial veins.  Superficial vein thrombosis visualized in the basilic and  cephalic veins.  The left internal jugular, subclavian, axillary, and ulnar  veins are patent, and demonstrate full compressibility  with phasic Doppler waveforms.  No evidence of thrombosis.  ------------------------------------------------------------------------  Summary/Impressions:  Deep vein thrombosis visualized in the left brachial and  radial veins.  Superficial vein thrombosis visualized in the basilic and  cephalic veins.  Results communicated to Dr. LINUS Tripathi on 8/6/20 at 10 am.  ------------------------------------------------------------------------  Confirmed on  8/6/2020 - 2:22 PM by YEIMI Pacheco  By signing this report, the attending physician certifies  that he or she has personally supervised and interpreted  the vascular study and has reviewed and or edited and  agrees with the written comments contained within the  report.    < end of copied text > Cardiology/Vascular Medicine Inpatient Consultation Note      HPI:  Patient is a 80 yo woman with HTN, dementia presented with hyperkalemia and AGMA, uremia 2/2 acute renal failure 2/2 prerenal azotemia (now resolved).     Her current hospital course has been complicated by hypothermia, hypotension, choledocholithiasis and ?cholangitis, acute blood loss anemia (s/p pRBC transfusion), and now also on broad spectrum antibiotics.    For her complaint of left arm swelling, erythema, and tenderness, she underwent LUE venous duplex US which noted acute DVT in the left brachial and radial veins.  She was started on heparin gtt but was stopped due to melena noted on 8/8/20 due to concern for UGI bleed.   She denies prior known instrumentation in her left upper extremity.    EGD/ERCP performed on 8/10/20with removal of gallstone, but did not note UGIB.     At the time of my examination, the patient was reporting discomfort in her left upper extremity.  She otherwise remained hemodynamically stable.      Given presumed acute blood loss anemia, unable to restart anticoagulation at this point.  Will await clearance from the GI team to restart anticoagulation.  If time off anticoagulation is greater than 2-3 days, can consider surveillance imaging of her LUE to monitor for DVT propagation.    Duration of anticoagulation therapy for what appears to be unprovoked VTE will be ~6 months if she can tolerate therapy, with re-evaluation at that time to see if she needs extended therapy or can be transitioned to low dose aspirin.  Time frame of anticoagulation treatment therapy may be shortened, or she may be treated with aspirin therapy from this point if she persists to have higher bleeding risks.    Allergies  No Known Allergies    MEDICATIONS:  aluminum hydroxide/magnesium hydroxide/simethicone Suspension 30 milliLiter(s) Oral every 6 hours PRN  pantoprazole  Injectable 40 milliGRAM(s) IV Push every 12 hours  magnesium sulfate  IVPB 2 Gram(s) IV Intermittent once  sodium bicarbonate 650 milliGRAM(s) Oral two times a day    PAST MEDICAL & SURGICAL HISTORY:  Dementia  Hypertension  No significant past surgical history    FAMILY HISTORY:  No pertinent family history in first degree relatives    SOCIAL HISTORY:    As above    REVIEW OF SYSTEMS:  As above, as per chart history    PHYSICAL EXAM:  T(C): 36.6 (08-11-20 @ 12:12), Max: 36.8 (08-11-20 @ 05:28)  HR: 98 (08-11-20 @ 12:12) (62 - 98)  BP: 139/77 (08-11-20 @ 12:12) (129/60 - 139/77)  RR: 17 (08-11-20 @ 12:12) (16 - 18)  SpO2: 100% (08-11-20 @ 12:12) (98% - 100%)    Appearance: NAD, laying flat in bed, chronically-ill in appearance, [place]-speaking	  Cardiovascular: Normal S1 S2, No discernible JVD  Respiratory: Decreased breath sounds bilaterally  Psychiatry: Awake, alert  Gastrointestinal:  Soft, Non-tender, + BS	  Skin: No rashes, No ecchymoses, No cyanosis	  Neurologic: Non-focal  Extremities: +Edematous LUE, erythema, tender to palpation, radial pulse intact    LABS:	 	                          7.9    6.63  )-----------( 130      ( 11 Aug 2020 06:45 )             24.5     08-11    140  |  107  |  12  ----------------------------<  87  3.9   |  23  |  0.72  08-10    142  |  111<H>  |  20  ----------------------------<  90  5.2   |  17<L>  |  0.66    Ca    7.9<L>      11 Aug 2020 06:45  Ca    7.1<L>      10 Aug 2020 05:00  Phos  1.4     08-11  Phos  1.2     08-10  Mg     1.5     08-11  Mg     1.1     08-10    TPro  5.1<L>  /  Alb  2.8<L>  /  TBili  1.9<H>  /  DBili  x   /  AST  169<H>  /  ALT  101<H>  /  AlkPhos  139<H>  08-11  TPro  4.9<L>  /  Alb  2.4<L>  /  TBili  0.4  /  DBili  x   /  AST  53<H>  /  ALT  53<H>  /  AlkPhos  57  08-10    < from: CT Abdomen and Pelvis No Cont (08.09.20 @ 13:19) >    EXAM:  CT ABDOMEN AND PELVIS        PROCEDURE DATE:  Aug  9 2020         INTERPRETATION:  CLINICAL INFORMATION: Anemia. Acute renal insufficiency. Evaluate for retroperitoneal hemorrhage.    COMPARISON: Ultrasound kidney 8/20/2020. CT abdomen and pelvis 8/5/2020.    PROCEDURE:  CT of the Abdomen and Pelvis was performed without intravenous contrast.  Intravenous contrast: None.  Oral contrast: None.  Sagittal and coronal reformats were performed.    FINDINGS:  LOWER CHEST: Small bilateral pleural effusions. Hiatal hernia.    LIVER: Within normal limits.  BILE DUCTS: The 10 x 5 mm stone in the dilated common duct is unchanged.  GALLBLADDER: Cholecystectomy.  SPLEEN: Within normal limits.  PANCREAS: Within normal limits.  ADRENALS: Within normal limits.  KIDNEYS/URETERS: Within normal limits.    BLADDER: Metcalf catheter is in the decompressed bladder. Air in the bladder secondary to catheterization.  REPRODUCTIVE ORGANS: Uterus is heterogeneous. The endometrium is not well evaluated.    BOWEL: The stomach is decompressed. The small bowel is normal in caliber. Diverticulosis. Normal appendix. No free air. No free fluid.  PERITONEUM: No ascites.  VESSELS: The aorta is normal in caliber. Aortic calcifications.  RETROPERITONEUM/LYMPH NODES: No lymphadenopathy.  ABDOMINAL WALL: Findings no hernia containing fat.  BONES: Within normal limits.    IMPRESSION:  1.  Cholecystectomy.  2.  Choledocholithiasis.    ROLY MUNIZ M.D., ATTENDING RADIOLOGIST  This document has been electronically signed. Aug  9 2020  1:59PM          < from: Transthoracic Echocardiogram (08.07.20 @ 13:01) >    Patient name: KYE RÍOS  YOB: 1940   Age: 79 (F)   MR#: [id number]  Study Date: 8/7/2020  Location: [id number]: Windy Hawkins RDCS  Study quality: Technically Difficult  Referring Physician: Sunil Mckeon MD  Blood Pressure: 97/51 mmHg  Height: 154 cm  Weight: 78 kg  BSA: 1.8 m2  ------------------------------------------------------------------------  PROCEDURE: Transthoracic echocardiogram with 2-D, M-Mode  and complete spectral and color flow Doppler.  INDICATION: Shock, unspecified (R57.9)  ------------------------------------------------------------------------  DIMENSIONS:  Dimensions:     Normal Values:  LA:     3.6 cm    2.0 - 4.0 cm  Ao:     2.8 cm    2.0 - 3.8 cm  SEPTUM: 0.7 cm    0.6 - 1.2 cm  PWT:    0.8 cm    0.6 - 1.1 cm  LVIDd:  4.3 cm    3.0 - 5.6 cm  LVIDs:  2.9 cm    1.8 - 4.0 cm  Derived Variables:  LVMI: 55 g/m2  RWT: 0.37  Fractional short: 33 %  Ejection Fraction (Teicholtz): 61 %  ------------------------------------------------------------------------  OBSERVATIONS:  Mitral Valve: Mitral annular calcification, otherwise  normal mitral valve. Minimal mitral regurgitation.  Aortic Root: Normal aortic root.  Aortic Valve: Aortic valve leaflet morphology not well  visualized. Mild aortic regurgitation.  Left Atrium: Normal left atrium.  LA volume index = 31  cc/m2.  Left Ventricle: Normal left ventricular systolic function.  No segmental wall motion abnormalities. Normal left  ventricular internal dimensions and wall thicknesses. Mild  diastolic dysfunction (Stage I).  Right Heart: Normal right atrium. Normal right ventricular  size and function. Normal tricuspid valve. Minimal  tricuspid regurgitation. Normal pulmonic valve.  Minimal  pulmonic regurgitation.  Pericardium/PleuraNormal pericardium with no pericardial  effusion.  ------------------------------------------------------------------------  CONCLUSIONS:  1. Mitral annular calcification, otherwise normal mitral  valve. Minimal mitral regurgitation.  2. Aorticvalve leaflet morphology not well visualized.  Mild aortic regurgitation.  3. Normal left ventricular internal dimensions and wall  thicknesses.  4. Normal left ventricular systolic function. No segmental  wall motion abnormalities.  5. Mild diastolic dysfunction (Stage I).  6. Normal right ventricular size and function.  ------------------------------------------------------------------------  Confirmed on  8/7/2020 - 16:41:30 by Marky Arenas M.D.  ------------------------------------------------------------------------    < end of copied text >      < from: VA Duplex Arterial Upper Ext, Left. (08.06.20 @ 09:43) >    Patient name: KYE RÍOS  Date of test: 8/6/2020  MR#: [id number]  [facility] #: [id number]    Location: [facility] Physician(s): Sunil MD  Interpreted by: Carmelita Irwin M.D. RPVI  Tech: Faizan Young RVT  Type of Test: Upper Extremity Arterial  ------------------------------------------------------------------------  Procedure: Real-time grayscale and color Duplex  ultrasonography was used to interrogate the left upper  extremity arteries.  Indications: Atheroembolism of left upper extremity  (I75.012)  ------------------------------------------------------------------------  VELOCITY:  ------------------------------------------------------------------------  LEFT:  Subclavian velocity (cm/sec): 60  Axillary velocity (cm/sec): 97  Brachial velocity (cm/sec): 21  Radial velocity (cm/sec): 21  Ulnar velocity (cm/sec):  ------------------------------------------------------------------------  PRESSURES:  ------------------------------------------------------------------------  LEFT:  Upper Arm Pressure (mm Hg)  Lower Arm PRessure (mm Hg):  ------------------------------------------------------------------------  Left Findings: --Left subclavian artery: Patent vessel.  Normal velocities with triphasic waveforms.  --Leftaxillary artery: Patent vessel. Normal velocities  with triphasic waveforms.  --Left brachial artery: Patent vessel. Normal velocities  with triphasic waveforms.  --Left radial artery: Patent vessel. Normal velocities  with triphasic waveforms.  --Left ulnar artery: Patent vessel. Normal velocities with  triphasic waveforms.  ------------------------------------------------------------------------  Summary/Impressions:  No evidence of occlusive arterial disease in the  visualized Left upper extremity.  ------------------------------------------------------------------------  Confirmed on  8/6/2020 - 2:23 PM by YEIMI Pacheco  By signing this report, the attending physician certifies  that he or she has personally supervised andinterpreted  the vascular study and has reviewed and or edited and  agrees with the written comments contained within the  report.    < end of copied text >      < from: VA Duplex Ext Veins Upper Comp, Left. (08.06.20 @ 09:15) >    Patient name: KYE RÍOS  Date of test: 8/6/2020  MR#: [id number]  [facility] #: [id number]    Location: [facility] Physician(s): Sunil MD  Interpreted by: YEIMI Pacheco  Tech: Faizan Young RVT  Type of Test: Upper Extremity Venous  ------------------------------------------------------------------------  Procedure: Real-time grayscale and color Duplex  ultrasonography was used to interrogate the deep veins of  the left upper extremity.  Indications: Localized swelling, mass and lump, left upper  limb (R22.32)  ------------------------------------------------------------------------  RESULT:  ------------------------------------------------------------------------  LEFT:  Deep venous thrombosis: Yes  Superficial venous thrombosis: Yes  ------------------------------------------------------------------------  Right Findings: Superficial vein thrombosis visualized in  the basilic (upper arm) vein.  Left Findings: Deep vein thrombosis visualized in the left  brachial and radial veins.  Superficial vein thrombosis visualized in the basilic and  cephalic veins.  The left internal jugular, subclavian, axillary, and ulnar  veins are patent, and demonstrate full compressibility  with phasic Doppler waveforms.  No evidence of thrombosis.  ------------------------------------------------------------------------  Summary/Impressions:  Deep vein thrombosis visualized in the left brachial and  radial veins.  Superficial vein thrombosis visualized in the basilic and  cephalic veins.  Results communicated to Dr. LINUS Tripathi on 8/6/20 at 10 am.  ------------------------------------------------------------------------  Confirmed on  8/6/2020 - 2:22 PM by YEIMI Pacheco  By signing this report, the attending physician certifies  that he or she has personally supervised and interpreted  the vascular study and has reviewed and or edited and  agrees with the written comments contained within the  report.    < end of copied text >

## 2020-08-11 NOTE — PROGRESS NOTE ADULT - ATTENDING COMMENTS
Patient seen and examined, chart and labs reviewed. Case discussed with house staff.      #: [id number]  79F with HTN, dementia presented with hyperkalemia and AGMA, uremia 2/2 acute renal failure 2/2 prerenal azotemia (now resolved). Course c/b hypothermia, hypotension, choledocholithiasis and acute blood loss anemia (s/p 1U blood), also found to have LUE DVT, heparin drip stopped due to melena ON on 8/8, concern for UGI bleed.    #Melena/UGIB  - S/p EGD 8/10 showing esophagitis. C/w PPI IV BID  - Discussed with GI fellow Dr. Blu brown to restart heparin gtt if repeat CBC is stable.   - Also s/p ERCP s/p biliary sphincterotomy and removal of CBD stone.    #LUE DVT   - Discussed with Dr. Guthrie, can restart heparin gtt once cleared by GI and will need to monitor closely for re-bleed     #VANDANA   - Cr 18.74 on admission, now normalized. Likely VANDANA in setting of decreased PO intake + hypotension   - D/c sodium bicarbonate today as CO2 normal     #Dispo: PT recs rehab

## 2020-08-11 NOTE — PROGRESS NOTE ADULT - SUBJECTIVE AND OBJECTIVE BOX
Chief Complaint:  Patient is a 79y old  Female who presents with a chief complaint of Acute renal failure, high anion gap metabolic acidosis (11 Aug 2020 07:33)    Interval Events:   No acute overnight events  No fevers, chills, chest pain, shortness of breath, nausea, vomiting, diarrhea    Allergies:  No Known Allergies    Hospital Medications:  aluminum hydroxide/magnesium hydroxide/simethicone Suspension 30 milliLiter(s) Oral every 6 hours PRN  pantoprazole  Injectable 40 milliGRAM(s) IV Push every 12 hours  sodium bicarbonate 650 milliGRAM(s) Oral two times a day    PMHX/PSHX:  Dementia  Hypertension  No significant past surgical history    ROS:   General:  No fevers, chills  ENT:  No sore throat or dysphagia  CV:  No pain or palpitations  Resp:  No dyspnea, cough or  wheezing  GI:  No pain, No nausea, No vomiting, No rectal bleeding, No tarry stools,  Skin:  No rash or edema    PHYSICAL EXAM:   Vital Signs:  Vital Signs Last 24 Hrs  T(C): 36.5 (11 Aug 2020 07:45), Max: 36.9 (10 Aug 2020 15:30)  T(F): 97.7 (11 Aug 2020 07:45), Max: 98.5 (10 Aug 2020 15:30)  HR: 69 (11 Aug 2020 07:45) (62 - 91)  BP: 129/60 (11 Aug 2020 07:45) (116/90 - 180/70)  BP(mean): 99 (10 Aug 2020 14:00) (97 - 109)  RR: 17 (11 Aug 2020 07:45) (14 - 19)  SpO2: 98% (11 Aug 2020 07:45) (97% - 100%)  Daily     Daily     GENERAL:  NAD, Appears stated age  HEENT:  NC/AT,  conjunctivae clear and pink  CHEST:  Normal Effort, Breath sounds clear  HEART:  RRR, S1 + S2  ABDOMEN:  Soft, non-tender, non-distended, BS+  EXTEREMITIES:  no cyanosis  SKIN:  Warm & Dry.  NEURO:  Alert, oriented    LABS:                        7.9    6.63  )-----------( 130      ( 11 Aug 2020 06:45 )             24.5     Mean Cell Volume: 80.6 fL (08-11-20 @ 06:45)    08-10    142  |  111<H>  |  20  ----------------------------<  90  5.2   |  17<L>  |  0.66    Ca    7.1<L>      10 Aug 2020 05:00  Phos  1.2     08-10  Mg     1.1     08-10    TPro  4.9<L>  /  Alb  2.4<L>  /  TBili  0.4  /  DBili  x   /  AST  53<H>  /  ALT  53<H>  /  AlkPhos  57  08-10    LIVER FUNCTIONS - ( 10 Aug 2020 05:00 )  Alb: 2.4 g/dL / Pro: 4.9 g/dL / ALK PHOS: 57 u/L / ALT: 53 u/L / AST: 53 u/L / GGT: x           PT/INR - ( 10 Aug 2020 16:29 )   PT: 12.4 SEC;   INR: 1.08        PTT - ( 10 Aug 2020 16:29 )  PTT:25.9 SEC                       7.9    6.63  )-----------( 130      ( 11 Aug 2020 06:45 )             24.5                         8.8    8.92  )-----------( 145      ( 10 Aug 2020 16:29 )             27.2                         9.0    7.92  )-----------( 160      ( 09 Aug 2020 23:30 )             29.1                         9.3    7.12  )-----------( 152      ( 09 Aug 2020 08:00 )             28.8                         7.7    6.53  )-----------( 172      ( 08 Aug 2020 10:21 )             23.7       Imaging:

## 2020-08-11 NOTE — PROGRESS NOTE ADULT - PROBLEM SELECTOR PLAN 5
Now resolved, likely in setting of decreased PO intake and hypotension   Cr. 18.74 (admission) -->0.66 today

## 2020-08-11 NOTE — PROGRESS NOTE ADULT - SUBJECTIVE AND OBJECTIVE BOX
ANESTHESIA POSTOP CHECK    79y Female POSTOP DAY 1 S/P ERCP    Vital Signs Last 24 Hrs  T(C): 36.6 (11 Aug 2020 12:12), Max: 36.9 (10 Aug 2020 15:30)  T(F): 97.9 (11 Aug 2020 12:12), Max: 98.5 (10 Aug 2020 15:30)  HR: 98 (11 Aug 2020 12:12) (62 - 98)  BP: 139/77 (11 Aug 2020 12:12) (116/90 - 180/70)  BP(mean): 99 (10 Aug 2020 14:00) (97 - 109)  RR: 17 (11 Aug 2020 12:12) (14 - 19)  SpO2: 100% (11 Aug 2020 12:12) (97% - 100%)  I&O's Summary      [X] NO APPARENT ANESTHESIA COMPLICATIONS      Comments: 81YO F S/P ERCP STABLE POSTOP W/ NO POSTOP ANESTHESIA COMPLICATIONS NOTED.  POSTOP NOTE WRITTEN NOW & PATIENT SEEN LATER.

## 2020-08-11 NOTE — CONSULT NOTE ADULT - ASSESSMENT
Patient is a 80 yo woman with HTN, dementia presented with hyperkalemia and AGMA, uremia 2/2 acute renal failure 2/2 prerenal azotemia (now resolved).     Her current hospital course has been complicated by hypothermia, hypotension, choledocholithiasis and ?cholangitis, acute blood loss anemia (s/p pRBC transfusion), and now also on broad spectrum antibiotics.    For her complaint of left arm swelling, erythema, and tenderness, she underwent LUE venous duplex US which noted acute DVT in the left brachial and radial veins.  She was started on heparin gtt but was stopped due to melena noted on 8/8/20 due to concern for UGI bleed.   She denies prior known instrumentation in her left upper extremity.    EGD/ERCP performed on 8/10/20with removal of gallstone, but did not note UGIB.     At the time of my examination, the patient was reporting discomfort in her left upper extremity.  She otherwise remained hemodynamically stable.      Given presumed acute blood loss anemia, unable to restart anticoagulation at this point.  Will await clearance from the GI team to restart anticoagulation.  If time off anticoagulation is greater than 2-3 days, can consider surveillance imaging of her LUE to monitor for DVT propagation.    Duration of anticoagulation therapy for what appears to be unprovoked VTE will be ~6 months if she can tolerate therapy, with re-evaluation at that time to see if she needs extended therapy or can be transitioned to low dose aspirin.  Time frame of anticoagulation treatment therapy may be shortened, or she may be treated with aspirin therapy from this point if she persists to have higher bleeding risks.    Will follow.  D/W Dr. Ramon.

## 2020-08-12 LAB
ALBUMIN SERPL ELPH-MCNC: 2.6 G/DL — LOW (ref 3.3–5)
ALP SERPL-CCNC: 251 U/L — HIGH (ref 40–120)
ALT FLD-CCNC: 191 U/L — HIGH (ref 4–33)
ANION GAP SERPL CALC-SCNC: 12 MMO/L — SIGNIFICANT CHANGE UP (ref 7–14)
APTT BLD: 89.9 SEC — HIGH (ref 27–36.3)
AST SERPL-CCNC: 297 U/L — HIGH (ref 4–32)
BILIRUB SERPL-MCNC: 2 MG/DL — HIGH (ref 0.2–1.2)
BUN SERPL-MCNC: 11 MG/DL — SIGNIFICANT CHANGE UP (ref 7–23)
CALCIUM SERPL-MCNC: 8 MG/DL — LOW (ref 8.4–10.5)
CHLORIDE SERPL-SCNC: 104 MMOL/L — SIGNIFICANT CHANGE UP (ref 98–107)
CO2 SERPL-SCNC: 23 MMOL/L — SIGNIFICANT CHANGE UP (ref 22–31)
CREAT SERPL-MCNC: 0.73 MG/DL — SIGNIFICANT CHANGE UP (ref 0.5–1.3)
GLUCOSE SERPL-MCNC: 107 MG/DL — HIGH (ref 70–99)
HCT VFR BLD CALC: 24.7 % — LOW (ref 34.5–45)
HCT VFR BLD CALC: 27.5 % — LOW (ref 34.5–45)
HGB BLD-MCNC: 7.7 G/DL — LOW (ref 11.5–15.5)
HGB BLD-MCNC: 8.4 G/DL — LOW (ref 11.5–15.5)
MAGNESIUM SERPL-MCNC: 1.6 MG/DL — SIGNIFICANT CHANGE UP (ref 1.6–2.6)
MCHC RBC-ENTMCNC: 25 PG — LOW (ref 27–34)
MCHC RBC-ENTMCNC: 25 PG — LOW (ref 27–34)
MCHC RBC-ENTMCNC: 30.5 % — LOW (ref 32–36)
MCHC RBC-ENTMCNC: 31.2 % — LOW (ref 32–36)
MCV RBC AUTO: 80.2 FL — SIGNIFICANT CHANGE UP (ref 80–100)
MCV RBC AUTO: 81.8 FL — SIGNIFICANT CHANGE UP (ref 80–100)
NRBC # FLD: 0 K/UL — SIGNIFICANT CHANGE UP (ref 0–0)
NRBC # FLD: 0 K/UL — SIGNIFICANT CHANGE UP (ref 0–0)
PHOSPHATE SERPL-MCNC: 1.8 MG/DL — LOW (ref 2.5–4.5)
PLATELET # BLD AUTO: 147 K/UL — LOW (ref 150–400)
PLATELET # BLD AUTO: 151 K/UL — SIGNIFICANT CHANGE UP (ref 150–400)
PMV BLD: 11.1 FL — SIGNIFICANT CHANGE UP (ref 7–13)
PMV BLD: 11.3 FL — SIGNIFICANT CHANGE UP (ref 7–13)
POTASSIUM SERPL-MCNC: 4.1 MMOL/L — SIGNIFICANT CHANGE UP (ref 3.5–5.3)
POTASSIUM SERPL-SCNC: 4.1 MMOL/L — SIGNIFICANT CHANGE UP (ref 3.5–5.3)
PROT SERPL-MCNC: 5 G/DL — LOW (ref 6–8.3)
RBC # BLD: 3.08 M/UL — LOW (ref 3.8–5.2)
RBC # BLD: 3.36 M/UL — LOW (ref 3.8–5.2)
RBC # FLD: 15.8 % — HIGH (ref 10.3–14.5)
RBC # FLD: 15.9 % — HIGH (ref 10.3–14.5)
SODIUM SERPL-SCNC: 139 MMOL/L — SIGNIFICANT CHANGE UP (ref 135–145)
WBC # BLD: 7.8 K/UL — SIGNIFICANT CHANGE UP (ref 3.8–10.5)
WBC # BLD: 8.22 K/UL — SIGNIFICANT CHANGE UP (ref 3.8–10.5)
WBC # FLD AUTO: 7.8 K/UL — SIGNIFICANT CHANGE UP (ref 3.8–10.5)
WBC # FLD AUTO: 8.22 K/UL — SIGNIFICANT CHANGE UP (ref 3.8–10.5)

## 2020-08-12 PROCEDURE — 43255 EGD CONTROL BLEEDING ANY: CPT | Mod: GC

## 2020-08-12 PROCEDURE — 99233 SBSQ HOSP IP/OBS HIGH 50: CPT | Mod: GC

## 2020-08-12 PROCEDURE — 99232 SBSQ HOSP IP/OBS MODERATE 35: CPT

## 2020-08-12 RX ORDER — ONDANSETRON 8 MG/1
4 TABLET, FILM COATED ORAL ONCE
Refills: 0 | Status: DISCONTINUED | OUTPATIENT
Start: 2020-08-12 | End: 2020-08-20

## 2020-08-12 RX ORDER — MAGNESIUM SULFATE 500 MG/ML
2 VIAL (ML) INJECTION ONCE
Refills: 0 | Status: COMPLETED | OUTPATIENT
Start: 2020-08-12 | End: 2020-08-12

## 2020-08-12 RX ADMIN — PANTOPRAZOLE SODIUM 40 MILLIGRAM(S): 20 TABLET, DELAYED RELEASE ORAL at 05:52

## 2020-08-12 RX ADMIN — HEPARIN SODIUM 1400 UNIT(S)/HR: 5000 INJECTION INTRAVENOUS; SUBCUTANEOUS at 01:49

## 2020-08-12 RX ADMIN — PANTOPRAZOLE SODIUM 40 MILLIGRAM(S): 20 TABLET, DELAYED RELEASE ORAL at 21:28

## 2020-08-12 RX ADMIN — Medication 30 MILLILITER(S): at 00:32

## 2020-08-12 RX ADMIN — Medication 50 GRAM(S): at 09:26

## 2020-08-12 RX ADMIN — Medication 63.75 MILLIMOLE(S): at 10:31

## 2020-08-12 NOTE — PROGRESS NOTE ADULT - ASSESSMENT
Patient is a 78 yo woman with HTN, dementia presented with hyperkalemia and AGMA, uremia 2/2 acute renal failure 2/2 prerenal azotemia (now resolved).     Her current hospital course has been complicated by hypothermia, hypotension, choledocholithiasis and ?cholangitis, acute blood loss anemia (s/p pRBC transfusion), and now also on broad spectrum antibiotics.    For her complaint of left arm swelling, erythema, and tenderness, she underwent LUE venous duplex US which noted acute DVT in the left brachial and radial veins.  She was started on heparin gtt but was stopped due to melena noted on 8/8/20 due to concern for UGI bleed.   She denies prior known instrumentation in her left upper extremity.    EGD/ERCP performed on 8/10/20with removal of gallstone, but did not note UGIB.     This morning, left upper extremity edema/erythema appears stable.  She otherwise remained hemodynamically stable.      Given presumed acute blood loss anemia, unable to restart anticoagulation at this point.  Will await clearance from the GI team to restart anticoagulation.  If time off anticoagulation is greater than 2-3 days, can consider surveillance imaging of her LUE to monitor for DVT propagation.    Duration of anticoagulation therapy for what appears to be unprovoked VTE will be ~6 months if she can tolerate therapy, with re-evaluation at that time to see if she needs extended therapy or can be transitioned to low dose aspirin.  Time frame of anticoagulation treatment therapy may be shortened, or she may be treated with aspirin therapy from this point if she persists to have higher bleeding risks.    Will follow.

## 2020-08-12 NOTE — DIETITIAN INITIAL EVALUATION ADULT. - PROBLEM SELECTOR PLAN 5
Fluid responsive, initially hypotensive to 70s/40s. Likely from recent N/V and poor PO intake.  - Currently on fluids with 1/2NS at 75cc/hr  - Bolus as necessary to keep MAP>65  - Monitor urine output

## 2020-08-12 NOTE — DIETITIAN INITIAL EVALUATION ADULT. - PROBLEM SELECTOR PLAN 4
AMS possible i/s/o uremic encephalopathy worsening already present dementia.  A&Ox1-2 on exam.  - Monitor for improvement or decline. Will attempt during the day to reach out to family regarding baseline mental status, as attempts unsuccessful overnight.  - Hold off on CTH for now, given likely sources of AMS  - Monitor for uremic bleeding  - Aspiration precautions, fall risk protocol

## 2020-08-12 NOTE — DIETITIAN INITIAL EVALUATION ADULT. - PROBLEM SELECTOR PLAN 2
pH 7.2, HCO3 13->11, AG 45->36. Likely 2/2 acute renal failure and starvation ketosis.  - S/p 1 amp of sodium bicarb in ED. Currently on Bicarb gtt with 1/2NS at 75cc/hr as per Nephrology recs.  - Monitor BMP and VBG  - Will obtain ABG x1 to better assess acid-base status  - Trend lactate

## 2020-08-12 NOTE — PROVIDER CONTACT NOTE (OTHER) - ASSESSMENT
On heparin drip  last h/h 10.3/30.8
asymptomatic
patient asymptomatic with no c/o or s/s of distress at this time
BP 83/48 asymptomatic
BP 83/48 asymptomatic
BP 89/47 HR 81 temp 97.3 orally oxygen 100% on Room Air.
BP 89/65 HR 83 oxygen 99% on RA, temp 97.6
Lactated Ringers Bolus given as per order. MD and whole team assessed patient at bedside. EKG STAT done and in chart. LUE+ for DVT. hep gtt started as per order.
Patient BP 77/48. Patient asymptomatic.
Patient BP 83/47. Patient asymptomatic.
hypotensive, asymptomatic
vss

## 2020-08-12 NOTE — DIETITIAN INITIAL EVALUATION ADULT. - PROBLEM SELECTOR PLAN 9
DVT PPx: Holding for now pending GI eval for CBD dilation/choledocholithiasis  Diet: NPO until GI eval  Med Rec: Must be obtained, brother to bring in medications in AM

## 2020-08-12 NOTE — PROGRESS NOTE ADULT - PROBLEM SELECTOR PLAN 3
- VA duplex venous w/ evidence of DVT in L brachial and radial veins, superficial venous thrombosis in L basilic and cephalic and R basilic.   - no evidence of arterial occlusion LUE  - Holding heparin gtt per GI  - now with L hand swelling and with delayed cap refill  - will consult Dr. Guthrie - vascular cardiology regarding how to procede given bleeding/clotting risks - AC vs. targeted TPA vs endovascular procedure Incidentally found on abdominal CT. s/p EGD/ERCP on 8/10 with removal of gallstone from CBD with sphincterotomy. - resolved  -d/c zosyn  -LFTs in AM elevated, per GI most likely 2/2 biliary tract manipulation during ERCP; will trend  - F/u GI recs

## 2020-08-12 NOTE — DIETITIAN INITIAL EVALUATION ADULT. - PROBLEM SELECTOR PLAN 8
Moderately severe L arm pain after IV infiltration, however entire arm appears purple, possibly 2/2 vasoconstriction, but asymmetric compared to right.  Pulses intact, strength intact.  - F/u VA Duplex venous, arterial LUE

## 2020-08-12 NOTE — DIETITIAN INITIAL EVALUATION ADULT. - PROBLEM SELECTOR PLAN 1
Cr 18->14 with IVF, continues to make urine. POCUS in ED and Abdominal CT shows no obvious hydroureteronephrosis, only mild perinephric stranding bilaterally. Determined not to be urgent HD candidate by Renal.  - Low threshold for MICU eval for urgent HD if hyperkalemia persists, acidosis worsens, or acute renal failure worsens  - Nephrology following, appreciate recs  - Avoid NSAIDs, ACEi/ARBs, contrast, nephrotoxic medications  - Obtain outpatient records regarding baseline kidney function  - Monitor BMP - HCO3, K, Ca, Phos  - Will likely need to be started on phosphate binder  - Emtcalf catheter placed in ED to accurately monitor I&Os

## 2020-08-12 NOTE — PROVIDER CONTACT NOTE (OTHER) - ACTION/TREATMENT ORDERED:
Team 1, Dayami MULTANI informed. Orders to not do 11:59 CBC but to complete 17:59 CBC. Endoscopy ordered

## 2020-08-12 NOTE — DIETITIAN INITIAL EVALUATION ADULT. - PERTINENT MEDS FT
MEDICATIONS  (STANDING):  ondansetron Injectable 4 milliGRAM(s) IV Push once  pantoprazole  Injectable 40 milliGRAM(s) IV Push every 12 hours

## 2020-08-12 NOTE — DIETITIAN INITIAL EVALUATION ADULT. - OTHER INFO
80 y/o F with PMH HTN, dementia A&Ox1 p/w hyperkalemia and uremia 2/2 ARF. Hospital course c/b choledocholithiasis s/p gallstone removal and c/b UGIB with hematemesis after heparin was started. Pt off floor for endoscopy. Pt was NPO for hematemesis overnight. Per RN, no diarrhea/constipation. No wt history in HIE. Unable to asses wt changes.

## 2020-08-12 NOTE — ASU PREOP CHECKLIST - ANTIBIOTIC
I reviewed the H&P, I examined the patient, and there are no anesthetic related changes in the patient's condition.     n/a

## 2020-08-12 NOTE — DIETITIAN INITIAL EVALUATION ADULT. - PROBLEM SELECTOR PLAN 7
Incidentally found on abdominal CT. Possible acute biliary pancreatitis vs. cholangitis given lipase elevated at 400 and dilated CBD with presence of stone. No current abdominal pain or symptoms. Mild epigastric tenderness on exam.   - GI consult to be emailed / possible MRCP vs. ERCP  - Zosyn for now to cover for possible cholangitis given hypothermia, hypotension, both possibly in setting of infection  - C/w IVF for possible acute biliary pancreatitis

## 2020-08-12 NOTE — ASU PREOP CHECKLIST - ORDERS/MEDICATION ADMINISTRATION RECORD ON CHART
Patient: Lazara Dooley Date: 2019   : 1944    75 year old female      OUTPATIENT WOUND CARE PROGRESS NOTE    Supervising Wound Care / Hyperbaric Medicine Physician: Dr. Barbara Ernst  Consulting Provider:  SELMA Cihnchilla  Date of Consultation/Last Comprehensive Exam:  2019  Referring  Provider:  Dr. Dayday Alexander    SUBJECTIVE:    Chief Complaint:  Bilateral leg swelling; left anterior leg wound    Wound/Ulcer Present:    Venous leg ulcer:  Current Vascular Assessment:  Physical exam.     Current Compression Therapy: Tube stocking/ace wrap    Additional Wound Category: None     Maximum Baseline Ambulatory Status: Ambulates unassisted    History of Present Illness:  This is a 75 year old female with heart failure, renal failure, diabetes mellitus, lymphedema, and chronic venous insufficiency, well-known to Wound Care for intermittent bilateral leg wounds. Her bilateral leg wounds have been healed until 2019 when she developed a left anterior leg wound; she does not recall any incident or injury which led to this wound.      Interval note 19: Patient returns to clinic for evaluation of a new left anterior leg wound; she has not been seen by wound care since 2019. In the interim, she has had no open wounds; she has been maintaining her swelling by use of tubular stockings and ace wrap.    At baseline, patient has been spending time baby-sitting her grandchildren.    Currently, the patient denies pain in her wound area; she also denies fever, chills, chest pain, increased dyspnea, nausea, vomiting, or diarrhea.    Current Treatment Regimen:  Dressing:  Gauze/tube stocking/ace wrap  Frequency:  Daily   Changed by:  Wound care clinic nurse    Review of Systems:  Pertinent items are noted in HPI (history of present illness).    Past Medical History:   Diagnosis Date   • Anxiety     mild    • Arthritis of knee, degenerative    • Atrial fibrillation (CMS/HCC)  03/01/2007    chronic   • Cancer of skin 6/12/2017   • CATARACT OU 7/16/2013   • Chronic pain of both knees 3/18/2018   • Chronic right-sided CHF (congestive heart failure) (CMS/HCC) 2/2/2019   • Chronic venous insufficiency    • Congestive heart failure, unspecified 3/16/2011   • Dermatophytosis of nail    • Fracture     Left lower arm- Patient \"4 years old when it happened\"   • h/o chronic anticoagulation     somewhat poor adherence with Warfarin monitoring (noted 01/12/2009)   • Hyperlipidemia    • Hypertension    • Hypothyroidism 6/25/2018   • Kidney stones 01/01/1992    with calcium oxalate; acute renal failure due to right heart failure, on HD December 2018 through March 4, 2019   • MENOPAUSE    • Nonrheumatic pulmonary valve insufficiency 8/12/2018   • Obstructive sleep apnea on CPAP 02/28/2016   • Osteoarthritis gen'l    • Osteoporosis    • Positive colorectal cancer screening using Cologuard test    • Pulmonary hypertension (CMS/HCC) 2010    improved 2012    • Tricuspid valve regurgitation     mod - severe.    • Type 2 diabetes, diet controlled (CMS/[facility])    • Venous insufficiency of both lower extremities    • Venous stasis ulcer (CMS/HCC) 9/30/2013     Past Surgical History:   Procedure Laterality Date   • Abdomen surgery     • Biopsy of breast, incisional     • Cardiology - stress test  01/01/2007    revealed no evidence of any major ischemia on Cardiolite imaging after stress test   • Colonoscopy remove lesion by snare  06/21/2019    Dr. Cortes, Adenoma Polyps, F/U 3 years   • Hysterectomy     • Mohs 1 stage upto5 tissue blocks hnhfg Left 09/05/2017    BCC left nose tip   • Ppm dual generator insert  07/10/2017    [facility]   • Removal gallbladder     • Tonsillectomy     • Total abdom hysterectomy       Social History     Socioeconomic History   • Marital status:      Spouse name: Not on file   • Number of children: 2   • Years of education: Not on file   • Highest education level: Not on file    Occupational History   • Occupation:      Employer: [employer]     Comment: [employer]  RETIRED    Social Needs   • Financial resource strain: Not on file   • Food insecurity:     Worry: Not on file     Inability: Not on file   • Transportation needs:     Medical: Not on file     Non-medical: Not on file   Tobacco Use   • Smoking status: Never Smoker   • Smokeless tobacco: Never Used   Substance and Sexual Activity   • Alcohol use: Yes     Comment: 3-4 glasses of wine a year   • Drug use: No   • Sexual activity: Never   Lifestyle   • Physical activity:     Days per week: Not on file     Minutes per session: Not on file   • Stress: Not on file   Relationships   • Social connections:     Talks on phone: Not on file     Gets together: Not on file     Attends Judaism service: Not on file     Active member of club or organization: Not on file     Attends meetings of clubs or organizations: Not on file     Relationship status: Not on file   • Intimate partner violence:     Fear of current or ex partner: Not on file     Emotionally abused: Not on file     Physically abused: Not on file     Forced sexual activity: Not on file   Other Topics Concern   •  Service Not Asked   • Blood Transfusions No   • Caffeine Concern No   • Occupational Exposure Not Asked   • Hobby Hazards Not Asked   • Sleep Concern Yes   • Stress Concern Yes   • Weight Concern Yes   • Special Diet Yes   • Back Care No   • Exercise No   • Bike Helmet Not Asked   • Seat Belt Yes   • Self-Exams Not Asked   Social History Narrative        Tends to me nonadherent.  Spends a lot of time living at her daughter's home.     Family History   Problem Relation Age of Onset   • Hypertension Mother    • Blood Disorder Mother         pancytopenia   • Other Father         age    • Kidney disease Brother         dialysis   • Hypertension Brother    • Anxiety disorder Brother    • Lung Disease Brother         pleural effusion   • Cancer, Breast Sister          breast       Current Outpatient Medications   Medication Sig   • ferrous sulfate 324 (65 Fe) MG EC tablet Take 1 tablet by mouth 3 days a week.   • flecainide (TAMBOCOR) 100 MG tablet Take 1 tablet by mouth 2 times daily.   • magnesium 250 MG tablet Take 1 tablet by mouth 2 times daily.   • warfarin (COUMADIN) 1 MG tablet Take 1-1.5 tablets by mouth daily. With 4 mg dose   • potassium chloride (KLOR-CON M) 10 MEQ sue ER tablet Take 2 tablets by mouth daily.   • levothyroxine (SYNTHROID, LEVOTHROID) 25 MCG tablet Take 1 tablet by mouth daily.   • bumetanide (BUMEX) 2 MG tablet Take 4 mg by mouth daily.   • polyethylene glycol-electrolytes (NULYTELY) 420 g solution TAKE AS DIRECTED FOR PROCEDURE - PATIENT HAS RECEIVED SEPARATE INSTRUCTIONS   • nystatin (NYSTOP) 633410 UNIT/GM powder APPLY TOPICALLY TO SKIN FOLDS TWICE DAILY AS NEEDED FOR RASH OR ITCH   • atorvastatin (LIPITOR) 20 MG tablet TAKE 1 TABLET BY MOUTH ONE TIME A DAY  (Patient taking differently: TAKE 1 TABLET BY MOUTH ONE TIME A DAY in PM)   • B complex-vitamin C-folic acid (NEPHRO-LUDY) 0.8 MG Tab Take 1 tablet by mouth daily.   • warfarin (COUMADIN) 4 MG tablet Take 1 tablet by mouth daily.   • betamethasone dipropionate (DIPROSONE) 0.05 % cream Mix 50:50 with eucerin cream or similar and apply thin amount topically to arms / chest and leg rash bid prn itching   • acetaminophen (TYLENOL 8 HOUR ARTHRITIS PAIN) 650 MG CR tablet Take 1 tablet by mouth every 8 hours as needed for Pain.     No current facility-administered medications for this encounter.         ALLERGIES:  Keflex; Adhesive   (environmental); and Sulfa antibiotics    OBJECTIVE:  Vital Signs:    Visit Vitals  /76 (BP Location: [facility], Patient Position: Semi-Vásquez's)   Pulse 80   Temp 98.1 °F (36.7 °C) (Oral)   Resp 16       Physical Exam:  General appearance: Alert, oriented to person, place, time and situation, in no distress and cooperative  Head:   normocephalic without obvious  abnormality  Pulmonary: normal respiratory effort    Bilateral lower extremities with palpable DP/PT pulses; there is heavy hemosiderin staining and mild inflammation in gaiter distribution. +2 edema.    The left lower extremity wound is granular; small amount of serosanguineous drainage present. No odor or purulence. María-wound with minor inflammation but no differential warmth or fluctuance.    08/05/2019:      Wound Bed Quality:  As above      María-wound Quality:    As above    Additional Descriptors:  As above    Wound Measurements Per Flowsheet:     Wound Abdomen Skin fold Excoriation (Active)   Number of days: 45       Wound Thigh Right Circumferential Other (comment) (Active)   Number of days: 213       Wound Thigh Left Inner;Upper;Posterior Pressure Injury (Active)   Number of days: 213       Wound Coccyx Middle Pressure Injury (Active)   Number of days: 45       Wound Leg Left Laceration (Active)   Number of days: 45       Wound Leg Left Anterior (Active)   Wound Care Team Consult Date 08/05/19 8/5/2019  2:29 PM   Wound Length (cm) 2.2 cm 8/5/2019  2:29 PM   Wound Width (cm) 1.1 cm 8/5/2019  2:29 PM   Wound Depth (cm) 0.1 cm 8/5/2019  2:29 PM   Wound Surface Area (cm^2) 2.42 cm^2 8/5/2019  2:29 PM   Wound Volume (cm^3) 0.24 cm^3 8/5/2019  2:29 PM   Number of days: 0     PROCEDURE:  None performed    Procedure was Performed by:  Not applicable    Laboratory assessments reviewed:  No results found for: PAB   Albumin (g/dL)   Date Value   07/26/2019 3.7   06/24/2019 3.1 (L)   06/04/2019 3.8      No results available in last 24 hours    Lab Results   Component Value Date    WBC 4.3 07/26/2019    GLUCOSE 97 07/26/2019    HGBA1C 5.5 06/04/2019    CRP 1.1 (H) 01/31/2019    RESR 72 (H) 01/31/2019    CREATININE 0.90 07/26/2019    GFRA 73 07/26/2019    GFRNA 63 07/26/2019        Culture results:  Specimen Description (no units)   Date Value   01/23/2019 SYNOVIAL FLUID ANKLE RIGHT   01/05/2019 URINE, CLEAN  CATCH/MIDSTREAM   01/04/2019 BLOOD, PERIPHERAL ANTECUBITAL,RIGHT   01/04/2019 BLOOD, PERIPHERAL ANTECUBITAL,LEFT    CULTURE (no units)   Date Value   01/23/2019 NO AEROBIC OR ANAEROBIC BACTERIAL GROWTH   01/05/2019     10,000 TO 50,000 CFU/mL MIXED BACTERIAL DAVID WITH NO PREDOMINATING TYPE   01/04/2019 NO GROWTH 5 DAYS.   01/04/2019 NO GROWTH 5 DAYS.        Diagnostic Assessments Reviewed:  Vascular Studies:  Venous insufficiency study     DUPLEX DOPPLER SONOGRAPHIC EVALUATION OF THE BILATERAL LOWER EXTREMITIES  WITH REFLUX EVALUATION 03/14/2019  IMPRESSION:  1. No duplex Doppler evidence for deep venous thromboses  2. Gross saphenofemoral insufficiency affecting the great saphenous vein   3. Left saphenopopliteal insufficiency affecting the small saphenous vein  4. No duplex evidence for deep venous thrombosis affecting either lower  extremity  5. Bilateral inguinal lymphadenopathy  6. Left popliteal fossa 4.9 cm Baker cyst    Nutritional Assessment:  Prealbumin and/or Albumin reviewed    Wound treatment goals are palliative:  No    DIAGNOSES:  Lymphedema BLE  Venous insufficiency ulcer, chronic, lower extremity LLE    Venous insufficiency, chronic BLE   Obesity  Renal failure     Medical Decision Making:     Bilateral lower extremities with lymphedema and venous insufficiency; has a left lower extremity venous ulcer present since July 2019; resumed use of compression wraps 08/05/2019.    Patient is seen in follow-up 08/05/19; she has done well with maintenance of her bilateral lower extremity swelling, but has developed a left anterior leg wound. This wound is relatively small and healthy; will use Sorbact locally for bioburden and resume compression wraps as detailed below:    Local wound care (BILATERAL LOWER EXTREMITIES):   - 2x/wk and PRN  - cleanse with mild soap/water, rinse and pat dry  - apply Hydrogel/Sorbact to the left anterior leg wound  - cover with dry secondary dressing  - compression with  viscopaste/cotton/coban    Infectious disease:  - no s/sx infection on clinical exam    Vascular:  - macro-circulation appears adequate for compression  - did have a venous insufficiency study done in March 2019; may benefit from intervention; will revisit if wound fails to heal or if new wounds develop    Offloading:  - encourage intermittent elevation of legs    Nutrition:  - normal albumin; encourage ongoing protein intake  - last HgbA1c was 5.5; encourage ongoing glycemic control    Will continue to follow up with patient.     Plan of Care:  Advanced Wound Care Recommendations: see above  Percent Wound Closure from consult:  N/A  Care plan to augment wound closure:  Not applicable.  see above    Patient stable. All questions were answered.     SELMA Chinchilla         done

## 2020-08-12 NOTE — PROGRESS NOTE ADULT - PROBLEM SELECTOR PLAN 4
Pt w/ microcytic anemia w/ slow Hgb downtrend in the setting of severe VANDANA and hypotension require IV fluid resuscitation, now w/ melena. Component of new anemia can be attributed to hemodilution. Pt w/ reported dark stools  soft BPs there is concern for UGIB. Pending EGD on 8/10.   - CT A/P without evidence of RP bleed.  - Trend CBC q12.   - Transfuse for Hgb < 7 or if patient symptomatic. Pt w/ microcytic anemia w/ slow Hgb downtrend in the setting of severe VANDANA and hypotension require IV fluid resuscitation, now w/ melena. Component of new anemia can be attributed to hemodilution. Pt w/ reported dark stools  soft BPs there is concern for UGIB. First EGD with only esophagitis but after hematemesis pending repeat EGD on 8/12.   - CT A/P without evidence of RP bleed.  - Trend CBC q12.   - Transfuse for Hgb < 7 or if patient symptomatic.

## 2020-08-12 NOTE — DIETITIAN INITIAL EVALUATION ADULT. - ADD RECOMMEND
1. Initiate diet to regular when feasible. 2. Encourage PO intake and honor food preferences as able. 3. Will re-attempt to visit pt tomorrow for further collateral.

## 2020-08-12 NOTE — PROGRESS NOTE ADULT - SUBJECTIVE AND OBJECTIVE BOX
Cardiology/Vascular Medicine Inpatient Progress Note    Patient seen this AM  Resting comfortably -- LUE still erythematous and reporting discomfort, but tolerable    Vital Signs Last 24 Hrs  T(C): 36.7 (12 Aug 2020 05:00), Max: 36.7 (11 Aug 2020 22:42)  T(F): 98 (12 Aug 2020 05:00), Max: 98 (11 Aug 2020 22:42)  HR: 72 (12 Aug 2020 05:00) (66 - 98)  BP: 131/77 (12 Aug 2020 05:00) (131/77 - 153/79)  BP(mean): --  RR: 18 (12 Aug 2020 05:00) (17 - 18)  SpO2: 99% (12 Aug 2020 05:00) (99% - 100%)    Appearance: NAD, laying flat in bed, chronically-ill in appearance, [place]-speaking	  Cardiovascular: Normal S1 S2, No discernible JVD  Respiratory: Decreased breath sounds bilaterally  Psychiatry: Awake, alert  Gastrointestinal:  Soft, Non-tender, + BS	  Skin: No rashes, No ecchymoses, No cyanosis	  Neurologic: Non-focal  Extremities: +Edematous LUE, erythema, tender to palpation, radial pulse intact    MEDICATIONS  (STANDING):  ondansetron Injectable 4 milliGRAM(s) IV Push once  pantoprazole  Injectable 40 milliGRAM(s) IV Push every 12 hours    MEDICATIONS  (PRN):  aluminum hydroxide/magnesium hydroxide/simethicone Suspension 30 milliLiter(s) Oral every 6 hours PRN Dyspepsia      LABS:	 	                          8.4    7.80  )-----------( 147      ( 12 Aug 2020 01:00 )             27.5   08-11    140  |  107  |  12  ----------------------------<  87  3.9   |  23  |  0.72    Ca    7.9<L>      11 Aug 2020 06:45  Phos  1.4     08-11  Mg     1.5     08-11    TPro  5.1<L>  /  Alb  2.8<L>  /  TBili  1.9<H>  /  DBili  x   /  AST  169<H>  /  ALT  101<H>  /  AlkPhos  139<H>  08-11    PT/INR - ( 11 Aug 2020 16:51 )   PT: 12.6 SEC;   INR: 1.11     PTT - ( 12 Aug 2020 01:00 )  PTT:89.9 SEC     < from: CT Abdomen and Pelvis No Cont (08.09.20 @ 13:19) >    EXAM:  CT ABDOMEN AND PELVIS        PROCEDURE DATE:  Aug  9 2020         INTERPRETATION:  CLINICAL INFORMATION: Anemia. Acute renal insufficiency. Evaluate for retroperitoneal hemorrhage.    COMPARISON: Ultrasound kidney 8/20/2020. CT abdomen and pelvis 8/5/2020.    PROCEDURE:  CT of the Abdomen and Pelvis was performed without intravenous contrast.  Intravenous contrast: None.  Oral contrast: None.  Sagittal and coronal reformats were performed.    FINDINGS:  LOWER CHEST: Small bilateral pleural effusions. Hiatal hernia.    LIVER: Within normal limits.  BILE DUCTS: The 10 x 5 mm stone in the dilated common duct is unchanged.  GALLBLADDER: Cholecystectomy.  SPLEEN: Within normal limits.  PANCREAS: Within normal limits.  ADRENALS: Within normal limits.  KIDNEYS/URETERS: Within normal limits.    BLADDER: Metcalf catheter is in the decompressed bladder. Air in the bladder secondary to catheterization.  REPRODUCTIVE ORGANS: Uterus is heterogeneous. The endometrium is not well evaluated.    BOWEL: The stomach is decompressed. The small bowel is normal in caliber. Diverticulosis. Normal appendix. No free air. No free fluid.  PERITONEUM: No ascites.  VESSELS: The aorta is normal in caliber. Aortic calcifications.  RETROPERITONEUM/LYMPH NODES: No lymphadenopathy.  ABDOMINAL WALL: Findings no hernia containing fat.  BONES: Within normal limits.    IMPRESSION:  1.  Cholecystectomy.  2.  Choledocholithiasis.    ROLY MUNIZ M.D., ATTENDING RADIOLOGIST  This document has been electronically signed. Aug  9 2020  1:59PM          < from: Transthoracic Echocardiogram (08.07.20 @ 13:01) >    Patient name: KYE RÍOS  YOB: 1940   Age: 79 (F)   MR#: [id number]  Study Date: 8/7/2020  Location: [id number]: Windy Hawkins RDCS  Study quality: Technically Difficult  Referring Physician: Sunil Mckeon MD  Blood Pressure: 97/51 mmHg  Height: 154 cm  Weight: 78 kg  BSA: 1.8 m2  ------------------------------------------------------------------------  PROCEDURE: Transthoracic echocardiogram with 2-D, M-Mode  and complete spectral and color flow Doppler.  INDICATION: Shock, unspecified (R57.9)  ------------------------------------------------------------------------  DIMENSIONS:  Dimensions:     Normal Values:  LA:     3.6 cm    2.0 - 4.0 cm  Ao:     2.8 cm    2.0 - 3.8 cm  SEPTUM: 0.7 cm    0.6 - 1.2 cm  PWT:    0.8 cm    0.6 - 1.1 cm  LVIDd:  4.3 cm    3.0 - 5.6 cm  LVIDs:  2.9 cm    1.8 - 4.0 cm  Derived Variables:  LVMI: 55 g/m2  RWT: 0.37  Fractional short: 33 %  Ejection Fraction (Teicholtz): 61 %  ------------------------------------------------------------------------  OBSERVATIONS:  Mitral Valve: Mitral annular calcification, otherwise  normal mitral valve. Minimal mitral regurgitation.  Aortic Root: Normal aortic root.  Aortic Valve: Aortic valve leaflet morphology not well  visualized. Mild aortic regurgitation.  Left Atrium: Normal left atrium.  LA volume index = 31  cc/m2.  Left Ventricle: Normal left ventricular systolic function.  No segmental wall motion abnormalities. Normal left  ventricular internal dimensions and wall thicknesses. Mild  diastolic dysfunction (Stage I).  Right Heart: Normal right atrium. Normal right ventricular  size and function. Normal tricuspid valve. Minimal  tricuspid regurgitation. Normal pulmonic valve.  Minimal  pulmonic regurgitation.  Pericardium/PleuraNormal pericardium with no pericardial  effusion.  ------------------------------------------------------------------------  CONCLUSIONS:  1. Mitral annular calcification, otherwise normal mitral  valve. Minimal mitral regurgitation.  2. Aorticvalve leaflet morphology not well visualized.  Mild aortic regurgitation.  3. Normal left ventricular internal dimensions and wall  thicknesses.  4. Normal left ventricular systolic function. No segmental  wall motion abnormalities.  5. Mild diastolic dysfunction (Stage I).  6. Normal right ventricular size and function.  ------------------------------------------------------------------------  Confirmed on  8/7/2020 - 16:41:30 by Marky Arenas M.D.  ------------------------------------------------------------------------    < end of copied text >      < from: [facility] Duplex Arterial Upper Ext, Left. (08.06.20 @ 09:43) >    Patient name: KYE RÍOS  Date of test: 8/6/2020  MR#: [id number]  [facility] #: [id number]    Location: [facility] Physician(s): Sunil MD  Interpreted by: Carmelita Irwin M.D. RPVI  Tech: Faizan Young RVT  Type of Test: Upper Extremity Arterial  ------------------------------------------------------------------------  Procedure: Real-time grayscale and color Duplex  ultrasonography was used to interrogate the left upper  extremity arteries.  Indications: Atheroembolism of left upper extremity  (I75.012)  ------------------------------------------------------------------------  VELOCITY:  ------------------------------------------------------------------------  LEFT:  Subclavian velocity (cm/sec): 60  Axillary velocity (cm/sec): 97  Brachial velocity (cm/sec): 21  Radial velocity (cm/sec): 21  Ulnar velocity (cm/sec):  ------------------------------------------------------------------------  PRESSURES:  ------------------------------------------------------------------------  LEFT:  Upper Arm Pressure (mm Hg)  Lower Arm PRessure (mm Hg):  ------------------------------------------------------------------------  Left Findings: --Left subclavian artery: Patent vessel.  Normal velocities with triphasic waveforms.  --Leftaxillary artery: Patent vessel. Normal velocities  with triphasic waveforms.  --Left brachial artery: Patent vessel. Normal velocities  with triphasic waveforms.  --Left radial artery: Patent vessel. Normal velocities  with triphasic waveforms.  --Left ulnar artery: Patent vessel. Normal velocities with  triphasic waveforms.  ------------------------------------------------------------------------  Summary/Impressions:  No evidence of occlusive arterial disease in the  visualized Left upper extremity.  ------------------------------------------------------------------------  Confirmed on  8/6/2020 - 2:23 PM by YEIMI Pacheco  By signing this report, the attending physician certifies  that he or she has personally supervised andinterpreted  the vascular study and has reviewed and or edited and  agrees with the written comments contained within the  report.    < end of copied text >      < from: [facility] Duplex Ext Veins Upper Comp, Left. (08.06.20 @ 09:15) >    Patient name: KYE RÍOS  Date of test: 8/6/2020  MR#: [id number]  [facility] #: [id number]    Location: [facility] Physician(s): Sunil MD  Interpreted by: YEIMI Pacheco  Tech: Faizan Young RVT  Type of Test: Upper Extremity Venous  ------------------------------------------------------------------------  Procedure: Real-time grayscale and color Duplex  ultrasonography was used to interrogate the deep veins of  the left upper extremity.  Indications: Localized swelling, mass and lump, left upper  limb (R22.32)  ------------------------------------------------------------------------  RESULT:  ------------------------------------------------------------------------  LEFT:  Deep venous thrombosis: Yes  Superficial venous thrombosis: Yes  ------------------------------------------------------------------------  Right Findings: Superficial vein thrombosis visualized in  the basilic (upper arm) vein.  Left Findings: Deep vein thrombosis visualized in the left  brachial and radial veins.  Superficial vein thrombosis visualized in the basilic and  cephalic veins.  The left internal jugular, subclavian, axillary, and ulnar  veins are patent, and demonstrate full compressibility  with phasic Doppler waveforms.  No evidence of thrombosis.  ------------------------------------------------------------------------  Summary/Impressions:  Deep vein thrombosis visualized in the left brachial and  radial veins.  Superficial vein thrombosis visualized in the basilic and  cephalic veins.  Results communicated to Dr. LINUS Tripathi on 8/6/20 at 10 am.  ------------------------------------------------------------------------  Confirmed on  8/6/2020 - 2:22 PM by YEIMI Pacheco  By signing this report, the attending physician certifies  that he or she has personally supervised and interpreted  the vascular study and has reviewed and or edited and  agrees with the written comments contained within the  report.    < end of copied text >

## 2020-08-12 NOTE — PROGRESS NOTE ADULT - ATTENDING COMMENTS
Patient seen and examined, chart and labs reviewed. Case discussed with house staff.      #: [id number]  79F with HTN, dementia presented with hyperkalemia and AGMA, uremia 2/2 acute renal failure 2/2 prerenal azotemia (now resolved). Course c/b hypothermia, hypotension, choledocholithiasis and acute blood loss anemia (s/p 1U blood), also found to have LUE DVT, heparin drip stopped due to melena ON on 8/8, concern for UGI bleed, s/p EGD showing esophagitis.     #Melena/Hematemesis   - S/p EGD 8/10 showing esophagitis. C/w PPI IV BID. Patient had episode of hematemesis overnight and heparin gtt was discontinued. Discussed with GI fellow Dr. Hurt, plan to repeat EGD today given hematemesis.   - Also s/p ERCP s/p biliary sphincterotomy and removal of CBD stone.    #LUE DVT   - Patient had episode of hematemesis last night after heparin gtt was re-started. Continue to hold AC for now. Plan for repeat EGD as above.   - Will get repeat UE dopplers to assess for clot prorogation as patient has been off AC for several days. Unclear if patient will be able to tolerate AC moving forward.     #VANDANA   - Cr 18.74 on admission, now normalized. Likely VANDANA in setting of decreased PO intake + hypotension   - D/c sodium bicarbonate as CO2 normal     #Dispo: PT recs rehab Patient seen and examined, chart and labs reviewed. Case discussed with house staff.      #: [id number]  79F with HTN, dementia presented with hyperkalemia and AGMA, uremia 2/2 acute renal failure 2/2 prerenal azotemia (now resolved). Course c/b hypothermia, hypotension, choledocholithiasis and acute blood loss anemia (s/p 1U blood), also found to have LUE DVT, heparin drip stopped due to melena ON on 8/8, concern for UGI bleed, s/p EGD showing esophagitis.     #Melena/Hematemesis   - S/p EGD 8/10 showing esophagitis. C/w PPI IV BID. Patient had episode of hematemesis overnight and heparin gtt was discontinued. Discussed with GI fellow Dr. Hurt, plan to repeat EGD today given hematemesis.   - Also s/p ERCP s/p biliary sphincterotomy and removal of CBD stone.    #LUE DVT   - Patient had episode of hematemesis last night after heparin gtt was re-started. Continue to hold AC for now. Plan for repeat EGD as above.   - Will get repeat UE dopplers to assess for clot prorogation as patient has been off AC for several days. Unclear if patient will be able to tolerate AC moving forward.     #VANDANA   - Cr 18.74 on admission, now normalized. Likely VANDANA in setting of decreased PO intake + hypotension   - D/c sodium bicarbonate as CO2 normal     #Dispo: PT recs rehab, will continue discussions with family

## 2020-08-12 NOTE — PROGRESS NOTE ADULT - ASSESSMENT
79F with HTN, dementia presented with hyperkalemia and AGMA, uremia 2/2 acute renal failure 2/2 prerenal azotemia (now resolved). Course c/b hypothermia, hypotension, choledocholithiasis and ?cholangitis, acute blood loss anemia (s/p 1U blood) on broad spectrum antibiotics, also found to have LUE DVT, heparin drip stopped due to melena ON on 8/8, concern for UGI bleed. ERCP with removal of gallstone, no UGIB.

## 2020-08-12 NOTE — PROGRESS NOTE ADULT - SUBJECTIVE AND OBJECTIVE BOX
*******************************************************  Kenny Stock MD PGY-1  Internal Medicine  Pager [phone] / [phone]  *******************************************************  Patient is a 79y old  Female who presents with a chief complaint of Acute renal failure, high anion gap metabolic acidosis (12 Aug 2020 07:45)      SUBJECTIVE / OVERNIGHT EVENTS / ROS:  - Patient seen and evaluated at bedside.  - Patient very tired 2/2 being up all night  - Two episodes of hematemesis overnight with mixture of food and BRB, patient made NPO, heparin ggt stopped, given zofran, and repeat CBC was 8.4  - Endorsing some chest/L arm discomfort in the setting of LUE DVT, no tachycardia or hypoxemia, will obtain ECG.  - Denies fevers/chills, headache, SOB at rest, palpitations, abdominal pain, nausea/vomiting/diarrhea/constipation      MEDICATIONS  (STANDING):  ondansetron Injectable 4 milliGRAM(s) IV Push once  pantoprazole  Injectable 40 milliGRAM(s) IV Push every 12 hours    MEDICATIONS  (PRN):  aluminum hydroxide/magnesium hydroxide/simethicone Suspension 30 milliLiter(s) Oral every 6 hours PRN Dyspepsia          I&O's Summary    11 Aug 2020 07:01  -  12 Aug 2020 07:00  --------------------------------------------------------  IN: 750 mL / OUT: 0 mL / NET: 750 mL    12 Aug 2020 07:01  -  12 Aug 2020 14:30  --------------------------------------------------------  IN: 300 mL / OUT: 0 mL / NET: 300 mL      PHYSICAL EXAM:  Vital Signs Last 24 Hrs  T(C): 36.5 (12 Aug 2020 11:52), Max: 36.7 (11 Aug 2020 22:42)  T(F): 97.7 (12 Aug 2020 11:52), Max: 98 (11 Aug 2020 22:42)  HR: 75 (12 Aug 2020 11:52) (66 - 77)  BP: 148/77 (12 Aug 2020 11:52) (131/77 - 153/79)  RR: 17 (12 Aug 2020 11:52) (17 - 18)  SpO2: 99% (12 Aug 2020 11:52) (99% - 99%)    CONSTITUTIONAL: NAD, obese, resting uncomfortable and tired but arousable  RESPIRATORY: Normal respiratory effort; lungs are clear to auscultation bilaterally  CARDIOVASCULAR: Regular rate, normal S1 and S2, no murmur/rub/gallop; No lower extremity edema; Peripheral pulses are 2+ bilaterally  ABDOMEN: Nontender to palpation, normoactive bowel sounds, no rebound/guarding; No hepatosplenomegaly  MUSCLOSKELETAL: no cyanosis of digits; no joint swelling or tenderness to palpation, full strength all extremities.  EXTREMITIES: L hand swelling improving compared to yesterday  NEURO: No focal deficits, CN II-XII grossly intact b/l; sensation to light touch intact in all extremities,  PSYCH: A+O to person, place, and time; affect appropriate.    LABS:                        7.7    8.22  )-----------( 151      ( 12 Aug 2020 07:20 )             24.7     08-12    139  |  104  |  11  ----------------------------<  107<H>  4.1   |  23  |  0.73    Ca    8.0<L>      12 Aug 2020 07:20  Phos  1.8     08-12  Mg     1.6     08-12    TPro  5.0<L>  /  Alb  2.6<L>  /  TBili  2.0<H>  /  DBili  x   /  AST  297<H>  /  ALT  191<H>  /  AlkPhos  251<H>  08-12    PT/INR - ( 11 Aug 2020 16:51 )   PT: 12.6 SEC;   INR: 1.11      PTT - ( 12 Aug 2020 01:00 )  PTT:89.9 SEC            RADIOLOGY & ADDITIONAL TESTS:  Results Reviewed:   Imaging Personally Reviewed:  Electrocardiogram Personally Reviewed:    COORDINATION OF CARE:  Care Discussed with Consultants/Other Providers [Y/N]:   Prior or Outpatient Records Reviewed [Y/N]: *******************************************************  Kenny Stock MD PGY-1  Internal Medicine  Pager [phone] / [phone]  *******************************************************  Patient is a 79y old  Female who presents with a chief complaint of Acute renal failure, high anion gap metabolic acidosis (12 Aug 2020 07:45)      SUBJECTIVE / OVERNIGHT EVENTS / ROS:  - Patient seen and evaluated at bedside.  - Patient very tired 2/2 being up all night  - Two episodes of hematemesis overnight with mixture of food and BRB, patient made NPO, heparin ggt stopped, given zofran, and repeat CBC was 8.4  - Endorsing some chest/L arm discomfort in the setting of LUE DVT, no tachycardia or hypoxemia, will obtain ECG.  - Denies fevers/chills, headache, SOB at rest, palpitations, abdominal pain, nausea/vomiting/diarrhea/constipation      MEDICATIONS  (STANDING):  ondansetron Injectable 4 milliGRAM(s) IV Push once  pantoprazole  Injectable 40 milliGRAM(s) IV Push every 12 hours    MEDICATIONS  (PRN):  aluminum hydroxide/magnesium hydroxide/simethicone Suspension 30 milliLiter(s) Oral every 6 hours PRN Dyspepsia          I&O's Summary    11 Aug 2020 07:01  -  12 Aug 2020 07:00  --------------------------------------------------------  IN: 750 mL / OUT: 0 mL / NET: 750 mL    12 Aug 2020 07:01  -  12 Aug 2020 14:30  --------------------------------------------------------  IN: 300 mL / OUT: 0 mL / NET: 300 mL      PHYSICAL EXAM:  Vital Signs Last 24 Hrs  T(C): 36.5 (12 Aug 2020 11:52), Max: 36.7 (11 Aug 2020 22:42)  T(F): 97.7 (12 Aug 2020 11:52), Max: 98 (11 Aug 2020 22:42)  HR: 75 (12 Aug 2020 11:52) (66 - 77)  BP: 148/77 (12 Aug 2020 11:52) (131/77 - 153/79)  RR: 17 (12 Aug 2020 11:52) (17 - 18)  SpO2: 99% (12 Aug 2020 11:52) (99% - 99%)    CONSTITUTIONAL: NAD, obese, resting uncomfortable and tired but arousable  RESPIRATORY: Normal respiratory effort; lungs are clear to auscultation bilaterally  CARDIOVASCULAR: Regular rate, normal S1 and S2, no murmur/rub/gallop; No lower extremity edema; Peripheral pulses are 2+ bilaterally  ABDOMEN: Nontender to palpation, normoactive bowel sounds, no rebound/guarding; No hepatosplenomegaly  EXTREMITIES: L hand swelling improving compared to yesterday  NEURO: No focal deficits, moving all ext   PSYCH: A+O to person, place, and time; affect appropriate.    LABS:                        7.7    8.22  )-----------( 151      ( 12 Aug 2020 07:20 )             24.7     08-12    139  |  104  |  11  ----------------------------<  107<H>  4.1   |  23  |  0.73    Ca    8.0<L>      12 Aug 2020 07:20  Phos  1.8     08-12  Mg     1.6     08-12    TPro  5.0<L>  /  Alb  2.6<L>  /  TBili  2.0<H>  /  DBili  x   /  AST  297<H>  /  ALT  191<H>  /  AlkPhos  251<H>  08-12    PT/INR - ( 11 Aug 2020 16:51 )   PT: 12.6 SEC;   INR: 1.11      PTT - ( 12 Aug 2020 01:00 )  PTT:89.9 SEC            RADIOLOGY & ADDITIONAL TESTS:  Results Reviewed:   Imaging Personally Reviewed:  Electrocardiogram Personally Reviewed:    COORDINATION OF CARE:  Care Discussed with Consultants/Other Providers [Y/N]:   Prior or Outpatient Records Reviewed [Y/N]:

## 2020-08-12 NOTE — DIETITIAN INITIAL EVALUATION ADULT. - PROBLEM SELECTOR PLAN 3
K 6.8->4.9 with Lokelma, Insulin 5u/D50, 1 amp of sodium bicarb. In setting of acute renal failure and acidosis.   - Monitor BMP q6hrs for now  - Currently on bicarb gtt, which should help with hyperkalemia   - EKG without HyperK changes, s/p 2g IV calcium gluconate  - Monitor on tele for now, as pt at increased risk of recurrent hyperkalemia given extent of acute renal failure

## 2020-08-12 NOTE — PROGRESS NOTE ADULT - PROBLEM SELECTOR PLAN 2
Patient presented with complaint of dark colored emesis and had an episode of melena after heparin ggt was started  -hold heparin ggt  -s/p 1 U pRBC 8/8  -EGD without evidence of bleed  - Pt Hb 7.9 this AM, will repeat - VA duplex venous w/ evidence of DVT in L brachial and radial veins, superficial venous thrombosis in L basilic and cephalic and R basilic.   - will repeat LUE duplex to assess for propagation of thrombus   - no evidence of arterial occlusion LUE  - Holding heparin gtt   - now with L hand swelling and with delayed cap refill  - appreciate vasc cards recs: long term AC needed; heparin vs asa based on risk

## 2020-08-12 NOTE — CHART NOTE - NSCHARTNOTEFT_GEN_A_CORE
Paged by RN for hematemesis.     Patient with one episode of vomiting bright red blood mixed with sputum/secretions. Difficult to quantify as it was on the bedsheets. Reddish/white area of vomitus.  No other episodes of vomiting. No melena or BRBPR. Patient is hemodynamically stable and generally well appearing.     Discussed with SIOMARA patricia.    Stop heparin gtt for now. NPO. Continue protonix. Follow up AM CBC. Zofran 4mg for nausea.     I am available for any questions or concerns    Bianca Yuen

## 2020-08-12 NOTE — PROGRESS NOTE ADULT - PROBLEM SELECTOR PLAN 1
Incidentally found on abdominal CT. s/p EGD/ERCP on 8/10 with removal of gallstone from CBD with sphincterotomy. EGD revealed gastritis without bleeding  -d/c zosyn  -LFTs in AM elevated, per GI most likely 2/2 biliary tract manipulation during ERCP; will trend  - F/u GI recs Patient presented with complaint of dark colored emesis and had an episode of melena after heparin ggt was started. After restarting hepatin gtt on 8/11 patient had two episodes of emesis containing small amounts of blood.  -hold heparin ggt  -repeat EGD today  -s/p 1 U pRBC 8/8  - EGD without evidence of bleed but with esophagitis  - Pt Hb 7.7 this AM, will repeat

## 2020-08-13 LAB
ALBUMIN SERPL ELPH-MCNC: 2.5 G/DL — LOW (ref 3.3–5)
ALP SERPL-CCNC: 226 U/L — HIGH (ref 40–120)
ALT FLD-CCNC: 200 U/L — HIGH (ref 4–33)
ANION GAP SERPL CALC-SCNC: 12 MMO/L — SIGNIFICANT CHANGE UP (ref 7–14)
ANION GAP SERPL CALC-SCNC: 12 MMO/L — SIGNIFICANT CHANGE UP (ref 7–14)
APTT BLD: 25.4 SEC — LOW (ref 27–36.3)
AST SERPL-CCNC: 172 U/L — HIGH (ref 4–32)
BILIRUB SERPL-MCNC: 0.5 MG/DL — SIGNIFICANT CHANGE UP (ref 0.2–1.2)
BLD GP AB SCN SERPL QL: NEGATIVE — SIGNIFICANT CHANGE UP
BUN SERPL-MCNC: 9 MG/DL — SIGNIFICANT CHANGE UP (ref 7–23)
BUN SERPL-MCNC: 9 MG/DL — SIGNIFICANT CHANGE UP (ref 7–23)
CALCIUM SERPL-MCNC: 8 MG/DL — LOW (ref 8.4–10.5)
CALCIUM SERPL-MCNC: 8.1 MG/DL — LOW (ref 8.4–10.5)
CHLORIDE SERPL-SCNC: 106 MMOL/L — SIGNIFICANT CHANGE UP (ref 98–107)
CHLORIDE SERPL-SCNC: 107 MMOL/L — SIGNIFICANT CHANGE UP (ref 98–107)
CO2 SERPL-SCNC: 21 MMOL/L — LOW (ref 22–31)
CO2 SERPL-SCNC: 21 MMOL/L — LOW (ref 22–31)
CREAT SERPL-MCNC: 0.69 MG/DL — SIGNIFICANT CHANGE UP (ref 0.5–1.3)
CREAT SERPL-MCNC: 0.7 MG/DL — SIGNIFICANT CHANGE UP (ref 0.5–1.3)
GLUCOSE SERPL-MCNC: 73 MG/DL — SIGNIFICANT CHANGE UP (ref 70–99)
GLUCOSE SERPL-MCNC: 77 MG/DL — SIGNIFICANT CHANGE UP (ref 70–99)
HCT VFR BLD CALC: 22.8 % — LOW (ref 34.5–45)
HCT VFR BLD CALC: 22.8 % — LOW (ref 34.5–45)
HCT VFR BLD CALC: 29.3 % — LOW (ref 34.5–45)
HGB BLD-MCNC: 7.2 G/DL — LOW (ref 11.5–15.5)
HGB BLD-MCNC: 7.4 G/DL — LOW (ref 11.5–15.5)
HGB BLD-MCNC: 8.9 G/DL — LOW (ref 11.5–15.5)
MAGNESIUM SERPL-MCNC: 1.7 MG/DL — SIGNIFICANT CHANGE UP (ref 1.6–2.6)
MAGNESIUM SERPL-MCNC: 1.7 MG/DL — SIGNIFICANT CHANGE UP (ref 1.6–2.6)
MCHC RBC-ENTMCNC: 24.9 PG — LOW (ref 27–34)
MCHC RBC-ENTMCNC: 25.3 PG — LOW (ref 27–34)
MCHC RBC-ENTMCNC: 26 PG — LOW (ref 27–34)
MCHC RBC-ENTMCNC: 30.4 % — LOW (ref 32–36)
MCHC RBC-ENTMCNC: 31.6 % — LOW (ref 32–36)
MCHC RBC-ENTMCNC: 32.5 % — SIGNIFICANT CHANGE UP (ref 32–36)
MCV RBC AUTO: 80 FL — SIGNIFICANT CHANGE UP (ref 80–100)
MCV RBC AUTO: 80 FL — SIGNIFICANT CHANGE UP (ref 80–100)
MCV RBC AUTO: 82.1 FL — SIGNIFICANT CHANGE UP (ref 80–100)
NRBC # FLD: 0 K/UL — SIGNIFICANT CHANGE UP (ref 0–0)
PHOSPHATE SERPL-MCNC: 1.9 MG/DL — LOW (ref 2.5–4.5)
PHOSPHATE SERPL-MCNC: 2 MG/DL — LOW (ref 2.5–4.5)
PLATELET # BLD AUTO: 175 K/UL — SIGNIFICANT CHANGE UP (ref 150–400)
PLATELET # BLD AUTO: 178 K/UL — SIGNIFICANT CHANGE UP (ref 150–400)
PLATELET # BLD AUTO: 200 K/UL — SIGNIFICANT CHANGE UP (ref 150–400)
PMV BLD: 10.7 FL — SIGNIFICANT CHANGE UP (ref 7–13)
PMV BLD: 10.9 FL — SIGNIFICANT CHANGE UP (ref 7–13)
PMV BLD: 11 FL — SIGNIFICANT CHANGE UP (ref 7–13)
POTASSIUM SERPL-MCNC: 3.6 MMOL/L — SIGNIFICANT CHANGE UP (ref 3.5–5.3)
POTASSIUM SERPL-MCNC: 3.7 MMOL/L — SIGNIFICANT CHANGE UP (ref 3.5–5.3)
POTASSIUM SERPL-SCNC: 3.6 MMOL/L — SIGNIFICANT CHANGE UP (ref 3.5–5.3)
POTASSIUM SERPL-SCNC: 3.7 MMOL/L — SIGNIFICANT CHANGE UP (ref 3.5–5.3)
PROT SERPL-MCNC: 5 G/DL — LOW (ref 6–8.3)
RBC # BLD: 2.85 M/UL — LOW (ref 3.8–5.2)
RBC # BLD: 2.85 M/UL — LOW (ref 3.8–5.2)
RBC # BLD: 3.57 M/UL — LOW (ref 3.8–5.2)
RBC # FLD: 15.9 % — HIGH (ref 10.3–14.5)
RBC # FLD: 16 % — HIGH (ref 10.3–14.5)
RBC # FLD: 16.1 % — HIGH (ref 10.3–14.5)
RH IG SCN BLD-IMP: POSITIVE — SIGNIFICANT CHANGE UP
SODIUM SERPL-SCNC: 139 MMOL/L — SIGNIFICANT CHANGE UP (ref 135–145)
SODIUM SERPL-SCNC: 140 MMOL/L — SIGNIFICANT CHANGE UP (ref 135–145)
WBC # BLD: 6.28 K/UL — SIGNIFICANT CHANGE UP (ref 3.8–10.5)
WBC # BLD: 6.76 K/UL — SIGNIFICANT CHANGE UP (ref 3.8–10.5)
WBC # BLD: 7.6 K/UL — SIGNIFICANT CHANGE UP (ref 3.8–10.5)
WBC # FLD AUTO: 6.28 K/UL — SIGNIFICANT CHANGE UP (ref 3.8–10.5)
WBC # FLD AUTO: 6.76 K/UL — SIGNIFICANT CHANGE UP (ref 3.8–10.5)
WBC # FLD AUTO: 7.6 K/UL — SIGNIFICANT CHANGE UP (ref 3.8–10.5)

## 2020-08-13 PROCEDURE — 99232 SBSQ HOSP IP/OBS MODERATE 35: CPT | Mod: GC

## 2020-08-13 PROCEDURE — 99233 SBSQ HOSP IP/OBS HIGH 50: CPT | Mod: GC

## 2020-08-13 PROCEDURE — 99232 SBSQ HOSP IP/OBS MODERATE 35: CPT

## 2020-08-13 PROCEDURE — 93971 EXTREMITY STUDY: CPT | Mod: 26

## 2020-08-13 RX ORDER — ALENDRONATE SODIUM 70 MG/1
1 TABLET ORAL
Qty: 0 | Refills: 0 | DISCHARGE

## 2020-08-13 RX ORDER — SUCRALFATE 1 G
1 TABLET ORAL
Refills: 0 | Status: DISCONTINUED | OUTPATIENT
Start: 2020-08-13 | End: 2020-08-20

## 2020-08-13 RX ORDER — SODIUM,POTASSIUM PHOSPHATES 278-250MG
1 POWDER IN PACKET (EA) ORAL
Refills: 0 | Status: COMPLETED | OUTPATIENT
Start: 2020-08-13 | End: 2020-08-14

## 2020-08-13 RX ORDER — ATORVASTATIN CALCIUM 80 MG/1
1 TABLET, FILM COATED ORAL
Qty: 0 | Refills: 0 | DISCHARGE

## 2020-08-13 RX ADMIN — Medication 1 TABLET(S): at 16:39

## 2020-08-13 RX ADMIN — Medication 1 TABLET(S): at 21:00

## 2020-08-13 RX ADMIN — PANTOPRAZOLE SODIUM 40 MILLIGRAM(S): 20 TABLET, DELAYED RELEASE ORAL at 16:38

## 2020-08-13 RX ADMIN — Medication 1 GRAM(S): at 16:39

## 2020-08-13 RX ADMIN — PANTOPRAZOLE SODIUM 40 MILLIGRAM(S): 20 TABLET, DELAYED RELEASE ORAL at 05:45

## 2020-08-13 NOTE — CHART NOTE - NSCHARTNOTEFT_GEN_A_CORE
Repeat hemoglobin OK. Given a unit at 3pm. On protonix and sucralfate.     We will continue to monitor for hemoptysis, hematemesis, melena, hematochezia, bleeding from IV sites, and other symptoms suggestive of acute bleed.    Zeyad

## 2020-08-13 NOTE — PROGRESS NOTE ADULT - SUBJECTIVE AND OBJECTIVE BOX
*******************************************************  Kenny Stock MD PGY-1  Internal Medicine  Pager [phone] / [phone]  *******************************************************  Patient is a 79y old  Female who presents with a chief complaint of Acute renal failure, high anion gap metabolic acidosis (13 Aug 2020 12:20)      SUBJECTIVE / OVERNIGHT EVENTS:  - Patient seen and evaluated at bedside.  - No acute events overnight.   - Endorses feeling better today than yesterday although is complaining of feeling very cold (AC was very high) and that swelling in L hand same/a little better than yesterday. Denies additional emesis (including hematemesis)  - Denies fevers/chills, headache, SOB at rest, chest pain, palpitations, abdominal pain, nausea/diarrhea/constipation, dysuria    MEDICATIONS  (STANDING):  ondansetron Injectable 4 milliGRAM(s) IV Push once  pantoprazole  Injectable 40 milliGRAM(s) IV Push every 12 hours  sucralfate suspension 1 Gram(s) Oral four times a day    MEDICATIONS  (PRN):  aluminum hydroxide/magnesium hydroxide/simethicone Suspension 30 milliLiter(s) Oral every 6 hours PRN Dyspepsia      I&O's Summary    12 Aug 2020 07:01  -  13 Aug 2020 07:00  --------------------------------------------------------  IN: 300 mL / OUT: 300 mL / NET: 0 mL    13 Aug 2020 07:01  -  13 Aug 2020 15:10  --------------------------------------------------------  IN: 0 mL / OUT: 300 mL / NET: -300 mL      Daily     Daily Weight in k.3 (12 Aug 2020 16:04)    PHYSICAL EXAM:  Vital Signs Last 24 Hrs  T(C): 36.9 (13 Aug 2020 11:39), Max: 36.9 (13 Aug 2020 11:39)  T(F): 98.4 (13 Aug 2020 11:39), Max: 98.4 (13 Aug 2020 11:39)  HR: 75 (13 Aug 2020 11:39) (69 - 78)  BP: 133/66 (13 Aug 2020 11:39) (101/72 - 144/75)  BP(mean): 87 (12 Aug 2020 18:45) (87 - 87)  RR: 18 (13 Aug 2020 11:39) (18 - 24)  SpO2: 99% (13 Aug 2020 11:39) (98% - 100%)    CONSTITUTIONAL: NAD, obese woman resting comfortably in bed  RESPIRATORY: Normal respiratory effort; lungs are clear to auscultation bilaterally  CARDIOVASCULAR: Regular rate, normal S1 and S2, no murmur/rub/gallop; No JVD; No lower extremity edema; Peripheral pulses are 2+ bilaterally  ABDOMEN: Soft, nontender to palpation, normoactive bowel sounds, no rebound/guarding; No hepatosplenomegaly  MUSCLOSKELETAL: no joint swelling or tenderness to palpation  EXTREMITIES: Left hand swollen but with 2+ radial pulse and mildly delayed capillary refill. R hand/feet are warm, and without cyanosis or clubbing  SKIN: no visible rashes, pallor, diaphoresis, or jaundice  NEURO: No focal deficits; moving all extremities  PSYCH: A+O to person, place, and time; affect appropriate; cooperative    LABS:                        7.2    6.76  )-----------( 175      ( 13 Aug 2020 11:32 )             22.8     08-13    139  |  106  |  9   ----------------------------<  73  3.6   |  21<L>  |  0.70    Ca    8.1<L>      13 Aug 2020 11:32  Phos  2.0     08-13  Mg     1.7     08-13    TPro  5.0<L>  /  Alb  2.5<L>  /  TBili  0.5  /  DBili  x   /  AST  172<H>  /  ALT  200<H>  /  AlkPhos  226<H>  08-    PT/INR - ( 11 Aug 2020 16:51 )   PT: 12.6 SEC;   INR: 1.11     PTT - ( 13 Aug 2020 01:00 )  PTT:25.4 SEC            RADIOLOGY & ADDITIONAL TESTS:  Results Reviewed:   < from: VA Duplex Arterial Upper Ext, Left. (20 @ 09:43) >  Summary/Impressions:  No evidence of occlusive arterial disease in the  visualized Left upper extremity.    < end of copied text >    < from: VA Duplex Ext Veins Upper Comp, Left. (20 @ 08:43) >  Summary/Impressions:  Acute, deep vein thrombosis visualized in the left  brachial vein.    < end of copied text >    < from: VA Duplex Ext Veins Upper Comp, Left. (20 @ 09:15) >  Summary/Impressions:  Deep vein thrombosis visualized in the left brachial and  radial veins.    < end of copied text >    < from: Upper Endoscopy (20 @ 16:50) >  Impression:          - Esophagitis with visible clot; unable to dislodged.                        Treated with bicap                       - 6 cm hiatus hernia.                       - Normal stomach.                       - Blood clot seen at sphincterotomy site s/p clip                        placement.    < end of copied text >        COORDINATION OF CARE:  Care Discussed with Consultants/Other Providers [Y/N]: Dr. Hurt (Gastroenterology) regarding endoscopy and anticoagulation; Dr. Guthrie (Vascular Cardiology) regarding UE DVT and anticoagulation

## 2020-08-13 NOTE — PROGRESS NOTE ADULT - ATTENDING COMMENTS
Patient seen and examined, chart and labs reviewed. Case discussed with house staff.      #: [id number]  79F with HTN, dementia presented with hyperkalemia and AGMA, uremia 2/2 acute renal failure 2/2 prerenal azotemia (now resolved). Course c/b hypothermia, hypotension, choledocholithiasis and acute blood loss anemia (s/p 1U blood), also found to have LUE DVT, heparin drip stopped due to melena ON on 8/8, concern for UGI bleed, s/p EGD showing esophagitis.     #Melena/Hematemesis   - S/p EGD 8/10 showing esophagitis and ERCP s/p biliary sphincterotomy and removal of CBD stone. Patient had episode of hematemesis on 8/12 and heparin gtt was discontinued. --> Repeat EGD performed 8/12 showing esophagitis w/ clot treated w/ bicap, and blood clot seen at sphincterotomy site s/p clip placement. This is likely source of patient's recurrent hematemesis. Discussed with GI fellow Dr. Hurt, recommends rechecking CBC but okay to start heparin gtt from GI standpoint  - Hgb 7.2 this AM - given recurrent bleeding and need for AC in the future, will give 1U PRBCs today.   - C/w protonix IV BID and Carafate     #LUE DVT   - Continue to hold AC for now given downtrending H/H.   - Repeat UE dopplers down today appears stable.   - Vasc cards recs appreciated, will likely reattempt AC after PRBC transfusion as source of bleeding has been addressed.      #VANDANA   - Cr 18.74 on admission, now normalized. Likely VANDANA in setting of decreased PO intake + hypotension   - D/c sodium bicarbonate as CO2 normal     #Dispo: PT recs rehab, will continue discussions with family

## 2020-08-13 NOTE — PROGRESS NOTE ADULT - PROBLEM SELECTOR PLAN 4
Pt w/ microcytic anemia w/ slow Hgb downtrend in the setting of severe VANDANA and hypotension require IV fluid resuscitation, now w/ melena. Component of new anemia can be attributed to hemodilution. Pt w/ reported dark stools  soft BPs there is concern for UGIB. First EGD with only esophagitis but after hematemesis pending repeat EGD on 8/12.   - CT A/P without evidence of RP bleed.  - Trend CBC q12.   - Transfuse for Hgb < 7 or if patient symptomatic. Pt w/ microcytic anemia and during hospitalization had an episode of melena after heparin gtt and two episodes of hematemesis. EGD on 8/12 demonstrating esophageal ulcer with clot as well as bleeding from sphincterotomy site now s/p clip.   - Trend CBC q12.   - Transfuse for Hgb < 7 or if patient symptomatic.  - 1 u pRBC on 8/8.   - 1 u pRBC 8/12.  - F/u post-transfusion CBC and AM CBC

## 2020-08-13 NOTE — PROGRESS NOTE ADULT - ATTENDING COMMENTS
Patient seen and examined with the GI fellow. I agree with the above assessment and plan. Thank you for allowing us to care for your patient.    Post ERCP with likely post sphincterotomy bleeding.

## 2020-08-13 NOTE — PROGRESS NOTE ADULT - ASSESSMENT
Patient is a 78 yo woman with HTN, dementia presented with hyperkalemia and AGMA, uremia 2/2 acute renal failure 2/2 prerenal azotemia (now resolved).     Her current hospital course has been complicated by hypothermia, hypotension, choledocholithiasis and ?cholangitis, acute blood loss anemia (s/p pRBC transfusion), and now also on broad spectrum antibiotics.    For her complaint of left arm swelling, erythema, and tenderness, she underwent LUE venous duplex US which noted acute DVT in the left brachial and radial veins.  She was started on heparin gtt but was stopped due to melena noted on 8/8/20 due to concern for UGI bleed.   She denies prior known instrumentation in her left upper extremity.    EGD/ERCP performed on 8/10/20with removal of gallstone, but did not note UGIB.     This morning, left upper extremity edema/erythema appears stable.  She otherwise remained hemodynamically stable.      Given presumed acute blood loss anemia, unable to restart anticoagulation at this point.    Will await clearance from the GI team to restart anticoagulation.    Plan for surveillance imaging today of her LUE to monitor for DVT propagation.    Duration of anticoagulation therapy for what appears to be unprovoked VTE will be ~6 months if she can tolerate therapy, with re-evaluation at that time to see if she needs extended therapy or can be transitioned to low dose aspirin.  Time frame of anticoagulation treatment therapy may be shortened, or she may be treated with aspirin therapy from this point if she persists to have higher bleeding risks.    Will follow.

## 2020-08-13 NOTE — PROGRESS NOTE ADULT - PROBLEM SELECTOR PLAN 2
- VA duplex venous w/ evidence of DVT in L brachial and radial veins, superficial venous thrombosis in L basilic and cephalic and R basilic.   - will repeat LUE duplex to assess for propagation of thrombus   - no evidence of arterial occlusion LUE  - Holding heparin gtt   - now with L hand swelling and with delayed cap refill  - appreciate vasc cards recs: long term AC needed; heparin vs asa based on risk VA duplex venous w/ evidence of DVT in L brachial and radial veins, superficial venous thrombosis in L basilic and cephalic and R basilic. No evidence of LUE arterial thrombus. L hand swollen with delayed capillary refill 8/11 although improving 8/12 and stable today. Repeat LUE venous duplex this AM with resolution of radial vein thrombus and without proximal propagation of clot.  - Holding heparin gtt for UGIB  - will obtain post-transfusion CBC and AM CBC. If stable will restart heparin gtt  - appreciate vasc cards recs: long term AC needed; heparin vs asa based on risk of bleeding vs clotting

## 2020-08-13 NOTE — PROGRESS NOTE ADULT - SUBJECTIVE AND OBJECTIVE BOX
Chief Complaint:  Patient is a 79y old  Female who presents with a chief complaint of Acute renal failure, high anion gap metabolic acidosis (13 Aug 2020 07:29)    Interval Events:   No acute overnight events  No fevers, chills, chest pain, shortness of breath, nausea, vomiting, diarrhea     Allergies:  No Known Allergies    Hospital Medications:  aluminum hydroxide/magnesium hydroxide/simethicone Suspension 30 milliLiter(s) Oral every 6 hours PRN  ondansetron Injectable 4 milliGRAM(s) IV Push once  pantoprazole  Injectable 40 milliGRAM(s) IV Push every 12 hours  sucralfate suspension 1 Gram(s) Oral four times a day    PMHX/PSHX:  Dementia  Hypertension  No significant past surgical history    ROS:   General:  No fevers, chills or night sweats  ENT:  No sore throat or dysphagia  CV:  No pain or palpitations  Resp:  No dyspnea, cough or  wheezing  GI:  No pain, No nausea, No vomiting, No diarrhea, No rectal bleeding, No tarry stools,  Skin:  No rash or edema    PHYSICAL EXAM:   Vital Signs:  Vital Signs Last 24 Hrs  T(C): 36.9 (13 Aug 2020 11:39), Max: 36.9 (13 Aug 2020 11:39)  T(F): 98.4 (13 Aug 2020 11:39), Max: 98.4 (13 Aug 2020 11:39)  HR: 75 (13 Aug 2020 11:39) (69 - 78)  BP: 133/66 (13 Aug 2020 11:39) (101/72 - 144/75)  BP(mean): 87 (12 Aug 2020 18:45) (87 - 87)  RR: 18 (13 Aug 2020 11:39) (18 - 24)  SpO2: 99% (13 Aug 2020 11:39) (98% - 100%)  Daily Height in cm: 154.94 (12 Aug 2020 15:09)    Daily Weight in k.3 (12 Aug 2020 16:04)    GENERAL:  NAD, Appears stated age  HEENT:  NC/AT,  conjunctivae clear and pink, sclera -anicteric  CHEST:  Normal Effort, Breath sounds clear  HEART:  RRR, S1 + S2, no murmurs  ABDOMEN:  Soft, non-tender, non-distended, BS+  EXTEREMITIES:  no cyanosis  SKIN:  Warm & Dry.  NEURO:  Alert, oriented    LABS:                        7.2    6.76  )-----------( 175      ( 13 Aug 2020 11:32 )             22.8     Mean Cell Volume: 80.0 fL (-20 @ 11:32)        139  |  106  |  9   ----------------------------<  73  3.6   |  21<L>  |  0.70    Ca    8.1<L>      13 Aug 2020 11:32  Phos  2.0       Mg     1.7         TPro  5.0<L>  /  Alb  2.5<L>  /  TBili  0.5  /  DBili  x   /  AST  172<H>  /  ALT  200<H>  /  AlkPhos  226<H>      LIVER FUNCTIONS - ( 13 Aug 2020 11:32 )  Alb: 2.5 g/dL / Pro: 5.0 g/dL / ALK PHOS: 226 u/L / ALT: 200 u/L / AST: 172 u/L / GGT: x           PT/INR - ( 11 Aug 2020 16:51 )   PT: 12.6 SEC;   INR: 1.11          PTT - ( 13 Aug 2020 01:00 )  PTT:25.4 SEC                            7.2    6.76  )-----------( 175      ( 13 Aug 2020 11:32 )             22.8                         7.4    6.28  )-----------( 178      ( 13 Aug 2020 01:00 )             22.8                         7.7    8.22  )-----------( 151      ( 12 Aug 2020 07:20 )             24.7                         8.4    7.80  )-----------( 147      ( 12 Aug 2020 01:00 )             27.5                         8.0    6.47  )-----------( 138      ( 11 Aug 2020 16:51 )             24.9       Imaging:

## 2020-08-13 NOTE — CHART NOTE - NSCHARTNOTEFT_GEN_A_CORE
Further history per PMD's office    HTN, HLD, GERD, R Breast Ca with lymphatic extension,    Medications confirmed    PMD: Dr. Alyse Pena  Oncologist: Dr. Jaelyn Lopez with [facility]  Breast Surgeon: Dr. Miguel Grace with [facility]

## 2020-08-13 NOTE — PROGRESS NOTE ADULT - PROBLEM SELECTOR PLAN 3
Incidentally found on abdominal CT. s/p EGD/ERCP on 8/10 with removal of gallstone from CBD with sphincterotomy. - resolved  -d/c zosyn  -LFTs in AM elevated, per GI most likely 2/2 biliary tract manipulation during ERCP; will trend  - F/u GI recs Incidentally found on abdominal CT. s/p EGD/ERCP on 8/10 with removal of gallstone from CBD with sphincterotomy. - resolved  -LFTs in AM elevated, per GI most likely 2/2 biliary tract manipulation during ERCP; will trend - mostly downtrending this AM  - F/u GI recs

## 2020-08-13 NOTE — PROGRESS NOTE ADULT - SUBJECTIVE AND OBJECTIVE BOX
Cardiology/Vascular Medicine Inpatient Progress Note    Patient seen this AM  Resting comfortably -- LUE still erythematous and reporting discomfort, but tolerable  Plan for surveillance LUE venous duplex US today    Vital Signs Last 24 Hrs  T(C): 36.8 (13 Aug 2020 02:00), Max: 36.8 (13 Aug 2020 02:00)  T(F): 98.3 (13 Aug 2020 02:00), Max: 98.3 (13 Aug 2020 02:00)  HR: 78 (13 Aug 2020 02:00) (69 - 78)  BP: 127/86 (13 Aug 2020 02:00) (101/72 - 148/77)  BP(mean): 87 (12 Aug 2020 18:45) (87 - 87)  RR: 18 (13 Aug 2020 02:00) (17 - 24)  SpO2: 99% (13 Aug 2020 02:00) (98% - 100%)    Appearance: NAD, laying flat in bed, chronically-ill in appearance, Frisian-speaking	  Cardiovascular: Normal S1 S2, No discernible JVD  Respiratory: Decreased breath sounds bilaterally  Psychiatry: Awake, alert  Gastrointestinal:  Soft, Non-tender, + BS	  Skin: No rashes, No ecchymoses, No cyanosis	  Neurologic: Non-focal  Extremities: +Edematous LUE, erythema, tender to palpation, radial pulse intact    MEDICATIONS  (STANDING):  ondansetron Injectable 4 milliGRAM(s) IV Push once  pantoprazole  Injectable 40 milliGRAM(s) IV Push every 12 hours    MEDICATIONS  (PRN):  aluminum hydroxide/magnesium hydroxide/simethicone Suspension 30 milliLiter(s) Oral every 6 hours PRN Dyspepsia      LABS:	 	                        7.4    6.28  )-----------( 178      ( 13 Aug 2020 01:00 )             22.8     08-13    140  |  107  |  9   ----------------------------<  77  3.7   |  21<L>  |  0.69    Ca    8.0<L>      13 Aug 2020 01:00  Phos  1.9     08-13  Mg     1.7     08-13    TPro  5.0<L>  /  Alb  2.6<L>  /  TBili  2.0<H>  /  DBili  x   /  AST  297<H>  /  ALT  191<H>  /  AlkPhos  251<H>  08-12    PT/INR - ( 11 Aug 2020 16:51 )   PT: 12.6 SEC;   INR: 1.11     PTT - ( 13 Aug 2020 01:00 )  PTT:25.4 SEC    < from: CT Abdomen and Pelvis No Cont (08.09.20 @ 13:19) >    EXAM:  CT ABDOMEN AND PELVIS        PROCEDURE DATE:  Aug  9 2020         INTERPRETATION:  CLINICAL INFORMATION: Anemia. Acute renal insufficiency. Evaluate for retroperitoneal hemorrhage.    COMPARISON: Ultrasound kidney 8/20/2020. CT abdomen and pelvis 8/5/2020.    PROCEDURE:  CT of the Abdomen and Pelvis was performed without intravenous contrast.  Intravenous contrast: None.  Oral contrast: None.  Sagittal and coronal reformats were performed.    FINDINGS:  LOWER CHEST: Small bilateral pleural effusions. Hiatal hernia.    LIVER: Within normal limits.  BILE DUCTS: The 10 x 5 mm stone in the dilated common duct is unchanged.  GALLBLADDER: Cholecystectomy.  SPLEEN: Within normal limits.  PANCREAS: Within normal limits.  ADRENALS: Within normal limits.  KIDNEYS/URETERS: Within normal limits.    BLADDER: Metcalf catheter is in the decompressed bladder. Air in the bladder secondary to catheterization.  REPRODUCTIVE ORGANS: Uterus is heterogeneous. The endometrium is not well evaluated.    BOWEL: The stomach is decompressed. The small bowel is normal in caliber. Diverticulosis. Normal appendix. No free air. No free fluid.  PERITONEUM: No ascites.  VESSELS: The aorta is normal in caliber. Aortic calcifications.  RETROPERITONEUM/LYMPH NODES: No lymphadenopathy.  ABDOMINAL WALL: Findings no hernia containing fat.  BONES: Within normal limits.    IMPRESSION:  1.  Cholecystectomy.  2.  Choledocholithiasis.    ROLY MUNIZ M.D., ATTENDING RADIOLOGIST  This document has been electronically signed. Aug  9 2020  1:59PM          < from: Transthoracic Echocardiogram (08.07.20 @ 13:01) >    Patient name: KYE RÍOS  YOB: 1940   Age: 79 (F)   MR#: [id number]  Study Date: 8/7/2020  Location: [id number]: Windy Hawkins RDCS  Study quality: Technically Difficult  Referring Physician: Sunil Mckeon MD  Blood Pressure: 97/51 mmHg  Height: 154 cm  Weight: 78 kg  BSA: 1.8 m2  ------------------------------------------------------------------------  PROCEDURE: Transthoracic echocardiogram with 2-D, M-Mode  and complete spectral and color flow Doppler.  INDICATION: Shock, unspecified (R57.9)  ------------------------------------------------------------------------  DIMENSIONS:  Dimensions:     Normal Values:  LA:     3.6 cm    2.0 - 4.0 cm  Ao:     2.8 cm    2.0 - 3.8 cm  SEPTUM: 0.7 cm    0.6 - 1.2 cm  PWT:    0.8 cm    0.6 - 1.1 cm  LVIDd:  4.3 cm    3.0 - 5.6 cm  LVIDs:  2.9 cm    1.8 - 4.0 cm  Derived Variables:  LVMI: 55 g/m2  RWT: 0.37  Fractional short: 33 %  Ejection Fraction (Teicholtz): 61 %  ------------------------------------------------------------------------  OBSERVATIONS:  Mitral Valve: Mitral annular calcification, otherwise  normal mitral valve. Minimal mitral regurgitation.  Aortic Root: Normal aortic root.  Aortic Valve: Aortic valve leaflet morphology not well  visualized. Mild aortic regurgitation.  Left Atrium: Normal left atrium.  LA volume index = 31  cc/m2.  Left Ventricle: Normal left ventricular systolic function.  No segmental wall motion abnormalities. Normal left  ventricular internal dimensions and wall thicknesses. Mild  diastolic dysfunction (Stage I).  Right Heart: Normal right atrium. Normal right ventricular  size and function. Normal tricuspid valve. Minimal  tricuspid regurgitation. Normal pulmonic valve.  Minimal  pulmonic regurgitation.  Pericardium/PleuraNormal pericardium with no pericardial  effusion.  ------------------------------------------------------------------------  CONCLUSIONS:  1. Mitral annular calcification, otherwise normal mitral  valve. Minimal mitral regurgitation.  2. Aorticvalve leaflet morphology not well visualized.  Mild aortic regurgitation.  3. Normal left ventricular internal dimensions and wall  thicknesses.  4. Normal left ventricular systolic function. No segmental  wall motion abnormalities.  5. Mild diastolic dysfunction (Stage I).  6. Normal right ventricular size and function.  ------------------------------------------------------------------------  Confirmed on  8/7/2020 - 16:41:30 by Marky Arenas M.D.  ------------------------------------------------------------------------    < end of copied text >      < from: VA Duplex Arterial Upper Ext, Left. (08.06.20 @ 09:43) >    Patient name: KYE RÍOS  Date of test: 8/6/2020  #: [id number]  [employer] #: [id number]    Location: [facility] Physician(s): , Sunil Mckeon MD  Interpreted by: Carmelita Irwin M.D. RPVI  Tech: Faizan Young RVT  Type of Test: Upper Extremity Arterial  ------------------------------------------------------------------------  Procedure: Real-time grayscale and color Duplex  ultrasonography was used to interrogate the left upper  extremity arteries.  Indications: Atheroembolism of left upper extremity  (I75.012)  ------------------------------------------------------------------------  VELOCITY:  ------------------------------------------------------------------------  LEFT:  Subclavian velocity (cm/sec): 60  Axillary velocity (cm/sec): 97  Brachial velocity (cm/sec): 21  Radial velocity (cm/sec): 21  Ulnar velocity (cm/sec):  ------------------------------------------------------------------------  PRESSURES:  ------------------------------------------------------------------------  LEFT:  Upper Arm Pressure (mm Hg)  Lower Arm PRessure (mm Hg):  ------------------------------------------------------------------------  Left Findings: --Left subclavian artery: Patent vessel.  Normal velocities with triphasic waveforms.  --Leftaxillary artery: Patent vessel. Normal velocities  with triphasic waveforms.  --Left brachial artery: Patent vessel. Normal velocities  with triphasic waveforms.  --Left radial artery: Patent vessel. Normal velocities  with triphasic waveforms.  --Left ulnar artery: Patent vessel. Normal velocities with  triphasic waveforms.  ------------------------------------------------------------------------  Summary/Impressions:  No evidence of occlusive arterial disease in the  visualized Left upper extremity.  ------------------------------------------------------------------------  Confirmed on  8/6/2020 - 2:23 PM by YEIMI Pacheco  By signing this report, the attending physician certifies  that he or she has personally supervised andinterpreted  the vascular study and has reviewed and or edited and  agrees with the written comments contained within the  report.    < end of copied text >      < from: VA Duplex Ext Veins Upper Comp, Left. (08.06.20 @ 09:15) >    Patient name: KYE RÍOS  Date of test: 8/6/2020  MR#: [id number]  [employer] #: [id number]    Location: [facility] Physician(s): Sunil MD  Interpreted by: YEIMI Pacheco  Tech: Faizan Young RVT  Type of Test: Upper Extremity Venous  ------------------------------------------------------------------------  Procedure: Real-time grayscale and color Duplex  ultrasonography was used to interrogate the deep veins of  the left upper extremity.  Indications: Localized swelling, mass and lump, left upper  limb (R22.32)  ------------------------------------------------------------------------  RESULT:  ------------------------------------------------------------------------  LEFT:  Deep venous thrombosis: Yes  Superficial venous thrombosis: Yes  ------------------------------------------------------------------------  Right Findings: Superficial vein thrombosis visualized in  the basilic (upper arm) vein.  Left Findings: Deep vein thrombosis visualized in the left  brachial and radial veins.  Superficial vein thrombosis visualized in the basilic and  cephalic veins.  The left internal jugular, subclavian, axillary, and ulnar  veins are patent, and demonstrate full compressibility  with phasic Doppler waveforms.  No evidence of thrombosis.  ------------------------------------------------------------------------  Summary/Impressions:  Deep vein thrombosis visualized in the left brachial and  radial veins.  Superficial vein thrombosis visualized in the basilic and  cephalic veins.  Results communicated to Dr. LINUS Tripathi on 8/6/20 at 10 am.  ------------------------------------------------------------------------  Confirmed on  8/6/2020 - 2:22 PM by YEIMI Pacheco  By signing this report, the attending physician certifies  that he or she has personally supervised and interpreted  the vascular study and has reviewed and or edited and  agrees with the written comments contained within the  report.        < end of copied text >

## 2020-08-13 NOTE — PROGRESS NOTE ADULT - PROBLEM SELECTOR PLAN 1
Patient presented with complaint of dark colored emesis and had an episode of melena after heparin ggt was started. After restarting hepatin gtt on 8/11 patient had two episodes of emesis containing small amounts of blood.  -hold heparin ggt  -repeat EGD today  -s/p 1 U pRBC 8/8  - EGD without evidence of bleed but with esophagitis  - Pt Hb 7.7 this AM, will repeat Patient presented with complaint of dark colored emesis and had an episode of melena after heparin ggt was started. After restarting hepatin gtt on 8/11 patient had two episodes of emesis containing small amounts of blood. S/p EGD on 8/10 and 8/12. First endoscopy with only esophagitis, second with esophageal ulcer with clot and bleeding from sphincterotomy site.  -hold heparin ggt  -starting simethicone 1g qid  -s/p 1 U pRBC on 8/8. Hct 7.4-->7.2: giving another unit pRBC this afternoon  - EGD without evidence of bleed but with esophagitis  - f/u post-transfusion CBC Patient presented with complaint of dark colored emesis and had an episode of melena after heparin ggt was started. After restarting hepatin gtt on 8/11 patient had two episodes of emesis containing small amounts of blood. S/p EGD on 8/10 and 8/12. First endoscopy with only esophagitis, second with esophageal ulcer with clot and bleeding from sphincterotomy site.  -hold heparin ggt  -starting sucrafalate 1g qid  -s/p 1 U pRBC on 8/8. Hct 7.4-->7.2: giving another unit pRBC this afternoon  - EGD without evidence of bleed but with esophagitis  - f/u post-transfusion CBC

## 2020-08-13 NOTE — PROGRESS NOTE ADULT - PROBLEM SELECTOR PLAN 5
Now resolved, likely in setting of decreased PO intake and hypotension   Cr. 18.74 (admission) -->0.66 today Now resolved, likely in setting of decreased PO intake and hypotension   Cr. 18.74 (admission) -->0.7 today

## 2020-08-13 NOTE — PROGRESS NOTE ADULT - PROBLEM SELECTOR PLAN 6
resolved  - this am pt w/ BPs improved 131/54  - Continue to hold home BP meds for now resolved  - Continue to hold home BP meds for now in the setting of bleed  - Will obtain med rec from patient's pharmacy

## 2020-08-13 NOTE — PROGRESS NOTE ADULT - ASSESSMENT
Impression:  # Hematemesis: Likely secondary to bleeding from esophagitis s/p bicap + sphincterotomy s/p clip  # Choledocholithiasis (1 x 0.5) with dilated CBD s/p ERCP with sphincterotomy & stone removal with basket.   # Abdominal Pain: Resolved, concerning pancreatitis based on elevated lipase and pain. However description of pain not consistent with pancreatitis. CT unremarkable, however limited due to lack of contrast. Elevations in lipase may be seen in the setting of renal insufficiency and hypotension.  # Anemia - worsening this admission with reports of dark stools, potentially related to esophagitis seen on EGD; No active bleeding seen.  Lower suspicion for lower GI source.    Recommendation:  - PPI BID  - Monitor for signs of bleeding  - Trend Hb and transfuse to Hb > 7.0  - Repeat Hb in evening; if stable - can restart Heparin ggt  - Clear liquid diet today, if Hb stable, can advance  - Supportive care per primary team    Jenniefr Hurt MD  Gastroenterology Fellow  [phone]  Available on [employer] Teams  Please page on call fellow on weekends and after 5pm on weekdays: [phone]

## 2020-08-14 LAB
ALBUMIN SERPL ELPH-MCNC: 2.9 G/DL — LOW (ref 3.3–5)
ALP SERPL-CCNC: 231 U/L — HIGH (ref 40–120)
ALT FLD-CCNC: 165 U/L — HIGH (ref 4–33)
ANION GAP SERPL CALC-SCNC: 12 MMO/L — SIGNIFICANT CHANGE UP (ref 7–14)
APTT BLD: 25.9 SEC — LOW (ref 27–36.3)
APTT BLD: 35 SEC — SIGNIFICANT CHANGE UP (ref 27–36.3)
AST SERPL-CCNC: 103 U/L — HIGH (ref 4–32)
BILIRUB SERPL-MCNC: 0.6 MG/DL — SIGNIFICANT CHANGE UP (ref 0.2–1.2)
BUN SERPL-MCNC: 8 MG/DL — SIGNIFICANT CHANGE UP (ref 7–23)
CALCIUM SERPL-MCNC: 8.6 MG/DL — SIGNIFICANT CHANGE UP (ref 8.4–10.5)
CHLORIDE SERPL-SCNC: 104 MMOL/L — SIGNIFICANT CHANGE UP (ref 98–107)
CO2 SERPL-SCNC: 23 MMOL/L — SIGNIFICANT CHANGE UP (ref 22–31)
CREAT SERPL-MCNC: 0.77 MG/DL — SIGNIFICANT CHANGE UP (ref 0.5–1.3)
GLUCOSE SERPL-MCNC: 79 MG/DL — SIGNIFICANT CHANGE UP (ref 70–99)
HCT VFR BLD CALC: 28.7 % — LOW (ref 34.5–45)
HCT VFR BLD CALC: 28.9 % — LOW (ref 34.5–45)
HGB BLD-MCNC: 8.9 G/DL — LOW (ref 11.5–15.5)
HGB BLD-MCNC: 8.9 G/DL — LOW (ref 11.5–15.5)
INR BLD: 1.14 — SIGNIFICANT CHANGE UP (ref 0.88–1.16)
MAGNESIUM SERPL-MCNC: 1.7 MG/DL — SIGNIFICANT CHANGE UP (ref 1.6–2.6)
MCHC RBC-ENTMCNC: 25.6 PG — LOW (ref 27–34)
MCHC RBC-ENTMCNC: 25.6 PG — LOW (ref 27–34)
MCHC RBC-ENTMCNC: 30.8 % — LOW (ref 32–36)
MCHC RBC-ENTMCNC: 31 % — LOW (ref 32–36)
MCV RBC AUTO: 82.7 FL — SIGNIFICANT CHANGE UP (ref 80–100)
MCV RBC AUTO: 83 FL — SIGNIFICANT CHANGE UP (ref 80–100)
NRBC # FLD: 0 K/UL — SIGNIFICANT CHANGE UP (ref 0–0)
NRBC # FLD: 0 K/UL — SIGNIFICANT CHANGE UP (ref 0–0)
PHOSPHATE SERPL-MCNC: 1.7 MG/DL — LOW (ref 2.5–4.5)
PLATELET # BLD AUTO: 212 K/UL — SIGNIFICANT CHANGE UP (ref 150–400)
PLATELET # BLD AUTO: 224 K/UL — SIGNIFICANT CHANGE UP (ref 150–400)
PMV BLD: 10.5 FL — SIGNIFICANT CHANGE UP (ref 7–13)
PMV BLD: 10.7 FL — SIGNIFICANT CHANGE UP (ref 7–13)
POTASSIUM SERPL-MCNC: 3.5 MMOL/L — SIGNIFICANT CHANGE UP (ref 3.5–5.3)
POTASSIUM SERPL-SCNC: 3.5 MMOL/L — SIGNIFICANT CHANGE UP (ref 3.5–5.3)
PROT SERPL-MCNC: 5.5 G/DL — LOW (ref 6–8.3)
PROTHROM AB SERPL-ACNC: 12.9 SEC — SIGNIFICANT CHANGE UP (ref 10.6–13.6)
RBC # BLD: 3.47 M/UL — LOW (ref 3.8–5.2)
RBC # BLD: 3.48 M/UL — LOW (ref 3.8–5.2)
RBC # FLD: 15.9 % — HIGH (ref 10.3–14.5)
RBC # FLD: 16.4 % — HIGH (ref 10.3–14.5)
SODIUM SERPL-SCNC: 139 MMOL/L — SIGNIFICANT CHANGE UP (ref 135–145)
WBC # BLD: 6.65 K/UL — SIGNIFICANT CHANGE UP (ref 3.8–10.5)
WBC # BLD: 7.04 K/UL — SIGNIFICANT CHANGE UP (ref 3.8–10.5)
WBC # FLD AUTO: 6.65 K/UL — SIGNIFICANT CHANGE UP (ref 3.8–10.5)
WBC # FLD AUTO: 7.04 K/UL — SIGNIFICANT CHANGE UP (ref 3.8–10.5)

## 2020-08-14 PROCEDURE — 99233 SBSQ HOSP IP/OBS HIGH 50: CPT | Mod: GC

## 2020-08-14 PROCEDURE — 99232 SBSQ HOSP IP/OBS MODERATE 35: CPT | Mod: GC

## 2020-08-14 RX ORDER — ENOXAPARIN SODIUM 100 MG/ML
80 INJECTION SUBCUTANEOUS
Refills: 0 | Status: DISCONTINUED | OUTPATIENT
Start: 2020-08-14 | End: 2020-08-20

## 2020-08-14 RX ORDER — RIVAROXABAN 15 MG-20MG
1 KIT ORAL
Qty: 30 | Refills: 1
Start: 2020-08-14 | End: 2020-10-12

## 2020-08-14 RX ORDER — HEPARIN SODIUM 5000 [USP'U]/ML
INJECTION INTRAVENOUS; SUBCUTANEOUS
Qty: 25000 | Refills: 0 | Status: DISCONTINUED | OUTPATIENT
Start: 2020-08-14 | End: 2020-08-14

## 2020-08-14 RX ORDER — MAGNESIUM SULFATE 500 MG/ML
1 VIAL (ML) INJECTION ONCE
Refills: 0 | Status: COMPLETED | OUTPATIENT
Start: 2020-08-14 | End: 2020-08-14

## 2020-08-14 RX ADMIN — ENOXAPARIN SODIUM 80 MILLIGRAM(S): 100 INJECTION SUBCUTANEOUS at 11:13

## 2020-08-14 RX ADMIN — Medication 1 GRAM(S): at 11:13

## 2020-08-14 RX ADMIN — PANTOPRAZOLE SODIUM 40 MILLIGRAM(S): 20 TABLET, DELAYED RELEASE ORAL at 17:50

## 2020-08-14 RX ADMIN — PANTOPRAZOLE SODIUM 40 MILLIGRAM(S): 20 TABLET, DELAYED RELEASE ORAL at 06:01

## 2020-08-14 RX ADMIN — Medication 100 GRAM(S): at 12:22

## 2020-08-14 RX ADMIN — Medication 85 MILLIMOLE(S): at 13:46

## 2020-08-14 RX ADMIN — Medication 1 GRAM(S): at 00:04

## 2020-08-14 RX ADMIN — Medication 1 GRAM(S): at 06:00

## 2020-08-14 RX ADMIN — Medication 1 TABLET(S): at 08:45

## 2020-08-14 RX ADMIN — Medication 1 TABLET(S): at 11:13

## 2020-08-14 RX ADMIN — HEPARIN SODIUM 1400 UNIT(S)/HR: 5000 INJECTION INTRAVENOUS; SUBCUTANEOUS at 09:54

## 2020-08-14 RX ADMIN — ENOXAPARIN SODIUM 80 MILLIGRAM(S): 100 INJECTION SUBCUTANEOUS at 17:50

## 2020-08-14 RX ADMIN — Medication 1 GRAM(S): at 17:50

## 2020-08-14 NOTE — PROGRESS NOTE ADULT - PROBLEM SELECTOR PLAN 8
DVT PPx: heparin ggt stopped due to concern for UGI bleed  Diet: DASH/TLC diet  Med Rec: Lotrel (amlodipine 10 mg / benazepril 20 mg) daily                Omeprazole 20 mg daily                asa 81 mg DVT PPx: therapeutic LVX  Diet: DASH/TLC diet  Med Rec: Lotrel (amlodipine 10 mg / benazepril 20 mg) daily                Omeprazole 20 mg daily                asa 81 mg

## 2020-08-14 NOTE — PROGRESS NOTE ADULT - PROBLEM SELECTOR PLAN 4
Pt w/ microcytic anemia and during hospitalization had an episode of melena after heparin gtt and two episodes of hematemesis. EGD on 8/12 demonstrating esophageal ulcer with clot as well as bleeding from sphincterotomy site now s/p clip.   - Trend CBC q12.   - Transfuse for Hgb < 7 or if patient symptomatic.  - 1 u pRBC on 8/8.   - 1 u pRBC 8/12.  - F/u post-transfusion CBC and AM CBC Pt w/ microcytic anemia and during hospitalization had an episode of melena after heparin gtt and two episodes of hematemesis. EGD on 8/12 demonstrating esophageal ulcer with clot as well as bleeding from sphincterotomy site now s/p clip.   - Trend CBC q12. If stable by tomorrow AM can do q24  - Transfuse for Hgb < 7 or if patient symptomatic.  - 1 u pRBC on 8/8.   - 1 u pRBC 8/13.

## 2020-08-14 NOTE — PROGRESS NOTE ADULT - PROBLEM SELECTOR PLAN 1
Patient presented with complaint of dark colored emesis and had an episode of melena after heparin ggt was started. After restarting hepatin gtt on 8/11 patient had two episodes of emesis containing small amounts of blood. S/p EGD on 8/10 and 8/12. First endoscopy with only esophagitis, second with esophageal ulcer with clot and bleeding from sphincterotomy site.  -hold heparin ggt  -starting simethicone 1g qid  -s/p 1 U pRBC on 8/8. Hct 7.4-->7.2: giving another unit pRBC this afternoon  - EGD without evidence of bleed but with esophagitis  - f/u post-transfusion CBC Patient presented with complaint of dark colored emesis and had an episode of melena after heparin ggt was started. After restarting hepatin gtt on 8/11 patient had two episodes of emesis containing small amounts of blood. S/p EGD on 8/10 and 8/12. First endoscopy with only esophagitis, second with esophageal ulcer with clot and bleeding from sphincterotomy site.  -s/p EGD with treatment of esophageal ulcer and sphincterotomy bleeding  -c/w sucrafalate 1g qid  -s/p 1 U pRBC on 8/8.   -s/p 1U pRBC yesterday. Hb 7.2 --> 8.9  -Hb stable this AM

## 2020-08-14 NOTE — PROGRESS NOTE ADULT - ASSESSMENT
79F with HTN, dementia presented with hyperkalemia and AGMA, uremia 2/2 acute renal failure 2/2 prerenal azotemia (now resolved). Course c/b hypothermia, hypotension, choledocholithiasis and ?cholangitis, acute blood loss anemia (s/p 1U blood) on broad spectrum antibiotics, also found to have LUE DVT, heparin drip stopped due to melena ON on 8/8, concern for UGI bleed. ERCP with removal of gallstone, no UGIB. 79F with HTN, remote hx breast cancer (s/p chemo/rad). ? dementia presented with hyperkalemia and AGMA, uremia 2/2 acute renal failure 2/2 prerenal azotemia (now resolved). Course c/b hypothermia, hypotension, choledocholithiasis and ?cholangitis, UGIB, acute blood loss anemia (s/p 2U blood), also found to have LUE DVT, heparin drip stopped due to melena ON on 8/8, concern for UGI bleed. ERCP with removal of gallstone. first EGD with esophagitis, repeat with ulcer and bleeding from sphincterotomy site.

## 2020-08-14 NOTE — PROGRESS NOTE ADULT - PROBLEM SELECTOR PLAN 6
resolved  - Continue to hold home BP meds for now in the setting of bleed  - Will obtain med rec from patient's pharmacy resolved  - Continue to hold home BP meds for now in the setting of bleed  - Med rec obtained and updated

## 2020-08-14 NOTE — PROGRESS NOTE ADULT - ASSESSMENT
Impression:  # Hematemesis: Likely secondary to bleeding from esophagitis s/p bicap + sphincterotomy s/p clip. No further overt signs of bleeding; Hb overcorrected with 1U transfusions.  # Choledocholithiasis (1 x 0.5) with dilated CBD s/p ERCP with sphincterotomy & stone removal with basket.   # Abdominal Pain: Resolved, concerning pancreatitis based on elevated lipase and pain. However description of pain not consistent with pancreatitis. CT unremarkable, however limited due to lack of contrast. Elevations in lipase may be seen in the setting of renal insufficiency and hypotension.  # Anemia - worsening this admission with reports of dark stools, potentially related to esophagitis seen on EGD; No active bleeding seen.  Lower suspicion for lower GI source.    Recommendation:  - PPI BID  - Monitor for signs of bleeding  - Trend Hb and transfuse to Hb > 7.0  - Clear liquid diet today, if Hb stable, can advance  - Supportive care per primary team    Jennifer Hurt MD  Gastroenterology Fellow  [phone]  Available on [employer] Teams  Please page on call fellow on weekends and after 5pm on weekdays: [phone]

## 2020-08-14 NOTE — PROGRESS NOTE ADULT - ATTENDING COMMENTS
Patient seen and examined, chart and labs reviewed. Case discussed with house staff.      #: [id number]  79F with HTN, dementia presented with hyperkalemia and AGMA, uremia 2/2 acute renal failure 2/2 prerenal azotemia (now resolved). Course c/b hypothermia, hypotension, choledocholithiasis and acute blood loss anemia (s/p 1U blood), also found to have LUE DVT, heparin drip stopped due to melena ON on 8/8, concern for UGI bleed, s/p EGD showing esophagitis.     #Melena/Hematemesis   - S/p EGD 8/10 showing esophagitis and ERCP s/p biliary sphincterotomy and removal of CBD stone. Patient had episode of hematemesis on 8/12 and heparin gtt was discontinued. --> Repeat EGD performed 8/12 showing esophagitis w/ clot treated w/ bicap, and blood clot seen at sphincterotomy site s/p clip placement. This is likely source of patient's recurrent hematemesis. Discussed with GI fellow Dr. Hurt, recommends rechecking CBC but okay to start heparin gtt from GI standpoint  - Hgb 7.2 this AM - given recurrent bleeding and need for AC in the future, will give 1U PRBCs today.   - C/w protonix IV BID and Carafate     #LUE DVT   - Continue to hold AC for now given downtrending H/H.   - Repeat UE dopplers down today appears stable.   - Vasc cards recs appreciated, will likely reattempt AC after PRBC transfusion as source of bleeding has been addressed.      #VANDANA   - Cr 18.74 on admission, now normalized. Likely VANDANA in setting of decreased PO intake + hypotension   - D/c sodium bicarbonate as CO2 normal     #Dispo: PT recs rehab, will continue discussions with family Patient seen and examined, chart and labs reviewed. Case discussed with house staff.     79F with HTN, dementia presented with hyperkalemia and AGMA, uremia 2/2 acute renal failure 2/2 prerenal azotemia (now resolved). Course c/b hypothermia, hypotension, choledocholithiasis and acute blood loss anemia (s/p 1U blood), also found to have LUE DVT, heparin drip stopped due to melena ON on 8/8, concern for UGI bleed, s/p EGD showing esophagitis.     #Melena/Hematemesis   - S/p EGD 8/10 showing esophagitis and ERCP s/p biliary sphincterotomy and removal of CBD stone. Patient had episode of hematemesis on 8/12 and heparin gtt was discontinued. --> Repeat EGD performed 8/12 showing esophagitis w/ clot treated w/ bicap, and blood clot seen at sphincterotomy site s/p clip placement. This is likely source of patient's recurrent hematemesis.   - S/p 1U PRBCs on 8/13 (2U total), with H/H overcorrected at 8.9 this AM. Will continue to monitor CBC   - C/w protonix IV BID and Carafate     #LUE DVT   - Repeat UE dopplers done 8/13 appears stable.   - Vasc cards recs appreciated, discussed with kevin Ivy to start therapeutic lovenox (patient is very difficult blood drawn for heparin gtt requiring PTT). Will monitor for bleed and discuss AC for discharge.     #VANDANA   - Cr 18.74 on admission, now normalized. Likely VANDANA in setting of decreased PO intake + hypotension   - D/c sodium bicarbonate as CO2 normal     #Dispo: PT recs home w/ home PT once medically optimized.

## 2020-08-14 NOTE — PROGRESS NOTE ADULT - SUBJECTIVE AND OBJECTIVE BOX
*INCOMPLETE NOTE*  *******************************************************  Kenny Stock MD PGY-1  Internal Medicine  Pager [phone] / [phone]  *******************************************************  Patient is a 79y old  Female who presents with a chief complaint of Acute renal failure, high anion gap metabolic acidosis (13 Aug 2020 12:20)          NOTE TO BE UPDATED AFTER ROUNDS *******************************************************  Kenny Stock MD PGY-1  Internal Medicine  Pager [phone] / [phone]  *******************************************************  Patient is a 79y old  Female who presents with a chief complaint of Acute renal failure, high anion gap metabolic acidosis (14 Aug 2020 07:43)    PI : [id number]    SUBJECTIVE / OVERNIGHT EVENTS:  - Patient seen and evaluated at bedside.  - No acute events overnight.   - Post transfusion CBC stable  - Endorses b/l  hand swelling L>R but improving  - Discussed hx of Breast Ca diagnosis and treatment at [facility] "many years ago" initially with chemo/radx and then PO anastrazole, was reportedly done taking anastrazole and former oncologist retired. Goes for annual mammograms, last in summer 2009.  - Denies fevers/chills, headache, SOB at rest, chest pain, palpitations, abdominal pain, nausea/vomiting/diarrhea/constipation, dysuria    MEDICATIONS  (STANDING):  enoxaparin Injectable 80 milliGRAM(s) SubCutaneous two times a day  ondansetron Injectable 4 milliGRAM(s) IV Push once  pantoprazole  Injectable 40 milliGRAM(s) IV Push every 12 hours  sodium phosphate IVPB 30 milliMole(s) IV Intermittent once  sucralfate suspension 1 Gram(s) Oral four times a day    MEDICATIONS  (PRN):  aluminum hydroxide/magnesium hydroxide/simethicone Suspension 30 milliLiter(s) Oral every 6 hours PRN Dyspepsia      CAPILLARY BLOOD GLUCOSE        I&O's Summary    13 Aug 2020 07:01  -  14 Aug 2020 07:00  --------------------------------------------------------  IN: 755 mL / OUT: 900 mL / NET: -145 mL    14 Aug 2020 07:01  -  14 Aug 2020 12:51  --------------------------------------------------------  IN: 0 mL / OUT: 500 mL / NET: -500 mL      PHYSICAL EXAM:  Vital Signs Last 24 Hrs  T(C): 36.8 (14 Aug 2020 11:53), Max: 37 (13 Aug 2020 15:15)  T(F): 98.3 (14 Aug 2020 11:53), Max: 98.6 (13 Aug 2020 15:15)  HR: 72 (14 Aug 2020 11:53) (67 - 96)  BP: 133/66 (14 Aug 2020 11:53) (122/68 - 143/78)  RR: 17 (14 Aug 2020 11:53) (17 - 18)  SpO2: 98% (14 Aug 2020 11:53) (98% - 100%)    CONSTITUTIONAL: NAD, obese, resting comfortably in bed  RESPIRATORY: Normal respiratory effort; lungs are clear to auscultation bilaterally  CARDIOVASCULAR: Regular rate, normal S1 and S2, no murmur/rub/gallop; No JVD; No lower extremity edema; Peripheral pulses are 2+ bilaterally  ABDOMEN: Soft, nontender to palpation, normoactive bowel sounds, no rebound/guarding; No hepatosplenomegaly  MUSCLOSKELETAL: no joint swelling or tenderness to palpation, full strength all extremities.  EXTREMITIES: L hand still swollen but with good pulses, no erythema. hands/feet are warm, and without cyanosis or clubbing  SKIN: mild pallor. no visible rashes, diaphoresis, or jaundice  NEURO: No focal deficits; moving all extremities  PSYCH: A+O to person, place, and time; affect appropriate; cooperative    LABS:                        8.9    7.04  )-----------( 212      ( 14 Aug 2020 07:22 )             28.9     08-14    139  |  104  |  8   ----------------------------<  79  3.5   |  23  |  0.77    Ca    8.6      14 Aug 2020 07:22  Phos  1.7     08-14  Mg     1.7     08-14    TPro  5.5<L>  /  Alb  2.9<L>  /  TBili  0.6  /  DBili  x   /  AST  103<H>  /  ALT  165<H>  /  AlkPhos  231<H>  08-14    PT/INR - ( 14 Aug 2020 07:22 )   PT: 12.9 SEC;   INR: 1.14     PTT - ( 14 Aug 2020 07:22 )  PTT:25.9 SEC            RADIOLOGY & ADDITIONAL TESTS:  Results Reviewed: EGD    COORDINATION OF CARE:  Care Discussed with Consultants/Other Providers [Y]:   -Dr. Hurt (GI)  -Dr. Guthrie ([facility])

## 2020-08-14 NOTE — PROGRESS NOTE ADULT - PROBLEM SELECTOR PLAN 2
VA duplex venous w/ evidence of DVT in L brachial and radial veins, superficial venous thrombosis in L basilic and cephalic and R basilic. No evidence of LUE arterial thrombus. L hand swollen with delayed capillary refill 8/11 although improving 8/12 and stable today. Repeat LUE venous duplex this AM with resolution of radial vein thrombus and without proximal propagation of clot.  - Holding heparin gtt for UGIB  - will obtain post-transfusion CBC and AM CBC. If stable will restart heparin gtt  - appreciate vasc cards recs: long term AC needed; heparin vs asa based on risk of bleeding vs clotting VA duplex venous w/ evidence of DVT in L brachial and radial veins, superficial venous thrombosis in L basilic and cephalic and R basilic. No evidence of LUE arterial thrombus. L hand swollen with delayed capillary refill 8/11 although improving 8/12 and stable today. Repeat LUE venous duplex 8/13 with resolution of radial vein thrombus and without proximal propagation of clot.  - Hgb stable post transfusion  -Starting therapeutic enoxaparin 80mg bid (patient is difficult to draw labs on so PTT measurements and titration would have been very difficult)  - appreciate vasc cards recs: long term AC needed; DOAC vs. heparin vs asa based on risk of bleeding vs clotting

## 2020-08-14 NOTE — PROGRESS NOTE ADULT - PROBLEM SELECTOR PLAN 3
Incidentally found on abdominal CT. s/p EGD/ERCP on 8/10 with removal of gallstone from CBD with sphincterotomy. - resolved  -LFTs in AM elevated, per GI most likely 2/2 biliary tract manipulation during ERCP; will trend - mostly downtrending this AM  - F/u GI recs Incidentally found on abdominal CT. s/p EGD/ERCP on 8/10 with removal of gallstone from CBD with sphincterotomy. - RESOLVED  -LFTs in AM elevated, per GI most likely 2/2 biliary tract manipulation during ERCP; will trend - still downtrending this AM  - F/u GI recs

## 2020-08-15 LAB
ALBUMIN SERPL ELPH-MCNC: 2.7 G/DL — LOW (ref 3.3–5)
ALP SERPL-CCNC: 197 U/L — HIGH (ref 40–120)
ALT FLD-CCNC: 109 U/L — HIGH (ref 4–33)
ANION GAP SERPL CALC-SCNC: 12 MMO/L — SIGNIFICANT CHANGE UP (ref 7–14)
APTT BLD: 32.7 SEC — SIGNIFICANT CHANGE UP (ref 27–36.3)
AST SERPL-CCNC: 51 U/L — HIGH (ref 4–32)
BILIRUB SERPL-MCNC: 0.4 MG/DL — SIGNIFICANT CHANGE UP (ref 0.2–1.2)
BUN SERPL-MCNC: 5 MG/DL — LOW (ref 7–23)
CALCIUM SERPL-MCNC: 8.4 MG/DL — SIGNIFICANT CHANGE UP (ref 8.4–10.5)
CHLORIDE SERPL-SCNC: 103 MMOL/L — SIGNIFICANT CHANGE UP (ref 98–107)
CO2 SERPL-SCNC: 24 MMOL/L — SIGNIFICANT CHANGE UP (ref 22–31)
CREAT SERPL-MCNC: 0.74 MG/DL — SIGNIFICANT CHANGE UP (ref 0.5–1.3)
GLUCOSE SERPL-MCNC: 80 MG/DL — SIGNIFICANT CHANGE UP (ref 70–99)
HCT VFR BLD CALC: 27 % — LOW (ref 34.5–45)
HCT VFR BLD CALC: 28.4 % — LOW (ref 34.5–45)
HGB BLD-MCNC: 8.3 G/DL — LOW (ref 11.5–15.5)
HGB BLD-MCNC: 9.1 G/DL — LOW (ref 11.5–15.5)
INR BLD: 1.16 — SIGNIFICANT CHANGE UP (ref 0.88–1.16)
MAGNESIUM SERPL-MCNC: 1.7 MG/DL — SIGNIFICANT CHANGE UP (ref 1.6–2.6)
MCHC RBC-ENTMCNC: 25.5 PG — LOW (ref 27–34)
MCHC RBC-ENTMCNC: 26.3 PG — LOW (ref 27–34)
MCHC RBC-ENTMCNC: 30.7 % — LOW (ref 32–36)
MCHC RBC-ENTMCNC: 32 % — SIGNIFICANT CHANGE UP (ref 32–36)
MCV RBC AUTO: 82.1 FL — SIGNIFICANT CHANGE UP (ref 80–100)
MCV RBC AUTO: 82.8 FL — SIGNIFICANT CHANGE UP (ref 80–100)
NRBC # FLD: 0 K/UL — SIGNIFICANT CHANGE UP (ref 0–0)
NRBC # FLD: 0 K/UL — SIGNIFICANT CHANGE UP (ref 0–0)
PHOSPHATE SERPL-MCNC: 2.2 MG/DL — LOW (ref 2.5–4.5)
PLATELET # BLD AUTO: 225 K/UL — SIGNIFICANT CHANGE UP (ref 150–400)
PLATELET # BLD AUTO: 265 K/UL — SIGNIFICANT CHANGE UP (ref 150–400)
PMV BLD: 10.5 FL — SIGNIFICANT CHANGE UP (ref 7–13)
PMV BLD: 10.9 FL — SIGNIFICANT CHANGE UP (ref 7–13)
POTASSIUM SERPL-MCNC: 2.9 MMOL/L — CRITICAL LOW (ref 3.5–5.3)
POTASSIUM SERPL-SCNC: 2.9 MMOL/L — CRITICAL LOW (ref 3.5–5.3)
PROT SERPL-MCNC: 5.1 G/DL — LOW (ref 6–8.3)
PROTHROM AB SERPL-ACNC: 13.2 SEC — SIGNIFICANT CHANGE UP (ref 10.6–13.6)
RBC # BLD: 3.26 M/UL — LOW (ref 3.8–5.2)
RBC # BLD: 3.46 M/UL — LOW (ref 3.8–5.2)
RBC # FLD: 16.3 % — HIGH (ref 10.3–14.5)
RBC # FLD: 16.6 % — HIGH (ref 10.3–14.5)
SODIUM SERPL-SCNC: 139 MMOL/L — SIGNIFICANT CHANGE UP (ref 135–145)
WBC # BLD: 5.31 K/UL — SIGNIFICANT CHANGE UP (ref 3.8–10.5)
WBC # BLD: 5.51 K/UL — SIGNIFICANT CHANGE UP (ref 3.8–10.5)
WBC # FLD AUTO: 5.31 K/UL — SIGNIFICANT CHANGE UP (ref 3.8–10.5)
WBC # FLD AUTO: 5.51 K/UL — SIGNIFICANT CHANGE UP (ref 3.8–10.5)

## 2020-08-15 PROCEDURE — 99233 SBSQ HOSP IP/OBS HIGH 50: CPT | Mod: GC

## 2020-08-15 RX ORDER — POTASSIUM CHLORIDE 20 MEQ
40 PACKET (EA) ORAL EVERY 4 HOURS
Refills: 0 | Status: COMPLETED | OUTPATIENT
Start: 2020-08-15 | End: 2020-08-15

## 2020-08-15 RX ORDER — SODIUM,POTASSIUM PHOSPHATES 278-250MG
1 POWDER IN PACKET (EA) ORAL ONCE
Refills: 0 | Status: COMPLETED | OUTPATIENT
Start: 2020-08-15 | End: 2020-08-15

## 2020-08-15 RX ADMIN — Medication 40 MILLIEQUIVALENT(S): at 08:48

## 2020-08-15 RX ADMIN — Medication 1 GRAM(S): at 11:22

## 2020-08-15 RX ADMIN — Medication 1 GRAM(S): at 00:11

## 2020-08-15 RX ADMIN — Medication 1 GRAM(S): at 22:32

## 2020-08-15 RX ADMIN — Medication 40 MILLIEQUIVALENT(S): at 11:22

## 2020-08-15 RX ADMIN — PANTOPRAZOLE SODIUM 40 MILLIGRAM(S): 20 TABLET, DELAYED RELEASE ORAL at 04:42

## 2020-08-15 RX ADMIN — Medication 1 GRAM(S): at 16:46

## 2020-08-15 RX ADMIN — Medication 1 GRAM(S): at 04:42

## 2020-08-15 RX ADMIN — ENOXAPARIN SODIUM 80 MILLIGRAM(S): 100 INJECTION SUBCUTANEOUS at 16:47

## 2020-08-15 RX ADMIN — Medication 40 MILLIEQUIVALENT(S): at 16:46

## 2020-08-15 RX ADMIN — ENOXAPARIN SODIUM 80 MILLIGRAM(S): 100 INJECTION SUBCUTANEOUS at 04:42

## 2020-08-15 RX ADMIN — PANTOPRAZOLE SODIUM 40 MILLIGRAM(S): 20 TABLET, DELAYED RELEASE ORAL at 16:46

## 2020-08-15 RX ADMIN — Medication 1 PACKET(S): at 08:48

## 2020-08-15 NOTE — PROGRESS NOTE ADULT - PROBLEM SELECTOR PLAN 6
resolved  - Continue to hold home BP meds for now in the setting of bleed  - Med rec obtained and updated

## 2020-08-15 NOTE — PROGRESS NOTE ADULT - PROBLEM SELECTOR PLAN 1
Patient presented with complaint of dark colored emesis and had an episode of melena after heparin ggt was started. After restarting hepatin gtt on 8/11 patient had two episodes of emesis containing small amounts of blood. S/p EGD on 8/10 and 8/12. First endoscopy with only esophagitis, second with esophageal ulcer with clot and bleeding from sphincterotomy site.  -s/p EGD with treatment of esophageal ulcer and sphincterotomy bleeding  -c/w sucrafalate 1g qid  -s/p 1 U pRBC on 8/8.   -s/p 1U pRBC yesterday. Hb 7.2 --> 8.9  -Hb stable this AM Patient presented with complaint of dark colored emesis and had an episode of melena after heparin ggt was started. After restarting hepatin gtt on 8/11 patient had two episodes of emesis containing small amounts of blood. S/p EGD on 8/10 and 8/12. First endoscopy with only esophagitis, second with esophageal ulcer with clot and bleeding from sphincterotomy site.  -c/w sucrafalate 1g qid  -s/p 1 U pRBC on 8/8, 8/13   -Hb stable this AM  -CBC q12  -watch for bleeding on lovenox

## 2020-08-15 NOTE — PROVIDER CONTACT NOTE (CRITICAL VALUE NOTIFICATION) - ACTION/TREATMENT ORDERED:
HS1 Dre Stock informed. Will give PO potassium as per orders.
hold hep gtt for 60min then resume based on nomogram

## 2020-08-15 NOTE — PROGRESS NOTE ADULT - PROBLEM SELECTOR PLAN 3
Incidentally found on abdominal CT. s/p EGD/ERCP on 8/10 with removal of gallstone from CBD with sphincterotomy. - RESOLVED  -LFTs in AM elevated, per GI most likely 2/2 biliary tract manipulation during ERCP; will trend - still downtrending this AM  - F/u GI recs

## 2020-08-15 NOTE — PROGRESS NOTE ADULT - PROBLEM SELECTOR PLAN 8
DVT PPx: therapeutic LVX  Diet: DASH/TLC diet  Med Rec: Lotrel (amlodipine 10 mg / benazepril 20 mg) daily                Omeprazole 20 mg daily                asa 81 mg

## 2020-08-15 NOTE — PROGRESS NOTE ADULT - PROBLEM SELECTOR PLAN 5
Now resolved, likely in setting of decreased PO intake and hypotension   Cr. 18.74 (admission) -->normal

## 2020-08-15 NOTE — PROGRESS NOTE ADULT - PROBLEM SELECTOR PLAN 2
VA duplex venous w/ evidence of DVT in L brachial and radial veins, superficial venous thrombosis in L basilic and cephalic and R basilic. No evidence of LUE arterial thrombus. L hand swollen with delayed capillary refill 8/11 although improving 8/12 and stable today. Repeat LUE venous duplex 8/13 with resolution of radial vein thrombus and without proximal propagation of clot.  - Hgb stable post transfusion  -Starting therapeutic enoxaparin 80mg bid (patient is difficult to draw labs on so PTT measurements and titration would have been very difficult)  - appreciate vasc cards recs: long term AC needed; DOAC vs. heparin vs asa based on risk of bleeding vs clotting VA duplex venous w/ evidence of DVT in L brachial and radial veins, superficial venous thrombosis in L basilic and cephalic and R basilic. No evidence of LUE arterial thrombus. L hand swollen with delayed capillary refill 8/11 although improving 8/12 and stable today. Repeat LUE venous duplex 8/13 with resolution of radial vein thrombus and without proximal propagation of clot.  - Hgb stable post transfusion  -c/w therapeutic lovenox 80 bid  - appreciate vasc cards recs: long term AC needed; DOAC vs. heparin vs asa based on risk of bleeding vs clotting

## 2020-08-15 NOTE — PROGRESS NOTE ADULT - ATTENDING COMMENTS
Patient seen and examined, chart and labs reviewed. Case discussed with house staff.     In brief, 79F with HTN, dementia presented with hyperkalemia and AGMA, uremia 2/2 acute renal failure 2/2 prerenal azotemia (now resolved). Course c/b hypothermia, hypotension, choledocholithiasis and acute blood loss anemia (s/p 1U blood), also found to have LUE DVT, heparin drip stopped due to melena ON on 8/8, concern for UGI bleed, s/p EGD showing esophagitis. Pt now on therapeutic lovenox, with stable hemoglobin.     #Melena/Hematemesis   - S/p EGD 8/10 showing esophagitis and ERCP s/p biliary sphincterotomy and removal of CBD stone. Patient had episode of hematemesis on 8/12 and heparin gtt was discontinued. --> Repeat EGD performed 8/12 showing esophagitis w/ clot treated w/ bicap, and blood clot seen at sphincterotomy site s/p clip placement. This is likely source of patient's recurrent hematemesis.   - S/p 1U PRBCs on 8/13 (2U total), with H/H overcorrected at 8.3 this AM. Will continue to monitor CBC   - C/w protonix IV BID and Carafate   - Patient now on full liquid diet,     #LUE DVT   - Repeat UE dopplers done 8/13 appears stable.   - Vasc cards recs appreciated, discussed with kevin Ivy to start therapeutic lovenox (patient is very difficult blood drawn for heparin gtt requiring PTT). Will monitor for bleed and discuss AC for discharge. likely DOAC     #VANDANA   - Cr 18.74 on admission, now normalized. Likely VANDANA in setting of decreased PO intake + hypotension   - D/c sodium bicarbonate as CO2 normal     #Dispo: PT recs home w/ home PT once medically optimized.     I was physically present for the key portions of the evaluation and management (E/M) service provided.  I agree with the above history, physical, and plan which I have reviewed and edited where appropriate.     Plan discussed with patient/ RN/ HS1 Dr. Stock.

## 2020-08-15 NOTE — PROGRESS NOTE ADULT - PROBLEM SELECTOR PLAN 4
Pt w/ microcytic anemia and during hospitalization had an episode of melena after heparin gtt and two episodes of hematemesis. EGD on 8/12 demonstrating esophageal ulcer with clot as well as bleeding from sphincterotomy site now s/p clip.   - Trend CBC q12. If stable by tomorrow AM can do q24  - Transfuse for Hgb < 7 or if patient symptomatic.  - 1 u pRBC on 8/8.   - 1 u pRBC 8/13. Pt w/ microcytic anemia and during hospitalization had an episode of melena after heparin gtt and two episodes of hematemesis. EGD on 8/12 demonstrating esophageal ulcer with clot as well as bleeding from sphincterotomy site now s/p clip.   - Trend CBC q12.   - Transfuse for Hgb < 7 or if patient symptomatic.  - 1 u pRBC on 8/8.   - 1 u pRBC 8/13.

## 2020-08-16 LAB
ALBUMIN SERPL ELPH-MCNC: 2.9 G/DL — LOW (ref 3.3–5)
ALP SERPL-CCNC: 200 U/L — HIGH (ref 40–120)
ALT FLD-CCNC: 87 U/L — HIGH (ref 4–33)
ANION GAP SERPL CALC-SCNC: 11 MMO/L — SIGNIFICANT CHANGE UP (ref 7–14)
AST SERPL-CCNC: 37 U/L — HIGH (ref 4–32)
BILIRUB SERPL-MCNC: 0.4 MG/DL — SIGNIFICANT CHANGE UP (ref 0.2–1.2)
BLD GP AB SCN SERPL QL: NEGATIVE — SIGNIFICANT CHANGE UP
BUN SERPL-MCNC: 5 MG/DL — LOW (ref 7–23)
CALCIUM SERPL-MCNC: 8.8 MG/DL — SIGNIFICANT CHANGE UP (ref 8.4–10.5)
CHLORIDE SERPL-SCNC: 107 MMOL/L — SIGNIFICANT CHANGE UP (ref 98–107)
CO2 SERPL-SCNC: 22 MMOL/L — SIGNIFICANT CHANGE UP (ref 22–31)
CREAT SERPL-MCNC: 0.76 MG/DL — SIGNIFICANT CHANGE UP (ref 0.5–1.3)
GLUCOSE SERPL-MCNC: 85 MG/DL — SIGNIFICANT CHANGE UP (ref 70–99)
HCT VFR BLD CALC: 28.2 % — LOW (ref 34.5–45)
HGB BLD-MCNC: 8.5 G/DL — LOW (ref 11.5–15.5)
MAGNESIUM SERPL-MCNC: 1.7 MG/DL — SIGNIFICANT CHANGE UP (ref 1.6–2.6)
MCHC RBC-ENTMCNC: 25.2 PG — LOW (ref 27–34)
MCHC RBC-ENTMCNC: 30.1 % — LOW (ref 32–36)
MCV RBC AUTO: 83.7 FL — SIGNIFICANT CHANGE UP (ref 80–100)
NRBC # FLD: 0 K/UL — SIGNIFICANT CHANGE UP (ref 0–0)
PHOSPHATE SERPL-MCNC: 1.6 MG/DL — LOW (ref 2.5–4.5)
PLATELET # BLD AUTO: 253 K/UL — SIGNIFICANT CHANGE UP (ref 150–400)
PMV BLD: 10.5 FL — SIGNIFICANT CHANGE UP (ref 7–13)
POTASSIUM SERPL-MCNC: 4.3 MMOL/L — SIGNIFICANT CHANGE UP (ref 3.5–5.3)
POTASSIUM SERPL-SCNC: 4.3 MMOL/L — SIGNIFICANT CHANGE UP (ref 3.5–5.3)
PROT SERPL-MCNC: 5.5 G/DL — LOW (ref 6–8.3)
RBC # BLD: 3.37 M/UL — LOW (ref 3.8–5.2)
RBC # FLD: 16.7 % — HIGH (ref 10.3–14.5)
RH IG SCN BLD-IMP: POSITIVE — SIGNIFICANT CHANGE UP
SODIUM SERPL-SCNC: 140 MMOL/L — SIGNIFICANT CHANGE UP (ref 135–145)
WBC # BLD: 5.2 K/UL — SIGNIFICANT CHANGE UP (ref 3.8–10.5)
WBC # FLD AUTO: 5.2 K/UL — SIGNIFICANT CHANGE UP (ref 3.8–10.5)

## 2020-08-16 PROCEDURE — 99233 SBSQ HOSP IP/OBS HIGH 50: CPT | Mod: GC

## 2020-08-16 RX ORDER — SODIUM,POTASSIUM PHOSPHATES 278-250MG
1 POWDER IN PACKET (EA) ORAL
Refills: 0 | Status: COMPLETED | OUTPATIENT
Start: 2020-08-16 | End: 2020-08-16

## 2020-08-16 RX ADMIN — Medication 1 GRAM(S): at 16:55

## 2020-08-16 RX ADMIN — Medication 1 PACKET(S): at 21:10

## 2020-08-16 RX ADMIN — Medication 1 PACKET(S): at 16:55

## 2020-08-16 RX ADMIN — ENOXAPARIN SODIUM 80 MILLIGRAM(S): 100 INJECTION SUBCUTANEOUS at 16:55

## 2020-08-16 RX ADMIN — Medication 1 PACKET(S): at 09:42

## 2020-08-16 RX ADMIN — Medication 1 GRAM(S): at 21:10

## 2020-08-16 RX ADMIN — PANTOPRAZOLE SODIUM 40 MILLIGRAM(S): 20 TABLET, DELAYED RELEASE ORAL at 16:55

## 2020-08-16 RX ADMIN — ENOXAPARIN SODIUM 80 MILLIGRAM(S): 100 INJECTION SUBCUTANEOUS at 05:59

## 2020-08-16 RX ADMIN — PANTOPRAZOLE SODIUM 40 MILLIGRAM(S): 20 TABLET, DELAYED RELEASE ORAL at 05:59

## 2020-08-16 RX ADMIN — Medication 1 PACKET(S): at 11:36

## 2020-08-16 RX ADMIN — Medication 1 GRAM(S): at 05:59

## 2020-08-16 RX ADMIN — Medication 1 GRAM(S): at 11:36

## 2020-08-16 NOTE — PROGRESS NOTE ADULT - PROBLEM SELECTOR PLAN 3
Incidentally found on abdominal CT. s/p EGD/ERCP on 8/10 with removal of gallstone from CBD with sphincterotomy. - RESOLVED  -LFTs in AM elevated, per GI most likely 2/2 biliary tract manipulation during ERCP; will trend - still downtrending this AM  - F/u GI recs Incidentally found on abdominal CT. s/p EGD/ERCP on 8/10 with removal of gallstone from CBD with sphincterotomy. - RESOLVED  -LFTs in AM elevated, per GI most likely 2/2 biliary tract manipulation during ERCP; will trend - still downtrending this AM  - Diet advanced

## 2020-08-16 NOTE — PROGRESS NOTE ADULT - PROBLEM SELECTOR PLAN 4
Pt w/ microcytic anemia and during hospitalization had an episode of melena after heparin gtt and two episodes of hematemesis. EGD on 8/12 demonstrating esophageal ulcer with clot as well as bleeding from sphincterotomy site now s/p clip.   - Trend CBC q12.   - Transfuse for Hgb < 7 or if patient symptomatic.  - 1 u pRBC on 8/8.   - 1 u pRBC 8/13. Pt w/ microcytic anemia and during hospitalization had an episode of melena after heparin gtt and two episodes of hematemesis. EGD on 8/12 demonstrating esophageal ulcer with clot as well as bleeding from sphincterotomy site now s/p clip.   - Trend CBC daily  - Transfuse for Hgb < 7 or if patient symptomatic.  - 1 u pRBC on 8/8.   - 1 u pRBC 8/13.

## 2020-08-16 NOTE — PROGRESS NOTE ADULT - ATTENDING COMMENTS
Patient seen and examined, chart and labs reviewed. Case discussed with house staff.     In brief, 79F with HTN, dementia presented with hyperkalemia and AGMA, uremia 2/2 acute renal failure 2/2 prerenal azotemia (now resolved). Course c/b hypothermia, hypotension, choledocholithiasis and acute blood loss anemia (s/p 1U blood), also found to have LUE DVT, heparin drip stopped due to melena ON on 8/8, concern for UGI bleed, s/p EGD showing esophagitis. Pt now on therapeutic lovenox, with stable hemoglobin.     #Melena/Hematemesis   - S/p EGD 8/10 showing esophagitis and ERCP s/p biliary sphincterotomy and removal of CBD stone. Patient had episode of hematemesis on 8/12 and heparin gtt was discontinued. --> Repeat EGD performed 8/12 showing esophagitis w/ clot treated w/ bicap, and blood clot seen at sphincterotomy site s/p clip placement. This is likely source of patient's recurrent hematemesis.   - S/p 1U PRBCs on 8/13 (2U total), with H/H now stable. Will continue to monitor CBC -> daily  - C/w protonix IV BID and Carafate -> transition to PO on D/c   - Patient now on regular diet from  full liquid diet - 8/16. If patient, able to tolerate diet, no further episodes of bleeding, will plan to d/c tomorrow on DOAC.     #LUE DVT   - Repeat UE dopplers done 8/13 appears stable.   - Vasc cards recs appreciated, discussed with kevin Ivy to start therapeutic lovenox (patient is very difficult blood drawn for heparin gtt requiring PTT). Will monitor for bleed and discuss AC for discharge. likely DOAC     #VANDANA   - Cr 18.74 on admission, now normalized. Likely VANDANA in setting of decreased PO intake + hypotension   - D/c sodium bicarbonate as CO2 normal     #Dispo: PT recs home w/ home PT once medically optimized.     I was physically present for the key portions of the evaluation and management (E/M) service provided.  I agree with the above history, physical, and plan which I have reviewed and edited where appropriate.     Plan discussed with patient/ RN/ HS2 Dr. Solano

## 2020-08-16 NOTE — PROGRESS NOTE ADULT - SUBJECTIVE AND OBJECTIVE BOX
PROGRESS NOTE:   Authoted by Dr. Ijeoma Solano MD  Pager [phone] [facility], [id number] [facility]     Patient is a 79y old  Female who presents with a chief complaint of Acute renal failure, high anion gap metabolic acidosis (15 Aug 2020 08:40)      SUBJECTIVE / OVERNIGHT EVENTS:     REVIEW OF SYSTEMS:    CONSTITUTIONAL: No weakness, fevers or chills  EYES/ENT: No visual changes;  No vertigo or throat pain   NECK: No pain or stiffness  RESPIRATORY: No cough, wheezing, hemoptysis; No shortness of breath  CARDIOVASCULAR: No chest pain or palpitations  GASTROINTESTINAL: No abdominal or epigastric pain. No nausea, vomiting, or hematemesis; No diarrhea or constipation. No melena or hematochezia.  GENITOURINARY: No dysuria, frequency or hematuria  NEUROLOGICAL: No numbness or weakness  SKIN: No itching, rashes    MEDICATIONS  (STANDING):  enoxaparin Injectable 80 milliGRAM(s) SubCutaneous two times a day  ondansetron Injectable 4 milliGRAM(s) IV Push once  pantoprazole  Injectable 40 milliGRAM(s) IV Push every 12 hours  sucralfate suspension 1 Gram(s) Oral four times a day    MEDICATIONS  (PRN):  aluminum hydroxide/magnesium hydroxide/simethicone Suspension 30 milliLiter(s) Oral every 6 hours PRN Dyspepsia      CAPILLARY BLOOD GLUCOSE        I&O's Summary    14 Aug 2020 07:01  -  15 Aug 2020 07:00  --------------------------------------------------------  IN: 900 mL / OUT: 1500 mL / NET: -600 mL    15 Aug 2020 07:01  -  16 Aug 2020 06:44  --------------------------------------------------------  IN: 1320 mL / OUT: 1900 mL / NET: -580 mL        PHYSICAL EXAM:  Vital Signs Last 24 Hrs  T(C): 36.7 (16 Aug 2020 04:30), Max: 37.1 (15 Aug 2020 10:45)  T(F): 98 (16 Aug 2020 04:30), Max: 98.7 (15 Aug 2020 10:45)  HR: 78 (16 Aug 2020 04:30) (72 - 81)  BP: 116/78 (16 Aug 2020 04:30) (116/78 - 130/79)  BP(mean): --  RR: 18 (16 Aug 2020 04:30) (17 - 18)  SpO2: 99% (16 Aug 2020 04:30) (98% - 99%)    CONSTITUTIONAL: NAD, well-developed  RESPIRATORY: Normal respiratory effort; lungs are clear to auscultation bilaterally  CARDIOVASCULAR: Regular rate and rhythm, normal S1 and S2, no murmur/rub/gallop; No lower extremity edema; Peripheral pulses are 2+ bilaterally  ABDOMEN: Nontender to palpation, normoactive bowel sounds, no rebound/guarding; No hepatosplenomegaly  MUSCLOSKELETAL: no clubbing or cyanosis of digits; no joint swelling or tenderness to palpation  PSYCH: A+O to person, place, and time; affect appropriate  NEURO: Non-focal, no tremors  SKIN: No rashes    LABS:                        8.5    5.20  )-----------( 253      ( 16 Aug 2020 03:15 )             28.2     08-16    140  |  107  |  5<L>  ----------------------------<  85  4.3   |  22  |  0.76    Ca    8.8      16 Aug 2020 03:15  Phos  1.6     08-16  Mg     1.7     08-16    TPro  5.5<L>  /  Alb  2.9<L>  /  TBili  0.4  /  DBili  x   /  AST  37<H>  /  ALT  87<H>  /  AlkPhos  200<H>  08-16    PT/INR - ( 15 Aug 2020 05:25 )   PT: 13.2 SEC;   INR: 1.16          PTT - ( 15 Aug 2020 05:25 )  PTT:32.7 SEC            RADIOLOGY & ADDITIONAL TESTS:  No new imaging or tests    COORDINATION OF CARE:  Care Discussed with Consultants/Other Providers [Y/N]:  Prior or Outpatient Records Reviewed [Y/N]: PROGRESS NOTE:   Authoted by Dr. Ijeoma Solano MD  Pager [phone] [facility], [id number] [facility]     Patient is a 79y old  Female who presents with a chief complaint of Acute renal failure, high anion gap metabolic acidosis (15 Aug 2020 08:40)      SUBJECTIVE / OVERNIGHT EVENTS: No acute events overnight. No bleeding with BM, no dizziness or fast heart rate. Able to tolerate full liquids. Feeling well in general.   NSR in 80's overnight on tele.     REVIEW OF SYSTEMS:    CONSTITUTIONAL: No fevers or chills  EYES/ENT: No visual changes  NECK: No pain  RESPIRATORY: No cough or shortness of breath  CARDIOVASCULAR: No chest pain or palpitations  GASTROINTESTINAL: No abdominal pain. No nausea, vomiting, diarrhea or constipation. No melena or hematochezia.  GENITOURINARY: No dysuria  NEUROLOGICAL: No weakness  SKIN: No itching, rashes    MEDICATIONS  (STANDING):  enoxaparin Injectable 80 milliGRAM(s) SubCutaneous two times a day  ondansetron Injectable 4 milliGRAM(s) IV Push once  pantoprazole  Injectable 40 milliGRAM(s) IV Push every 12 hours  sucralfate suspension 1 Gram(s) Oral four times a day    MEDICATIONS  (PRN):  aluminum hydroxide/magnesium hydroxide/simethicone Suspension 30 milliLiter(s) Oral every 6 hours PRN Dyspepsia      CAPILLARY BLOOD GLUCOSE        I&O's Summary    14 Aug 2020 07:01  -  15 Aug 2020 07:00  --------------------------------------------------------  IN: 900 mL / OUT: 1500 mL / NET: -600 mL    15 Aug 2020 07:01  -  16 Aug 2020 06:44  --------------------------------------------------------  IN: 1320 mL / OUT: 1900 mL / NET: -580 mL        PHYSICAL EXAM:  Vital Signs Last 24 Hrs  T(C): 36.7 (16 Aug 2020 04:30), Max: 37.1 (15 Aug 2020 10:45)  T(F): 98 (16 Aug 2020 04:30), Max: 98.7 (15 Aug 2020 10:45)  HR: 78 (16 Aug 2020 04:30) (72 - 81)  BP: 116/78 (16 Aug 2020 04:30) (116/78 - 130/79)  BP(mean): --  RR: 18 (16 Aug 2020 04:30) (17 - 18)  SpO2: 99% (16 Aug 2020 04:30) (98% - 99%)    CONSTITUTIONAL: NAD, eating breakfast  RESPIRATORY: Normal respiratory effort; lungs are clear to auscultation bilaterally  CARDIOVASCULAR: Regular rate and rhythm, normal S1 and S2; No lower extremity edema;  ABDOMEN: Nontender to palpation, normoactive bowel sounds, no rebound/guarding;  MUSCULOSKELETAL no clubbing or cyanosis of digits; no joint swelling or tenderness to palpation  PSYCH: A+O to person, place, and time; affect appropriate  NEURO: Non-focal, no tremors  SKIN: No rashes    LABS:                        8.5    5.20  )-----------( 253      ( 16 Aug 2020 03:15 )             28.2     08-16    140  |  107  |  5<L>  ----------------------------<  85  4.3   |  22  |  0.76    Ca    8.8      16 Aug 2020 03:15  Phos  1.6     08-16  Mg     1.7     08-16    TPro  5.5<L>  /  Alb  2.9<L>  /  TBili  0.4  /  DBili  x   /  AST  37<H>  /  ALT  87<H>  /  AlkPhos  200<H>  08-16    PT/INR - ( 15 Aug 2020 05:25 )   PT: 13.2 SEC;   INR: 1.16          PTT - ( 15 Aug 2020 05:25 )  PTT:32.7 SEC        RADIOLOGY & ADDITIONAL TESTS:  No new imaging or tests    COORDINATION OF CARE:  Care Discussed with Consultants/Other Providers [Y/N]: GI  Prior or Outpatient Records Reviewed [Y/N]: N

## 2020-08-16 NOTE — PROGRESS NOTE ADULT - PROBLEM SELECTOR PLAN 8
DVT PPx: therapeutic LVX  Diet: DASH/TLC diet  Med Rec: Lotrel (amlodipine 10 mg / benazepril 20 mg) daily                Omeprazole 20 mg daily                asa 81 mg DVT PPx: therapeutic LVX  Diet: Advanced to regular diet  Med Rec: Lotrel (amlodipine 10 mg / benazepril 20 mg) daily                Omeprazole 20 mg daily                asa 81 mg

## 2020-08-16 NOTE — PROGRESS NOTE ADULT - PROBLEM SELECTOR PLAN 2
VA duplex venous w/ evidence of DVT in L brachial and radial veins, superficial venous thrombosis in L basilic and cephalic and R basilic. No evidence of LUE arterial thrombus. L hand swollen with delayed capillary refill 8/11 although improving 8/12 and stable today. Repeat LUE venous duplex 8/13 with resolution of radial vein thrombus and without proximal propagation of clot.  - Hgb stable post transfusion  -c/w therapeutic lovenox 80 bid  - appreciate vasc cards recs: long term AC needed; DOAC vs. heparin vs asa based on risk of bleeding vs clotting VA duplex venous w/ evidence of DVT in L brachial and radial veins, superficial venous thrombosis in L basilic and cephalic and R basilic. No evidence of LUE arterial thrombus. L hand swollen with delayed capillary refill 8/11 although improving 8/12 and stable today. Repeat LUE venous duplex 8/13 with resolution of radial vein thrombus and without proximal propagation of clot.  - Hgb stable post transfusion  -c/w therapeutic lovenox 80 bid --> transition to DOAC tomorrow if H&H stable

## 2020-08-16 NOTE — PROGRESS NOTE ADULT - ASSESSMENT
79F with HTN, remote hx breast cancer (s/p chemo/rad). ? dementia presented with hyperkalemia and AGMA, uremia 2/2 acute renal failure 2/2 prerenal azotemia (now resolved). Course c/b hypothermia, hypotension, choledocholithiasis and ?cholangitis, UGIB, acute blood loss anemia (s/p 2U blood), also found to have LUE DVT, heparin drip stopped due to melena ON on 8/8, concern for UGI bleed. ERCP with removal of gallstone. first EGD with esophagitis, repeat with ulcer and bleeding from sphincterotomy site. Currently stable on therapeutic LVX.

## 2020-08-16 NOTE — PROGRESS NOTE ADULT - PROBLEM SELECTOR PLAN 1
Patient presented with complaint of dark colored emesis and had an episode of melena after heparin ggt was started. After restarting hepatin gtt on 8/11 patient had two episodes of emesis containing small amounts of blood. S/p EGD on 8/10 and 8/12. First endoscopy with only esophagitis, second with esophageal ulcer with clot and bleeding from sphincterotomy site.  -c/w sucrafalate 1g qid  -s/p 1 U pRBC on 8/8, 8/13   -Hb stable this AM  -CBC q12  -watch for bleeding on lovenox S/p EGD on 8/10 and 8/12. First endoscopy with only esophagitis, second with esophageal ulcer with clot and bleeding from sphincterotomy site.  -c/w sucrafalate 1g qid  -s/p 1 U pRBC on 8/8, 8/13   - Hb stable this AM; will trend daily  - watch for bleeding on lovenox  - Advance diet as tolerated

## 2020-08-17 LAB
ALBUMIN SERPL ELPH-MCNC: 2.9 G/DL — LOW (ref 3.3–5)
ALP SERPL-CCNC: 196 U/L — HIGH (ref 40–120)
ALT FLD-CCNC: 66 U/L — HIGH (ref 4–33)
ANION GAP SERPL CALC-SCNC: 10 MMO/L — SIGNIFICANT CHANGE UP (ref 7–14)
APTT BLD: 35.4 SEC — SIGNIFICANT CHANGE UP (ref 27–36.3)
AST SERPL-CCNC: 28 U/L — SIGNIFICANT CHANGE UP (ref 4–32)
BILIRUB SERPL-MCNC: 0.4 MG/DL — SIGNIFICANT CHANGE UP (ref 0.2–1.2)
BUN SERPL-MCNC: 4 MG/DL — LOW (ref 7–23)
CALCIUM SERPL-MCNC: 8.7 MG/DL — SIGNIFICANT CHANGE UP (ref 8.4–10.5)
CHLORIDE SERPL-SCNC: 105 MMOL/L — SIGNIFICANT CHANGE UP (ref 98–107)
CO2 SERPL-SCNC: 24 MMOL/L — SIGNIFICANT CHANGE UP (ref 22–31)
CREAT SERPL-MCNC: 0.81 MG/DL — SIGNIFICANT CHANGE UP (ref 0.5–1.3)
GLUCOSE SERPL-MCNC: 81 MG/DL — SIGNIFICANT CHANGE UP (ref 70–99)
HCT VFR BLD CALC: 28.7 % — LOW (ref 34.5–45)
HGB BLD-MCNC: 8.8 G/DL — LOW (ref 11.5–15.5)
INR BLD: 1.05 — SIGNIFICANT CHANGE UP (ref 0.88–1.16)
MAGNESIUM SERPL-MCNC: 1.6 MG/DL — SIGNIFICANT CHANGE UP (ref 1.6–2.6)
MCHC RBC-ENTMCNC: 25.9 PG — LOW (ref 27–34)
MCHC RBC-ENTMCNC: 30.7 % — LOW (ref 32–36)
MCV RBC AUTO: 84.4 FL — SIGNIFICANT CHANGE UP (ref 80–100)
NRBC # FLD: 0 K/UL — SIGNIFICANT CHANGE UP (ref 0–0)
PHOSPHATE SERPL-MCNC: 2.3 MG/DL — LOW (ref 2.5–4.5)
PLATELET # BLD AUTO: 273 K/UL — SIGNIFICANT CHANGE UP (ref 150–400)
PMV BLD: 10.5 FL — SIGNIFICANT CHANGE UP (ref 7–13)
POTASSIUM SERPL-MCNC: 3.9 MMOL/L — SIGNIFICANT CHANGE UP (ref 3.5–5.3)
POTASSIUM SERPL-SCNC: 3.9 MMOL/L — SIGNIFICANT CHANGE UP (ref 3.5–5.3)
PROT SERPL-MCNC: 5.5 G/DL — LOW (ref 6–8.3)
PROTHROM AB SERPL-ACNC: 12.1 SEC — SIGNIFICANT CHANGE UP (ref 10.6–13.6)
RBC # BLD: 3.4 M/UL — LOW (ref 3.8–5.2)
RBC # FLD: 16.7 % — HIGH (ref 10.3–14.5)
SODIUM SERPL-SCNC: 139 MMOL/L — SIGNIFICANT CHANGE UP (ref 135–145)
WBC # BLD: 4.45 K/UL — SIGNIFICANT CHANGE UP (ref 3.8–10.5)
WBC # FLD AUTO: 4.45 K/UL — SIGNIFICANT CHANGE UP (ref 3.8–10.5)

## 2020-08-17 PROCEDURE — 99233 SBSQ HOSP IP/OBS HIGH 50: CPT | Mod: GC

## 2020-08-17 RX ORDER — MAGNESIUM SULFATE 500 MG/ML
1 VIAL (ML) INJECTION ONCE
Refills: 0 | Status: COMPLETED | OUTPATIENT
Start: 2020-08-17 | End: 2020-08-17

## 2020-08-17 RX ORDER — SUCRALFATE 1 G
10 TABLET ORAL
Qty: 1200 | Refills: 0
Start: 2020-08-17 | End: 2020-09-15

## 2020-08-17 RX ORDER — ENOXAPARIN SODIUM 100 MG/ML
80 INJECTION SUBCUTANEOUS
Qty: 4800 | Refills: 0
Start: 2020-08-17 | End: 2020-09-15

## 2020-08-17 RX ORDER — WARFARIN SODIUM 2.5 MG/1
5 TABLET ORAL ONCE
Refills: 0 | Status: COMPLETED | OUTPATIENT
Start: 2020-08-17 | End: 2020-08-17

## 2020-08-17 RX ORDER — APIXABAN 2.5 MG/1
2 TABLET, FILM COATED ORAL
Qty: 28 | Refills: 0
Start: 2020-08-17 | End: 2020-08-23

## 2020-08-17 RX ADMIN — ENOXAPARIN SODIUM 80 MILLIGRAM(S): 100 INJECTION SUBCUTANEOUS at 05:11

## 2020-08-17 RX ADMIN — PANTOPRAZOLE SODIUM 40 MILLIGRAM(S): 20 TABLET, DELAYED RELEASE ORAL at 05:10

## 2020-08-17 RX ADMIN — Medication 100 GRAM(S): at 08:08

## 2020-08-17 RX ADMIN — PANTOPRAZOLE SODIUM 40 MILLIGRAM(S): 20 TABLET, DELAYED RELEASE ORAL at 17:01

## 2020-08-17 RX ADMIN — Medication 1 GRAM(S): at 12:43

## 2020-08-17 RX ADMIN — Medication 1 GRAM(S): at 17:00

## 2020-08-17 RX ADMIN — WARFARIN SODIUM 5 MILLIGRAM(S): 2.5 TABLET ORAL at 19:31

## 2020-08-17 RX ADMIN — ENOXAPARIN SODIUM 80 MILLIGRAM(S): 100 INJECTION SUBCUTANEOUS at 17:00

## 2020-08-17 RX ADMIN — Medication 1 GRAM(S): at 23:01

## 2020-08-17 RX ADMIN — Medication 1 GRAM(S): at 05:10

## 2020-08-17 RX ADMIN — Medication 63.75 MILLIMOLE(S): at 09:17

## 2020-08-17 NOTE — PROGRESS NOTE ADULT - PROBLEM SELECTOR PLAN 2
VA duplex venous w/ evidence of DVT in L brachial and radial veins, superficial venous thrombosis in L basilic and cephalic and R basilic. No evidence of LUE arterial thrombus. L hand swollen with delayed capillary refill 8/11 although improving 8/12 and stable today. Repeat LUE venous duplex 8/13 with resolution of radial vein thrombus and without proximal propagation of clot.  - Hgb stable post transfusion  -c/w therapeutic lovenox 80 bid --> transition to DOAC tomorrow if H&H stable VA duplex venous w/ evidence of DVT in L brachial and radial veins, superficial venous thrombosis in L basilic and cephalic and R basilic. No evidence of LUE arterial thrombus. L hand swollen with delayed capillary refill 8/11 although improving 8/12 and stable today. Repeat LUE venous duplex 8/13 with resolution of radial vein thrombus and without proximal propagation of clot.  - Hgb stable post transfusion  -c/w therapeutic lovenox 80 bid --> transition to DOAC tomorrow if H&H stable  - will likely d/c on eliquis

## 2020-08-17 NOTE — PROGRESS NOTE ADULT - SUBJECTIVE AND OBJECTIVE BOX
*INCOMPLETE NOTE*  *******************************************************  Kenny Stock MD PGY-1  Internal Medicine  Pager [phone] / [phone]  *******************************************************  Patient is a 79y old  Female who presents with a chief complaint of Acute renal failure, high anion gap metabolic acidosis (16 Aug 2020 06:44)          NOTE TO BE UPDATED AFTER ROUNDS *INCOMPLETE NOTE*  *******************************************************  Kenny Stock MD PGY-1  Internal Medicine  Pager [phone] / [phone]  *******************************************************  Patient is a 79y old  Female who presents with a chief complaint of Acute renal failure, high anion gap metabolic acidosis (17 Aug 2020 07:03)      SUBJECTIVE / OVERNIGHT EVENTS:  - Patient seen and evaluated at bedside.  - No acute events overnight.   - Denies fevers/chills, headache, SOB at rest, chest pain, palpitations, abdominal pain, nausea/vomiting/diarrhea/constipation, dysuria    MEDICATIONS  (STANDING):  enoxaparin Injectable 80 milliGRAM(s) SubCutaneous two times a day  ondansetron Injectable 4 milliGRAM(s) IV Push once  pantoprazole  Injectable 40 milliGRAM(s) IV Push every 12 hours  sucralfate suspension 1 Gram(s) Oral four times a day    MEDICATIONS  (PRN):  aluminum hydroxide/magnesium hydroxide/simethicone Suspension 30 milliLiter(s) Oral every 6 hours PRN Dyspepsia      CAPILLARY BLOOD GLUCOSE        I&O's Summary    16 Aug 2020 07:01  -  17 Aug 2020 07:00  --------------------------------------------------------  IN: 675 mL / OUT: 300 mL / NET: 375 mL      Daily     Daily     PHYSICAL EXAM:  Vital Signs Last 24 Hrs  T(C): 36.9 (17 Aug 2020 05:05), Max: 37 (16 Aug 2020 20:00)  T(F): 98.5 (17 Aug 2020 05:05), Max: 98.6 (16 Aug 2020 20:00)  HR: 82 (17 Aug 2020 05:05) (71 - 91)  BP: 128/78 (17 Aug 2020 05:05) (116/78 - 135/80)  BP(mean): --  RR: 18 (17 Aug 2020 05:05) (16 - 18)  SpO2: 98% (17 Aug 2020 05:05) (97% - 100%)    CONSTITUTIONAL: NAD, obese, resting comfortably in bed  RESPIRATORY: Normal respiratory effort; lungs are clear to auscultation bilaterally  CARDIOVASCULAR: Regular rate, normal S1 and S2, no murmur/rub/gallop; No JVD; No lower extremity edema; Peripheral pulses are 2+ bilaterally  ABDOMEN: Soft, nontender to palpation, normoactive bowel sounds, no rebound/guarding; No hepatosplenomegaly  MUSCLOSKELETAL: no joint swelling or tenderness to palpation, full strength all extremities.  EXTREMITIES: L hand swelling resolved, no erythema. hands/feet are warm, and without cyanosis or clubbing  SKIN: mild pallor. no visible rashes, diaphoresis, or jaundice  NEURO: No focal deficits; moving all extremities  PSYCH: A+O to person, place, and time; affect appropriate; cooperative    LABS:                        8.8    4.45  )-----------( 273      ( 17 Aug 2020 05:04 )             28.7     08-17    139  |  105  |  4<L>  ----------------------------<  81  3.9   |  24  |  0.81    Ca    8.7      17 Aug 2020 05:04  Phos  2.3     08-17  Mg     1.6     08-17    TPro  5.5<L>  /  Alb  2.9<L>  /  TBili  0.4  /  DBili  x   /  AST  28  /  ALT  66<H>  /  AlkPhos  196<H>  08-17                RADIOLOGY & ADDITIONAL TESTS:  Results Reviewed:   Imaging Personally Reviewed:  Electrocardiogram Personally Reviewed:    COORDINATION OF CARE:  Care Discussed with Consultants/Other Providers [Y/N]:   Prior or Outpatient Records Reviewed [Y/N]: *******************************************************  Kenny Stock MD PGY-1  Internal Medicine  Pager [phone] / [phone]  *******************************************************  Patient is a 79y old  Female who presents with a chief complaint of Acute renal failure, high anion gap metabolic acidosis (17 Aug 2020 07:03)      SUBJECTIVE / OVERNIGHT EVENTS:  - Patient seen and evaluated at bedside.  - No acute events overnight.   - Denies fevers/chills, headache, SOB at rest, chest pain, palpitations, abdominal pain, nausea/vomiting/diarrhea/constipation, dysuria    MEDICATIONS  (STANDING):  enoxaparin Injectable 80 milliGRAM(s) SubCutaneous two times a day  ondansetron Injectable 4 milliGRAM(s) IV Push once  pantoprazole  Injectable 40 milliGRAM(s) IV Push every 12 hours  sucralfate suspension 1 Gram(s) Oral four times a day    MEDICATIONS  (PRN):  aluminum hydroxide/magnesium hydroxide/simethicone Suspension 30 milliLiter(s) Oral every 6 hours PRN Dyspepsia      CAPILLARY BLOOD GLUCOSE        I&O's Summary    16 Aug 2020 07:01  -  17 Aug 2020 07:00  --------------------------------------------------------  IN: 675 mL / OUT: 300 mL / NET: 375 mL      Daily     Daily     PHYSICAL EXAM:  Vital Signs Last 24 Hrs  T(C): 36.9 (17 Aug 2020 05:05), Max: 37 (16 Aug 2020 20:00)  T(F): 98.5 (17 Aug 2020 05:05), Max: 98.6 (16 Aug 2020 20:00)  HR: 82 (17 Aug 2020 05:05) (71 - 91)  BP: 128/78 (17 Aug 2020 05:05) (116/78 - 135/80)  BP(mean): --  RR: 18 (17 Aug 2020 05:05) (16 - 18)  SpO2: 98% (17 Aug 2020 05:05) (97% - 100%)    CONSTITUTIONAL: NAD, obese, resting comfortably in bed  RESPIRATORY: Normal respiratory effort; lungs are clear to auscultation bilaterally  CARDIOVASCULAR: Regular rate, normal S1 and S2, no murmur/rub/gallop; No JVD; No lower extremity edema; Peripheral pulses are 2+ bilaterally  ABDOMEN: Soft, nontender to palpation, normoactive bowel sounds, no rebound/guarding; No hepatosplenomegaly  MUSCLOSKELETAL: no joint swelling or tenderness to palpation, full strength all extremities.  EXTREMITIES: L hand swelling resolved, no erythema. hands/feet are warm, and without cyanosis or clubbing  SKIN: mild pallor. no visible rashes, diaphoresis, or jaundice  NEURO: No focal deficits; moving all extremities  PSYCH: A+O to person, place, and time; affect appropriate; cooperative    LABS:                        8.8    4.45  )-----------( 273      ( 17 Aug 2020 05:04 )             28.7     08-17    139  |  105  |  4<L>  ----------------------------<  81  3.9   |  24  |  0.81    Ca    8.7      17 Aug 2020 05:04  Phos  2.3     08-17  Mg     1.6     08-17    TPro  5.5<L>  /  Alb  2.9<L>  /  TBili  0.4  /  DBili  x   /  AST  28  /  ALT  66<H>  /  AlkPhos  196<H>  08-17                RADIOLOGY & ADDITIONAL TESTS:  Results Reviewed:   Imaging Personally Reviewed:  Electrocardiogram Personally Reviewed:    COORDINATION OF CARE:  Care Discussed with Consultants/Other Providers [Y/N]:   Prior or Outpatient Records Reviewed [Y/N]: *******************************************************  Kenny Stock MD PGY-1  Internal Medicine  Pager [phone] / [phone]  *******************************************************  Patient is a 79y old  Female who presents with a chief complaint of Acute renal failure, high anion gap metabolic acidosis (17 Aug 2020 07:03)      SUBJECTIVE / OVERNIGHT EVENTS:  - Patient seen and evaluated at bedside.  - No acute events overnight.   - Denies fevers/chills, headache, SOB at rest, chest pain, palpitations, abdominal pain, nausea/vomiting/diarrhea/constipation, dysuria    MEDICATIONS  (STANDING):  enoxaparin Injectable 80 milliGRAM(s) SubCutaneous two times a day  ondansetron Injectable 4 milliGRAM(s) IV Push once  pantoprazole  Injectable 40 milliGRAM(s) IV Push every 12 hours  sucralfate suspension 1 Gram(s) Oral four times a day    MEDICATIONS  (PRN):  aluminum hydroxide/magnesium hydroxide/simethicone Suspension 30 milliLiter(s) Oral every 6 hours PRN Dyspepsia      CAPILLARY BLOOD GLUCOSE        I&O's Summary    16 Aug 2020 07:01  -  17 Aug 2020 07:00  --------------------------------------------------------  IN: 675 mL / OUT: 300 mL / NET: 375 mL      Daily     Daily     PHYSICAL EXAM:  Vital Signs Last 24 Hrs  T(C): 36.9 (17 Aug 2020 05:05), Max: 37 (16 Aug 2020 20:00)  T(F): 98.5 (17 Aug 2020 05:05), Max: 98.6 (16 Aug 2020 20:00)  HR: 82 (17 Aug 2020 05:05) (71 - 91)  BP: 128/78 (17 Aug 2020 05:05) (116/78 - 135/80)  BP(mean): --  RR: 18 (17 Aug 2020 05:05) (16 - 18)  SpO2: 98% (17 Aug 2020 05:05) (97% - 100%)    CONSTITUTIONAL: NAD, obese, resting comfortably in bed  RESPIRATORY: Normal respiratory effort; lungs are clear to auscultation bilaterally  CARDIOVASCULAR: Regular rate, normal S1 and S2, no murmur/rub/gallop; No JVD; No lower extremity edema; Peripheral pulses are 2+ bilaterally  ABDOMEN: Soft, nontender to palpation, normoactive bowel sounds, no rebound/guarding; No hepatosplenomegaly  MUSCLOSKELETAL: no joint swelling or tenderness to palpation, full strength all extremities.  EXTREMITIES: L hand swelling resolved, no erythema. hands/feet are warm, and without cyanosis or clubbing  SKIN: mild pallor. no visible rashes, diaphoresis, or jaundice  NEURO: No focal deficits; moving all extremities  PSYCH: A+O to person, place, and time; affect appropriate; cooperative    LABS:                        8.8    4.45  )-----------( 273      ( 17 Aug 2020 05:04 )             28.7     08-17    139  |  105  |  4<L>  ----------------------------<  81  3.9   |  24  |  0.81    Ca    8.7      17 Aug 2020 05:04  Phos  2.3     08-17  Mg     1.6     08-17    TPro  5.5<L>  /  Alb  2.9<L>  /  TBili  0.4  /  DBili  x   /  AST  28  /  ALT  66<H>  /  AlkPhos  196<H>  08-17        RADIOLOGY & ADDITIONAL TESTS:  Results Reviewed:   Imaging Personally Reviewed:  Electrocardiogram Personally Reviewed:    COORDINATION OF CARE:  Care Discussed with Consultants/Other Providers [Y/N]:   Prior or Outpatient Records Reviewed [Y/N]:

## 2020-08-17 NOTE — PROGRESS NOTE ADULT - ATTENDING COMMENTS
Patient seen and examined, chart and labs reviewed. Case discussed with house staff.     79F with HTN, dementia presented with hyperkalemia and AGMA, uremia 2/2 acute renal failure 2/2 prerenal azotemia (now resolved). Course c/b hypothermia, hypotension, choledocholithiasis and acute blood loss anemia (s/p 1U blood), also found to have acute LUE DVT, heparin drip stopped due to melena ON on 8/8, concern for UGI bleed, s/p EGD showing esophagitis.     #Melena/Hematemesis   - S/p EGD 8/10 showing esophagitis and ERCP s/p biliary sphincterotomy and removal of CBD stone. Patient had episode of hematemesis on 8/12 and heparin gtt was discontinued. --> Repeat EGD performed 8/12 showing esophagitis w/ clot treated w/ bicap, and blood clot seen at sphincterotomy site s/p clip placement. This is likely source of patient's recurrent hematemesis.   - S/p 1U PRBCs on 8/13 (2U total). Will continue to monitor CBC   - C/w protonix IV BID and Carafate     #LUE DVT   - Repeat UE dopplers done 8/13 appears stable.   - Vasc cards recs appreciated, discussed with Dr. Guthrie today. Since H/H stable, okay to d/c on AC for 3 months for acute DVT. Patient's insurance does not cover DOAC or lovenox. Will attempt to get coupon code with  for DOAC. If not, will need to bridge to coumadin.     #VANDANA   - Cr 18.74 on admission, now normalized. Likely VANDANA in setting of decreased PO intake + hypotension   - D/c sodium bicarbonate as CO2 normal     #Dispo: PT recs home w/ home PT once medically optimized.

## 2020-08-17 NOTE — PROGRESS NOTE ADULT - PROBLEM SELECTOR PLAN 1
S/p EGD on 8/10 and 8/12. First endoscopy with only esophagitis, second with esophageal ulcer with clot and bleeding from sphincterotomy site.  -c/w sucrafalate 1g qid  -s/p 1 U pRBC on 8/8, 8/13   - Hb stable this AM; will trend daily  - watch for bleeding on lovenox  - Advance diet as tolerated

## 2020-08-17 NOTE — PROGRESS NOTE ADULT - PROBLEM SELECTOR PLAN 8
DVT PPx: therapeutic LVX  Diet: Advanced to regular diet  Med Rec: Lotrel (amlodipine 10 mg / benazepril 20 mg) daily                Omeprazole 20 mg daily                asa 81 mg

## 2020-08-17 NOTE — PROGRESS NOTE ADULT - PROBLEM SELECTOR PLAN 4
Pt w/ microcytic anemia and during hospitalization had an episode of melena after heparin gtt and two episodes of hematemesis. EGD on 8/12 demonstrating esophageal ulcer with clot as well as bleeding from sphincterotomy site now s/p clip.   - Trend CBC daily  - Transfuse for Hgb < 7 or if patient symptomatic.  - 1 u pRBC on 8/8.   - 1 u pRBC 8/13.

## 2020-08-17 NOTE — CHART NOTE - NSCHARTNOTEFT_GEN_A_CORE
Completed benefit review form for eliquis 360 support for this patient. Goal is to get patient a "copay card" where she will pay $10 per 30 day supply of eliquis. Obtained patient's consent and signature and filled out and faxed form.    Dre Stock  PGY1 HS team 1

## 2020-08-17 NOTE — PROGRESS NOTE ADULT - PROBLEM SELECTOR PLAN 3
Incidentally found on abdominal CT. s/p EGD/ERCP on 8/10 with removal of gallstone from CBD with sphincterotomy. - RESOLVED  -LFTs in AM elevated, per GI most likely 2/2 biliary tract manipulation during ERCP; will trend - still downtrending this AM  - Diet advanced

## 2020-08-18 ENCOUNTER — TRANSCRIPTION ENCOUNTER (OUTPATIENT)
Age: 80
End: 2020-08-18

## 2020-08-18 LAB
ALBUMIN SERPL ELPH-MCNC: 2.8 G/DL — LOW (ref 3.3–5)
ALP SERPL-CCNC: 193 U/L — HIGH (ref 40–120)
ALT FLD-CCNC: 53 U/L — HIGH (ref 4–33)
ANION GAP SERPL CALC-SCNC: 11 MMO/L — SIGNIFICANT CHANGE UP (ref 7–14)
APTT BLD: 34.1 SEC — SIGNIFICANT CHANGE UP (ref 27–36.3)
AST SERPL-CCNC: 31 U/L — SIGNIFICANT CHANGE UP (ref 4–32)
BILIRUB SERPL-MCNC: 0.3 MG/DL — SIGNIFICANT CHANGE UP (ref 0.2–1.2)
BUN SERPL-MCNC: 4 MG/DL — LOW (ref 7–23)
CALCIUM SERPL-MCNC: 8.9 MG/DL — SIGNIFICANT CHANGE UP (ref 8.4–10.5)
CHLORIDE SERPL-SCNC: 104 MMOL/L — SIGNIFICANT CHANGE UP (ref 98–107)
CO2 SERPL-SCNC: 23 MMOL/L — SIGNIFICANT CHANGE UP (ref 22–31)
CREAT SERPL-MCNC: 0.78 MG/DL — SIGNIFICANT CHANGE UP (ref 0.5–1.3)
GLUCOSE SERPL-MCNC: 76 MG/DL — SIGNIFICANT CHANGE UP (ref 70–99)
HCT VFR BLD CALC: 29 % — LOW (ref 34.5–45)
HGB BLD-MCNC: 8.8 G/DL — LOW (ref 11.5–15.5)
INR BLD: 1.16 — SIGNIFICANT CHANGE UP (ref 0.88–1.16)
MAGNESIUM SERPL-MCNC: 1.9 MG/DL — SIGNIFICANT CHANGE UP (ref 1.6–2.6)
MCHC RBC-ENTMCNC: 25.2 PG — LOW (ref 27–34)
MCHC RBC-ENTMCNC: 30.3 % — LOW (ref 32–36)
MCV RBC AUTO: 83.1 FL — SIGNIFICANT CHANGE UP (ref 80–100)
NRBC # FLD: 0 K/UL — SIGNIFICANT CHANGE UP (ref 0–0)
PHOSPHATE SERPL-MCNC: 2.6 MG/DL — SIGNIFICANT CHANGE UP (ref 2.5–4.5)
PLATELET # BLD AUTO: 292 K/UL — SIGNIFICANT CHANGE UP (ref 150–400)
PMV BLD: 10.4 FL — SIGNIFICANT CHANGE UP (ref 7–13)
POTASSIUM SERPL-MCNC: 4.1 MMOL/L — SIGNIFICANT CHANGE UP (ref 3.5–5.3)
POTASSIUM SERPL-SCNC: 4.1 MMOL/L — SIGNIFICANT CHANGE UP (ref 3.5–5.3)
PROT SERPL-MCNC: 5.6 G/DL — LOW (ref 6–8.3)
PROTHROM AB SERPL-ACNC: 13.1 SEC — SIGNIFICANT CHANGE UP (ref 10.6–13.6)
RBC # BLD: 3.49 M/UL — LOW (ref 3.8–5.2)
RBC # FLD: 16.8 % — HIGH (ref 10.3–14.5)
SODIUM SERPL-SCNC: 138 MMOL/L — SIGNIFICANT CHANGE UP (ref 135–145)
WBC # BLD: 3.61 K/UL — LOW (ref 3.8–10.5)
WBC # FLD AUTO: 3.61 K/UL — LOW (ref 3.8–10.5)

## 2020-08-18 PROCEDURE — 99233 SBSQ HOSP IP/OBS HIGH 50: CPT | Mod: GC

## 2020-08-18 RX ORDER — WARFARIN SODIUM 2.5 MG/1
5 TABLET ORAL ONCE
Refills: 0 | Status: COMPLETED | OUTPATIENT
Start: 2020-08-18 | End: 2020-08-18

## 2020-08-18 RX ADMIN — Medication 1 GRAM(S): at 21:41

## 2020-08-18 RX ADMIN — Medication 1 GRAM(S): at 11:30

## 2020-08-18 RX ADMIN — ENOXAPARIN SODIUM 80 MILLIGRAM(S): 100 INJECTION SUBCUTANEOUS at 17:08

## 2020-08-18 RX ADMIN — PANTOPRAZOLE SODIUM 40 MILLIGRAM(S): 20 TABLET, DELAYED RELEASE ORAL at 17:08

## 2020-08-18 RX ADMIN — ENOXAPARIN SODIUM 80 MILLIGRAM(S): 100 INJECTION SUBCUTANEOUS at 05:11

## 2020-08-18 RX ADMIN — WARFARIN SODIUM 5 MILLIGRAM(S): 2.5 TABLET ORAL at 17:09

## 2020-08-18 RX ADMIN — Medication 1 GRAM(S): at 17:08

## 2020-08-18 RX ADMIN — PANTOPRAZOLE SODIUM 40 MILLIGRAM(S): 20 TABLET, DELAYED RELEASE ORAL at 05:10

## 2020-08-18 RX ADMIN — Medication 1 GRAM(S): at 05:11

## 2020-08-18 NOTE — PROGRESS NOTE ADULT - ASSESSMENT
79F with HTN, remote hx breast cancer (s/p chemo/rad). ? dementia presented with hyperkalemia and AGMA, uremia 2/2 acute renal failure 2/2 prerenal azotemia (now resolved). Course c/b hypothermia, hypotension, choledocholithiasis and ?cholangitis, UGIB, acute blood loss anemia (s/p 2U blood), also found to have LUE DVT, heparin drip stopped due to melena ON on 8/8, concern for UGI bleed. ERCP with removal of gallstone. first EGD with esophagitis, repeat with ulcer and bleeding from sphincterotomy site. Currently stable on therapeutic LVX. 79F with HTN, remote hx breast cancer (s/p chemo/rad). ? dementia presented with hyperkalemia and AGMA, uremia 2/2 acute renal failure 2/2 prerenal azotemia (now resolved). Course c/b hypothermia, hypotension, choledocholithiasis and ?cholangitis, UGIB, acute blood loss anemia (s/p 2U blood), also found to have LUE DVT, heparin drip stopped due to melena ON on 8/8, concern for UGI bleed. ERCP with removal of gallstone. first EGD with esophagitis, repeat with ulcer and bleeding from sphincterotomy site. Currently stable on therapeutic LVX, bridging to coumadin.

## 2020-08-18 NOTE — PROGRESS NOTE ADULT - SUBJECTIVE AND OBJECTIVE BOX
*INCOMPLETE NOTE*  *******************************************************  Kenny Stock MD PGY-1  Internal Medicine  Pager [phone] / [phone]  *******************************************************  Patient is a 79y old  Female who presents with a chief complaint of Acute renal failure, high anion gap metabolic acidosis (17 Aug 2020 07:03)      -Patient started on warfarin bridge yesterday evening. Attempting to secure discount for apixiban.    NOTE TO BE UPDATED AFTER ROUNDS *******************************************************  Kenny Stock MD PGY-1  Internal Medicine  Pager [phone] / [phone]  *******************************************************  Patient is a 79y old  Female who presents with a chief complaint of Acute renal failure, high anion gap metabolic acidosis (18 Aug 2020 07:09)      SUBJECTIVE / OVERNIGHT EVENTS:  - Patient seen and evaluated at bedside.  - No acute events overnight.   - Patient wants to go home  - Denies swelling/discomfort/pain in L hand. Denies bleeding, hematemesis, hematochezia, melena.  - Denies fevers/chills, headache, SOB at rest, chest pain, palpitations, abdominal pain, nausea/vomiting/diarrhea/constipation, dysuria    MEDICATIONS  (STANDING):  enoxaparin Injectable 80 milliGRAM(s) SubCutaneous two times a day  ondansetron Injectable 4 milliGRAM(s) IV Push once  pantoprazole  Injectable 40 milliGRAM(s) IV Push every 12 hours  sucralfate suspension 1 Gram(s) Oral four times a day  warfarin 5 milliGRAM(s) Oral once    MEDICATIONS  (PRN):  aluminum hydroxide/magnesium hydroxide/simethicone Suspension 30 milliLiter(s) Oral every 6 hours PRN Dyspepsia      CAPILLARY BLOOD GLUCOSE        I&O's Summary    17 Aug 2020 07:01  -  18 Aug 2020 07:00  --------------------------------------------------------  IN: 400 mL / OUT: 1000 mL / NET: -600 mL    18 Aug 2020 07:01  -  18 Aug 2020 13:46  --------------------------------------------------------  IN: 340 mL / OUT: 0 mL / NET: 340 mL        PHYSICAL EXAM:  Vital Signs Last 24 Hrs  T(C): 37 (18 Aug 2020 11:50), Max: 37 (18 Aug 2020 11:50)  T(F): 98.6 (18 Aug 2020 11:50), Max: 98.6 (18 Aug 2020 11:50)  HR: 77 (18 Aug 2020 11:50) (76 - 82)  BP: 117/80 (18 Aug 2020 11:50) (117/80 - 132/75)  RR: 18 (18 Aug 2020 11:50) (16 - 18)  SpO2: 98% (18 Aug 2020 11:50) (97% - 99%)    CONSTITUTIONAL: NAD, obese, resting comfortably in bed  RESPIRATORY: Normal respiratory effort; lungs are clear to auscultation bilaterally  CARDIOVASCULAR: Regular rate, normal S1 and S2, no murmur/rub/gallop; No JVD; No lower extremity edema; Peripheral pulses are 2+ bilaterally  ABDOMEN: Soft, nontender to palpation, normoactive bowel sounds, no rebound/guarding; No hepatosplenomegaly  MUSCLOSKELETAL: no joint swelling or tenderness to palpation, full strength all extremities.  EXTREMITIES: L hand swelling resolved, no erythema. hands/feet are warm, and without cyanosis or clubbing  SKIN: mild pallor. no visible rashes, diaphoresis, or jaundice  NEURO: No focal deficits; moving all extremities  PSYCH: A+O to person, place, and time; affect appropriate; cooperative    LABS:                        8.8    3.61  )-----------( 292      ( 18 Aug 2020 07:18 )             29.0     08-18    138  |  104  |  4<L>  ----------------------------<  76  4.1   |  23  |  0.78    Ca    8.9      18 Aug 2020 07:18  Phos  2.6     08-18  Mg     1.9     08-18    TPro  5.6<L>  /  Alb  2.8<L>  /  TBili  0.3  /  DBili  x   /  AST  31  /  ALT  53<H>  /  AlkPhos  193<H>  08-18    PT/INR - ( 18 Aug 2020 07:18 )   PT: 13.1 SEC;   INR: 1.16          PTT - ( 18 Aug 2020 07:18 )  PTT:34.1 SEC            RADIOLOGY & ADDITIONAL TESTS:  Results Reviewed:   Imaging Personally Reviewed:  Electrocardiogram Personally Reviewed:    COORDINATION OF CARE:  Care Discussed with Consultants/Other Providers [Y/N]:   Prior or Outpatient Records Reviewed [Y/N]: *******************************************************  Kenny Stock MD PGY-1  Internal Medicine  Pager [phone] / [phone]  *******************************************************  Patient is a 79y old  Female who presents with a chief complaint of Acute renal failure, high anion gap metabolic acidosis (18 Aug 2020 07:09)      SUBJECTIVE / OVERNIGHT EVENTS:  - Patient seen and evaluated at bedside.  - No acute events overnight.   - Patient wants to go home  - Denies swelling/discomfort/pain in L hand. Denies bleeding, hematemesis, hematochezia, melena.  - Denies fevers/chills, headache, SOB at rest, chest pain, palpitations, abdominal pain, nausea/vomiting/diarrhea/constipation, dysuria    MEDICATIONS  (STANDING):  enoxaparin Injectable 80 milliGRAM(s) SubCutaneous two times a day  ondansetron Injectable 4 milliGRAM(s) IV Push once  pantoprazole  Injectable 40 milliGRAM(s) IV Push every 12 hours  sucralfate suspension 1 Gram(s) Oral four times a day  warfarin 5 milliGRAM(s) Oral once    MEDICATIONS  (PRN):  aluminum hydroxide/magnesium hydroxide/simethicone Suspension 30 milliLiter(s) Oral every 6 hours PRN Dyspepsia      CAPILLARY BLOOD GLUCOSE  I&O's Summary    17 Aug 2020 07:01  -  18 Aug 2020 07:00  --------------------------------------------------------  IN: 400 mL / OUT: 1000 mL / NET: -600 mL    18 Aug 2020 07:01  -  18 Aug 2020 13:46  --------------------------------------------------------  IN: 340 mL / OUT: 0 mL / NET: 340 mL        PHYSICAL EXAM:  Vital Signs Last 24 Hrs  T(C): 37 (18 Aug 2020 11:50), Max: 37 (18 Aug 2020 11:50)  T(F): 98.6 (18 Aug 2020 11:50), Max: 98.6 (18 Aug 2020 11:50)  HR: 77 (18 Aug 2020 11:50) (76 - 82)  BP: 117/80 (18 Aug 2020 11:50) (117/80 - 132/75)  RR: 18 (18 Aug 2020 11:50) (16 - 18)  SpO2: 98% (18 Aug 2020 11:50) (97% - 99%)    CONSTITUTIONAL: NAD, obese, resting comfortably in bed  RESPIRATORY: Normal respiratory effort; lungs are clear to auscultation bilaterally  CARDIOVASCULAR: Regular rate, normal S1 and S2, no murmur/rub/gallop; No JVD; No lower extremity edema; Peripheral pulses are 2+ bilaterally  ABDOMEN: Soft, nontender to palpation, normoactive bowel sounds, no rebound/guarding; No hepatosplenomegaly  MUSCLOSKELETAL: no joint swelling or tenderness to palpation, full strength all extremities.  EXTREMITIES: L hand swelling resolved, no erythema. hands/feet are warm, and without cyanosis or clubbing  SKIN: no visible rashes, diaphoresis, or jaundice  NEURO: No focal deficits; moving all extremities  PSYCH: A+O to person, place, and time; affect appropriate; cooperative    LABS:                        8.8    3.61  )-----------( 292      ( 18 Aug 2020 07:18 )             29.0     08-18    138  |  104  |  4<L>  ----------------------------<  76  4.1   |  23  |  0.78    Ca    8.9      18 Aug 2020 07:18  Phos  2.6     08-18  Mg     1.9     08-18    TPro  5.6<L>  /  Alb  2.8<L>  /  TBili  0.3  /  DBili  x   /  AST  31  /  ALT  53<H>  /  AlkPhos  193<H>  08-18    PT/INR - ( 18 Aug 2020 07:18 )   PT: 13.1 SEC;   INR: 1.16          PTT - ( 18 Aug 2020 07:18 )  PTT:34.1 SEC    RADIOLOGY & ADDITIONAL TESTS:  Results Reviewed:   Imaging Personally Reviewed:  Electrocardiogram Personally Reviewed:    COORDINATION OF CARE:  Care Discussed with Consultants/Other Providers [Y/N]:   Prior or Outpatient Records Reviewed [Y/N]: 3 weeks

## 2020-08-18 NOTE — DISCHARGE NOTE NURSING/CASE MANAGEMENT/SOCIAL WORK - NSSCNAMETXT_GEN_ALL_CORE
[facility]. This agency will send a nurse to your home to evaluate you for further care.

## 2020-08-18 NOTE — DISCHARGE NOTE NURSING/CASE MANAGEMENT/SOCIAL WORK - NSDCFUADDAPPT_GEN_ALL_CORE_FT
Please follow up with your primary care doctor (Dr. Pena) within two weeks.    Please also follow up with a gastroenterologist about your intestinal bleeding.    Please also follow up with Dr. Guthrie about your blood clot.

## 2020-08-18 NOTE — DISCHARGE NOTE NURSING/CASE MANAGEMENT/SOCIAL WORK - PATIENT PORTAL LINK FT
You can access the FollowMyHealth Patient Portal offered by [facility] by registering at the following website: [email or web address]. By joining [employer]’s FollowMyHealth portal, you will also be able to view your health information using other applications (apps) compatible with our system.

## 2020-08-18 NOTE — PROGRESS NOTE ADULT - ATTENDING COMMENTS
Patient seen and examined, chart and labs reviewed. Case discussed with house staff.     79F with HTN, dementia presented with hyperkalemia and AGMA, uremia 2/2 acute renal failure 2/2 prerenal azotemia (now resolved). Course c/b hypothermia, hypotension, choledocholithiasis and acute blood loss anemia (s/p 2U blood), also found to have acute LUE DVT, heparin drip stopped due to melena ON on 8/8, concern for UGI bleed, s/p EGD showing esophagitis and repeat EGD for hematemesis on 8/12 showing clots. Now stable on lovenox bridge to coumadin.     #LUE DVT   - Repeat UE dopplers done 8/13 appears stable.   - Vasc cards recs appreciated. H/H stable on full dose lovenox. Will need AC for 3 months for acute DVT. Patient's insurance does not cover DOAC or lovenox. Will attempt to get coupon code with  for DOAC- however unsure timing of process, so now we are bridging to coumadin, second dose of 5mg tonight. INR in AM.      #Melena/Hematemesis   - S/p EGD 8/10 showing esophagitis and ERCP s/p biliary sphincterotomy and removal of CBD stone. Patient had episode of hematemesis on 8/12 and heparin gtt was discontinued. --> Repeat EGD performed 8/12 showing esophagitis w/ clot treated w/ bicap, and blood clot seen at sphincterotomy site s/p clip placement. This is likely source of patient's recurrent hematemesis.   - S/p 1U PRBCs on 8/13 (2U total). Will continue to monitor CBC   - C/w protonix IV BID and Carafate     #VANDANA   - Cr 18.74 on admission, now normalized. Likely VANDANA in setting of decreased PO intake + hypotension   - D/c sodium bicarbonate as CO2 normal     #Dispo: PT recs home w/ home PT once medically optimized.

## 2020-08-18 NOTE — PROGRESS NOTE ADULT - PROBLEM SELECTOR PLAN 2
VA duplex venous w/ evidence of DVT in L brachial and radial veins, superficial venous thrombosis in L basilic and cephalic and R basilic. No evidence of LUE arterial thrombus. L hand swollen with delayed capillary refill 8/11 although improving 8/12 and stable today. Repeat LUE venous duplex 8/13 with resolution of radial vein thrombus and without proximal propagation of clot.  - Hgb stable post transfusion  -c/w therapeutic lovenox 80 bid --> transition to DOAC tomorrow if H&H stable  - will likely d/c on eliquis VA duplex venous w/ evidence of DVT in L brachial and radial veins, superficial venous thrombosis in L basilic and cephalic and R basilic. No evidence of LUE arterial thrombus. L hand swollen with delayed capillary refill 8/11 although improving 8/12 and stable today. Repeat LUE venous duplex 8/13 with resolution of radial vein thrombus and without proximal propagation of clot.    Plan:  - Hgb stable post transfusion  -c/w therapeutic lovenox 80 bid + warfarin 5mg (bridging to warfarin in case can't obtain eliquis)  - will hopefully d/c on eliquis if insurance/payment issues can be settled S/p EGD on 8/10 and 8/12. First endoscopy with only esophagitis, second with esophageal ulcer with clot and bleeding from sphincterotomy site.    Plan:  -started warfarin 5mg bridging with lovenox  -attempting to secure discount for eliquis  -c/w sucrafalate 1g qid  -s/p 1 U pRBC on 8/8, 8/13   - Hb stable this AM; will trend daily  - watch for bleeding on lovenox  - Advance diet as tolerated

## 2020-08-18 NOTE — PROGRESS NOTE ADULT - PROBLEM SELECTOR PLAN 1
S/p EGD on 8/10 and 8/12. First endoscopy with only esophagitis, second with esophageal ulcer with clot and bleeding from sphincterotomy site.  -c/w sucrafalate 1g qid  -s/p 1 U pRBC on 8/8, 8/13   - Hb stable this AM; will trend daily  - watch for bleeding on lovenox  - Advance diet as tolerated S/p EGD on 8/10 and 8/12. First endoscopy with only esophagitis, second with esophageal ulcer with clot and bleeding from sphincterotomy site.    Plan:  -started warfarin 5mg bridging with lovenox  -attempting to secure discount for eliquis  -c/w sucrafalate 1g qid  -s/p 1 U pRBC on 8/8, 8/13   - Hb stable this AM; will trend daily  - watch for bleeding on lovenox  - Advance diet as tolerated VA duplex venous w/ evidence of DVT in L brachial and radial veins, superficial venous thrombosis in L basilic and cephalic and R basilic. No evidence of LUE arterial thrombus. L hand swollen with delayed capillary refill 8/11 although improving 8/12 and stable today. Repeat LUE venous duplex 8/13 with resolution of radial vein thrombus and without proximal propagation of clot.    Plan:  - Hgb stable post transfusion  -c/w therapeutic lovenox 80 bid + warfarin 5mg (bridging to warfarin in case can't obtain eliquis)  - will hopefully d/c on eliquis if insurance/payment issues can be settled

## 2020-08-18 NOTE — PROGRESS NOTE ADULT - PROBLEM SELECTOR PLAN 4
Pt w/ microcytic anemia and during hospitalization had an episode of melena after heparin gtt and two episodes of hematemesis. EGD on 8/12 demonstrating esophageal ulcer with clot as well as bleeding from sphincterotomy site now s/p clip.   - Trend CBC daily  - Transfuse for Hgb < 7 or if patient symptomatic.  - 1 u pRBC on 8/8.   - 1 u pRBC 8/13. Pt w/ microcytic anemia and during hospitalization had an episode of melena after heparin gtt and two episodes of hematemesis. EGD on 8/12 demonstrating esophageal ulcer with clot as well as bleeding from sphincterotomy site now s/p clip.   - Trend CBC daily. H/H has been stable on full AC.   - Transfuse for Hgb < 7 or if patient symptomatic.  - 1 u pRBC on 8/8.   - 1 u pRBC 8/13.

## 2020-08-18 NOTE — PROGRESS NOTE ADULT - PROBLEM SELECTOR PLAN 8
DVT PPx: therapeutic LVX  Diet: Advanced to regular diet  Med Rec: Lotrel (amlodipine 10 mg / benazepril 20 mg) daily                Omeprazole 20 mg daily                asa 81 mg DVT PPx: therapeutic LVX with bridge to coumadin   Diet: Advanced to regular diet  Med Rec: Lotrel (amlodipine 10 mg / benazepril 20 mg) daily                Omeprazole 20 mg daily                asa 81 mg

## 2020-08-19 LAB
ALBUMIN SERPL ELPH-MCNC: 2.9 G/DL — LOW (ref 3.3–5)
ALP SERPL-CCNC: 192 U/L — HIGH (ref 40–120)
ALT FLD-CCNC: 43 U/L — HIGH (ref 4–33)
ANION GAP SERPL CALC-SCNC: 12 MMO/L — SIGNIFICANT CHANGE UP (ref 7–14)
AST SERPL-CCNC: 28 U/L — SIGNIFICANT CHANGE UP (ref 4–32)
BILIRUB SERPL-MCNC: 0.3 MG/DL — SIGNIFICANT CHANGE UP (ref 0.2–1.2)
BLD GP AB SCN SERPL QL: NEGATIVE — SIGNIFICANT CHANGE UP
BUN SERPL-MCNC: 4 MG/DL — LOW (ref 7–23)
CALCIUM SERPL-MCNC: 8.8 MG/DL — SIGNIFICANT CHANGE UP (ref 8.4–10.5)
CHLORIDE SERPL-SCNC: 107 MMOL/L — SIGNIFICANT CHANGE UP (ref 98–107)
CO2 SERPL-SCNC: 21 MMOL/L — LOW (ref 22–31)
CREAT SERPL-MCNC: 0.71 MG/DL — SIGNIFICANT CHANGE UP (ref 0.5–1.3)
GLUCOSE SERPL-MCNC: 76 MG/DL — SIGNIFICANT CHANGE UP (ref 70–99)
HCT VFR BLD CALC: 27.7 % — LOW (ref 34.5–45)
HGB BLD-MCNC: 8.5 G/DL — LOW (ref 11.5–15.5)
INR BLD: 1.34 — HIGH (ref 0.88–1.16)
MAGNESIUM SERPL-MCNC: 1.8 MG/DL — SIGNIFICANT CHANGE UP (ref 1.6–2.6)
MCHC RBC-ENTMCNC: 25 PG — LOW (ref 27–34)
MCHC RBC-ENTMCNC: 30.7 % — LOW (ref 32–36)
MCV RBC AUTO: 81.5 FL — SIGNIFICANT CHANGE UP (ref 80–100)
NRBC # FLD: 0 K/UL — SIGNIFICANT CHANGE UP (ref 0–0)
PHOSPHATE SERPL-MCNC: 2 MG/DL — LOW (ref 2.5–4.5)
PLATELET # BLD AUTO: 356 K/UL — SIGNIFICANT CHANGE UP (ref 150–400)
PMV BLD: 10.4 FL — SIGNIFICANT CHANGE UP (ref 7–13)
POTASSIUM SERPL-MCNC: 3.6 MMOL/L — SIGNIFICANT CHANGE UP (ref 3.5–5.3)
POTASSIUM SERPL-SCNC: 3.6 MMOL/L — SIGNIFICANT CHANGE UP (ref 3.5–5.3)
PROT SERPL-MCNC: 5.7 G/DL — LOW (ref 6–8.3)
PROTHROM AB SERPL-ACNC: 15.2 SEC — HIGH (ref 10.6–13.6)
RBC # BLD: 3.4 M/UL — LOW (ref 3.8–5.2)
RBC # FLD: 16.9 % — HIGH (ref 10.3–14.5)
RH IG SCN BLD-IMP: POSITIVE — SIGNIFICANT CHANGE UP
SODIUM SERPL-SCNC: 140 MMOL/L — SIGNIFICANT CHANGE UP (ref 135–145)
WBC # BLD: 3.32 K/UL — LOW (ref 3.8–10.5)
WBC # FLD AUTO: 3.32 K/UL — LOW (ref 3.8–10.5)

## 2020-08-19 PROCEDURE — 99233 SBSQ HOSP IP/OBS HIGH 50: CPT | Mod: GC

## 2020-08-19 RX ORDER — WARFARIN SODIUM 2.5 MG/1
5 TABLET ORAL ONCE
Refills: 0 | Status: COMPLETED | OUTPATIENT
Start: 2020-08-19 | End: 2020-08-19

## 2020-08-19 RX ORDER — CALAMINE AND ZINC OXIDE AND PHENOL 160; 10 MG/ML; MG/ML
1 LOTION TOPICAL
Refills: 0 | Status: DISCONTINUED | OUTPATIENT
Start: 2020-08-19 | End: 2020-08-20

## 2020-08-19 RX ADMIN — ENOXAPARIN SODIUM 80 MILLIGRAM(S): 100 INJECTION SUBCUTANEOUS at 05:40

## 2020-08-19 RX ADMIN — Medication 63.75 MILLIMOLE(S): at 11:05

## 2020-08-19 RX ADMIN — PANTOPRAZOLE SODIUM 40 MILLIGRAM(S): 20 TABLET, DELAYED RELEASE ORAL at 05:40

## 2020-08-19 RX ADMIN — CALAMINE AND ZINC OXIDE AND PHENOL 1 APPLICATION(S): 160; 10 LOTION TOPICAL at 23:21

## 2020-08-19 RX ADMIN — Medication 1 GRAM(S): at 05:40

## 2020-08-19 RX ADMIN — PANTOPRAZOLE SODIUM 40 MILLIGRAM(S): 20 TABLET, DELAYED RELEASE ORAL at 18:03

## 2020-08-19 RX ADMIN — Medication 1 GRAM(S): at 18:03

## 2020-08-19 RX ADMIN — Medication 1 GRAM(S): at 11:05

## 2020-08-19 RX ADMIN — WARFARIN SODIUM 5 MILLIGRAM(S): 2.5 TABLET ORAL at 18:10

## 2020-08-19 RX ADMIN — Medication 1 GRAM(S): at 23:21

## 2020-08-19 RX ADMIN — ENOXAPARIN SODIUM 80 MILLIGRAM(S): 100 INJECTION SUBCUTANEOUS at 18:02

## 2020-08-19 NOTE — CHART NOTE - NSCHARTNOTEFT_GEN_A_CORE
Received call from patient's niece. She contacted the  for eliquis but was unable to receive a discount. She is still working on finding a discount/plan through her insurance. At this point she expressed that they wish for her to be discharged on eliquis and that they will continue to work on the payment after discharge. I informed her again that the first payment would cost the amount of the patient's deductible ~$450. She expressed understanding and said that they would continue to work on it through the programs that they are currently pursuing.

## 2020-08-19 NOTE — PROGRESS NOTE ADULT - PROBLEM SELECTOR PLAN 8
DVT PPx: therapeutic LVX with bridge to coumadin   Diet: Advanced to regular diet  Med Rec: Lotrel (amlodipine 10 mg / benazepril 20 mg) daily                Omeprazole 20 mg daily                asa 81 mg

## 2020-08-19 NOTE — PROGRESS NOTE ADULT - PROBLEM SELECTOR PLAN 1
VA duplex venous w/ evidence of DVT in L brachial and radial veins, superficial venous thrombosis in L basilic and cephalic and R basilic. No evidence of LUE arterial thrombus. L hand swollen with delayed capillary refill 8/11 although improving 8/12 and stable today. Repeat LUE venous duplex 8/13 with resolution of radial vein thrombus and without proximal propagation of clot.    Plan:  - Hgb stable post transfusion  -c/w therapeutic lovenox 80 bid + warfarin 5mg (bridging to warfarin in case can't obtain eliquis)  - will hopefully d/c on eliquis if insurance/payment issues can be settled

## 2020-08-19 NOTE — PROGRESS NOTE ADULT - PROBLEM SELECTOR PLAN 4
Pt w/ microcytic anemia and during hospitalization had an episode of melena after heparin gtt and two episodes of hematemesis. EGD on 8/12 demonstrating esophageal ulcer with clot as well as bleeding from sphincterotomy site now s/p clip.   - Trend CBC daily. H/H has been stable on full AC.   - Transfuse for Hgb < 7 or if patient symptomatic.  - 1 u pRBC on 8/8.   - 1 u pRBC 8/13.

## 2020-08-19 NOTE — PROGRESS NOTE ADULT - ATTENDING COMMENTS
Patient seen and examined, chart and labs reviewed. Case discussed with house staff.     79F with HTN, dementia presented with hyperkalemia and AGMA, uremia 2/2 acute renal failure 2/2 prerenal azotemia (now resolved). Course c/b hypothermia, hypotension, choledocholithiasis and acute blood loss anemia (s/p 2U blood), also found to have acute LUE DVT, heparin drip stopped due to melena ON on 8/8, concern for UGI bleed, s/p EGD showing esophagitis and repeat EGD for hematemesis on 8/12 showing clots. Now stable on lovenox bridge to coumadin.     #LUE DVT   - Repeat UE dopplers done 8/13 appears stable.   - Vasc cards recs appreciated. H/H stable on full dose lovenox. Will need AC for 3 months for acute DVT. Patient's insurance does not cover DOAC or lovenox. C/w lovenox bridge to coumadin, dose of 5mg tonight and f/u INR in AM.      #Melena/Hematemesis   - S/p EGD 8/10 showing esophagitis and ERCP s/p biliary sphincterotomy and removal of CBD stone. Patient had episode of hematemesis on 8/12 and heparin gtt was discontinued. --> Repeat EGD performed 8/12 showing esophagitis w/ clot treated w/ bicap, and blood clot seen at sphincterotomy site s/p clip placement. This is likely source of patient's recurrent hematemesis.   - S/p 1U PRBCs on 8/13 (2U total). Will continue to monitor CBC   - C/w protonix BID and Carafate     #VANDANA   - Cr 18.74 on admission, now normalized. Likely VANDANA in setting of decreased PO intake + hypotension   - D/c sodium bicarbonate as CO2 normal     #Dispo: PT recs home w/ home PT once medically optimized.

## 2020-08-19 NOTE — PROGRESS NOTE ADULT - ASSESSMENT
79F with HTN, remote hx breast cancer (s/p chemo/rad). ? dementia presented with hyperkalemia and AGMA, uremia 2/2 acute renal failure 2/2 prerenal azotemia (now resolved). Course c/b hypothermia, hypotension, choledocholithiasis and ?cholangitis, UGIB, acute blood loss anemia (s/p 2U blood), also found to have LUE DVT, heparin drip stopped due to melena ON on 8/8, concern for UGI bleed. ERCP with removal of gallstone. first EGD with esophagitis, repeat with ulcer and bleeding from sphincterotomy site. Currently stable on therapeutic LVX, bridging to coumadin. Working on getting Eliquis.

## 2020-08-19 NOTE — PROGRESS NOTE ADULT - PROBLEM SELECTOR PLAN 2
S/p EGD on 8/10 and 8/12. First endoscopy with only esophagitis, second with esophageal ulcer with clot and bleeding from sphincterotomy site.    Plan:  -started warfarin 5mg bridging with lovenox  -attempting to secure discount for eliquis  -c/w sucrafalate 1g qid  -s/p 1 U pRBC on 8/8, 8/13   - Hb stable this AM; will trend daily  - watch for bleeding on lovenox  - Advance diet as tolerated

## 2020-08-19 NOTE — PROGRESS NOTE ADULT - SUBJECTIVE AND OBJECTIVE BOX
*INCOMPLETE NOTE*  *******************************************************  Kenny Stock MD PGY-1  Internal Medicine  Pager [phone] / [phone]  *******************************************************  Patient is a 79y old  Female who presents with a chief complaint of Acute renal failure, high anion gap metabolic acidosis (18 Aug 2020 07:09)          NOTE TO BE UPDATED AFTER ROUNDS *******************************************************  Kenny Stock MD PGY-1  Internal Medicine  Pager [phone] / [phone]  *******************************************************  Patient is a 79y old  Female who presents with a chief complaint of Acute renal failure, high anion gap metabolic acidosis (18 Aug 2020 07:09)      SUBJECTIVE / OVERNIGHT EVENTS:  - Patient seen and evaluated at bedside.  - No acute events overnight.   - Patient wants to go home  - Denies swelling/discomfort/pain in L hand. Denies bleeding, hematemesis, hematochezia, melena.  - Denies fevers/chills, headache, SOB at rest, chest pain, palpitations, abdominal pain, nausea/vomiting/diarrhea/constipation, dysuria    MEDICATIONS  (STANDING):  enoxaparin Injectable 80 milliGRAM(s) SubCutaneous two times a day  ondansetron Injectable 4 milliGRAM(s) IV Push once  pantoprazole  Injectable 40 milliGRAM(s) IV Push every 12 hours  sucralfate suspension 1 Gram(s) Oral four times a day  warfarin 5 milliGRAM(s) Oral once    MEDICATIONS  (PRN):  aluminum hydroxide/magnesium hydroxide/simethicone Suspension 30 milliLiter(s) Oral every 6 hours PRN Dyspepsia      I&O's Summary    18 Aug 2020 07:01  -  19 Aug 2020 07:00  --------------------------------------------------------  IN: 640 mL / OUT: 700 mL / NET: -60 mL        PHYSICAL EXAM:  Vital Signs Last 24 Hrs  T(C): 37.3 (19 Aug 2020 11:42), Max: 37.3 (19 Aug 2020 11:42)  T(F): 99.2 (19 Aug 2020 11:42), Max: 99.2 (19 Aug 2020 11:42)  HR: 91 (19 Aug 2020 11:42) (61 - 91)  BP: 131/80 (19 Aug 2020 11:42) (131/80 - 132/72)  RR: 17 (19 Aug 2020 11:42) (16 - 18)  SpO2: 100% (19 Aug 2020 11:42) (97% - 100%)    CONSTITUTIONAL: NAD, obese, resting comfortably in bed  RESPIRATORY: Normal respiratory effort; lungs are clear to auscultation bilaterally  CARDIOVASCULAR: Regular rate, normal S1 and S2, no murmur/rub/gallop; No JVD; No lower extremity edema; Peripheral pulses are 2+ bilaterally  ABDOMEN: Soft, nontender to palpation, normoactive bowel sounds, no rebound/guarding; No hepatosplenomegaly  MUSCLOSKELETAL: no joint swelling or tenderness to palpation, full strength all extremities.  EXTREMITIES: L hand swelling resolved, no erythema. hands/feet are warm, and without cyanosis or clubbing  SKIN: no visible rashes, diaphoresis, or jaundice  NEURO: No focal deficits; moving all extremities  PSYCH: A+O to person, place, and time; affect appropriate; cooperative      LABS:                         8.5    3.32  )-----------( 356      ( 19 Aug 2020 06:35 )             27.7     08-19    140  |  107  |  4<L>  ----------------------------<  76  3.6   |  21<L>  |  0.71    Ca    8.8      19 Aug 2020 06:35  Phos  2.0     08-19  Mg     1.8     08-19    TPro  5.7<L>  /  Alb  2.9<L>  /  TBili  0.3  /  DBili  x   /  AST  28  /  ALT  43<H>  /  AlkPhos  192<H>  08-19      PT/INR - ( 19 Aug 2020 06:35 )   PT: 15.2 SEC;   INR: 1.34     PTT - ( 18 Aug 2020 07:18 )  PTT:34.1 SEC

## 2020-08-20 VITALS
RESPIRATION RATE: 18 BRPM | DIASTOLIC BLOOD PRESSURE: 77 MMHG | SYSTOLIC BLOOD PRESSURE: 121 MMHG | HEART RATE: 72 BPM | TEMPERATURE: 98 F | OXYGEN SATURATION: 99 %

## 2020-08-20 PROBLEM — Z00.00 ENCOUNTER FOR PREVENTIVE HEALTH EXAMINATION: Status: ACTIVE | Noted: 2020-08-20

## 2020-08-20 LAB
APTT BLD: 31.1 SEC — SIGNIFICANT CHANGE UP (ref 27–36.3)
HCT VFR BLD CALC: 28.8 % — LOW (ref 34.5–45)
HGB BLD-MCNC: 8.9 G/DL — LOW (ref 11.5–15.5)
INR BLD: 1.55 — HIGH (ref 0.88–1.16)
MCHC RBC-ENTMCNC: 25.2 PG — LOW (ref 27–34)
MCHC RBC-ENTMCNC: 30.9 % — LOW (ref 32–36)
MCV RBC AUTO: 81.6 FL — SIGNIFICANT CHANGE UP (ref 80–100)
NRBC # FLD: 0 K/UL — SIGNIFICANT CHANGE UP (ref 0–0)
PLATELET # BLD AUTO: 307 K/UL — SIGNIFICANT CHANGE UP (ref 150–400)
PMV BLD: 11 FL — SIGNIFICANT CHANGE UP (ref 7–13)
PROTHROM AB SERPL-ACNC: 17.3 SEC — HIGH (ref 10.6–13.6)
RBC # BLD: 3.53 M/UL — LOW (ref 3.8–5.2)
RBC # FLD: 16.9 % — HIGH (ref 10.3–14.5)
WBC # BLD: 3.44 K/UL — LOW (ref 3.8–10.5)
WBC # FLD AUTO: 3.44 K/UL — LOW (ref 3.8–10.5)

## 2020-08-20 PROCEDURE — 99239 HOSP IP/OBS DSCHRG MGMT >30: CPT | Mod: GC

## 2020-08-20 RX ORDER — SUCRALFATE 1 G
10 TABLET ORAL
Qty: 1200 | Refills: 0
Start: 2020-08-20 | End: 2020-09-18

## 2020-08-20 RX ORDER — APIXABAN 2.5 MG/1
1 TABLET, FILM COATED ORAL
Qty: 60 | Refills: 0
Start: 2020-08-20

## 2020-08-20 RX ORDER — AMLODIPINE BESYLATE AND BENAZEPRIL HYDROCHLORIDE 10; 20 MG/1; MG/1
1 CAPSULE ORAL
Qty: 0 | Refills: 0 | DISCHARGE

## 2020-08-20 RX ORDER — APIXABAN 2.5 MG/1
5 TABLET, FILM COATED ORAL EVERY 12 HOURS
Refills: 0 | Status: DISCONTINUED | OUTPATIENT
Start: 2020-08-20 | End: 2020-08-20

## 2020-08-20 RX ADMIN — Medication 1 GRAM(S): at 05:46

## 2020-08-20 RX ADMIN — PANTOPRAZOLE SODIUM 40 MILLIGRAM(S): 20 TABLET, DELAYED RELEASE ORAL at 05:46

## 2020-08-20 RX ADMIN — ENOXAPARIN SODIUM 80 MILLIGRAM(S): 100 INJECTION SUBCUTANEOUS at 05:46

## 2020-08-20 RX ADMIN — CALAMINE AND ZINC OXIDE AND PHENOL 1 APPLICATION(S): 160; 10 LOTION TOPICAL at 05:46

## 2020-08-20 RX ADMIN — Medication 1 GRAM(S): at 11:06

## 2020-08-20 NOTE — PROGRESS NOTE ADULT - PROBLEM SELECTOR PLAN 6
resolved  - Continue to hold home BP meds for now in the setting of bleed  - Med rec obtained and updated DVT PPx: eliquis   Diet: Advanced to regular diet  Med Rec: Lotrel (amlodipine 10 mg / benazepril 20 mg) daily                Omeprazole 20 mg daily                asa 81 mg

## 2020-08-20 NOTE — PROGRESS NOTE ADULT - PROVIDER SPECIALTY LIST ADULT
Anesthesia
Gastroenterology
Internal Medicine
Nephrology
Vascular Cardiology
Vascular Cardiology
Internal Medicine
Gastroenterology
Internal Medicine
Gastroenterology
Internal Medicine

## 2020-08-20 NOTE — CHART NOTE - NSCHARTNOTEFT_GEN_A_CORE
Source: Patient [ ]    Family [ ]     other [x ] RN, chart note.     Nutrition follow-up per protocol. 78 y/o F with hospital course c/b hypothermia, hypotension, choledocholithiasis. Per RN, pt is eating well. Pt sleeping at time of RD visit; unable to wake up. RN denies any GI issues.     Diet, Regular (08-16-20 @ 11:51)    PO intake:  < 50% [ ] 50-75% [ ]   % [ x]  other :  Current Weight: n/a  Weight (kg): 78.8 (08-12)    Edema: 2+ B/L legs   Pressure Injuries: none    __________________ Pertinent Medications__________________   MEDICATIONS  (STANDING):  calamine/zinc oxide Lotion 1 Application(s) Topical two times a day  enoxaparin Injectable 80 milliGRAM(s) SubCutaneous two times a day  ondansetron Injectable 4 milliGRAM(s) IV Push once  pantoprazole  Injectable 40 milliGRAM(s) IV Push every 12 hours  sucralfate suspension 1 Gram(s) Oral four times a day    MEDICATIONS  (PRN):  aluminum hydroxide/magnesium hydroxide/simethicone Suspension 30 milliLiter(s) Oral every 6 hours PRN Dyspepsia      __________________ Pertinent Labs__________________   08-19 Na140 mmol/L Glu 76 mg/dL K+ 3.6 mmol/L Cr  0.71 mg/dL BUN 4 mg/dL<L> 08-19 Phos 2.0 mg/dL<L> 08-19 Alb 2.9 g/dL<L>            Estimated Needs:   [x ] no change since previous assessment  [ ] recalculated:       Previous Nutrition Diagnosis: Inadequate oral intake    Nutrition Diagnosis is [ ] ongoing  [x ] resolved [ ] not applicable       Recommendations:  1. Regular diet   2. Encourage PO intake and honor food preferences as able.       Monitoring and Evaluation:     [x ] PO intake [ x] Tolerance to diet prescription [x ] weights [x ] follow up per protocol  [ ] other:

## 2020-08-20 NOTE — PROGRESS NOTE ADULT - ATTENDING COMMENTS
Patient seen and examined, chart and labs reviewed. Case discussed with house staff.     79F with HTN, dementia presented with hyperkalemia and AGMA, uremia 2/2 acute renal failure 2/2 prerenal azotemia (now resolved). Course c/b hypothermia, hypotension, choledocholithiasis and acute blood loss anemia (s/p 2U blood), also found to have acute LUE DVT, heparin drip stopped due to melena ON on 8/8, concern for UGI bleed, s/p EGD showing esophagitis and repeat EGD for hematemesis on 8/12 showing clots. Now stable on lovenox bridge to coumadin.     #LUE DVT   - Repeat UE dopplers done 8/13 appears stable.   - Vasc cards recs appreciated. H/H stable on full dose lovenox. Will need AC for 3 months for acute DVT. Patient's insurance does not cover DOAC or lovenox, so was started on bridge to coumadin.   - After discussions with family, they are willing to pay out of pocket for Eliquis and will     #Melena/Hematemesis   - S/p EGD 8/10 showing esophagitis and ERCP s/p biliary sphincterotomy and removal of CBD stone. Patient had episode of hematemesis on 8/12 and heparin gtt was discontinued. --> Repeat EGD performed 8/12 showing esophagitis w/ clot treated w/ bicap, and blood clot seen at sphincterotomy site s/p clip placement. This is likely source of patient's recurrent hematemesis.   - S/p 1U PRBCs on 8/13 (2U total). Will continue to monitor CBC   - C/w protonix BID and Carafate     #VANDANA   - Cr 18.74 on admission, now normalized. Likely VANDANA in setting of decreased PO intake + hypotension   - D/c sodium bicarbonate as CO2 normal     #Dispo: PT recs home w/ home PT once medically optimized. Patient seen and examined, chart and labs reviewed. Case discussed with house staff.     79F with HTN, dementia presented with hyperkalemia and AGMA, uremia 2/2 acute renal failure 2/2 prerenal azotemia (now resolved). Course c/b hypothermia, hypotension, choledocholithiasis and acute blood loss anemia (s/p 2U blood), also found to have acute LUE DVT, heparin drip stopped due to melena ON on 8/8, concern for UGI bleed, s/p EGD showing esophagitis and repeat EGD for hematemesis on 8/12 showing clots. Now stable on lovenox bridge to coumadin.     #LUE DVT   - Repeat UE dopplers done 8/13 appears stable.   - Vasc cards recs appreciated. H/H stable on full dose lovenox. Will need AC for 3 months for acute DVT. Patient's insurance does not cover DOAC or lovenox, so was started on bridge to coumadin.   - After discussions with family, they are willing to pay out of pocket for Eliquis    #Melena/Hematemesis, resolved   - S/p EGD 8/10 showing esophagitis and ERCP s/p biliary sphincterotomy and removal of CBD stone. Patient had episode of hematemesis on 8/12 and heparin gtt was discontinued. --> Repeat EGD performed 8/12 showing esophagitis w/ clot treated w/ bicap, and blood clot seen at sphincterotomy site s/p clip placement. This is likely source of patient's recurrent hematemesis.   - S/p 1U PRBCs on 8/13 (2U total). Will continue to monitor CBC   - C/w protonix BID and Carafate     #VANDANA   - Cr 18.74 on admission, now normalized. Likely VANDANA in setting of decreased PO intake + hypotension   - D/c sodium bicarbonate as CO2 normal     #Dispo: PT recs home w/ home PT. Patient is medically optimized for discharge home w/ home care today. Patient's niece to pay out of pocket for AC and was instructed to follow-up with PMD and Dr. Guthrie.     31 minutes spent on discharge planning.

## 2020-08-20 NOTE — PROGRESS NOTE ADULT - SUBJECTIVE AND OBJECTIVE BOX
Donald Ochoa, pgy 2  [facility] Team 1  [id number]  After 7pm; page night float    Patient is a 79y old  Female who presents with a chief complaint of Acute renal failure, high anion gap metabolic acidosis (19 Aug 2020 08:04)      SUBJECTIVE / OVERNIGHT EVENTS:  ADDITIONAL REVIEW OF SYSTEMS:    MEDICATIONS  (STANDING):  calamine/zinc oxide Lotion 1 Application(s) Topical two times a day  enoxaparin Injectable 80 milliGRAM(s) SubCutaneous two times a day  ondansetron Injectable 4 milliGRAM(s) IV Push once  pantoprazole  Injectable 40 milliGRAM(s) IV Push every 12 hours  sucralfate suspension 1 Gram(s) Oral four times a day    MEDICATIONS  (PRN):  aluminum hydroxide/magnesium hydroxide/simethicone Suspension 30 milliLiter(s) Oral every 6 hours PRN Dyspepsia      CAPILLARY BLOOD GLUCOSE        I&O's Summary    19 Aug 2020 07:01  -  20 Aug 2020 07:00  --------------------------------------------------------  IN: 480 mL / OUT: 0 mL / NET: 480 mL        PHYSICAL EXAM:  Vital Signs Last 24 Hrs  T(C): 37 (20 Aug 2020 05:45), Max: 37.3 (19 Aug 2020 11:42)  T(F): 98.6 (20 Aug 2020 05:45), Max: 99.2 (19 Aug 2020 11:42)  HR: 70 (20 Aug 2020 05:45) (70 - 91)  BP: 129/70 (20 Aug 2020 05:45) (129/70 - 131/80)  BP(mean): --  RR: 18 (20 Aug 2020 05:45) (17 - 18)  SpO2: 99% (20 Aug 2020 05:45) (99% - 100%)    LABS:                        8.5    3.32  )-----------( 356      ( 19 Aug 2020 06:35 )             27.7     08-19    140  |  107  |  4<L>  ----------------------------<  76  3.6   |  21<L>  |  0.71    Ca    8.8      19 Aug 2020 06:35  Phos  2.0     08-19  Mg     1.8     08-19    TPro  5.7<L>  /  Alb  2.9<L>  /  TBili  0.3  /  DBili  x   /  AST  28  /  ALT  43<H>  /  AlkPhos  192<H>  08-19    PT/INR - ( 19 Aug 2020 06:35 )   PT: 15.2 SEC;   INR: 1.34                      RADIOLOGY & ADDITIONAL TESTS:  Results Reviewed:   Imaging Personally Reviewed:  Electrocardiogram Personally Reviewed:    COORDINATION OF CARE:  Care Discussed with Consultants/Other Providers [Y/N]:  Prior or Outpatient Records Reviewed [Y/N]: Donald Ochoa, pgy 2  [facility] Team 1  [id number]  After 7pm; page night float    Patient is a 79y old  Female who presents with a chief complaint of Acute renal failure, high anion gap metabolic acidosis (19 Aug 2020 08:04)      SUBJECTIVE / OVERNIGHT EVENTS: pt seen and examined at bedside. She is doing well this morning. No chest pain, sob, fevers, chills, nausea, vomiting. Tolerating diet well. No problems with urination/stools. Aware of plan for her today   ADDITIONAL REVIEW OF SYSTEMS:  REVIEW OF SYSTEMS:    CONSTITUTIONAL: No weakness, fevers or chills  EYES/ENT: No visual changes;  No vertigo or throat pain   RESPIRATORY: No cough, No shortness of breath  CARDIOVASCULAR: No chest pain or palpitations  GASTROINTESTINAL: No abdominal or epigastric pain. No nausea, vomiting, or hematemesis; No diarrhea or constipation. No melena or hematochezia.  GENITOURINARY: No dysuria, frequency or hematuria  All other review of systems is negative unless indicated above.    MEDICATIONS  (STANDING):  calamine/zinc oxide Lotion 1 Application(s) Topical two times a day  enoxaparin Injectable 80 milliGRAM(s) SubCutaneous two times a day  ondansetron Injectable 4 milliGRAM(s) IV Push once  pantoprazole  Injectable 40 milliGRAM(s) IV Push every 12 hours  sucralfate suspension 1 Gram(s) Oral four times a day    MEDICATIONS  (PRN):  aluminum hydroxide/magnesium hydroxide/simethicone Suspension 30 milliLiter(s) Oral every 6 hours PRN Dyspepsia      CAPILLARY BLOOD GLUCOSE        I&O's Summary    19 Aug 2020 07:01  -  20 Aug 2020 07:00  --------------------------------------------------------  IN: 480 mL / OUT: 0 mL / NET: 480 mL        PHYSICAL EXAM:  Vital Signs Last 24 Hrs  T(C): 37 (20 Aug 2020 05:45), Max: 37.3 (19 Aug 2020 11:42)  T(F): 98.6 (20 Aug 2020 05:45), Max: 99.2 (19 Aug 2020 11:42)  PHYSICAL EXAM:  GENERAL: NAD, lying in bed comfortably  HEAD:  Atraumatic, Normocephalic  EYES: EOMI, PERRLA, conjunctiva and sclera clear  ENT: Moist mucous membranes  NECK: Supple, No JVD  CHEST/LUNG: Clear to auscultation bilaterally; No rales, rhonchi, wheezing, or rubs. Unlabored respirations  HEART: Regular rate and rhythm; No murmurs, rubs, or gallops  ABDOMEN: Bowel sounds present; Soft, Nontender, Nondistended. No hepatomegally  EXTREMITIES:  2+ Peripheral Pulses, brisk capillary refill. No clubbing, cyanosis, or edema  NERVOUS SYSTEM:  Alert & Oriented X3, speech clear. No deficits   MSK: FROM all 4 extremities, full and equal strength  SKIN: No rashes or lesionsHR: 70 (20 Aug 2020 05:45) (70 - 91)  BP: 129/70 (20 Aug 2020 05:45) (129/70 - 131/80)  BP(mean): --  RR: 18 (20 Aug 2020 05:45) (17 - 18)  SpO2: 99% (20 Aug 2020 05:45) (99% - 100%)    LABS:                        8.5    3.32  )-----------( 356      ( 19 Aug 2020 06:35 )             27.7     08-19    140  |  107  |  4<L>  ----------------------------<  76  3.6   |  21<L>  |  0.71    Ca    8.8      19 Aug 2020 06:35  Phos  2.0     08-19  Mg     1.8     08-19    TPro  5.7<L>  /  Alb  2.9<L>  /  TBili  0.3  /  DBili  x   /  AST  28  /  ALT  43<H>  /  AlkPhos  192<H>  08-19    PT/INR - ( 19 Aug 2020 06:35 )   PT: 15.2 SEC;   INR: 1.34                      RADIOLOGY & ADDITIONAL TESTS:  Results Reviewed:   Imaging Personally Reviewed:  Electrocardiogram Personally Reviewed:    COORDINATION OF CARE:  Care Discussed with Consultants/Other Providers [Y/N]:  Prior or Outpatient Records Reviewed [Y/N]: Donald Ochoa, pgy 2  [facility] Team 1  [id number]  After 7pm; page night float    Patient is a 79y old  Female who presents with a chief complaint of Acute renal failure, high anion gap metabolic acidosis (19 Aug 2020 08:04)      SUBJECTIVE / OVERNIGHT EVENTS: pt seen and examined at bedside. She is doing well this morning. No chest pain, sob, fevers, chills, nausea, vomiting. Tolerating diet well. No problems with urination/stools. Aware of plan for her today   ADDITIONAL REVIEW OF SYSTEMS:  CONSTITUTIONAL: No weakness, fevers or chills  EYES/ENT: No visual changes;  No vertigo or throat pain   RESPIRATORY: No cough, No shortness of breath  CARDIOVASCULAR: No chest pain or palpitations  GASTROINTESTINAL: No abdominal or epigastric pain. No nausea, vomiting, or hematemesis; No diarrhea or constipation. No melena or hematochezia.  GENITOURINARY: No dysuria, frequency or hematuria    MEDICATIONS  (STANDING):  calamine/zinc oxide Lotion 1 Application(s) Topical two times a day  enoxaparin Injectable 80 milliGRAM(s) SubCutaneous two times a day  ondansetron Injectable 4 milliGRAM(s) IV Push once  pantoprazole  Injectable 40 milliGRAM(s) IV Push every 12 hours  sucralfate suspension 1 Gram(s) Oral four times a day    MEDICATIONS  (PRN):  aluminum hydroxide/magnesium hydroxide/simethicone Suspension 30 milliLiter(s) Oral every 6 hours PRN Dyspepsia      CAPILLARY BLOOD GLUCOSE        I&O's Summary    19 Aug 2020 07:01  -  20 Aug 2020 07:00  --------------------------------------------------------  IN: 480 mL / OUT: 0 mL / NET: 480 mL      PHYSICAL EXAM:  Vital Signs Last 24 Hrs  T(C): 37 (20 Aug 2020 05:45), Max: 37.3 (19 Aug 2020 11:42)  T(F): 98.6 (20 Aug 2020 05:45), Max: 99.2 (19 Aug 2020 11:42)    PHYSICAL EXAM:  GENERAL: NAD, lying in bed comfortably  HEAD:  Atraumatic, Normocephalic  EYES: EOMI, PERRLA, conjunctiva and sclera clear  ENT: Moist mucous membranes  NECK: Supple, No JVD  CHEST/LUNG: Clear to auscultation bilaterally; No rales, rhonchi, wheezing, or rubs. Unlabored respirations  HEART: Regular rate and rhythm; No murmurs, rubs, or gallops  ABDOMEN: Bowel sounds present; Soft, Nontender, Nondistended. No hepatomegally  EXTREMITIES:  2+ Peripheral Pulses, brisk capillary refill. No clubbing, cyanosis, or edema  NERVOUS SYSTEM:  Alert & Oriented X3, speech clear. No deficits   MSK: FROM all 4 extremities, full and equal strength  SKIN: No rashes or lesions    HR: 70 (20 Aug 2020 05:45) (70 - 91)  BP: 129/70 (20 Aug 2020 05:45) (129/70 - 131/80)  BP(mean): --  RR: 18 (20 Aug 2020 05:45) (17 - 18)  SpO2: 99% (20 Aug 2020 05:45) (99% - 100%)    LABS:                        8.5    3.32  )-----------( 356      ( 19 Aug 2020 06:35 )             27.7     08-19    140  |  107  |  4<L>  ----------------------------<  76  3.6   |  21<L>  |  0.71    Ca    8.8      19 Aug 2020 06:35  Phos  2.0     08-19  Mg     1.8     08-19    TPro  5.7<L>  /  Alb  2.9<L>  /  TBili  0.3  /  DBili  x   /  AST  28  /  ALT  43<H>  /  AlkPhos  192<H>  08-19    PT/INR - ( 19 Aug 2020 06:35 )   PT: 15.2 SEC;   INR: 1.34            RADIOLOGY & ADDITIONAL TESTS:  Results Reviewed:   Imaging Personally Reviewed:  Electrocardiogram Personally Reviewed:    COORDINATION OF CARE:  Care Discussed with Consultants/Other Providers [Y/N]:  Prior or Outpatient Records Reviewed [Y/N]:

## 2020-08-20 NOTE — PROGRESS NOTE ADULT - PROBLEM SELECTOR PLAN 2
S/p EGD on 8/10 and 8/12. First endoscopy with only esophagitis, second with esophageal ulcer with clot and bleeding from sphincterotomy site.  -warfarin 5mg bridging with lovenox  -attempting to secure discount for eliquis  -c/w sucrafalate 1g qid  -s/p 1 U pRBC on 8/8, 8/13   - GI follow up as outpatient S/p EGD on 8/10 and 8/12. First endoscopy with only esophagitis, second with esophageal ulcer with clot and bleeding from sphincterotomy site.  -attempting to secure discount for eliquis  -c/w sucrafalate 1g qid  -s/p 1 U pRBC on 8/8, 8/13   - GI follow up as outpatient

## 2020-08-20 NOTE — PROGRESS NOTE ADULT - PROBLEM SELECTOR PLAN 8
DVT PPx: therapeutic LVX with bridge to coumadin   Diet: Advanced to regular diet  Med Rec: Lotrel (amlodipine 10 mg / benazepril 20 mg) daily                Omeprazole 20 mg daily                asa 81 mg DVT PPx: eliquis   Diet: Advanced to regular diet  Med Rec: Lotrel (amlodipine 10 mg / benazepril 20 mg) daily                Omeprazole 20 mg daily                asa 81 mg

## 2020-08-20 NOTE — PROGRESS NOTE ADULT - PROBLEM SELECTOR PLAN 7
Resolved. Transitions of Care Status:  1.  Name of PCP: Dr. Alyse ePna  2.  PCP Contacted on Admission: [ x] Y    [] N    3.  PCP contacted at Discharge: [ ] Y    [ ] N    [ ] N/A  4.  Post-Discharge Appointment Date and Location:  5.  Summary of Handoff given to PCP:

## 2020-08-20 NOTE — PROGRESS NOTE ADULT - ASSESSMENT
79F with HTN, remote hx breast cancer (s/p chemo/rad). ? dementia presented with hyperkalemia and AGMA, uremia 2/2 acute renal failure 2/2 prerenal azotemia (now resolved). Course c/b hypothermia, hypotension, choledocholithiasis and ?cholangitis, UGIB, acute blood loss anemia (s/p 2U blood), also found to have LUE DVT, heparin drip stopped due to melena ON on 8/8, concern for UGI bleed. ERCP with removal of gallstone. first EGD with esophagitis, repeat with ulcer and bleeding from sphincterotomy site. Currently stable on therapeutic LVX, bridging to coumadin. Working on getting Eliquis. 79F with HTN, remote hx breast cancer (s/p chemo/rad). ? dementia presented with hyperkalemia and AGMA, uremia 2/2 acute renal failure 2/2 prerenal azotemia (now resolved). Course c/b hypothermia, hypotension, choledocholithiasis and ?cholangitis, UGIB, acute blood loss anemia (s/p 2U blood), also found to have LUE DVT, heparin drip stopped due to melena ON on 8/8, concern for UGI bleed. ERCP with removal of gallstone. first EGD with esophagitis, repeat with ulcer and bleeding from sphincterotomy site. Currently stable with plan to start eliquis today.

## 2020-08-20 NOTE — PROGRESS NOTE ADULT - PROBLEM SELECTOR PLAN 9
Transitions of Care Status:  1.  Name of PCP: Dr. Alyse Pena  2.  PCP Contacted on Admission: [ x] Y    [] N    3.  PCP contacted at Discharge: [ ] Y    [ ] N    [ ] N/A  4.  Post-Discharge Appointment Date and Location:  5.  Summary of Handoff given to PCP:

## 2020-08-20 NOTE — PROGRESS NOTE ADULT - PROBLEM SELECTOR PLAN 1
VA duplex venous w/ evidence of DVT in L brachial and radial veins, superficial venous thrombosis in L basilic and cephalic and R basilic. No evidence of LUE arterial thrombus. L hand swollen with delayed capillary refill 8/11 although  stable today. Repeat LUE venous duplex 8/13 with resolution of radial vein thrombus and without proximal propagation of clot.  -c/w therapeutic lovenox 80 bid + warfarin 5mg (bridging to warfarin in case can't obtain eliquis)  - will hopefully d/c on eliquis if insurance/payment issues can be settled VA duplex venous w/ evidence of DVT in L brachial and radial veins, superficial venous thrombosis in L basilic and cephalic and R basilic. No evidence of LUE arterial thrombus. L hand swollen with delayed capillary refill 8/11 although  stable today. Repeat LUE venous duplex 8/13 with resolution of radial vein thrombus and without proximal propagation of clot.  - start eliquis today; sent to pharmacy. follow up with pcp and dr. ryan

## 2020-08-20 NOTE — PROGRESS NOTE ADULT - REASON FOR ADMISSION
Acute renal failure, high anion gap metabolic acidosis

## 2020-08-20 NOTE — PROGRESS NOTE ADULT - PROBLEM SELECTOR PLAN 3
Incidentally found on abdominal CT. s/p EGD/ERCP on 8/10 with removal of gallstone from CBD with sphincterotomy. - RESOLVED  -LFTs previously elevated per GI most likely 2/2 biliary tract manipulation during ERCP

## 2021-12-17 NOTE — PROGRESS NOTE ADULT - ASSESSMENT
"Patient states that her goals for the next month include:    1. \"Change out all my outlets\"  2. Order a storm door  3. Consider painting projects (kitchen and stairs)  4. Have sister visit  " 79F with HTN, remote hx breast cancer (s/p chemo/rad). ? dementia presented with hyperkalemia and AGMA, uremia 2/2 acute renal failure 2/2 prerenal azotemia (now resolved). Course c/b hypothermia, hypotension, choledocholithiasis and ?cholangitis, UGIB, acute blood loss anemia (s/p 2U blood), also found to have LUE DVT, heparin drip stopped due to melena ON on 8/8, concern for UGI bleed. ERCP with removal of gallstone. first EGD with esophagitis, repeat with ulcer and bleeding from sphincterotomy site. 79F with HTN, remote hx breast cancer (s/p chemo/rad). ? dementia presented with hyperkalemia and AGMA, uremia 2/2 acute renal failure 2/2 prerenal azotemia (now resolved). Course c/b hypothermia, hypotension, choledocholithiasis and ?cholangitis, UGIB, acute blood loss anemia (s/p 2U blood), also found to have LUE DVT, heparin drip stopped due to melena ON on 8/8, concern for UGI bleed. ERCP with removal of gallstone. first EGD with esophagitis, repeat with ulcer and bleeding from sphincterotomy site. Currently stable on therapeutic LVX.

## 2021-12-20 NOTE — PROGRESS NOTE ADULT - PROBLEM SELECTOR PROBLEM 5
Gastrointestinal Esophagogastroduodenoscopy (EGD) - Upper Exam Discharge Instructions    1. Call Dr. Ron Maradiaga at [phone] for any problems or questions. 2. Contact the doctor's office for follow up appointment as directed. 3. Medication may cause drowsiness for several hours, therefore:  · Do not drive or operate machinery for remainder of the day. · No alcohol today. · Do not make any important or legal decisions for 24 hours. · Do not sign any legal documents for 24 hours. 5. Ordinarily, you may resume regular diet and activity after exam unless otherwise specified by your physician. 6. For mild soreness in your throat you may use Cepacol throat lozenges or warm salt-water gargles as needed. Gastrointestinal Colonoscopy/Flexible Sigmoidoscopy - Lower Exam Discharge Instructions  1. Because of air put into your colon during exam, you may experience some abdominal distension, relieved by the passage of gas, for several hours. 2. Contact your physician if you have any of the following:  · Excessive amount of bleeding - large amount when having a bowel movement.   Occasional specks of blood with bowel movement would not be unusual.  · Severe abdominal pain  · Fever or Chills  Any additional instructions:  Dr. Ron Maradiaga office will call with pathology results and follow up appointment Acute renal failure

## 2022-03-22 NOTE — PROGRESS NOTE ADULT - PROBLEM SELECTOR PLAN 9
none Transitions of Care Status:  1.  Name of PCP:  2.  PCP Contacted on Admission: [ ] Y    [x] N    3.  PCP contacted at Discharge: [ ] Y    [ ] N    [ ] N/A  4.  Post-Discharge Appointment Date and Location:  5.  Summary of Handoff given to PCP:

## 2022-08-11 ENCOUNTER — INPATIENT (INPATIENT)
Facility: HOSPITAL | Age: 82
LOS: 12 days | Discharge: NURSING, REHAB OR LONG-TERM CARE | End: 2022-08-24
Attending: STUDENT IN AN ORGANIZED HEALTH CARE EDUCATION/TRAINING PROGRAM | Admitting: STUDENT IN AN ORGANIZED HEALTH CARE EDUCATION/TRAINING PROGRAM

## 2022-08-11 VITALS — HEIGHT: 61 IN

## 2022-08-11 DIAGNOSIS — R57.1 HYPOVOLEMIC SHOCK: ICD-10-CM

## 2022-08-11 LAB
ALBUMIN SERPL ELPH-MCNC: 2.8 G/DL — LOW (ref 3.3–5)
ALBUMIN SERPL ELPH-MCNC: 4.4 G/DL — SIGNIFICANT CHANGE UP (ref 3.3–5)
ALP SERPL-CCNC: 121 U/L — HIGH (ref 40–120)
ALP SERPL-CCNC: 88 U/L — SIGNIFICANT CHANGE UP (ref 40–120)
ALT FLD-CCNC: 40 U/L — HIGH (ref 4–33)
ALT FLD-CCNC: 42 U/L — HIGH (ref 4–33)
ANION GAP SERPL CALC-SCNC: 33 MMOL/L — HIGH (ref 7–14)
ANION GAP SERPL CALC-SCNC: 43 MMOL/L — HIGH (ref 7–14)
APAP SERPL-MCNC: <10 UG/ML — LOW (ref 15–25)
APPEARANCE UR: CLEAR — SIGNIFICANT CHANGE UP
APTT BLD: 23.6 SEC — LOW (ref 27–36.3)
AST SERPL-CCNC: 67 U/L — HIGH (ref 4–32)
AST SERPL-CCNC: 69 U/L — HIGH (ref 4–32)
BACTERIA # UR AUTO: ABNORMAL
BASE EXCESS BLDV CALC-SCNC: -13.8 MMOL/L — LOW (ref -2–3)
BASE EXCESS BLDV CALC-SCNC: -18.4 MMOL/L — LOW (ref -2–3)
BASOPHILS # BLD AUTO: 0.03 K/UL — SIGNIFICANT CHANGE UP (ref 0–0.2)
BASOPHILS NFR BLD AUTO: 0.2 % — SIGNIFICANT CHANGE UP (ref 0–2)
BILIRUB SERPL-MCNC: 1.1 MG/DL — SIGNIFICANT CHANGE UP (ref 0.2–1.2)
BILIRUB SERPL-MCNC: 1.4 MG/DL — HIGH (ref 0.2–1.2)
BILIRUB UR-MCNC: ABNORMAL
BLD GP AB SCN SERPL QL: NEGATIVE — SIGNIFICANT CHANGE UP
BLOOD GAS VENOUS COMPREHENSIVE RESULT: SIGNIFICANT CHANGE UP
BLOOD GAS VENOUS COMPREHENSIVE RESULT: SIGNIFICANT CHANGE UP
BUN SERPL-MCNC: 100 MG/DL — HIGH (ref 7–23)
BUN SERPL-MCNC: 97 MG/DL — HIGH (ref 7–23)
CALCIUM SERPL-MCNC: 10.8 MG/DL — HIGH (ref 8.4–10.5)
CALCIUM SERPL-MCNC: 7.9 MG/DL — LOW (ref 8.4–10.5)
CHLORIDE BLDV-SCNC: 80 MMOL/L — LOW (ref 96–108)
CHLORIDE BLDV-SCNC: 93 MMOL/L — LOW (ref 96–108)
CHLORIDE SERPL-SCNC: 71 MMOL/L — LOW (ref 98–107)
CHLORIDE SERPL-SCNC: 89 MMOL/L — LOW (ref 98–107)
CO2 BLDV-SCNC: 10 MMOL/L — LOW (ref 22–26)
CO2 BLDV-SCNC: 14.5 MMOL/L — LOW (ref 22–26)
CO2 SERPL-SCNC: 11 MMOL/L — LOW (ref 22–31)
CO2 SERPL-SCNC: 8 MMOL/L — CRITICAL LOW (ref 22–31)
COLOR SPEC: SIGNIFICANT CHANGE UP
CREAT SERPL-MCNC: 5.32 MG/DL — HIGH (ref 0.5–1.3)
CREAT SERPL-MCNC: 6.63 MG/DL — HIGH (ref 0.5–1.3)
DIFF PNL FLD: NEGATIVE — SIGNIFICANT CHANGE UP
EGFR: 6 ML/MIN/1.73M2 — LOW
EGFR: 8 ML/MIN/1.73M2 — LOW
EOSINOPHIL # BLD AUTO: 0 K/UL — SIGNIFICANT CHANGE UP (ref 0–0.5)
EOSINOPHIL NFR BLD AUTO: 0 % — SIGNIFICANT CHANGE UP (ref 0–6)
EPI CELLS # UR: SIGNIFICANT CHANGE UP
ETHANOL SERPL-MCNC: <10 MG/DL — SIGNIFICANT CHANGE UP
GAS PNL BLDV: 120 MMOL/L — CRITICAL LOW (ref 136–145)
GAS PNL BLDV: 127 MMOL/L — LOW (ref 136–145)
GLUCOSE BLDV-MCNC: 134 MG/DL — HIGH (ref 70–99)
GLUCOSE BLDV-MCNC: 280 MG/DL — HIGH (ref 70–99)
GLUCOSE SERPL-MCNC: 141 MG/DL — HIGH (ref 70–99)
GLUCOSE SERPL-MCNC: 266 MG/DL — HIGH (ref 70–99)
GLUCOSE UR QL: NEGATIVE — SIGNIFICANT CHANGE UP
HCO3 BLDV-SCNC: 13 MMOL/L — LOW (ref 22–29)
HCO3 BLDV-SCNC: 9 MMOL/L — CRITICAL LOW (ref 22–29)
HCT VFR BLD CALC: 45.6 % — HIGH (ref 34.5–45)
HCT VFR BLDA CALC: 35 % — SIGNIFICANT CHANGE UP (ref 34.5–46.5)
HCT VFR BLDA CALC: 45 % — SIGNIFICANT CHANGE UP (ref 34.5–46.5)
HGB BLD CALC-MCNC: 11.5 G/DL — SIGNIFICANT CHANGE UP (ref 11.5–15.5)
HGB BLD CALC-MCNC: 14.9 G/DL — SIGNIFICANT CHANGE UP (ref 11.5–15.5)
HGB BLD-MCNC: 14.6 G/DL — SIGNIFICANT CHANGE UP (ref 11.5–15.5)
IANC: 12.07 K/UL — HIGH (ref 1.8–7.4)
IMM GRANULOCYTES NFR BLD AUTO: 1.2 % — SIGNIFICANT CHANGE UP (ref 0–1.5)
INR BLD: 1.49 RATIO — HIGH (ref 0.88–1.16)
KETONES UR-MCNC: ABNORMAL
LACTATE BLDV-MCNC: 10.5 MMOL/L — CRITICAL HIGH (ref 0.5–2)
LACTATE BLDV-MCNC: 15.6 MMOL/L — CRITICAL HIGH (ref 0.5–2)
LEUKOCYTE ESTERASE UR-ACNC: ABNORMAL
LIDOCAIN IGE QN: 180 U/L — HIGH (ref 7–60)
LYMPHOCYTES # BLD AUTO: 1.07 K/UL — SIGNIFICANT CHANGE UP (ref 1–3.3)
LYMPHOCYTES # BLD AUTO: 7.8 % — LOW (ref 13–44)
MAGNESIUM SERPL-MCNC: 2.8 MG/DL — HIGH (ref 1.6–2.6)
MCHC RBC-ENTMCNC: 25.1 PG — LOW (ref 27–34)
MCHC RBC-ENTMCNC: 32 GM/DL — SIGNIFICANT CHANGE UP (ref 32–36)
MCV RBC AUTO: 78.4 FL — LOW (ref 80–100)
MONOCYTES # BLD AUTO: 0.3 K/UL — SIGNIFICANT CHANGE UP (ref 0–0.9)
MONOCYTES NFR BLD AUTO: 2.2 % — SIGNIFICANT CHANGE UP (ref 2–14)
NEUTROPHILS # BLD AUTO: 12.07 K/UL — HIGH (ref 1.8–7.4)
NEUTROPHILS NFR BLD AUTO: 88.6 % — HIGH (ref 43–77)
NITRITE UR-MCNC: NEGATIVE — SIGNIFICANT CHANGE UP
NRBC # BLD: 0 /100 WBCS — SIGNIFICANT CHANGE UP
NRBC # FLD: 0 K/UL — SIGNIFICANT CHANGE UP
PCO2 BLDV: 27 MMHG — LOW (ref 39–42)
PCO2 BLDV: 35 MMHG — LOW (ref 39–42)
PH BLDV: 7.14 — LOW (ref 7.32–7.43)
PH BLDV: 7.19 — LOW (ref 7.32–7.43)
PH UR: 5.5 — SIGNIFICANT CHANGE UP (ref 5–8)
PHOSPHATE SERPL-MCNC: 8.6 MG/DL — HIGH (ref 2.5–4.5)
PLATELET # BLD AUTO: 384 K/UL — SIGNIFICANT CHANGE UP (ref 150–400)
PO2 BLDV: 51 MMHG — SIGNIFICANT CHANGE UP
PO2 BLDV: 73 MMHG — SIGNIFICANT CHANGE UP
POTASSIUM BLDV-SCNC: 2.8 MMOL/L — CRITICAL LOW (ref 3.5–5.1)
POTASSIUM BLDV-SCNC: 3.5 MMOL/L — SIGNIFICANT CHANGE UP (ref 3.5–5.1)
POTASSIUM SERPL-MCNC: 3.1 MMOL/L — LOW (ref 3.5–5.3)
POTASSIUM SERPL-MCNC: 3.5 MMOL/L — SIGNIFICANT CHANGE UP (ref 3.5–5.3)
POTASSIUM SERPL-SCNC: 3.1 MMOL/L — LOW (ref 3.5–5.3)
POTASSIUM SERPL-SCNC: 3.5 MMOL/L — SIGNIFICANT CHANGE UP (ref 3.5–5.3)
PROT SERPL-MCNC: 5.5 G/DL — LOW (ref 6–8.3)
PROT SERPL-MCNC: 8.4 G/DL — HIGH (ref 6–8.3)
PROT UR-MCNC: ABNORMAL
PROTHROM AB SERPL-ACNC: 17.4 SEC — HIGH (ref 10.5–13.4)
RBC # BLD: 5.82 M/UL — HIGH (ref 3.8–5.2)
RBC # FLD: 15.4 % — HIGH (ref 10.3–14.5)
RBC CASTS # UR COMP ASSIST: SIGNIFICANT CHANGE UP /HPF (ref 0–4)
RH IG SCN BLD-IMP: POSITIVE — SIGNIFICANT CHANGE UP
SALICYLATES SERPL-MCNC: <0.3 MG/DL — LOW (ref 15–30)
SAO2 % BLDV: 75.1 % — SIGNIFICANT CHANGE UP
SAO2 % BLDV: 92.9 % — SIGNIFICANT CHANGE UP
SODIUM SERPL-SCNC: 125 MMOL/L — LOW (ref 135–145)
SODIUM SERPL-SCNC: 130 MMOL/L — LOW (ref 135–145)
SP GR SPEC: 1.03 — SIGNIFICANT CHANGE UP (ref 1–1.05)
TOXICOLOGY SCREEN, DRUGS OF ABUSE, SERUM RESULT: SIGNIFICANT CHANGE UP
TROPONIN T, HIGH SENSITIVITY RESULT: 99 NG/L — CRITICAL HIGH
UROBILINOGEN FLD QL: SIGNIFICANT CHANGE UP
WBC # BLD: 13.64 K/UL — HIGH (ref 3.8–10.5)
WBC # FLD AUTO: 13.64 K/UL — HIGH (ref 3.8–10.5)
WBC UR QL: SIGNIFICANT CHANGE UP /HPF (ref 0–5)

## 2022-08-11 PROCEDURE — 36556 INSERT NON-TUNNEL CV CATH: CPT

## 2022-08-11 PROCEDURE — 99291 CRITICAL CARE FIRST HOUR: CPT | Mod: 25

## 2022-08-11 PROCEDURE — 99291 CRITICAL CARE FIRST HOUR: CPT | Mod: GC

## 2022-08-11 PROCEDURE — 71045 X-RAY EXAM CHEST 1 VIEW: CPT | Mod: 26

## 2022-08-11 PROCEDURE — 72170 X-RAY EXAM OF PELVIS: CPT | Mod: 26

## 2022-08-11 PROCEDURE — 71250 CT THORAX DX C-: CPT | Mod: 26

## 2022-08-11 PROCEDURE — 70450 CT HEAD/BRAIN W/O DYE: CPT | Mod: 26

## 2022-08-11 PROCEDURE — 74176 CT ABD & PELVIS W/O CONTRAST: CPT | Mod: 26

## 2022-08-11 RX ORDER — SODIUM CHLORIDE 9 MG/ML
1000 INJECTION INTRAMUSCULAR; INTRAVENOUS; SUBCUTANEOUS ONCE
Refills: 0 | Status: COMPLETED | OUTPATIENT
Start: 2022-08-11 | End: 2022-08-11

## 2022-08-11 RX ORDER — ONDANSETRON 8 MG/1
4 TABLET, FILM COATED ORAL ONCE
Refills: 0 | Status: COMPLETED | OUTPATIENT
Start: 2022-08-11 | End: 2022-08-11

## 2022-08-11 RX ORDER — PIPERACILLIN AND TAZOBACTAM 4; .5 G/20ML; G/20ML
3.38 INJECTION, POWDER, LYOPHILIZED, FOR SOLUTION INTRAVENOUS EVERY 12 HOURS
Refills: 0 | Status: DISCONTINUED | OUTPATIENT
Start: 2022-08-12 | End: 2022-08-14

## 2022-08-11 RX ORDER — SODIUM BICARBONATE 1 MEQ/ML
0.23 SYRINGE (ML) INTRAVENOUS
Qty: 150 | Refills: 0 | Status: DISCONTINUED | OUTPATIENT
Start: 2022-08-11 | End: 2022-08-12

## 2022-08-11 RX ORDER — PANTOPRAZOLE SODIUM 20 MG/1
80 TABLET, DELAYED RELEASE ORAL ONCE
Refills: 0 | Status: COMPLETED | OUTPATIENT
Start: 2022-08-11 | End: 2022-08-11

## 2022-08-11 RX ORDER — CEFTRIAXONE 500 MG/1
1000 INJECTION, POWDER, FOR SOLUTION INTRAMUSCULAR; INTRAVENOUS ONCE
Refills: 0 | Status: COMPLETED | OUTPATIENT
Start: 2022-08-11 | End: 2022-08-11

## 2022-08-11 RX ORDER — SODIUM BICARBONATE 1 MEQ/ML
50 SYRINGE (ML) INTRAVENOUS ONCE
Refills: 0 | Status: COMPLETED | OUTPATIENT
Start: 2022-08-11 | End: 2022-08-11

## 2022-08-11 RX ORDER — METOCLOPRAMIDE HCL 10 MG
10 TABLET ORAL ONCE
Refills: 0 | Status: COMPLETED | OUTPATIENT
Start: 2022-08-11 | End: 2022-08-11

## 2022-08-11 RX ORDER — PIPERACILLIN AND TAZOBACTAM 4; .5 G/20ML; G/20ML
3.38 INJECTION, POWDER, LYOPHILIZED, FOR SOLUTION INTRAVENOUS EVERY 12 HOURS
Refills: 0 | Status: DISCONTINUED | OUTPATIENT
Start: 2022-08-11 | End: 2022-08-11

## 2022-08-11 RX ORDER — NOREPINEPHRINE BITARTRATE/D5W 8 MG/250ML
0.2 PLASTIC BAG, INJECTION (ML) INTRAVENOUS
Qty: 8 | Refills: 0 | Status: DISCONTINUED | OUTPATIENT
Start: 2022-08-11 | End: 2022-08-12

## 2022-08-11 RX ORDER — CHLORHEXIDINE GLUCONATE 213 G/1000ML
1 SOLUTION TOPICAL
Refills: 0 | Status: DISCONTINUED | OUTPATIENT
Start: 2022-08-11 | End: 2022-08-13

## 2022-08-11 RX ORDER — OCTREOTIDE ACETATE 200 UG/ML
500 INJECTION, SOLUTION INTRAVENOUS; SUBCUTANEOUS ONCE
Refills: 0 | Status: DISCONTINUED | OUTPATIENT
Start: 2022-08-11 | End: 2022-08-11

## 2022-08-11 RX ORDER — NOREPINEPHRINE BITARTRATE/D5W 8 MG/250ML
0.05 PLASTIC BAG, INJECTION (ML) INTRAVENOUS
Qty: 32 | Refills: 0 | Status: DISCONTINUED | OUTPATIENT
Start: 2022-08-11 | End: 2022-08-11

## 2022-08-11 RX ORDER — POTASSIUM CHLORIDE 20 MEQ
10 PACKET (EA) ORAL
Refills: 0 | Status: COMPLETED | OUTPATIENT
Start: 2022-08-11 | End: 2022-08-12

## 2022-08-11 RX ORDER — PIPERACILLIN AND TAZOBACTAM 4; .5 G/20ML; G/20ML
3.38 INJECTION, POWDER, LYOPHILIZED, FOR SOLUTION INTRAVENOUS ONCE
Refills: 0 | Status: COMPLETED | OUTPATIENT
Start: 2022-08-11 | End: 2022-08-11

## 2022-08-11 RX ORDER — PANTOPRAZOLE SODIUM 20 MG/1
8 TABLET, DELAYED RELEASE ORAL
Qty: 80 | Refills: 0 | Status: DISCONTINUED | OUTPATIENT
Start: 2022-08-11 | End: 2022-08-12

## 2022-08-11 RX ADMIN — SODIUM CHLORIDE 1000 MILLILITER(S): 9 INJECTION INTRAMUSCULAR; INTRAVENOUS; SUBCUTANEOUS at 19:35

## 2022-08-11 RX ADMIN — PANTOPRAZOLE SODIUM 10 MG/HR: 20 TABLET, DELAYED RELEASE ORAL at 19:11

## 2022-08-11 RX ADMIN — SODIUM CHLORIDE 1000 MILLILITER(S): 9 INJECTION INTRAMUSCULAR; INTRAVENOUS; SUBCUTANEOUS at 21:17

## 2022-08-11 RX ADMIN — Medication 24.4 MICROGRAM(S)/KG/MIN: at 21:17

## 2022-08-11 RX ADMIN — SODIUM CHLORIDE 2000 MILLILITER(S): 9 INJECTION INTRAMUSCULAR; INTRAVENOUS; SUBCUTANEOUS at 19:11

## 2022-08-11 RX ADMIN — CEFTRIAXONE 100 MILLIGRAM(S): 500 INJECTION, POWDER, FOR SOLUTION INTRAMUSCULAR; INTRAVENOUS at 19:30

## 2022-08-11 RX ADMIN — SODIUM CHLORIDE 1000 MILLILITER(S): 9 INJECTION INTRAMUSCULAR; INTRAVENOUS; SUBCUTANEOUS at 18:06

## 2022-08-11 RX ADMIN — Medication 10 MILLIGRAM(S): at 21:18

## 2022-08-11 RX ADMIN — ONDANSETRON 4 MILLIGRAM(S): 8 TABLET, FILM COATED ORAL at 19:11

## 2022-08-11 RX ADMIN — ONDANSETRON 4 MILLIGRAM(S): 8 TABLET, FILM COATED ORAL at 18:06

## 2022-08-11 RX ADMIN — Medication 100 MILLIEQUIVALENT(S): at 23:15

## 2022-08-11 RX ADMIN — PANTOPRAZOLE SODIUM 80 MILLIGRAM(S): 20 TABLET, DELAYED RELEASE ORAL at 18:06

## 2022-08-11 RX ADMIN — Medication 50 MEQ/KG/HR: at 23:15

## 2022-08-11 NOTE — H&P ADULT - NSHPLABSRESULTS_GEN_ALL_CORE
Troponin T, High Sensitivity Result: 99 --> 81    pH, Venous: 7.14   pCO2, Venous: 27 mmHg   pO2, Venous: 73 mmHg   HCO3, Venous: 9: TYPE:(C=Critical, N=Notification, A=Abnormal) C Comprehensive Metabolic Panel (08.11.22 @ 20:00)   Sodium, Serum: 130 mmol/L   Potassium, Serum: 3.1 mmol/L   Chloride, Serum: 89 mmol/L   Carbon Dioxide, Serum: 8: TYPE:(C=Critical, N=Notification, A=Abnormal) C   TESTS: CO2 - TROPTHS   DATE/TIME CALLED: 08/11/2022 20:48:46 EDT   CALLED TO: HELLEN SEALS MD   READ BACK (2 Patient Identifiers)(Y/N): Y   READ BACK VALUES (Y/N): Y   CALLED BY: HELLEN ISAAC mmol/L   Anion Gap, Serum: 33 mmol/L   Blood Urea Nitrogen, Serum: 97 mg/dL   Creatinine, Serum: 5.32 mg/dL   Glucose, Serum: 141 mg/dL   Calcium, Total Serum: 7.9 mg/dL   Protein Total, Serum: 5.5 g/dL   Albumin, Serum: 2.8 g/dL   Bilirubin Total, Serum: 1.1 mg/dL   Alkaline Phosphatase, Serum: 88 U/L   Aspartate Aminotransferase (AST/SGOT): 69 U/L   Alanine Aminotransferase (ALT/SGPT): 42 U/L   Salicylate Level, Serum: <0.3: TOXIC VALUES   Alcohol, Blood: <10: <10 mg/dL = Negative mg/dL   Acetaminophen Level, Serum: <10: Acetaminophen values are related to time of ingestion  .Urinalysis + Microscopic Examination (08.11.22 @ 17:52)   Glucose Qualitative, Urine: Negative   Blood, Urine: Negative   Color: dark yellow   pH Urine: 5.5   Leukocyte Esterase Concentration: Trace   Protein, Urine: 30 mg/dL   Urine Appearance: Clear   Specific Gravity: 1.030   Urobilinogen: <2 mg/dL   Bilirubin: Moderate   Ketone - Urine: Trace   Nitrite: Negative   Red Blood Cell - Urine: 0-2 /HPF   White Blood Cell - Urine: 2-5 /HPF   Epithelial Cells: Occasional   Bacteria: Few Lipase, Serum: 180 U/L Prothrombin Time, Plasma: 17.3 SEC   INR: 1.55Activated Partial Thromboplastin Time: 31.1    Bedside Ultrasound: Mild pericardial effusion, good EF, scattered B lines. No pleural effusions noted. Collapsed IVC after 3 L of fluid.    < from: Xray Chest 1 View- PORTABLE-Urgent (Xray Chest 1 View- PORTABLE-Urgent .) (08.11.22 @ 21:35) >      IMPRESSION:  Enteric tube with sideholes at the level the GE junction versus distal   esophagus; recommend advancing.    < end of copied text >    CTH, CTAP, CT Chest pending

## 2022-08-11 NOTE — ED ADULT NURSE NOTE - NSIMPLEMENTINTERV_GEN_ALL_ED
Implemented All Fall with Harm Risk Interventions:  [place] to call system. Call bell, personal items and telephone within reach. Instruct patient to call for assistance. Room bathroom lighting operational. Non-slip footwear when patient is off stretcher. Physically safe environment: no spills, clutter or unnecessary equipment. Stretcher in lowest position, wheels locked, appropriate side rails in place. Provide visual cue, wrist band, yellow gown, etc. Monitor gait and stability. Monitor for mental status changes and reorient to person, place, and time. Review medications for side effects contributing to fall risk. Reinforce activity limits and safety measures with patient and family. Provide visual clues: red socks.

## 2022-08-11 NOTE — H&P ADULT - ASSESSMENT
82 y/o F PMH dementia (A&Ox2 at baseline), HTN, recent DVT (on eliquis), p/w vomiting and AMS found to be hypotensive despite 4 L NS and was admitted to MICU for management of hypotension requiring pressors. Hypotension likely 2/2 hypovolemic shock (in the setting of poor PO intake/vomiting vs. GI bleed). Patient currently on levophed and zosyn. Course c/b VANDANA with acidosis, possibly 2/2 use of NS for volume repletion. Blood cultures, CT CAP pending to rule out septic and distributive shock. Bedside echo suggests unlikely cardiogenic shock.     #Neuro  1) AMS  -Patient is A&Ox2, which is baseline per chart review  -Will reach out to brother in AM for further collateral regarding mental status   -monitor for acute changes in mental status    #Cardiovascular   1) Shock likely 2/2 hypovolemia in the setting of poor PO intake with vomiting vs. GI bleed  -Patient is hypotensive despite 4 L NS   -On Levophed with femoral central line  -Continue to titrate levo as tolerated  -f/u Q6 CBC     #Respiratory  -no active issues at this time  -is able to protect airway well    #Gentiourinary  1) VANDANA likely pre-renal in setting of hypotension and hypovolemia  Patient presented with intiial Cr 6.63 --> 5.32 with fluids   -last baseline from 2020 was 0.71  -F/u urine lytes   -contine to trend Cr and maintain blood pressure     2) Acidosis   Bicarbonate 9 with VBG pH 7.14.   -Possibly 2/2 use of acidic fluid for repletion   -if requiring IVF, use LR instead of NS   -patient started on HCO3 drip   -monitor K+ as bicarbonate can cause potassium shifts into cell.   -Q6 BMP    #Gastrointestinal  1) Concern for GI bleed with Coffee ground emesis seen coming from NGT   -no signs of lui bleeding at the moment  -continue to monitor Q6 CBC and trend Hb   -maintain active type and screen just in case     #ID   -Patient presented with fever in ED and WBC 13 (last WBC was 3.5)   -started on zosyn; continue  -follow up BCx, CT CAP to look for source of infection   -UA negative     #Heme/Onc  -Elevated coags   -hold home eliquis in setting of bleed  -no DVT PPx at this time    #Ethics  -[facility] conversation with brother  -full code for now     82 y/o F PMH dementia (A&Ox2 at baseline), HTN, recent DVT (on eliquis), p/w vomiting and AMS found to be hypotensive despite 4 L NS and was admitted to MICU for management of hypotension requiring pressors. Hypotension likely 2/2 hypovolemic shock (in the setting of poor PO intake/vomiting vs. GI bleed). Patient currently on levophed and zosyn. Course c/b VANDANA with acidosis, possibly 2/2 use of NS for volume repletion. Blood cultures, CT CAP pending to rule out septic and distributive shock. Bedside echo suggests unlikely cardiogenic shock.     #Neuro  1) AMS  -Patient is A&Ox2, which is baseline per chart review  -Will reach out to brother in AM for further collateral regarding mental status   -monitor for acute changes in mental status    #Cardiovascular   1) Shock likely 2/2 hypovolemia in the setting of poor PO intake with vomiting vs. GI bleed  -Patient is hypotensive despite 4 L NS   -On Levophed with femoral central line  -Continue to titrate levo as tolerated  -f/u Q6 CBC     #Respiratory  -no active issues at this time  -is able to protect airway well    #Gentiourinary  1) VANDANA likely pre-renal in setting of hypotension and hypovolemia  Patient presented with intiial Cr 6.63 --> 5.32 with fluids   -last baseline from 2020 was 0.71  -F/u urine lytes   -contine to trend Cr and maintain blood pressure     2) Acidosis   Bicarbonate 9 with VBG pH 7.14.   -Possibly 2/2 use of acidic fluid for repletion   -if requiring IVF, use LR instead of NS   -patient started on HCO3 drip   -monitor K+ as bicarbonate can cause potassium shifts into cell.   -Q6 BMP    3) Hyponatremia   -initial Na 125   -improved to 130  -continue to trend     #Gastrointestinal  1) Concern for GI bleed with Coffee ground emesis seen coming from NGT   -no signs of lui bleeding at the moment  -continue to monitor Q6 CBC and trend Hb   -maintain active type and screen just in case     #ID   -Patient presented with fever in ED and WBC 13 (last WBC was 3.5)   -started on zosyn; continue  -follow up BCx, CT CAP to look for source of infection   -UA negative     #Heme/Onc  -Elevated coags   -hold home eliquis in setting of bleed  -no DVT PPx at this time    #Ethics  -[facility] conversation with brother  -full code for now

## 2022-08-11 NOTE — ED ADULT TRIAGE NOTE - CHIEF COMPLAINT QUOTE
Patient brought to ER by her brother from home. brother states she became unresponsive and passed out. Pt opened eyes in triage. Went straight to MICHA CHAVARRIA

## 2022-08-11 NOTE — ED PROVIDER NOTE - PHYSICAL EXAMINATION
Physical Exam:  Gen: Arrived to room with eyes open, faintly responds to verbal commands, A&Ox1 (unknown baseline), moving all extremities spontaneously   Head: NCAT  HEENT: EOMI, PEERL, pale conjunctiva, tongue midline, oral mucosa dry, lips cracked   Lung: CTAB, no respiratory distress, no wheezes/rhonchi/rales B/L  CV: RRR, no murmurs, rubs or gallops  Abd: soft, NT, ND, no guarding, no rigidity, no rebound tenderness  MSK: no visible deformities, ROM normal in UE/LE  Neuro: No focal motor deficits, moving all extremities on command and spontaneously   Skin: Cool mottled extremities, no rash, no LE edema   Joy Yoo D.O.

## 2022-08-11 NOTE — ED PROVIDER NOTE - ATTENDING CONTRIBUTION TO CARE
I have personally performed a face to face medical and diagnostic evaluation of the patient. I have discussed with and reviewed the Resident's note and agree with the History, ROS, Physical Exam and MDM unless otherwise indicated. A brief summary of my personal evaluation and impression can be found below.

## 2022-08-11 NOTE — ED PROVIDER NOTE - PROGRESS NOTE DETAILS
-Joy Yoo D.O: multiple attempts made to obtain IV access, patient extremely hypovolemic, and hypotensive with IV infiltrations x3, decision made to place central line, verbal consent obtained by brother, patient has no health care proxy, per brother is full code. -Joy DELA CRUZO: L femoral central line placed, IVF running, patient awake, alert, protecting airway -Joy Yoo D.O; pt with initial improvement of BP MAP 62, now hypotensive again post multiple episodes of coffee-ground emesis, NGT placed, levo started as pt received 3L IVF, will consult MICU Gabi, PGY3: MICU consulted. will see patient shortly. Gabi, PGY3: accepted to MICU. requesting bicarb gtt.

## 2022-08-11 NOTE — ED ADULT NURSE NOTE - OBJECTIVE STATEMENT
Pt to bed Trc after being taken physically out of car at triage. Pt arrives lethargic with coffe brown emesis on clothes. Pt arousable but lethargic. Pt hypotensive. Establishing IV access. Pt vomited once. 14 [place] fu catheter placed. Skin intact. Will continue to monitor.

## 2022-08-11 NOTE — H&P ADULT - HISTORY OF PRESENT ILLNESS
82 y/o F PMH demenntia (A&Ox1 at baseline), HTN, recent DVT 9on eliquis), p/w vomiting and AMS as per brother after a strongly caffeinated drink. According to brother, patient is at baseline confused (will randomly remove clothing), but appears more altered today.      In the ED, patient was hypotensive with SBP in the 60s. Patient received 3 L of IVF with no avail and persistently low blood pressure. Patient was then started on Levophed and MICU consulted for management of hypotension with pressors in setting of AMS.     In the ED, patient was also noted to be febrile and broad spectrum antibiotics were started (Zosyn).   Additionally, patient was noted to have coffee ground emesis during NGT placement and was started on protonix drip.    82 y/o F PMH demenntia (A&Ox2 at baseline), HTN, recent DVT on eliquis), p/w vomiting and AMS as per brother after a strongly caffeinated drink. According to brother, patient is at baseline confused (will randomly remove clothing), but appears more altered today.      In the ED, patient was hypotensive with SBP in the 60s. Patient received 4 L of NS with no avail and persistently low blood pressure. Patient was then started on Levophed and MICU consulted for management of hypotension with pressors in setting of AMS.     In the ED, patient was also noted to be febrile and broad spectrum antibiotics were started (Zosyn).   Additionally, patient was noted to have a few cc coffee ground emesis during NGT placement and was started on protonix drip.     Of note, in 2020 patient had a similar admission for hypotension in the setting of GI bleed 2/2 esophageal ulcer. At that time, patient's hemoglobin dropped from 14 to 7.7. Patient was noted to have pre-renal VANDANA, started on bicarb drip. At that time, nephrology did not deem candidate for dialysis.

## 2022-08-11 NOTE — H&P ADULT - ATTENDING COMMENTS
80 y/o F PMH dementia (A&Ox2 at baseline), HTN, recent DVT (on eliquis), p/w vomiting and AMS found to be hypotensive despite 4 L NS and was admitted to MICU for management of hypotension requiring pressors. Shock unclear etiology. Course c/b VANDANA and lactic acidosis.  despite 4 liters pt with collapsed IVC, will give IVF and albumin.  pancx, Abx  follow up pan Ct scan   will trend lactate  bicarb gtt for acidosis (monitor electrolytes closely)  serial labs  fu strict I and O   protonix IV BID  for possible GIB (no signs melena, pt with brown stools, but reported coffee ground emesis in ED)  hold AC given possible GIB  Full code   SCDs for DVT ppx

## 2022-08-11 NOTE — PATIENT PROFILE ADULT - FALL HARM RISK - RISK INTERVENTIONS

## 2022-08-11 NOTE — ED PROVIDER NOTE - OBJECTIVE STATEMENT
[age] y/o F with PMHx of HTN and dementia presents with c/o vomiting and altered mental status. As per brother, the patient has been refusing most PO food/drink for several days, and today seemed "off", worse after having a strongly caffeinated drink. Brother notes the patient at baseline is confused, will try and take her clothes off at home, but today appeared worse, prompting evaluation. Currently on Eliquis due to h/o DVT. Unable to obtain full history from patient due to altered state.

## 2022-08-11 NOTE — ED PROVIDER NOTE - CLINICAL SUMMARY MEDICAL DECISION MAKING FREE TEXT BOX
80 y/o F with PMHx of HTN and dementia presents with c/o vomiting and altered mental status. Arrived to ED altered, responding to verbal commands and moving all extremities spontaneously, with active dark brown emesis c/f coffee-ground emesis. Patient denies pain, arrived w/ cool extremities, hypotensive, extremely dry appearing, brother at bedside giving collateral c/f UGIB will obtain IV access, resuscitate with IVF pending H/H if pt anemic will administer blood products, protonix, ceftriaxone, labs, reassess. Patient may require pressors pending response to IVF. Will require MICU consult.

## 2022-08-11 NOTE — ED ADULT NURSE REASSESSMENT NOTE - NS ED NURSE REASSESS COMMENT FT1
Mobile Critical Care RN: Pt received getting 2 liters NS bolus, protonix gtt running. Vitals as noted on flowsheet, pt hypotensive with MAP in the 55 to 60 range. Additional 2 liters of fluid given, MAP remained same. Pt vomitted x3 coffee ground emesis, then BP dropped to MAP<55, NGT placed at bedside to LCWS by ED team, levophed gtt started, titrated to 0.25 MCG/KG/MIN to maintain MAP >65. Pt seen and accepted by MICU team. Pt transported to CT scan then to MICU.

## 2022-08-11 NOTE — H&P ADULT - NSHPPHYSICALEXAM_GEN_ALL_CORE
T(C): 38.1 (08-11-22 @ 18:34), Max: 38.1 (08-11-22 @ 18:34)  HR: 79 (08-11-22 @ 21:50) (73 - 89)  BP: 96/78 (08-11-22 @ 21:50) (43/27 - 117/84)  RR: 22 (08-11-22 @ 21:50) (20 - 30)  SpO2: 100% (08-11-22 @ 21:50) (95% - 100%)    GENERAL: patient is altered, unable to participate due to mental status; comfortable lying in bed, NAD  EYES: sclera clear, no exudates  ENMT: oropharynx clear without erythema, no exudates, moist mucous membranes  NECK: supple, soft, no thyromegaly noted  LUNGS: CTAB, no wheezes, crackles or rhonchi  HEART: soft S1/S2, regular rate and rhythm, no murmurs noted, no lower extremity edema  GASTROINTESTINAL: abdomen is soft, nontender, nondistended, normoactive bowel sounds, no palpable masses  INTEGUMENT: good skin turgor, no lesions noted  GENITOURINARY: no suprapubic tenderness; fu placed, Femoral central line placed, site clean dry and intact.   MUSCULOSKELETAL: no clubbing or cyanosis, no obvious deformity; trace peripheral edema   NEUROLOGIC: awake, alert, oriented x2 (person and place); follows commands   HEME/LYMPH: no palpable supraclavicular nodules, no obvious ecchymosis or petechiae; few cc of coffee ground emesis noted coming out of NGT

## 2022-08-12 PROBLEM — I10 ESSENTIAL (PRIMARY) HYPERTENSION: Chronic | Status: ACTIVE | Noted: 2020-08-06

## 2022-08-12 PROBLEM — F03.90 UNSPECIFIED DEMENTIA, UNSPECIFIED SEVERITY, WITHOUT BEHAVIORAL DISTURBANCE, PSYCHOTIC DISTURBANCE, MOOD DISTURBANCE, AND ANXIETY: Chronic | Status: ACTIVE | Noted: 2020-08-06

## 2022-08-12 LAB
-  COAGULASE NEGATIVE STAPHYLOCOCCUS: SIGNIFICANT CHANGE UP
ALBUMIN SERPL ELPH-MCNC: 2.8 G/DL — LOW (ref 3.3–5)
ALBUMIN SERPL ELPH-MCNC: 2.9 G/DL — LOW (ref 3.3–5)
ALBUMIN SERPL ELPH-MCNC: 2.9 G/DL — LOW (ref 3.3–5)
ALBUMIN SERPL ELPH-MCNC: 3.1 G/DL — LOW (ref 3.3–5)
ALP SERPL-CCNC: 103 U/L — SIGNIFICANT CHANGE UP (ref 40–120)
ALP SERPL-CCNC: 58 U/L — SIGNIFICANT CHANGE UP (ref 40–120)
ALP SERPL-CCNC: 59 U/L — SIGNIFICANT CHANGE UP (ref 40–120)
ALP SERPL-CCNC: 69 U/L — SIGNIFICANT CHANGE UP (ref 40–120)
ALT FLD-CCNC: 51 U/L — HIGH (ref 4–33)
ALT FLD-CCNC: 56 U/L — HIGH (ref 4–33)
ALT FLD-CCNC: 58 U/L — HIGH (ref 4–33)
ALT FLD-CCNC: 70 U/L — HIGH (ref 4–33)
AMYLASE P1 CFR SERPL: 182 U/L — HIGH (ref 25–125)
ANION GAP SERPL CALC-SCNC: 14 MMOL/L — SIGNIFICANT CHANGE UP (ref 7–14)
ANION GAP SERPL CALC-SCNC: 16 MMOL/L — HIGH (ref 7–14)
ANION GAP SERPL CALC-SCNC: 16 MMOL/L — HIGH (ref 7–14)
ANION GAP SERPL CALC-SCNC: 24 MMOL/L — HIGH (ref 7–14)
ANION GAP SERPL CALC-SCNC: 25 MMOL/L — HIGH (ref 7–14)
ANISOCYTOSIS BLD QL: SLIGHT — SIGNIFICANT CHANGE UP
APTT BLD: 33 SEC — SIGNIFICANT CHANGE UP (ref 27–36.3)
AST SERPL-CCNC: 111 U/L — HIGH (ref 4–32)
AST SERPL-CCNC: 80 U/L — HIGH (ref 4–32)
AST SERPL-CCNC: 80 U/L — HIGH (ref 4–32)
AST SERPL-CCNC: 82 U/L — HIGH (ref 4–32)
BASE EXCESS BLDV CALC-SCNC: -11.6 MMOL/L — LOW (ref -2–3)
BASOPHILS # BLD AUTO: 0.06 K/UL — SIGNIFICANT CHANGE UP (ref 0–0.2)
BASOPHILS NFR BLD AUTO: 0.5 % — SIGNIFICANT CHANGE UP (ref 0–2)
BILIRUB SERPL-MCNC: 0.7 MG/DL — SIGNIFICANT CHANGE UP (ref 0.2–1.2)
BILIRUB SERPL-MCNC: 1.6 MG/DL — HIGH (ref 0.2–1.2)
BLOOD GAS ARTERIAL COMPREHENSIVE RESULT: SIGNIFICANT CHANGE UP
BLOOD GAS VENOUS COMPREHENSIVE RESULT: SIGNIFICANT CHANGE UP
BUN SERPL-MCNC: 61 MG/DL — HIGH (ref 7–23)
BUN SERPL-MCNC: 65 MG/DL — HIGH (ref 7–23)
BUN SERPL-MCNC: 77 MG/DL — HIGH (ref 7–23)
BUN SERPL-MCNC: 85 MG/DL — HIGH (ref 7–23)
BUN SERPL-MCNC: 93 MG/DL — HIGH (ref 7–23)
CALCIUM SERPL-MCNC: 7.1 MG/DL — LOW (ref 8.4–10.5)
CALCIUM SERPL-MCNC: 7.3 MG/DL — LOW (ref 8.4–10.5)
CALCIUM SERPL-MCNC: 7.5 MG/DL — LOW (ref 8.4–10.5)
CALCIUM SERPL-MCNC: 7.5 MG/DL — LOW (ref 8.4–10.5)
CALCIUM SERPL-MCNC: 7.8 MG/DL — LOW (ref 8.4–10.5)
CHLORIDE BLDV-SCNC: 94 MMOL/L — LOW (ref 96–108)
CHLORIDE SERPL-SCNC: 90 MMOL/L — LOW (ref 98–107)
CHLORIDE SERPL-SCNC: 91 MMOL/L — LOW (ref 98–107)
CHLORIDE SERPL-SCNC: 96 MMOL/L — LOW (ref 98–107)
CHLORIDE SERPL-SCNC: 96 MMOL/L — LOW (ref 98–107)
CHLORIDE SERPL-SCNC: 98 MMOL/L — SIGNIFICANT CHANGE UP (ref 98–107)
CO2 BLDV-SCNC: 17.8 MMOL/L — LOW (ref 22–26)
CO2 SERPL-SCNC: 14 MMOL/L — LOW (ref 22–31)
CO2 SERPL-SCNC: 15 MMOL/L — LOW (ref 22–31)
CO2 SERPL-SCNC: 21 MMOL/L — LOW (ref 22–31)
CO2 SERPL-SCNC: 23 MMOL/L — SIGNIFICANT CHANGE UP (ref 22–31)
CO2 SERPL-SCNC: 24 MMOL/L — SIGNIFICANT CHANGE UP (ref 22–31)
CORTIS AM PEAK SERPL-MCNC: 63.4 UG/DL — HIGH (ref 6–18.4)
CORTIS AM PEAK SERPL-MCNC: 73 UG/DL — HIGH (ref 6–18.4)
CREAT SERPL-MCNC: 2.36 MG/DL — HIGH (ref 0.5–1.3)
CREAT SERPL-MCNC: 2.55 MG/DL — HIGH (ref 0.5–1.3)
CREAT SERPL-MCNC: 3.25 MG/DL — HIGH (ref 0.5–1.3)
CREAT SERPL-MCNC: 3.64 MG/DL — HIGH (ref 0.5–1.3)
CREAT SERPL-MCNC: 4.36 MG/DL — HIGH (ref 0.5–1.3)
EGFR: 10 ML/MIN/1.73M2 — LOW
EGFR: 12 ML/MIN/1.73M2 — LOW
EGFR: 14 ML/MIN/1.73M2 — LOW
EGFR: 18 ML/MIN/1.73M2 — LOW
EGFR: 20 ML/MIN/1.73M2 — LOW
EOSINOPHIL # BLD AUTO: 0.17 K/UL — SIGNIFICANT CHANGE UP (ref 0–0.5)
EOSINOPHIL NFR BLD AUTO: 1.3 % — SIGNIFICANT CHANGE UP (ref 0–6)
GAS PNL BLDV: 126 MMOL/L — LOW (ref 136–145)
GLUCOSE BLDC GLUCOMTR-MCNC: 195 MG/DL — HIGH (ref 70–99)
GLUCOSE BLDV-MCNC: 250 MG/DL — HIGH (ref 70–99)
GLUCOSE SERPL-MCNC: 180 MG/DL — HIGH (ref 70–99)
GLUCOSE SERPL-MCNC: 187 MG/DL — HIGH (ref 70–99)
GLUCOSE SERPL-MCNC: 232 MG/DL — HIGH (ref 70–99)
GLUCOSE SERPL-MCNC: 254 MG/DL — HIGH (ref 70–99)
GLUCOSE SERPL-MCNC: 305 MG/DL — HIGH (ref 70–99)
GRAM STN FLD: SIGNIFICANT CHANGE UP
HCO3 BLDV-SCNC: 16 MMOL/L — LOW (ref 22–29)
HCT VFR BLD CALC: 32.4 % — LOW (ref 34.5–45)
HCT VFR BLD CALC: 32.9 % — LOW (ref 34.5–45)
HCT VFR BLD CALC: 33.4 % — LOW (ref 34.5–45)
HCT VFR BLD CALC: 35.6 % — SIGNIFICANT CHANGE UP (ref 34.5–45)
HCT VFR BLD CALC: 39.5 % — SIGNIFICANT CHANGE UP (ref 34.5–45)
HCT VFR BLD CALC: 43.3 % — SIGNIFICANT CHANGE UP (ref 34.5–45)
HCT VFR BLDA CALC: 39 % — SIGNIFICANT CHANGE UP (ref 34.5–46.5)
HGB BLD CALC-MCNC: 12.9 G/DL — SIGNIFICANT CHANGE UP (ref 11.5–15.5)
HGB BLD-MCNC: 10.8 G/DL — LOW (ref 11.5–15.5)
HGB BLD-MCNC: 11 G/DL — LOW (ref 11.5–15.5)
HGB BLD-MCNC: 11.3 G/DL — LOW (ref 11.5–15.5)
HGB BLD-MCNC: 11.5 G/DL — SIGNIFICANT CHANGE UP (ref 11.5–15.5)
HGB BLD-MCNC: 12.9 G/DL — SIGNIFICANT CHANGE UP (ref 11.5–15.5)
HGB BLD-MCNC: 13.5 G/DL — SIGNIFICANT CHANGE UP (ref 11.5–15.5)
IANC: 11.39 K/UL — HIGH (ref 1.8–7.4)
IMM GRANULOCYTES NFR BLD AUTO: 2.5 % — HIGH (ref 0–1.5)
INR BLD: 2.19 RATIO — HIGH (ref 0.88–1.16)
LACTATE BLDV-MCNC: 4.2 MMOL/L — CRITICAL HIGH (ref 0.5–2)
LIDOCAIN IGE QN: 124 U/L — HIGH (ref 7–60)
LYMPHOCYTES # BLD AUTO: 0.8 K/UL — LOW (ref 1–3.3)
LYMPHOCYTES # BLD AUTO: 6.1 % — LOW (ref 13–44)
MACROCYTES BLD QL: SLIGHT — SIGNIFICANT CHANGE UP
MAGNESIUM SERPL-MCNC: 1.9 MG/DL — SIGNIFICANT CHANGE UP (ref 1.6–2.6)
MAGNESIUM SERPL-MCNC: 2 MG/DL — SIGNIFICANT CHANGE UP (ref 1.6–2.6)
MAGNESIUM SERPL-MCNC: 2 MG/DL — SIGNIFICANT CHANGE UP (ref 1.6–2.6)
MAGNESIUM SERPL-MCNC: 2.2 MG/DL — SIGNIFICANT CHANGE UP (ref 1.6–2.6)
MAGNESIUM SERPL-MCNC: 2.4 MG/DL — SIGNIFICANT CHANGE UP (ref 1.6–2.6)
MCHC RBC-ENTMCNC: 24.8 PG — LOW (ref 27–34)
MCHC RBC-ENTMCNC: 24.8 PG — LOW (ref 27–34)
MCHC RBC-ENTMCNC: 25.2 PG — LOW (ref 27–34)
MCHC RBC-ENTMCNC: 25.4 PG — LOW (ref 27–34)
MCHC RBC-ENTMCNC: 25.5 PG — LOW (ref 27–34)
MCHC RBC-ENTMCNC: 25.5 PG — LOW (ref 27–34)
MCHC RBC-ENTMCNC: 31.2 GM/DL — LOW (ref 32–36)
MCHC RBC-ENTMCNC: 32.3 GM/DL — SIGNIFICANT CHANGE UP (ref 32–36)
MCHC RBC-ENTMCNC: 32.7 GM/DL — SIGNIFICANT CHANGE UP (ref 32–36)
MCHC RBC-ENTMCNC: 33.3 GM/DL — SIGNIFICANT CHANGE UP (ref 32–36)
MCHC RBC-ENTMCNC: 33.4 GM/DL — SIGNIFICANT CHANGE UP (ref 32–36)
MCHC RBC-ENTMCNC: 33.8 GM/DL — SIGNIFICANT CHANGE UP (ref 32–36)
MCV RBC AUTO: 75.2 FL — LOW (ref 80–100)
MCV RBC AUTO: 75.3 FL — LOW (ref 80–100)
MCV RBC AUTO: 76.1 FL — LOW (ref 80–100)
MCV RBC AUTO: 76.9 FL — LOW (ref 80–100)
MCV RBC AUTO: 78.1 FL — LOW (ref 80–100)
MCV RBC AUTO: 79.6 FL — LOW (ref 80–100)
METHOD TYPE: SIGNIFICANT CHANGE UP
MONOCYTES # BLD AUTO: 0.44 K/UL — SIGNIFICANT CHANGE UP (ref 0–0.9)
MONOCYTES NFR BLD AUTO: 3.3 % — SIGNIFICANT CHANGE UP (ref 2–14)
MRSA PCR RESULT.: SIGNIFICANT CHANGE UP
NEUTROPHILS # BLD AUTO: 11.39 K/UL — HIGH (ref 1.8–7.4)
NEUTROPHILS NFR BLD AUTO: 86.3 % — HIGH (ref 43–77)
NEUTS BAND # BLD: 7.1 % — HIGH (ref 0–6)
NRBC # BLD: 0 /100 WBCS — SIGNIFICANT CHANGE UP
NRBC # FLD: 0 K/UL — SIGNIFICANT CHANGE UP
NRBC # FLD: 0.02 K/UL — HIGH
PCO2 BLDV: 44 MMHG — HIGH (ref 39–42)
PH BLDV: 7.18 — LOW (ref 7.32–7.43)
PHOSPHATE SERPL-MCNC: 2.6 MG/DL — SIGNIFICANT CHANGE UP (ref 2.5–4.5)
PHOSPHATE SERPL-MCNC: 2.7 MG/DL — SIGNIFICANT CHANGE UP (ref 2.5–4.5)
PHOSPHATE SERPL-MCNC: 3 MG/DL — SIGNIFICANT CHANGE UP (ref 2.5–4.5)
PHOSPHATE SERPL-MCNC: 4.8 MG/DL — HIGH (ref 2.5–4.5)
PHOSPHATE SERPL-MCNC: 6 MG/DL — HIGH (ref 2.5–4.5)
PLAT MORPH BLD: NORMAL — SIGNIFICANT CHANGE UP
PLATELET # BLD AUTO: 226 K/UL — SIGNIFICANT CHANGE UP (ref 150–400)
PLATELET # BLD AUTO: 246 K/UL — SIGNIFICANT CHANGE UP (ref 150–400)
PLATELET # BLD AUTO: 251 K/UL — SIGNIFICANT CHANGE UP (ref 150–400)
PLATELET # BLD AUTO: 267 K/UL — SIGNIFICANT CHANGE UP (ref 150–400)
PLATELET # BLD AUTO: 269 K/UL — SIGNIFICANT CHANGE UP (ref 150–400)
PLATELET # BLD AUTO: 318 K/UL — SIGNIFICANT CHANGE UP (ref 150–400)
PLATELET COUNT - ESTIMATE: NORMAL — SIGNIFICANT CHANGE UP
PO2 BLDV: 40 MMHG — SIGNIFICANT CHANGE UP
POLYCHROMASIA BLD QL SMEAR: SLIGHT — SIGNIFICANT CHANGE UP
POTASSIUM BLDV-SCNC: 3.7 MMOL/L — SIGNIFICANT CHANGE UP (ref 3.5–5.1)
POTASSIUM SERPL-MCNC: 3.1 MMOL/L — LOW (ref 3.5–5.3)
POTASSIUM SERPL-MCNC: 3.2 MMOL/L — LOW (ref 3.5–5.3)
POTASSIUM SERPL-MCNC: 3.2 MMOL/L — LOW (ref 3.5–5.3)
POTASSIUM SERPL-MCNC: 3.5 MMOL/L — SIGNIFICANT CHANGE UP (ref 3.5–5.3)
POTASSIUM SERPL-MCNC: 3.9 MMOL/L — SIGNIFICANT CHANGE UP (ref 3.5–5.3)
POTASSIUM SERPL-SCNC: 3.1 MMOL/L — LOW (ref 3.5–5.3)
POTASSIUM SERPL-SCNC: 3.2 MMOL/L — LOW (ref 3.5–5.3)
POTASSIUM SERPL-SCNC: 3.2 MMOL/L — LOW (ref 3.5–5.3)
POTASSIUM SERPL-SCNC: 3.5 MMOL/L — SIGNIFICANT CHANGE UP (ref 3.5–5.3)
POTASSIUM SERPL-SCNC: 3.9 MMOL/L — SIGNIFICANT CHANGE UP (ref 3.5–5.3)
PROT SERPL-MCNC: 4.5 G/DL — LOW (ref 6–8.3)
PROT SERPL-MCNC: 4.6 G/DL — LOW (ref 6–8.3)
PROT SERPL-MCNC: 5 G/DL — LOW (ref 6–8.3)
PROT SERPL-MCNC: 5.6 G/DL — LOW (ref 6–8.3)
PROTHROM AB SERPL-ACNC: 25.6 SEC — HIGH (ref 10.5–13.4)
RBC # BLD: 4.26 M/UL — SIGNIFICANT CHANGE UP (ref 3.8–5.2)
RBC # BLD: 4.37 M/UL — SIGNIFICANT CHANGE UP (ref 3.8–5.2)
RBC # BLD: 4.44 M/UL — SIGNIFICANT CHANGE UP (ref 3.8–5.2)
RBC # BLD: 4.63 M/UL — SIGNIFICANT CHANGE UP (ref 3.8–5.2)
RBC # BLD: 5.06 M/UL — SIGNIFICANT CHANGE UP (ref 3.8–5.2)
RBC # BLD: 5.44 M/UL — HIGH (ref 3.8–5.2)
RBC # FLD: 14.6 % — HIGH (ref 10.3–14.5)
RBC # FLD: 14.7 % — HIGH (ref 10.3–14.5)
RBC # FLD: 14.7 % — HIGH (ref 10.3–14.5)
RBC # FLD: 15 % — HIGH (ref 10.3–14.5)
RBC # FLD: 15 % — HIGH (ref 10.3–14.5)
RBC # FLD: 15.2 % — HIGH (ref 10.3–14.5)
RBC BLD AUTO: ABNORMAL
S AUREUS DNA NOSE QL NAA+PROBE: SIGNIFICANT CHANGE UP
SAO2 % BLDV: 58.2 % — SIGNIFICANT CHANGE UP
SARS-COV-2 RNA SPEC QL NAA+PROBE: SIGNIFICANT CHANGE UP
SMUDGE CELLS # BLD: PRESENT — SIGNIFICANT CHANGE UP
SODIUM SERPL-SCNC: 129 MMOL/L — LOW (ref 135–145)
SODIUM SERPL-SCNC: 130 MMOL/L — LOW (ref 135–145)
SODIUM SERPL-SCNC: 133 MMOL/L — LOW (ref 135–145)
SODIUM SERPL-SCNC: 134 MMOL/L — LOW (ref 135–145)
SODIUM SERPL-SCNC: 137 MMOL/L — SIGNIFICANT CHANGE UP (ref 135–145)
SPECIMEN SOURCE: SIGNIFICANT CHANGE UP
T4 FREE SERPL-MCNC: 1.5 NG/DL — SIGNIFICANT CHANGE UP (ref 0.9–1.8)
TRIGL SERPL-MCNC: 77 MG/DL — SIGNIFICANT CHANGE UP
TSH SERPL-MCNC: 1.57 UIU/ML — SIGNIFICANT CHANGE UP (ref 0.27–4.2)
WBC # BLD: 11.52 K/UL — HIGH (ref 3.8–10.5)
WBC # BLD: 13.19 K/UL — HIGH (ref 3.8–10.5)
WBC # BLD: 17.13 K/UL — HIGH (ref 3.8–10.5)
WBC # BLD: 6.34 K/UL — SIGNIFICANT CHANGE UP (ref 3.8–10.5)
WBC # BLD: 7.41 K/UL — SIGNIFICANT CHANGE UP (ref 3.8–10.5)
WBC # BLD: 9.89 K/UL — SIGNIFICANT CHANGE UP (ref 3.8–10.5)
WBC # FLD AUTO: 11.52 K/UL — HIGH (ref 3.8–10.5)
WBC # FLD AUTO: 13.19 K/UL — HIGH (ref 3.8–10.5)
WBC # FLD AUTO: 17.13 K/UL — HIGH (ref 3.8–10.5)
WBC # FLD AUTO: 6.34 K/UL — SIGNIFICANT CHANGE UP (ref 3.8–10.5)
WBC # FLD AUTO: 7.41 K/UL — SIGNIFICANT CHANGE UP (ref 3.8–10.5)
WBC # FLD AUTO: 9.89 K/UL — SIGNIFICANT CHANGE UP (ref 3.8–10.5)

## 2022-08-12 PROCEDURE — 71045 X-RAY EXAM CHEST 1 VIEW: CPT | Mod: 26

## 2022-08-12 PROCEDURE — 99291 CRITICAL CARE FIRST HOUR: CPT

## 2022-08-12 PROCEDURE — 76937 US GUIDE VASCULAR ACCESS: CPT | Mod: 26

## 2022-08-12 PROCEDURE — 36620 INSERTION CATHETER ARTERY: CPT

## 2022-08-12 RX ORDER — POTASSIUM CHLORIDE 20 MEQ
10 PACKET (EA) ORAL
Refills: 0 | Status: COMPLETED | OUTPATIENT
Start: 2022-08-12 | End: 2022-08-12

## 2022-08-12 RX ORDER — NOREPINEPHRINE BITARTRATE/D5W 8 MG/250ML
0.05 PLASTIC BAG, INJECTION (ML) INTRAVENOUS
Qty: 16 | Refills: 0 | Status: DISCONTINUED | OUTPATIENT
Start: 2022-08-12 | End: 2022-08-12

## 2022-08-12 RX ORDER — ALBUMIN HUMAN 25 %
250 VIAL (ML) INTRAVENOUS ONCE
Refills: 0 | Status: COMPLETED | OUTPATIENT
Start: 2022-08-12 | End: 2022-08-12

## 2022-08-12 RX ORDER — SODIUM CHLORIDE 9 MG/ML
1000 INJECTION, SOLUTION INTRAVENOUS ONCE
Refills: 0 | Status: COMPLETED | OUTPATIENT
Start: 2022-08-12 | End: 2022-08-12

## 2022-08-12 RX ORDER — PHENYLEPHRINE HYDROCHLORIDE 10 MG/ML
0.1 INJECTION INTRAVENOUS
Qty: 160 | Refills: 0 | Status: DISCONTINUED | OUTPATIENT
Start: 2022-08-12 | End: 2022-08-15

## 2022-08-12 RX ORDER — VASOPRESSIN 20 [USP'U]/ML
0.04 INJECTION INTRAVENOUS
Qty: 50 | Refills: 0 | Status: DISCONTINUED | OUTPATIENT
Start: 2022-08-12 | End: 2022-08-13

## 2022-08-12 RX ORDER — DEXMEDETOMIDINE HYDROCHLORIDE IN 0.9% SODIUM CHLORIDE 4 UG/ML
0.1 INJECTION INTRAVENOUS
Qty: 400 | Refills: 0 | Status: DISCONTINUED | OUTPATIENT
Start: 2022-08-12 | End: 2022-08-13

## 2022-08-12 RX ORDER — SODIUM CHLORIDE 9 MG/ML
500 INJECTION, SOLUTION INTRAVENOUS ONCE
Refills: 0 | Status: COMPLETED | OUTPATIENT
Start: 2022-08-12 | End: 2022-08-12

## 2022-08-12 RX ORDER — MAGNESIUM SULFATE 500 MG/ML
2 VIAL (ML) INJECTION ONCE
Refills: 0 | Status: COMPLETED | OUTPATIENT
Start: 2022-08-12 | End: 2022-08-12

## 2022-08-12 RX ORDER — VANCOMYCIN HCL 1 G
1000 VIAL (EA) INTRAVENOUS ONCE
Refills: 0 | Status: COMPLETED | OUTPATIENT
Start: 2022-08-12 | End: 2022-08-12

## 2022-08-12 RX ORDER — POTASSIUM CHLORIDE 20 MEQ
10 PACKET (EA) ORAL
Refills: 0 | Status: DISCONTINUED | OUTPATIENT
Start: 2022-08-12 | End: 2022-08-13

## 2022-08-12 RX ORDER — PANTOPRAZOLE SODIUM 20 MG/1
40 TABLET, DELAYED RELEASE ORAL
Refills: 0 | Status: DISCONTINUED | OUTPATIENT
Start: 2022-08-12 | End: 2022-08-13

## 2022-08-12 RX ADMIN — Medication 100 MILLIEQUIVALENT(S): at 10:14

## 2022-08-12 RX ADMIN — Medication 250 MILLIGRAM(S): at 05:48

## 2022-08-12 RX ADMIN — PIPERACILLIN AND TAZOBACTAM 200 GRAM(S): 4; .5 INJECTION, POWDER, LYOPHILIZED, FOR SOLUTION INTRAVENOUS at 00:03

## 2022-08-12 RX ADMIN — Medication 100 MILLIEQUIVALENT(S): at 16:06

## 2022-08-12 RX ADMIN — Medication 25 GRAM(S): at 13:57

## 2022-08-12 RX ADMIN — VASOPRESSIN 2.4 UNIT(S)/MIN: 20 INJECTION INTRAVENOUS at 05:48

## 2022-08-12 RX ADMIN — PHENYLEPHRINE HYDROCHLORIDE 1.3 MICROGRAM(S)/KG/MIN: 10 INJECTION INTRAVENOUS at 07:36

## 2022-08-12 RX ADMIN — PIPERACILLIN AND TAZOBACTAM 25 GRAM(S): 4; .5 INJECTION, POWDER, LYOPHILIZED, FOR SOLUTION INTRAVENOUS at 23:13

## 2022-08-12 RX ADMIN — Medication 500 MILLILITER(S): at 00:28

## 2022-08-12 RX ADMIN — DEXMEDETOMIDINE HYDROCHLORIDE IN 0.9% SODIUM CHLORIDE 1.74 MICROGRAM(S)/KG/HR: 4 INJECTION INTRAVENOUS at 23:14

## 2022-08-12 RX ADMIN — Medication 100 MILLIEQUIVALENT(S): at 13:58

## 2022-08-12 RX ADMIN — Medication 500 MILLILITER(S): at 05:48

## 2022-08-12 RX ADMIN — Medication 100 MILLIEQUIVALENT(S): at 12:23

## 2022-08-12 RX ADMIN — VASOPRESSIN 2.4 UNIT(S)/MIN: 20 INJECTION INTRAVENOUS at 07:36

## 2022-08-12 RX ADMIN — PIPERACILLIN AND TAZOBACTAM 25 GRAM(S): 4; .5 INJECTION, POWDER, LYOPHILIZED, FOR SOLUTION INTRAVENOUS at 08:56

## 2022-08-12 RX ADMIN — Medication 100 MILLIEQUIVALENT(S): at 00:27

## 2022-08-12 RX ADMIN — Medication 100 MEQ/KG/HR: at 07:36

## 2022-08-12 RX ADMIN — PANTOPRAZOLE SODIUM 10 MG/HR: 20 TABLET, DELAYED RELEASE ORAL at 07:35

## 2022-08-12 RX ADMIN — Medication 100 MILLIEQUIVALENT(S): at 15:05

## 2022-08-12 RX ADMIN — Medication 500 MILLILITER(S): at 04:22

## 2022-08-12 RX ADMIN — CHLORHEXIDINE GLUCONATE 1 APPLICATION(S): 213 SOLUTION TOPICAL at 08:49

## 2022-08-12 RX ADMIN — SODIUM CHLORIDE 1000 MILLILITER(S): 9 INJECTION, SOLUTION INTRAVENOUS at 00:27

## 2022-08-12 RX ADMIN — Medication 3.25 MICROGRAM(S)/KG/MIN: at 04:22

## 2022-08-12 RX ADMIN — Medication 100 MILLIEQUIVALENT(S): at 11:15

## 2022-08-12 RX ADMIN — Medication 100 MILLIEQUIVALENT(S): at 01:30

## 2022-08-12 RX ADMIN — SODIUM CHLORIDE 1000 MILLILITER(S): 9 INJECTION, SOLUTION INTRAVENOUS at 09:55

## 2022-08-12 NOTE — PROGRESS NOTE ADULT - SUBJECTIVE AND OBJECTIVE BOX
INTERVAL HPI/OVERNIGHT EVENTS:    SUBJECTIVE: Patient seen and examined at bedside. ROS could not be obtained as patient intubated, sedated.      VITAL SIGNS:  ICU Vital Signs Last 24 Hrs  T(C): 36.6 (12 Aug 2022 09:00), Max: 38.1 (11 Aug 2022 18:34)  T(F): 97.9 (12 Aug 2022 09:00), Max: 100.6 (11 Aug 2022 18:34)  HR: 73 (12 Aug 2022 09:45) (68 - 89)  BP: 88/66 (12 Aug 2022 03:00) (43/27 - 117/84)  BP(mean): 75 (12 Aug 2022 03:00) (48 - 95)  ABP: 93/56 (12 Aug 2022 09:45) (74/48 - 123/68)  ABP(mean): 71 (12 Aug 2022 09:45) (59 - 93)  RR: 28 (12 Aug 2022 09:45) (20 - 30)  SpO2: 100% (12 Aug 2022 09:45) (65% - 100%)    O2 Parameters below as of 12 Aug 2022 05:00  Patient On (Oxygen Delivery Method): room air            Plateau pressure:   P/F ratio:     08-11 @ 07:01  -  08-12 @ 07:00  --------------------------------------------------------  IN: 1178.2 mL / OUT: 655 mL / NET: 523.2 mL      CAPILLARY BLOOD GLUCOSE        ECG:    PHYSICAL EXAM:    General:   HEENT:   Neck:   Respiratory:   Cardiovascular:   Abdomen:   Extremities:  Neurological:    MEDICATIONS:  MEDICATIONS  (STANDING):  chlorhexidine 4% Liquid 1 Application(s) Topical <User Schedule>  lactated ringers Bolus 1000 milliLiter(s) IV Bolus once  norepinephrine Infusion 0.05 MICROgram(s)/kG/Min (3.25 mL/Hr) IV Continuous <Continuous>  pantoprazole Infusion 8 mG/Hr (10 mL/Hr) IV Continuous <Continuous>  phenylephrine    Infusion 0.1 MICROgram(s)/kG/Min (1.3 mL/Hr) IV Continuous <Continuous>  piperacillin/tazobactam IVPB.. 3.375 Gram(s) IV Intermittent every 12 hours  potassium chloride  10 mEq/100 mL IVPB 10 milliEquivalent(s) IV Intermittent every 1 hour  sodium bicarbonate  Infusion 0.231 mEq/kG/Hr (100 mL/Hr) IV Continuous <Continuous>  vasopressin Infusion 0.04 Unit(s)/Min (2.4 mL/Hr) IV Continuous <Continuous>    MEDICATIONS  (PRN):      ALLERGIES:  Allergies    No Known Allergies    Intolerances        LABS:                        11.5   7.41  )-----------( 269      ( 12 Aug 2022 06:48 )             35.6     08-12    130<L>  |  91<L>  |  85<H>  ----------------------------<  305<H>  3.1<L>   |  15<L>  |  3.64<H>    Ca    7.1<L>      12 Aug 2022 06:48  Phos  4.8     08-12  Mg     2.20     08-12    TPro  5.6<L>  /  Alb  3.1<L>  /  TBili  1.6<H>  /  DBili  x   /  AST  111<H>  /  ALT  70<H>  /  AlkPhos  103  08-12    PT/INR - ( 12 Aug 2022 01:53 )   PT: 25.6 sec;   INR: 2.19 ratio         PTT - ( 12 Aug 2022 01:53 )  PTT:33.0 sec  Urinalysis Basic - ( 11 Aug 2022 17:52 )    Color: dark yellow / Appearance: Clear / S.030 / pH: x  Gluc: x / Ketone: Trace  / Bili: Moderate / Urobili: <2 mg/dL   Blood: x / Protein: 30 mg/dL / Nitrite: Negative   Leuk Esterase: Trace / RBC: 0-2 /HPF / WBC 2-5 /HPF   Sq Epi: x / Non Sq Epi: Occasional / Bacteria: Few        RADIOLOGY & ADDITIONAL TESTS: Reviewed. INTERVAL HPI/OVERNIGHT EVENTS: Patient admitted to floor this AM.    SUBJECTIVE: Patient seen and examined at bedside. ROS could not be obtained 2/2 patient mental status      VITAL SIGNS:  ICU Vital Signs Last 24 Hrs  T(C): 36.6 (12 Aug 2022 09:00), Max: 38.1 (11 Aug 2022 18:34)  T(F): 97.9 (12 Aug 2022 09:00), Max: 100.6 (11 Aug 2022 18:34)  HR: 73 (12 Aug 2022 09:45) (68 - 89)  BP: 88/66 (12 Aug 2022 03:00) (43/27 - 117/84)  BP(mean): 75 (12 Aug 2022 03:00) (48 - 95)  ABP: 93/56 (12 Aug 2022 09:45) (74/48 - 123/68)  ABP(mean): 71 (12 Aug 2022 09:45) (59 - 93)  RR: 28 (12 Aug 2022 09:45) (20 - 30)  SpO2: 100% (12 Aug 2022 09:45) (65% - 100%)    O2 Parameters below as of 12 Aug 2022 05:00  Patient On (Oxygen Delivery Method): room air            Plateau pressure:   P/F ratio:     08-11 @ 07:01  -  08-12 @ 07:00  --------------------------------------------------------  IN: 1178.2 mL / OUT: 655 mL / NET: 523.2 mL      CAPILLARY BLOOD GLUCOSE        ECG:    PHYSICAL EXAM:    General:   HEENT:   Neck:   Respiratory:   Cardiovascular:   Abdomen:   Extremities:  Neurological:    MEDICATIONS:  MEDICATIONS  (STANDING):  chlorhexidine 4% Liquid 1 Application(s) Topical <User Schedule>  lactated ringers Bolus 1000 milliLiter(s) IV Bolus once  norepinephrine Infusion 0.05 MICROgram(s)/kG/Min (3.25 mL/Hr) IV Continuous <Continuous>  pantoprazole Infusion 8 mG/Hr (10 mL/Hr) IV Continuous <Continuous>  phenylephrine    Infusion 0.1 MICROgram(s)/kG/Min (1.3 mL/Hr) IV Continuous <Continuous>  piperacillin/tazobactam IVPB.. 3.375 Gram(s) IV Intermittent every 12 hours  potassium chloride  10 mEq/100 mL IVPB 10 milliEquivalent(s) IV Intermittent every 1 hour  sodium bicarbonate  Infusion 0.231 mEq/kG/Hr (100 mL/Hr) IV Continuous <Continuous>  vasopressin Infusion 0.04 Unit(s)/Min (2.4 mL/Hr) IV Continuous <Continuous>    MEDICATIONS  (PRN):      ALLERGIES:  Allergies    No Known Allergies    Intolerances        LABS:                        11.5   7.41  )-----------( 269      ( 12 Aug 2022 06:48 )             35.6     08-12    130<L>  |  91<L>  |  85<H>  ----------------------------<  305<H>  3.1<L>   |  15<L>  |  3.64<H>    Ca    7.1<L>      12 Aug 2022 06:48  Phos  4.8     08-12  Mg     2.20     08-12    TPro  5.6<L>  /  Alb  3.1<L>  /  TBili  1.6<H>  /  DBili  x   /  AST  111<H>  /  ALT  70<H>  /  AlkPhos  103  08-12    PT/INR - ( 12 Aug 2022 01:53 )   PT: 25.6 sec;   INR: 2.19 ratio         PTT - ( 12 Aug 2022 01:53 )  PTT:33.0 sec  Urinalysis Basic - ( 11 Aug 2022 17:52 )    Color: dark yellow / Appearance: Clear / S.030 / pH: x  Gluc: x / Ketone: Trace  / Bili: Moderate / Urobili: <2 mg/dL   Blood: x / Protein: 30 mg/dL / Nitrite: Negative   Leuk Esterase: Trace / RBC: 0-2 /HPF / WBC 2-5 /HPF   Sq Epi: x / Non Sq Epi: Occasional / Bacteria: Few        RADIOLOGY & ADDITIONAL TESTS: Reviewed. INTERVAL HPI/OVERNIGHT EVENTS: Patient admitted to floor this AM.    SUBJECTIVE: Patient seen and examined at bedside. ROS could not be obtained 2/2 patient mental status.      VITAL SIGNS:  ICU Vital Signs Last 24 Hrs  T(C): 36.6 (12 Aug 2022 09:00), Max: 38.1 (11 Aug 2022 18:34)  T(F): 97.9 (12 Aug 2022 09:00), Max: 100.6 (11 Aug 2022 18:34)  HR: 73 (12 Aug 2022 09:45) (68 - 89)  BP: 88/66 (12 Aug 2022 03:00) (43/27 - 117/84)  BP(mean): 75 (12 Aug 2022 03:00) (48 - 95)  ABP: 93/56 (12 Aug 2022 09:45) (74/48 - 123/68)  ABP(mean): 71 (12 Aug 2022 09:45) (59 - 93)  RR: 28 (12 Aug 2022 09:45) (20 - 30)  SpO2: 100% (12 Aug 2022 09:45) (65% - 100%)    O2 Parameters below as of 12 Aug 2022 05:00  Patient On (Oxygen Delivery Method): room air            Plateau pressure:   P/F ratio:     - @ 07:01  -  08-12 @ 07:00  --------------------------------------------------------  IN: 1178.2 mL / OUT: 655 mL / NET: 523.2 mL      CAPILLARY BLOOD GLUCOSE        ECG:    PHYSICAL EXAM:    General: NAD lying in bed, unable to participate 2/2 mental status  Respiratory: CTAB  Cardiovascular: RRR  Abdomen: soft, non-distended  Extremities: trace peripheral edema    MEDICATIONS:  MEDICATIONS  (STANDING):  chlorhexidine 4% Liquid 1 Application(s) Topical <User Schedule>  lactated ringers Bolus 1000 milliLiter(s) IV Bolus once  norepinephrine Infusion 0.05 MICROgram(s)/kG/Min (3.25 mL/Hr) IV Continuous <Continuous>  pantoprazole Infusion 8 mG/Hr (10 mL/Hr) IV Continuous <Continuous>  phenylephrine    Infusion 0.1 MICROgram(s)/kG/Min (1.3 mL/Hr) IV Continuous <Continuous>  piperacillin/tazobactam IVPB.. 3.375 Gram(s) IV Intermittent every 12 hours  potassium chloride  10 mEq/100 mL IVPB 10 milliEquivalent(s) IV Intermittent every 1 hour  sodium bicarbonate  Infusion 0.231 mEq/kG/Hr (100 mL/Hr) IV Continuous <Continuous>  vasopressin Infusion 0.04 Unit(s)/Min (2.4 mL/Hr) IV Continuous <Continuous>    MEDICATIONS  (PRN):      ALLERGIES:  Allergies    No Known Allergies    Intolerances        LABS:                        11.5   7.41  )-----------( 269      ( 12 Aug 2022 06:48 )             35.6     08-12    130<L>  |  91<L>  |  85<H>  ----------------------------<  305<H>  3.1<L>   |  15<L>  |  3.64<H>    Ca    7.1<L>      12 Aug 2022 06:48  Phos  4.8     08-12  Mg     2.20     08-12    TPro  5.6<L>  /  Alb  3.1<L>  /  TBili  1.6<H>  /  DBili  x   /  AST  111<H>  /  ALT  70<H>  /  AlkPhos  103  08-12    PT/INR - ( 12 Aug 2022 01:53 )   PT: 25.6 sec;   INR: 2.19 ratio         PTT - ( 12 Aug 2022 01:53 )  PTT:33.0 sec  Urinalysis Basic - ( 11 Aug 2022 17:52 )    Color: dark yellow / Appearance: Clear / S.030 / pH: x  Gluc: x / Ketone: Trace  / Bili: Moderate / Urobili: <2 mg/dL   Blood: x / Protein: 30 mg/dL / Nitrite: Negative   Leuk Esterase: Trace / RBC: 0-2 /HPF / WBC 2-5 /HPF   Sq Epi: x / Non Sq Epi: Occasional / Bacteria: Few        RADIOLOGY & ADDITIONAL TESTS:    INTERVAL HPI/OVERNIGHT EVENTS: Patient admitted to floor this AM.    SUBJECTIVE: Patient seen and examined at bedside. ROS could not be obtained 2/2 patient mental status.      VITAL SIGNS:  ICU Vital Signs Last 24 Hrs  T(C): 36.6 (12 Aug 2022 09:00), Max: 38.1 (11 Aug 2022 18:34)  T(F): 97.9 (12 Aug 2022 09:00), Max: 100.6 (11 Aug 2022 18:34)  HR: 73 (12 Aug 2022 09:45) (68 - 89)  BP: 88/66 (12 Aug 2022 03:00) (43/27 - 117/84)  BP(mean): 75 (12 Aug 2022 03:00) (48 - 95)  ABP: 93/56 (12 Aug 2022 09:45) (74/48 - 123/68)  ABP(mean): 71 (12 Aug 2022 09:45) (59 - 93)  RR: 28 (12 Aug 2022 09:45) (20 - 30)  SpO2: 100% (12 Aug 2022 09:45) (65% - 100%)    O2 Parameters below as of 12 Aug 2022 05:00  Patient On (Oxygen Delivery Method): room air            Plateau pressure:   P/F ratio:     - @ 07:01  -  08-12 @ 07:00  --------------------------------------------------------  IN: 1178.2 mL / OUT: 655 mL / NET: 523.2 mL      CAPILLARY BLOOD GLUCOSE        ECG:    PHYSICAL EXAM:    General: NAD lying in bed, unable to participate 2/2 mental status  Respiratory: CTAB  Cardiovascular: RRR  Abdomen: soft, non-distended  Extremities: trace peripheral edema    MEDICATIONS:  MEDICATIONS  (STANDING):  chlorhexidine 4% Liquid 1 Application(s) Topical <User Schedule>  lactated ringers Bolus 1000 milliLiter(s) IV Bolus once  norepinephrine Infusion 0.05 MICROgram(s)/kG/Min (3.25 mL/Hr) IV Continuous <Continuous>  pantoprazole Infusion 8 mG/Hr (10 mL/Hr) IV Continuous <Continuous>  phenylephrine    Infusion 0.1 MICROgram(s)/kG/Min (1.3 mL/Hr) IV Continuous <Continuous>  piperacillin/tazobactam IVPB.. 3.375 Gram(s) IV Intermittent every 12 hours  potassium chloride  10 mEq/100 mL IVPB 10 milliEquivalent(s) IV Intermittent every 1 hour  sodium bicarbonate  Infusion 0.231 mEq/kG/Hr (100 mL/Hr) IV Continuous <Continuous>  vasopressin Infusion 0.04 Unit(s)/Min (2.4 mL/Hr) IV Continuous <Continuous>    MEDICATIONS  (PRN):      ALLERGIES:  Allergies    No Known Allergies    Intolerances        LABS:                        11.5   7.41  )-----------( 269      ( 12 Aug 2022 06:48 )             35.6     08-12    130<L>  |  91<L>  |  85<H>  ----------------------------<  305<H>  3.1<L>   |  15<L>  |  3.64<H>    Ca    7.1<L>      12 Aug 2022 06:48  Phos  4.8     08-12  Mg     2.20     08-12    TPro  5.6<L>  /  Alb  3.1<L>  /  TBili  1.6<H>  /  DBili  x   /  AST  111<H>  /  ALT  70<H>  /  AlkPhos  103  08-12    PT/INR - ( 12 Aug 2022 01:53 )   PT: 25.6 sec;   INR: 2.19 ratio         PTT - ( 12 Aug 2022 01:53 )  PTT:33.0 sec  Urinalysis Basic - ( 11 Aug 2022 17:52 )    Color: dark yellow / Appearance: Clear / S.030 / pH: x  Gluc: x / Ketone: Trace  / Bili: Moderate / Urobili: <2 mg/dL   Blood: x / Protein: 30 mg/dL / Nitrite: Negative   Leuk Esterase: Trace / RBC: 0-2 /HPF / WBC 2-5 /HPF   Sq Epi: x / Non Sq Epi: Occasional / Bacteria: Few        RADIOLOGY & ADDITIONAL TESTS:     ACC: [id number] EXAM: CT CHEST  ACC: [id number] EXAM: CT ABDOMEN AND PELVIS    PROCEDURE DATE: 2022        INTERPRETATION: CLINICAL INFORMATION: Sepsis, dark vomiting, history of ERCP with sphincterotomy and stone removal for choledocholithiasis, history of GI bleed secondary to esophageal ulcer.    COMPARISON: CT abdomen and pelvis from 2020    CONTRAST/COMPLICATIONS:  IV Contrast: None  Oral Contrast: None  Complications: None    PROCEDURE:  CT of the Chest, Abdomen and Pelvis was performed.  Sagittal and coronal reformats were performed.    FINDINGS:  Evaluation of the solid organs and vasculature is limited without intravenous contrast.    CHEST:  LUNGS AND LARGE AIRWAYS: Patent central airways. Right upper lobe calcified granuloma.  PLEURA: No pleural effusion.  VESSELS: Atherosclerotic changes of the aorta and coronary arteries.  HEART: Heart size is normal. No pericardial effusion.  MEDIASTINUM AND DEBAR: No lymphadenopathy.  CHEST WALL AND LOWER NECK: Within normal limits.    ABDOMEN AND PELVIS:  LIVER: Within normal limits.  BILE DUCTS: Pneumobilia, likely related to prior ERCP/sphincterotomy.  GALLBLADDER: Cholecystectomy.  SPLEEN: Within normal limits.  PANCREAS: Within normal limits.  ADRENALS: Bilateral adrenal gland thickening, nonspecific.  KIDNEYS/URETERS: No hydronephrosis.    BLADDER: Metcalf catheter.  REPRODUCTIVE ORGANS: Uterus and adnexa within normal limits.    BOWEL: Moderate to large hiatal hernia. Enteric tube terminates just below the diaphragmatic hiatus, and appears outside the stomach, however there is no adjacent extraluminal air, unclear if this is related to tenting of the gastric wall. Colonic diverticulosis. No bowel obstruction. Appendix is normal.  PERITONEUM: No ascites.  VESSELS: Atherosclerotic changes.  RETROPERITONEUM/LYMPH NODES: No lymphadenopathy.  ABDOMINAL WALL: Fat-containing right inguinal hernia.  BONES: Degenerative changes.    IMPRESSION:  Moderate to large hiatal hernia. Enteric tube terminates just below the diaphragmatic hiatus, and appears outside the stomach, unclear if this is related to tenting of the gastric wall. Consider venous repositioning versus fluoroscopic NG tube study with water soluble contrast.    No acute findings in the chest.    Dr. SIL Houser discussed these findings with Dr. Cedeno on 2022 11:34 AM, with read back.    --- End of Report ---      ACC: [id number] EXAM: CT BRAIN    PROCEDURE DATE: 2022        INTERPRETATION: CT HEAD    CLINICAL HISTORY: AMS on Eliquis    TECHNIQUE:  Noncontrast CT. Axial Acquisition. Sagittal and coronal reformations.    COMPARISON:  No prior studies for comparison    FINDINGS:  * HEMORRHAGE: No evidence of intracranial hemorrhage.  * BRAIN PARENCHYMA: Mild atrophy. 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 Chronic lacunar infarct left caudate nucleus. Mild periventricular white matter ischemic changes. No mass or mass effect.  * VENTRICLES / SHIFT: No hydrocephalus. No midline shift.  * EXTRA-AXIAL / BASAL CISTERNS: No extra-axial mass. Basal cisterns preserved. Atherosclerotic calcifications of the cavernous internal carotid arteries.  * CALVARIUM AND EXTRACRANIAL SOFT TISSUES: No depressed calvarial fracture.  * SINUSES, ORBITS, MASTOIDS: The visualized paranasal sinuses and mastoid air cells are well aerated. A nasogastric tube courses to the right nasal cavity    IMPRESSION:  No evidence of an acute intracranial hemorrhage, midline shift, or hydrocephalus. 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

## 2022-08-12 NOTE — PROCEDURE NOTE - [email or web address]_A_CORE
alcohol/Adherence to aseptic technique: hand hygiene prior to donning barriers (gown, gloves), don cap and mask, sterile drape over patient
chlorhexidine/Adherence to aseptic technique: hand hygiene prior to donning barriers (gown, gloves), don cap and mask, sterile drape over patient

## 2022-08-12 NOTE — PROGRESS NOTE ADULT - ASSESSMENT
80 y/o F PMH dementia (A&Ox2 at baseline), HTN, recent DVT (on eliquis), p/w vomiting and AMS found to be hypotensive despite 4 L NS and was admitted to MICU for management of hypotension requiring pressors. Hypotension likely 2/2 hypovolemic shock (in the setting of poor PO intake/vomiting vs. GI bleed). Patient currently on levophed and zosyn. Course c/b VANDANA with acidosis, possibly 2/2 use of NS for volume repletion. Blood cultures, CT CAP pending to rule out septic and distributive shock. Bedside echo suggests unlikely cardiogenic shock.     #Neuro  1) AMS  -Patient is A&Ox2, which is baseline per chart review  -Will reach out to brother in AM for further collateral regarding mental status   -monitor for acute changes in mental status    #Cardiovascular   1) Shock likely 2/2 hypovolemia in the setting of poor PO intake with vomiting vs. GI bleed  -Patient is hypotensive despite 4 L NS   -On Levophed with femoral central line  -Continue to titrate levo as tolerated  -f/u Q6 CBC     #Respiratory  -no active issues at this time  -is able to protect airway well    #Gentiourinary  1) VANDANA likely pre-renal in setting of hypotension and hypovolemia  Patient presented with intiial Cr 6.63 --> 5.32 with fluids   -last baseline from 2020 was 0.71  -F/u urine lytes   -contine to trend Cr and maintain blood pressure     2) Acidosis   Bicarbonate 9 with VBG pH 7.14.   -Possibly 2/2 use of acidic fluid for repletion   -if requiring IVF, use LR instead of NS   -patient started on HCO3 drip   -monitor K+ as bicarbonate can cause potassium shifts into cell.   -Q6 BMP    3) Hyponatremia   -initial Na 125   -improved to 130  -continue to trend     #Gastrointestinal  1) Concern for GI bleed with Coffee ground emesis seen coming from NGT   -no signs of lui bleeding at the moment  -continue to monitor Q6 CBC and trend Hb   -maintain active type and screen just in case     #ID   -Patient presented with fever in ED and WBC 13 (last WBC was 3.5)   -started on zosyn; continue  -follow up BCx, CT CAP to look for source of infection   -UA negative     #Heme/Onc  -Elevated coags   -hold home eliquis in setting of bleed  -no DVT PPx at this time    #Ethics  -[facility] conversation with brother  -full code for now     82 y/o F PMH dementia (A&Ox2 at baseline), HTN, recent DVT (on eliquis), p/w vomiting and AMS found to be hypotensive despite 4 L NS and was admitted to MICU for management of hypotension requiring pressors. Hypotension likely 2/2 hypovolemic shock (in the setting of poor PO intake/vomiting vs. GI bleed), with significant improvement after IV fluid resuscitation.   Patient currently on levophed and zosyn. Course c/b VANDANA with acidosis, possibly 2/2 use of NS for volume repletion. Blood cultures, CT CAP pending to rule out septic and distributive shock. Bedside echo suggests unlikely cardiogenic shock.     #Neuro  1) AMS  -Patient is A&Ox2, which is baseline per chart review  -Will reach out to brother in AM for further collateral regarding mental status   -monitor for acute changes in mental status    #Cardiovascular   1) Shock likely 2/2 hypovolemia in the setting of poor PO intake with vomiting vs. GI bleed  -Patient is hypotensive despite 4 L NS   -On Levophed with femoral central line  -Continue to titrate levo as tolerated  -f/u Q6 CBC     #Respiratory  -no active issues at this time  -is able to protect airway well    #Gentiourinary  1) VANDANA likely pre-renal in setting of hypotension and hypovolemia  Patient presented with intiial Cr 6.63 --> 5.32 with fluids   -last baseline from 2020 was 0.71  -F/u urine lytes   -contine to trend Cr and maintain blood pressure     2) Acidosis   Bicarbonate 9 with VBG pH 7.14.   -Possibly 2/2 use of acidic fluid for repletion   -if requiring IVF, use LR instead of NS   -patient started on HCO3 drip   -monitor K+ as bicarbonate can cause potassium shifts into cell.   -Q6 BMP    3) Hyponatremia   -initial Na 125   -improved to 130  -continue to trend     #Gastrointestinal  1) Concern for GI bleed with Coffee ground emesis seen coming from NGT   -no signs of lui bleeding at the moment  -continue to monitor Q6 CBC and trend Hb   -maintain active type and screen just in case     #ID   -Patient presented with fever in ED and WBC 13 (last WBC was 3.5)   -started on zosyn; continue  -follow up BCx, CT CAP to look for source of infection   -UA negative     #Heme/Onc  -Elevated coags   -hold home eliquis in setting of bleed  -no DVT PPx at this time    #Ethics  -[facility] conversation with brother  -full code for now     80 y/o F PMH dementia (A&Ox2 at baseline), HTN, recent DVT (on eliquis), p/w vomiting and AMS found to be hypotensive despite 4 L NS and was admitted to MICU for management of hypotension requiring pressors. Hypotension likely 2/2 hypovolemic shock (in the setting of poor PO intake/vomiting vs. GI bleed), with significant improvement after IV fluid resuscitation. VANDANA and acidosis, Notably patient still requiring Charli and vasopressin drips for BP support.  Patient currently on levophed and zosyn. Course c/b VANDANA with acidosis, possibly 2/2 use of NS for volume repletion. Blood cultures, CT CAP pending to rule out septic and distributive shock. Bedside echo suggests unlikely cardiogenic shock.     #Neuro  1) AMS  -Patient is A&Ox2, which is baseline per chart review  -Will reach out to brother in AM for further collateral regarding mental status   -monitor for acute changes in mental status    #Cardiovascular   1) Shock likely 2/2 hypovolemia in the setting of poor PO intake with vomiting vs. GI bleed  -Patient is hypotensive despite 4 L NS   -On Levophed with femoral central line  -Continue to titrate levo as tolerated  -f/u Q6 CBC     #Respiratory  -no active issues at this time  -is able to protect airway well    #Gentiourinary  1) VANDANA likely pre-renal in setting of hypotension and hypovolemia  Patient presented with intiial Cr 6.63 --> 5.32 with fluids   -last baseline from 2020 was 0.71  -F/u urine lytes   -contine to trend Cr and maintain blood pressure     2) Acidosis   Bicarbonate 9 with VBG pH 7.14.   -Possibly 2/2 use of acidic fluid for repletion   -if requiring IVF, use LR instead of NS   -patient started on HCO3 drip   -monitor K+ as bicarbonate can cause potassium shifts into cell.   -Q6 BMP    3) Hyponatremia   -initial Na 125   -improved to 130  -continue to trend     #Gastrointestinal  1) Concern for GI bleed with Coffee ground emesis seen coming from NGT   -no signs of lui bleeding at the moment  -continue to monitor Q6 CBC and trend Hb   -maintain active type and screen just in case     #ID   -Patient presented with fever in ED and WBC 13 (last WBC was 3.5)   -started on zosyn; continue  -follow up BCx, CT CAP to look for source of infection   -UA negative     #Heme/Onc  -Elevated coags   -hold home eliquis in setting of bleed  -no DVT PPx at this time    #Ethics  -[facility] conversation with brother  -full code for now     82 y/o F PMH dementia (A&Ox2 at baseline), HTN, recent DVT (on eliquis), p/w vomiting and AMS found to be hypotensive despite 4 L NS and was admitted to MICU for management of hypotension requiring pressors. Hypotension likely 2/2 hypovolemic shock (in the setting of poor PO intake/vomiting vs. GI bleed), with significant improvement after IV fluid resuscitation. VANDANA and acidosis improving with fluid administration. Notably patient still requiring Charli and vasopressin drips for BP support. CTH notable for chronic infarcts but no acute process; CT chest/AP show no acute process. Blood cultures pending. Etiology of hypotension unclear, as persistent despite 5..5L fluid resuscitation.    #Neuro  1) AMS  -Patient is A&Ox2, which is baseline per chart review  -Per brother (Collin), patient baseline is variable, as she often forgets where she is or what day/season it is.  -monitor for acute changes in mental status    #Cardiovascular   1) Shock likely 2/2 hypovolemia in the setting of poor PO intake with vomiting vs. GI bleed  -Patient is hypotensive despite 5.5 L NS   -On Charli and Vasopressin  -f/u Q6 CBC     #Respiratory  -no active issues at this time  -is able to protect airway well    #Gentiourinary  1) VANDANA likely pre-renal in setting of hypotension and hypovolemia  Patient presented with intiial Cr 6.63 --> 5.32 with fluids   -last baseline from 2020 was 0.71  -Cr continues to improve; most recent 3.36  -F/u urine lytes   -contine to trend Cr and maintain blood pressure     2) Acidosis   Bicarbonate 9 with VBG pH 7.14.   -Possibly 2/2 use of acidic fluid for repletion   -if requiring IVF, use LR instead of NS   -patient started on HCO3 drip   -monitor/replete K+ as bicarbonate can cause potassium shifts into cell.   -Q6 BMP    3) Hyponatremia   -initial Na 125   -improved to 130  -continue to trend     #Gastrointestinal  1) Concern for GI bleed with Coffee ground emesis seen coming from NGT   -no signs of lui bleeding at the moment  -continue to monitor Q6 CBC and trend Hb   -maintain active type and screen     #ID   -Patient presented with fever in ED and WBC 13 (last WBC was 3.5)   -WBC improving to normal range with fluid resusc  -c/w zosyn pending BCx  -CT CAP neg for infx source  -UA negative     #Heme/Onc  -Elevated coags   -hold home eliquis in setting of bleed  -no DVT PPx at this time    #Ethics  -[facility] conversation with brother  -full code for now     82 y/o F PMH dementia (A&Ox2 at baseline), HTN, recent DVT (on eliquis), p/w vomiting and AMS found to be hypotensive despite 4 L NS and was admitted to MICU for management of hypotension requiring pressors. Hypotension likely 2/2 hypovolemic shock (in the setting of poor PO intake/vomiting vs. GI bleed), with significant improvement after IV fluid resuscitation. VANDANA and acidosis improving with fluid administration. Notably patient still requiring Charli and vasopressin drips for BP support. CTH notable for chronic infarcts but no acute process; CT chest/AP show no acute process. Blood cultures pending. Etiology of hypotension unclear, as persistent despite 5..5L fluid resuscitation.    #Neuro  1) AMS  -Patient is A&Ox2, which is baseline per chart review  -Per brother (Collin), patient baseline is variable, as she often forgets where she is or what day/season it is.  -monitor for acute changes in mental status    #Cardiovascular   1) Shock likely 2/2 hypovolemia in the setting of poor PO intake with vomiting vs. GI bleed  -Patient is hypotensive despite 5.5 L NS   -On Charli and Vasopressin  -f/u Q6 CBC     #Respiratory  -no active issues at this time  -is able to protect airway well    #Gentiourinary  1) VANDANA likely pre-renal in setting of hypotension and hypovolemia  Patient presented with intiial Cr 6.63 --> 5.32 with fluids   -last baseline from 2020 was 0.71  -Cr continues to improve; most recent 3.36  -F/u urine lytes   -contine to trend Cr and maintain blood pressure   2) Acidosis   -Initial Bicarbonate 9 with VBG pH 7.14.     -Improved to 25, pH 7.4 after HCO3 drip  -Possibly 2/2 use of acidic fluid for repletion   -if requiring IVF, use LR instead of NS   -monitor/replete K+ as bicarbonate can cause potassium shifts into cell.   -Q6 BMP  3) Hyponatremia   -initial Na 125   -improved to 130  -continue to trend     #Gastrointestinal  1) Concern for GI bleed with Coffee ground emesis seen coming from NGT   -no signs of lui bleeding at the moment  -continue to monitor Q6 CBC and trend Hb   -maintain active type and screen     #ID   -Patient presented with fever in ED and WBC 13 (last WBC was 3.5)   -WBC improving to normal range with fluid resusc  -c/w zosyn pending BCx  -CT CAP neg for infx source  -UA negative     #Heme/Onc  -Elevated coags   -hold home eliquis in setting of bleed  -no DVT PPx at this time    #Ethics  -[facility] conversation with brother  -full code for now

## 2022-08-12 NOTE — PROCEDURE NOTE - NSPROCDETAILS_GEN_ALL_CORE
Discharge Planning Assessment  [facility]     Patient Name: Mayra Granados  MRN: [id number]  Today's Date: 5/21/2018    Admit Date: 5/18/2018          Discharge Needs Assessment     Row Name 05/21/18 1111       Living Environment    Lives With spouse    Name(s) of Who Lives With Patient Palomo(spouse)    Current Living Arrangements home/apartment/condo    Primary Care Provided by self    Provides Primary Care For no one    Family Caregiver if Needed spouse    Quality of Family Relationships supportive    Able to Return to Prior Arrangements yes    Living Arrangement Comments Spoke with pt in room with permission in regards to discharge planning. Pt resides in [place] with spouse, Palomo. Plan is home with spouse. Denies discharge needs.        Resource/Environmental Concerns    Resource/Environmental Concerns none    Transportation Concerns other (see comments)   Denies transportation concerns.        Transition Planning    Patient/Family Anticipates Transition to home with family    Patient/Family Anticipated Services at Transition none    Transportation Anticipated family or friend will provide       Discharge Needs Assessment    Readmission Within the Last 30 Days no previous admission in last 30 days    Concerns to be Addressed denies needs/concerns at this time    Equipment Currently Used at Home colostomy/ostomy supplies    Anticipated Changes Related to Illness other (see comments)   Routine postop recovery    Equipment Needed After Discharge none    Discharge Coordination/Progress Pt has [place] [employer] insurance and denies recent changes in insurance. Pt's insurance provides prescription coverage and pt uses [facility] on [facility]. Plan is home with spouse at discharge. Denies discharge needs.             Discharge Plan     Row Name 05/21/18 1115       Plan    Plan discharge plan    Patient/Family in Agreement with Plan yes    Plan Comments Plan is home with spouse. Denies discharge 
needs.     Final Discharge Disposition Code 01 - home or self-care    Final Note Plan is home with spouse. Denies discharge needs.         Destination     No service coordination in this encounter.      Durable Medical Equipment     No service coordination in this encounter.      Dialysis/Infusion     No service coordination in this encounter.      Home Medical Care     No service coordination in this encounter.      Social Care     No service coordination in this encounter.        Expected Discharge Date and Time     Expected Discharge Date Expected Discharge Time    May 21, 2018               Demographic Summary     Row Name 05/21/18 1110       General Information    General Information Comments Pt's PCP is Tyra Mcgrath       Contact Information    Permission Granted to Share Info With     Contact Information Obtained for     Contact Information Comments Palomo Granados(spouse): [phone]            Functional Status     Row Name 05/21/18 1111       Functional Status    Functional Status Comments Pt is independent of ADLs prior to admission            Psychosocial    No documentation.           Abuse/Neglect    No documentation.           Legal    No documentation.           Substance Abuse    No documentation.           Patient Forms    No documentation.         Stella Duffy RN    
location identified, draped/prepped, sterile technique used/blood seen on insertion/dressing applied/flushes easily/secured in place/sterile technique, catheter placed
positive blood return obtained via catheter/connected to a pressurized flush line/sutured in place/hemostasis with direct pressure, dressing applied/Seldinger technique/all materials/supplies accounted for at end of procedure

## 2022-08-13 LAB
ALBUMIN SERPL ELPH-MCNC: 2.7 G/DL — LOW (ref 3.3–5)
ALBUMIN SERPL ELPH-MCNC: 2.7 G/DL — LOW (ref 3.3–5)
ALP SERPL-CCNC: 63 U/L — SIGNIFICANT CHANGE UP (ref 40–120)
ALP SERPL-CCNC: 67 U/L — SIGNIFICANT CHANGE UP (ref 40–120)
ALT FLD-CCNC: 57 U/L — HIGH (ref 4–33)
ALT FLD-CCNC: 57 U/L — HIGH (ref 4–33)
ANION GAP SERPL CALC-SCNC: 14 MMOL/L — SIGNIFICANT CHANGE UP (ref 7–14)
ANION GAP SERPL CALC-SCNC: 15 MMOL/L — HIGH (ref 7–14)
APTT BLD: 29.3 SEC — SIGNIFICANT CHANGE UP (ref 27–36.3)
AST SERPL-CCNC: 72 U/L — HIGH (ref 4–32)
AST SERPL-CCNC: 74 U/L — HIGH (ref 4–32)
BILIRUB SERPL-MCNC: 0.6 MG/DL — SIGNIFICANT CHANGE UP (ref 0.2–1.2)
BILIRUB SERPL-MCNC: 0.7 MG/DL — SIGNIFICANT CHANGE UP (ref 0.2–1.2)
BLOOD GAS ARTERIAL COMPREHENSIVE RESULT: SIGNIFICANT CHANGE UP
BLOOD GAS ARTERIAL COMPREHENSIVE RESULT: SIGNIFICANT CHANGE UP
BUN SERPL-MCNC: 54 MG/DL — HIGH (ref 7–23)
BUN SERPL-MCNC: 57 MG/DL — HIGH (ref 7–23)
CALCIUM SERPL-MCNC: 7.8 MG/DL — LOW (ref 8.4–10.5)
CALCIUM SERPL-MCNC: 7.9 MG/DL — LOW (ref 8.4–10.5)
CHLORIDE SERPL-SCNC: 98 MMOL/L — SIGNIFICANT CHANGE UP (ref 98–107)
CHLORIDE SERPL-SCNC: 98 MMOL/L — SIGNIFICANT CHANGE UP (ref 98–107)
CO2 SERPL-SCNC: 23 MMOL/L — SIGNIFICANT CHANGE UP (ref 22–31)
CO2 SERPL-SCNC: 24 MMOL/L — SIGNIFICANT CHANGE UP (ref 22–31)
CREAT SERPL-MCNC: 1.95 MG/DL — HIGH (ref 0.5–1.3)
CREAT SERPL-MCNC: 2.11 MG/DL — HIGH (ref 0.5–1.3)
CULTURE RESULTS: SIGNIFICANT CHANGE UP
CULTURE RESULTS: SIGNIFICANT CHANGE UP
EGFR: 23 ML/MIN/1.73M2 — LOW
EGFR: 25 ML/MIN/1.73M2 — LOW
GLUCOSE SERPL-MCNC: 173 MG/DL — HIGH (ref 70–99)
GLUCOSE SERPL-MCNC: 187 MG/DL — HIGH (ref 70–99)
GRAM STN FLD: SIGNIFICANT CHANGE UP
HCT VFR BLD CALC: 32.5 % — LOW (ref 34.5–45)
HCT VFR BLD CALC: 33.6 % — LOW (ref 34.5–45)
HGB BLD-MCNC: 10.9 G/DL — LOW (ref 11.5–15.5)
HGB BLD-MCNC: 11 G/DL — LOW (ref 11.5–15.5)
INR BLD: 1.99 RATIO — HIGH (ref 0.88–1.16)
MAGNESIUM SERPL-MCNC: 2.1 MG/DL — SIGNIFICANT CHANGE UP (ref 1.6–2.6)
MAGNESIUM SERPL-MCNC: 2.2 MG/DL — SIGNIFICANT CHANGE UP (ref 1.6–2.6)
MCHC RBC-ENTMCNC: 25.1 PG — LOW (ref 27–34)
MCHC RBC-ENTMCNC: 25.3 PG — LOW (ref 27–34)
MCHC RBC-ENTMCNC: 32.4 GM/DL — SIGNIFICANT CHANGE UP (ref 32–36)
MCHC RBC-ENTMCNC: 33.8 GM/DL — SIGNIFICANT CHANGE UP (ref 32–36)
MCV RBC AUTO: 74.7 FL — LOW (ref 80–100)
MCV RBC AUTO: 77.2 FL — LOW (ref 80–100)
NRBC # BLD: 0 /100 WBCS — SIGNIFICANT CHANGE UP
NRBC # BLD: 0 /100 WBCS — SIGNIFICANT CHANGE UP
NRBC # FLD: 0 K/UL — SIGNIFICANT CHANGE UP
NRBC # FLD: 0 K/UL — SIGNIFICANT CHANGE UP
ORGANISM # SPEC MICROSCOPIC CNT: SIGNIFICANT CHANGE UP
ORGANISM # SPEC MICROSCOPIC CNT: SIGNIFICANT CHANGE UP
PHOSPHATE SERPL-MCNC: 2.2 MG/DL — LOW (ref 2.5–4.5)
PHOSPHATE SERPL-MCNC: 2.4 MG/DL — LOW (ref 2.5–4.5)
PLATELET # BLD AUTO: 226 K/UL — SIGNIFICANT CHANGE UP (ref 150–400)
PLATELET # BLD AUTO: 232 K/UL — SIGNIFICANT CHANGE UP (ref 150–400)
POTASSIUM SERPL-MCNC: 3 MMOL/L — LOW (ref 3.5–5.3)
POTASSIUM SERPL-MCNC: 3.6 MMOL/L — SIGNIFICANT CHANGE UP (ref 3.5–5.3)
POTASSIUM SERPL-SCNC: 3 MMOL/L — LOW (ref 3.5–5.3)
POTASSIUM SERPL-SCNC: 3.6 MMOL/L — SIGNIFICANT CHANGE UP (ref 3.5–5.3)
PROT SERPL-MCNC: 5 G/DL — LOW (ref 6–8.3)
PROT SERPL-MCNC: 5 G/DL — LOW (ref 6–8.3)
PROTHROM AB SERPL-ACNC: 23.3 SEC — HIGH (ref 10.5–13.4)
RBC # BLD: 4.35 M/UL — SIGNIFICANT CHANGE UP (ref 3.8–5.2)
RBC # BLD: 4.35 M/UL — SIGNIFICANT CHANGE UP (ref 3.8–5.2)
RBC # FLD: 14.7 % — HIGH (ref 10.3–14.5)
RBC # FLD: 14.7 % — HIGH (ref 10.3–14.5)
SODIUM SERPL-SCNC: 136 MMOL/L — SIGNIFICANT CHANGE UP (ref 135–145)
SODIUM SERPL-SCNC: 136 MMOL/L — SIGNIFICANT CHANGE UP (ref 135–145)
SPECIMEN SOURCE: SIGNIFICANT CHANGE UP
SPECIMEN SOURCE: SIGNIFICANT CHANGE UP
WBC # BLD: 12.19 K/UL — HIGH (ref 3.8–10.5)
WBC # BLD: 12.66 K/UL — HIGH (ref 3.8–10.5)
WBC # FLD AUTO: 12.19 K/UL — HIGH (ref 3.8–10.5)
WBC # FLD AUTO: 12.66 K/UL — HIGH (ref 3.8–10.5)

## 2022-08-13 PROCEDURE — 99291 CRITICAL CARE FIRST HOUR: CPT

## 2022-08-13 RX ORDER — POTASSIUM CHLORIDE 20 MEQ
20 PACKET (EA) ORAL ONCE
Refills: 0 | Status: COMPLETED | OUTPATIENT
Start: 2022-08-13 | End: 2022-08-13

## 2022-08-13 RX ORDER — POTASSIUM CHLORIDE 20 MEQ
10 PACKET (EA) ORAL
Refills: 0 | Status: DISCONTINUED | OUTPATIENT
Start: 2022-08-13 | End: 2022-08-13

## 2022-08-13 RX ORDER — VANCOMYCIN HCL 1 G
1000 VIAL (EA) INTRAVENOUS ONCE
Refills: 0 | Status: COMPLETED | OUTPATIENT
Start: 2022-08-13 | End: 2022-08-13

## 2022-08-13 RX ORDER — CHLORHEXIDINE GLUCONATE 213 G/1000ML
1 SOLUTION TOPICAL DAILY
Refills: 0 | Status: DISCONTINUED | OUTPATIENT
Start: 2022-08-13 | End: 2022-08-17

## 2022-08-13 RX ORDER — SODIUM CHLORIDE 9 MG/ML
1000 INJECTION, SOLUTION INTRAVENOUS ONCE
Refills: 0 | Status: COMPLETED | OUTPATIENT
Start: 2022-08-13 | End: 2022-08-13

## 2022-08-13 RX ORDER — POTASSIUM PHOSPHATE, MONOBASIC POTASSIUM PHOSPHATE, DIBASIC 236; 224 MG/ML; MG/ML
15 INJECTION, SOLUTION INTRAVENOUS ONCE
Refills: 0 | Status: COMPLETED | OUTPATIENT
Start: 2022-08-13 | End: 2022-08-13

## 2022-08-13 RX ORDER — MIDODRINE HYDROCHLORIDE 2.5 MG/1
10 TABLET ORAL EVERY 8 HOURS
Refills: 0 | Status: DISCONTINUED | OUTPATIENT
Start: 2022-08-13 | End: 2022-08-14

## 2022-08-13 RX ORDER — CHLORHEXIDINE GLUCONATE 213 G/1000ML
1 SOLUTION TOPICAL
Refills: 0 | Status: DISCONTINUED | OUTPATIENT
Start: 2022-08-13 | End: 2022-08-13

## 2022-08-13 RX ORDER — PANTOPRAZOLE SODIUM 20 MG/1
40 TABLET, DELAYED RELEASE ORAL
Refills: 0 | Status: DISCONTINUED | OUTPATIENT
Start: 2022-08-13 | End: 2022-08-15

## 2022-08-13 RX ADMIN — CHLORHEXIDINE GLUCONATE 1 APPLICATION(S): 213 SOLUTION TOPICAL at 12:05

## 2022-08-13 RX ADMIN — Medication 100 MILLIEQUIVALENT(S): at 01:16

## 2022-08-13 RX ADMIN — PANTOPRAZOLE SODIUM 40 MILLIGRAM(S): 20 TABLET, DELAYED RELEASE ORAL at 17:42

## 2022-08-13 RX ADMIN — Medication 100 MILLIEQUIVALENT(S): at 03:42

## 2022-08-13 RX ADMIN — PANTOPRAZOLE SODIUM 40 MILLIGRAM(S): 20 TABLET, DELAYED RELEASE ORAL at 05:21

## 2022-08-13 RX ADMIN — SODIUM CHLORIDE 1000 MILLILITER(S): 9 INJECTION, SOLUTION INTRAVENOUS at 16:47

## 2022-08-13 RX ADMIN — SODIUM CHLORIDE 1000 MILLILITER(S): 9 INJECTION, SOLUTION INTRAVENOUS at 10:55

## 2022-08-13 RX ADMIN — Medication 250 MILLIGRAM(S): at 03:00

## 2022-08-13 RX ADMIN — VASOPRESSIN 2.4 UNIT(S)/MIN: 20 INJECTION INTRAVENOUS at 08:27

## 2022-08-13 RX ADMIN — PIPERACILLIN AND TAZOBACTAM 25 GRAM(S): 4; .5 INJECTION, POWDER, LYOPHILIZED, FOR SOLUTION INTRAVENOUS at 20:09

## 2022-08-13 RX ADMIN — MIDODRINE HYDROCHLORIDE 10 MILLIGRAM(S): 2.5 TABLET ORAL at 17:42

## 2022-08-13 RX ADMIN — PHENYLEPHRINE HYDROCHLORIDE 1.3 MICROGRAM(S)/KG/MIN: 10 INJECTION INTRAVENOUS at 08:28

## 2022-08-13 RX ADMIN — POTASSIUM PHOSPHATE, MONOBASIC POTASSIUM PHOSPHATE, DIBASIC 62.5 MILLIMOLE(S): 236; 224 INJECTION, SOLUTION INTRAVENOUS at 06:23

## 2022-08-13 RX ADMIN — Medication 100 MILLIEQUIVALENT(S): at 02:34

## 2022-08-13 RX ADMIN — PIPERACILLIN AND TAZOBACTAM 25 GRAM(S): 4; .5 INJECTION, POWDER, LYOPHILIZED, FOR SOLUTION INTRAVENOUS at 08:27

## 2022-08-13 NOTE — PROGRESS NOTE ADULT - ASSESSMENT
80 y/o F PMH dementia (A&Ox2 at baseline), HTN, recent DVT (on eliquis), p/w vomiting and AMS found to be hypotensive despite 4 L NS and was admitted to MICU for management of hypotension requiring pressors. Hypotension likely 2/2 hypovolemic shock (in the setting of poor PO intake/vomiting vs. GI bleed), with significant improvement after IV fluid resuscitation. VANDANA and acidosis improving with fluid administration. Notably patient still requiring Charli and vasopressin drips for BP support. CTH notable for chronic infarcts but no acute process; CT chest/AP show no acute process. Blood cultures pending. Etiology of hypotension unclear, as persistent despite 5..5L fluid resuscitation.    #Neuro  1) AMS  -Patient is A&Ox2, which is baseline per chart review  -Per brother (Collin), patient baseline is variable, as she often forgets where she is or what day/season it is.  -monitor for acute changes in mental status    #Cardiovascular   1) Shock likely 2/2 hypovolemia in the setting of poor PO intake with vomiting vs. GI bleed  -Patient is hypotensive despite 5.5 L NS   -On Charli and Vasopressin  -f/u Q6 CBC     #Respiratory  -no active issues at this time  -is able to protect airway well    #Gentiourinary  1) VANDANA likely pre-renal in setting of hypotension and hypovolemia  Patient presented with intiial Cr 6.63 --> 5.32 with fluids   -last baseline from 2020 was 0.71  -Cr continues to improve; most recent 3.36  -F/u urine lytes   -contine to trend Cr and maintain blood pressure   2) Acidosis   -Initial Bicarbonate 9 with VBG pH 7.14.     -Improved to 25, pH 7.4 after HCO3 drip  -Possibly 2/2 use of acidic fluid for repletion   -if requiring IVF, use LR instead of NS   -monitor/replete K+ as bicarbonate can cause potassium shifts into cell.   -Q6 BMP  3) Hyponatremia   -initial Na 125   -improved to 130  -continue to trend     #Gastrointestinal  1) Concern for GI bleed with Coffee ground emesis seen coming from NGT   -no signs of lui bleeding at the moment  -continue to monitor Q6 CBC and trend Hb   -maintain active type and screen     #ID   -Patient presented with fever in ED and WBC 13 (last WBC was 3.5)   -WBC improving to normal range with fluid resusc  -c/w zosyn pending BCx  -CT CAP neg for infx source  -UA negative     #Heme/Onc  -Elevated coags   -hold home eliquis in setting of bleed  -no DVT PPx at this time    #Ethics  -[facility] conversation with brother  -full code for now     80 y/o F PMH dementia (A&Ox2 at baseline), HTN, recent DVT (on eliquis), p/w vomiting and AMS found to be hypotensive despite 4 L NS and was admitted to MICU for management of hypotension requiring pressors. Hypotension likely 2/2 hypovolemic shock (in the setting of poor PO intake/vomiting vs. GI bleed), with significant improvement after IV fluid resuscitation. VANDANA and acidosis improving with fluid administration. Patient now just on Charli drip for BP support. CTH notable for chronic infarcts but no acute process; CT chest/AP show no acute process. Blood cultures show coagulase-negative cocci, likely contaminate. hypotension improving with continued fluid administration.    #Neuro  1) AMS  -Patient is A&Ox2, which is baseline per chart review  -Per brother (Collin), patient baseline is variable, as she often forgets where she is or what day/season it is.  -monitor for acute changes in mental status    #Cardiovascular   1) Shock likely 2/2 hypovolemia in the setting of poor PO intake with vomiting  Hypotension improving  -On Charli  -f/u Q6 CBC     #Respiratory  -no active issues at this time  -is able to protect airway well    #Gentiourinary  1) VANDANA likely pre-renal in setting of hypotension and hypovolemia  Patient presented with intiial Cr 6.63 --> 5.32 with fluids   -last baseline from 2020 was 0.71  -Cr continues to improve; most recent 1.95  -F/u urine lytes   -contine to trend Cr and maintain blood pressure   2) Acidosis RESOLVED  -Initial Bicarbonate 9 with VBG pH 7.14.     -Improved to 25, pH 7.4 after HCO3 drip  -Possibly 2/2 use of acidic fluid for repletion   -if requiring IVF, use LR instead of NS   -monitor/replete K+ as bicarbonate can cause potassium shifts into cell.   -Q6 BMP  3) Hyponatremia   -initial Na 125   -improved to 130  -continue to trend     #Gastrointestinal  1) Concern for GI bleed with Coffee ground emesis seen coming from NGT   -no signs of lui bleeding at the moment  -continue to monitor Q6 CBC and trend Hb   -maintain active type and screen     #ID   -Patient presented with fever in ED and WBC 13 (last WBC was 3.5)   -WBC improving to normal range with fluid resusc  -c/w zosyn pending BCx  -CT CAP neg for infx source  -UA negative     #Heme/Onc  -Elevated coags   -hold home eliquis in setting of bleed  -no DVT PPx at this time    #Ethics  -[facility] conversation with brother  -full code for now     80 y/o F PMH dementia (A&Ox2 at baseline), HTN, recent DVT (on eliquis), p/w vomiting and AMS found to be hypotensive despite 4 L NS and was admitted to MICU for management of hypotension requiring pressors. Hypotension likely 2/2 hypovolemic shock (in the setting of poor PO intake/vomiting vs. GI bleed), with significant improvement after IV fluid resuscitation. VANDANA and acidosis improving with fluid administration. Patient now just on Charli drip for BP support. CTH notable for chronic infarcts but no acute process; CT chest/AP show no acute process. Blood cultures show coagulase-negative cocci, likely contaminate. hypotension improving with continued fluid administration.    #Neuro  1) AMS  -Patient is A&Ox2, which is baseline per chart review  -Per brother (Collin), patient baseline is variable, as she often forgets where she is or what day/season it is.  -monitor for acute changes in mental status    #Cardiovascular   1) Shock likely 2/2 hypovolemia in the setting of poor PO intake with vomiting  Hypotension improving  -On Charli  -f/u Q6 CBC     #Respiratory  -no active issues at this time  -is able to protect airway well    #Gentiourinary  1) VANDANA likely pre-renal in setting of hypotension and hypovolemia  Patient presented with intiial Cr 6.63 --> 5.32 with fluids   -last baseline from 2020 was 0.71  -Cr continues to improve; most recent 1.95  -F/u urine lytes   -contine to trend Cr and maintain blood pressure   2) Acidosis RESOLVED  -Initial Bicarbonate 9 with VBG pH 7.14.     -Improved to 25, pH 7.4 after HCO3 drip  -Possibly 2/2 use of acidic fluid for repletion   -if requiring IVF, use LR instead of NS   -monitor/replete K+ as bicarbonate can cause potassium shifts into cell.   -Q6 BMP  3) Hyponatremia   -initial Na 125   -improved to 130  -continue to trend     #Gastrointestinal  1) Concern for GI bleed with Coffee ground emesis seen coming from NGT   -no signs of lui bleeding for past several days  -continue to monitor Q6 CBC and trend Hb   -maintain active type and screen     #ID   -Patient presented with fever in ED and WBC 13 (last WBC was 3.5)   -WBC improving to normal range with fluid resusc  -c/w zosyn pending BCx  -CT CAP neg for infx source  -UA negative   1) Coagulase-negative cocci BCx  -Likely contaminant  -patient received 1 dose of Vancomycin while awaiting coagulase results    #Heme/Onc  -Elevated coags   -hold home eliquis in setting of bleed  -no DVT PPx at this time    #Ethics  -[facility] conversation with brother  -full code for now

## 2022-08-13 NOTE — PROGRESS NOTE ADULT - SUBJECTIVE AND OBJECTIVE BOX
INTERVAL HPI/OVERNIGHT EVENTS:    SUBJECTIVE: Patient seen and examined at bedside. ROS could not be obtained as patient intubated, sedated.      VITAL SIGNS:  ICU Vital Signs Last 24 Hrs  T(C): 37 (13 Aug 2022 04:00), Max: 37.1 (12 Aug 2022 16:00)  T(F): 98.6 (13 Aug 2022 04:00), Max: 98.7 (12 Aug 2022 16:00)  HR: 102 (13 Aug 2022 06:00) (69 - 102)  BP: --  BP(mean): --  ABP: 102/62 (13 Aug 2022 06:00) (70/44 - 126/71)  ABP(mean): 78 (13 Aug 2022 06:00) (56 - 93)  RR: 22 (13 Aug 2022 06:00) (20 - 30)  SpO2: 99% (13 Aug 2022 06:00) (65% - 100%)    O2 Parameters below as of 13 Aug 2022 06:00  Patient On (Oxygen Delivery Method): room air            Plateau pressure:   P/F ratio:     08-12 @ 07:01  -  08-13 @ 07:00  --------------------------------------------------------  IN: 5537.7 mL / OUT: 2525 mL / NET: 3012.7 mL      CAPILLARY BLOOD GLUCOSE      POCT Blood Glucose.: 195 mg/dL (12 Aug 2022 21:45)    ECG:    PHYSICAL EXAM:    General:   HEENT:   Neck:   Respiratory:   Cardiovascular:   Abdomen:   Extremities:  Neurological:    MEDICATIONS:  MEDICATIONS  (STANDING):  chlorhexidine 4% Liquid 1 Application(s) Topical <User Schedule>  pantoprazole  Injectable 40 milliGRAM(s) IV Push two times a day  phenylephrine    Infusion 0.1 MICROgram(s)/kG/Min (1.3 mL/Hr) IV Continuous <Continuous>  piperacillin/tazobactam IVPB.. 3.375 Gram(s) IV Intermittent every 12 hours  vasopressin Infusion 0.04 Unit(s)/Min (2.4 mL/Hr) IV Continuous <Continuous>    MEDICATIONS  (PRN):      ALLERGIES:  Allergies    No Known Allergies    Intolerances        LABS:                        10.9   12.66 )-----------( 232      ( 13 Aug 2022 05:25 )             33.6     08-    136  |  98  |  54<H>  ----------------------------<  187<H>  3.6   |  24  |  1.95<H>    Ca    7.8<L>      13 Aug 2022 05:25  Phos  2.2     08-  Mg     2.20         TPro  5.0<L>  /  Alb  2.7<L>  /  TBili  0.6  /  DBili  x   /  AST  72<H>  /  ALT  57<H>  /  AlkPhos  67  08-13    PT/INR - ( 13 Aug 2022 01:10 )   PT: 23.3 sec;   INR: 1.99 ratio         PTT - ( 13 Aug 2022 01:10 )  PTT:29.3 sec  Urinalysis Basic - ( 11 Aug 2022 17:52 )    Color: dark yellow / Appearance: Clear / S.030 / pH: x  Gluc: x / Ketone: Trace  / Bili: Moderate / Urobili: <2 mg/dL   Blood: x / Protein: 30 mg/dL / Nitrite: Negative   Leuk Esterase: Trace / RBC: 0-2 /HPF / WBC 2-5 /HPF   Sq Epi: x / Non Sq Epi: Occasional / Bacteria: Few        RADIOLOGY & ADDITIONAL TESTS: Reviewed.   INTERVAL HPI/OVERNIGHT EVENTS: Ovn BCx notable for coagulase-negative staph. Additionally pt had an isolated episode of SVT which spontaneously resolved.    SUBJECTIVE: Patient seen and examined at bedside. Pt states that her abdominal pain is getting better, no other complaints. Pt does not know why she has a janet hugger. No other complaints.      VITAL SIGNS:  ICU Vital Signs Last 24 Hrs  T(C): 37 (13 Aug 2022 04:00), Max: 37.1 (12 Aug 2022 16:00)  T(F): 98.6 (13 Aug 2022 04:00), Max: 98.7 (12 Aug 2022 16:00)  HR: 102 (13 Aug 2022 06:00) (69 - 102)  BP: --  BP(mean): --  ABP: 102/62 (13 Aug 2022 06:00) (70/44 - 126/71)  ABP(mean): 78 (13 Aug 2022 06:00) (56 - 93)  RR: 22 (13 Aug 2022 06:00) (20 - 30)  SpO2: 99% (13 Aug 2022 06:00) (65% - 100%)    O2 Parameters below as of 13 Aug 2022 06:00  Patient On (Oxygen Delivery Method): room air            Plateau pressure:   P/F ratio:     08-12 @ 07:01  -  08-13 @ 07:00  --------------------------------------------------------  IN: 5537.7 mL / OUT: 2525 mL / NET: 3012.7 mL      CAPILLARY BLOOD GLUCOSE      POCT Blood Glucose.: 195 mg/dL (12 Aug 2022 21:45)    ECG:    PHYSICAL EXAM:    General: NAD, Lying in bed  HEENT:   Neck:   Respiratory:   Cardiovascular:   Abdomen:   Extremities:  Neurological:    MEDICATIONS:  MEDICATIONS  (STANDING):  chlorhexidine 4% Liquid 1 Application(s) Topical <User Schedule>  pantoprazole  Injectable 40 milliGRAM(s) IV Push two times a day  phenylephrine    Infusion 0.1 MICROgram(s)/kG/Min (1.3 mL/Hr) IV Continuous <Continuous>  piperacillin/tazobactam IVPB.. 3.375 Gram(s) IV Intermittent every 12 hours  vasopressin Infusion 0.04 Unit(s)/Min (2.4 mL/Hr) IV Continuous <Continuous>    MEDICATIONS  (PRN):      ALLERGIES:  Allergies    No Known Allergies    Intolerances        LABS:                        10.9   12.66 )-----------( 232      ( 13 Aug 2022 05:25 )             33.6     08-13    136  |  98  |  54<H>  ----------------------------<  187<H>  3.6   |  24  |  1.95<H>    Ca    7.8<L>      13 Aug 2022 05:25  Phos  2.2     08-13  Mg     2.20     08-13    TPro  5.0<L>  /  Alb  2.7<L>  /  TBili  0.6  /  DBili  x   /  AST  72<H>  /  ALT  57<H>  /  AlkPhos  67  08-13    PT/INR - ( 13 Aug 2022 01:10 )   PT: 23.3 sec;   INR: 1.99 ratio         PTT - ( 13 Aug 2022 01:10 )  PTT:29.3 sec  Urinalysis Basic - ( 11 Aug 2022 17:52 )    Color: dark yellow / Appearance: Clear / S.030 / pH: x  Gluc: x / Ketone: Trace  / Bili: Moderate / Urobili: <2 mg/dL   Blood: x / Protein: 30 mg/dL / Nitrite: Negative   Leuk Esterase: Trace / RBC: 0-2 /HPF / WBC 2-5 /HPF   Sq Epi: x / Non Sq Epi: Occasional / Bacteria: Few        RADIOLOGY & ADDITIONAL TESTS: Reviewed.   INTERVAL HPI/OVERNIGHT EVENTS: Ovn BCx notable for coagulase-negative staph. Additionally pt had an isolated episode of SVT which spontaneously resolved.    SUBJECTIVE: Patient seen and examined at bedside. Pt states that her abdominal pain is getting better, no other complaints. Pt does not know why she has a janet hugger. No other complaints.      VITAL SIGNS:  ICU Vital Signs Last 24 Hrs  T(C): 37 (13 Aug 2022 04:00), Max: 37.1 (12 Aug 2022 16:00)  T(F): 98.6 (13 Aug 2022 04:00), Max: 98.7 (12 Aug 2022 16:00)  HR: 102 (13 Aug 2022 06:00) (69 - 102)  BP: --  BP(mean): --  ABP: 102/62 (13 Aug 2022 06:00) (70/44 - 126/71)  ABP(mean): 78 (13 Aug 2022 06:00) (56 - 93)  RR: 22 (13 Aug 2022 06:00) (20 - 30)  SpO2: 99% (13 Aug 2022 06:00) (65% - 100%)    O2 Parameters below as of 13 Aug 2022 06:00  Patient On (Oxygen Delivery Method): room air            Plateau pressure:   P/F ratio:     08-12 @ 07:01  -  08-13 @ 07:00  --------------------------------------------------------  IN: 5537.7 mL / OUT: 2525 mL / NET: 3012.7 mL      CAPILLARY BLOOD GLUCOSE      POCT Blood Glucose.: 195 mg/dL (12 Aug 2022 21:45)    ECG:    PHYSICAL EXAM:    General: NAD, Lying in bed, AOx1 (name only)  Respiratory: clear bilat  Cardiovascular: RRR  Abdomen: soft, non-tender non-distended  Extremities: no edema noted    MEDICATIONS:  MEDICATIONS  (STANDING):  chlorhexidine 4% Liquid 1 Application(s) Topical <User Schedule>  pantoprazole  Injectable 40 milliGRAM(s) IV Push two times a day  phenylephrine    Infusion 0.1 MICROgram(s)/kG/Min (1.3 mL/Hr) IV Continuous <Continuous>  piperacillin/tazobactam IVPB.. 3.375 Gram(s) IV Intermittent every 12 hours  vasopressin Infusion 0.04 Unit(s)/Min (2.4 mL/Hr) IV Continuous <Continuous>    MEDICATIONS  (PRN):      ALLERGIES:  Allergies    No Known Allergies    Intolerances        LABS:                        10.9   12.66 )-----------( 232      ( 13 Aug 2022 05:25 )             33.6     08-13    136  |  98  |  54<H>  ----------------------------<  187<H>  3.6   |  24  |  1.95<H>    Ca    7.8<L>      13 Aug 2022 05:25  Phos  2.2       Mg     2.20         TPro  5.0<L>  /  Alb  2.7<L>  /  TBili  0.6  /  DBili  x   /  AST  72<H>  /  ALT  57<H>  /  AlkPhos  67  08-13    PT/INR - ( 13 Aug 2022 01:10 )   PT: 23.3 sec;   INR: 1.99 ratio         PTT - ( 13 Aug 2022 01:10 )  PTT:29.3 sec  Urinalysis Basic - ( 11 Aug 2022 17:52 )    Color: dark yellow / Appearance: Clear / S.030 / pH: x  Gluc: x / Ketone: Trace  / Bili: Moderate / Urobili: <2 mg/dL   Blood: x / Protein: 30 mg/dL / Nitrite: Negative   Leuk Esterase: Trace / RBC: 0-2 /HPF / WBC 2-5 /HPF   Sq Epi: x / Non Sq Epi: Occasional / Bacteria: Few        RADIOLOGY & ADDITIONAL TESTS: Reviewed.

## 2022-08-14 LAB
-  AMPICILLIN/SULBACTAM: SIGNIFICANT CHANGE UP
-  CEFAZOLIN: SIGNIFICANT CHANGE UP
-  CLINDAMYCIN: SIGNIFICANT CHANGE UP
-  ERYTHROMYCIN: SIGNIFICANT CHANGE UP
-  GENTAMICIN: SIGNIFICANT CHANGE UP
-  OXACILLIN: SIGNIFICANT CHANGE UP
-  PENICILLIN: SIGNIFICANT CHANGE UP
-  RIFAMPIN: SIGNIFICANT CHANGE UP
-  TETRACYCLINE: SIGNIFICANT CHANGE UP
-  TRIMETHOPRIM/SULFAMETHOXAZOLE: SIGNIFICANT CHANGE UP
-  VANCOMYCIN: SIGNIFICANT CHANGE UP
ALBUMIN SERPL ELPH-MCNC: 2.3 G/DL — LOW (ref 3.3–5)
ALBUMIN SERPL ELPH-MCNC: 3.1 G/DL — LOW (ref 3.3–5)
ALBUMIN SERPL ELPH-MCNC: 3.4 G/DL — SIGNIFICANT CHANGE UP (ref 3.3–5)
ALP SERPL-CCNC: 64 U/L — SIGNIFICANT CHANGE UP (ref 40–120)
ALP SERPL-CCNC: 64 U/L — SIGNIFICANT CHANGE UP (ref 40–120)
ALP SERPL-CCNC: 66 U/L — SIGNIFICANT CHANGE UP (ref 40–120)
ALT FLD-CCNC: 40 U/L — HIGH (ref 4–33)
ALT FLD-CCNC: 42 U/L — HIGH (ref 4–33)
ALT FLD-CCNC: 47 U/L — HIGH (ref 4–33)
ANION GAP SERPL CALC-SCNC: 11 MMOL/L — SIGNIFICANT CHANGE UP (ref 7–14)
ANION GAP SERPL CALC-SCNC: 12 MMOL/L — SIGNIFICANT CHANGE UP (ref 7–14)
ANION GAP SERPL CALC-SCNC: 12 MMOL/L — SIGNIFICANT CHANGE UP (ref 7–14)
AST SERPL-CCNC: 61 U/L — HIGH (ref 4–32)
AST SERPL-CCNC: 63 U/L — HIGH (ref 4–32)
AST SERPL-CCNC: 66 U/L — HIGH (ref 4–32)
BILIRUB SERPL-MCNC: 0.4 MG/DL — SIGNIFICANT CHANGE UP (ref 0.2–1.2)
BLD GP AB SCN SERPL QL: NEGATIVE — SIGNIFICANT CHANGE UP
BLOOD GAS ARTERIAL COMPREHENSIVE RESULT: SIGNIFICANT CHANGE UP
BUN SERPL-MCNC: 41 MG/DL — HIGH (ref 7–23)
BUN SERPL-MCNC: 43 MG/DL — HIGH (ref 7–23)
BUN SERPL-MCNC: 44 MG/DL — HIGH (ref 7–23)
CALCIUM SERPL-MCNC: 8.5 MG/DL — SIGNIFICANT CHANGE UP (ref 8.4–10.5)
CALCIUM SERPL-MCNC: 9 MG/DL — SIGNIFICANT CHANGE UP (ref 8.4–10.5)
CALCIUM SERPL-MCNC: 9.1 MG/DL — SIGNIFICANT CHANGE UP (ref 8.4–10.5)
CHLORIDE SERPL-SCNC: 102 MMOL/L — SIGNIFICANT CHANGE UP (ref 98–107)
CHLORIDE SERPL-SCNC: 105 MMOL/L — SIGNIFICANT CHANGE UP (ref 98–107)
CHLORIDE SERPL-SCNC: 107 MMOL/L — SIGNIFICANT CHANGE UP (ref 98–107)
CO2 SERPL-SCNC: 24 MMOL/L — SIGNIFICANT CHANGE UP (ref 22–31)
CO2 SERPL-SCNC: 24 MMOL/L — SIGNIFICANT CHANGE UP (ref 22–31)
CO2 SERPL-SCNC: 25 MMOL/L — SIGNIFICANT CHANGE UP (ref 22–31)
CREAT SERPL-MCNC: 1.26 MG/DL — SIGNIFICANT CHANGE UP (ref 0.5–1.3)
CREAT SERPL-MCNC: 1.34 MG/DL — HIGH (ref 0.5–1.3)
CREAT SERPL-MCNC: 1.44 MG/DL — HIGH (ref 0.5–1.3)
CULTURE RESULTS: SIGNIFICANT CHANGE UP
EGFR: 37 ML/MIN/1.73M2 — LOW
EGFR: 40 ML/MIN/1.73M2 — LOW
EGFR: 43 ML/MIN/1.73M2 — LOW
GLUCOSE SERPL-MCNC: 111 MG/DL — HIGH (ref 70–99)
GLUCOSE SERPL-MCNC: 130 MG/DL — HIGH (ref 70–99)
GLUCOSE SERPL-MCNC: 162 MG/DL — HIGH (ref 70–99)
HCT VFR BLD CALC: 23.3 % — LOW (ref 34.5–45)
HCT VFR BLD CALC: 24.1 % — LOW (ref 34.5–45)
HCT VFR BLD CALC: 27.4 % — LOW (ref 34.5–45)
HGB BLD-MCNC: 7.6 G/DL — LOW (ref 11.5–15.5)
HGB BLD-MCNC: 7.9 G/DL — LOW (ref 11.5–15.5)
HGB BLD-MCNC: 9.1 G/DL — LOW (ref 11.5–15.5)
MAGNESIUM SERPL-MCNC: 1.8 MG/DL — SIGNIFICANT CHANGE UP (ref 1.6–2.6)
MAGNESIUM SERPL-MCNC: 1.8 MG/DL — SIGNIFICANT CHANGE UP (ref 1.6–2.6)
MAGNESIUM SERPL-MCNC: 1.9 MG/DL — SIGNIFICANT CHANGE UP (ref 1.6–2.6)
MCHC RBC-ENTMCNC: 25.2 PG — LOW (ref 27–34)
MCHC RBC-ENTMCNC: 25.3 PG — LOW (ref 27–34)
MCHC RBC-ENTMCNC: 25.6 PG — LOW (ref 27–34)
MCHC RBC-ENTMCNC: 32.6 GM/DL — SIGNIFICANT CHANGE UP (ref 32–36)
MCHC RBC-ENTMCNC: 32.8 GM/DL — SIGNIFICANT CHANGE UP (ref 32–36)
MCHC RBC-ENTMCNC: 33.2 GM/DL — SIGNIFICANT CHANGE UP (ref 32–36)
MCV RBC AUTO: 77 FL — LOW (ref 80–100)
MCV RBC AUTO: 77 FL — LOW (ref 80–100)
MCV RBC AUTO: 77.7 FL — LOW (ref 80–100)
METHOD TYPE: SIGNIFICANT CHANGE UP
NRBC # BLD: 0 /100 WBCS — SIGNIFICANT CHANGE UP
NRBC # FLD: 0 K/UL — SIGNIFICANT CHANGE UP
ORGANISM # SPEC MICROSCOPIC CNT: SIGNIFICANT CHANGE UP
ORGANISM # SPEC MICROSCOPIC CNT: SIGNIFICANT CHANGE UP
PHOSPHATE SERPL-MCNC: 2.3 MG/DL — LOW (ref 2.5–4.5)
PHOSPHATE SERPL-MCNC: 2.5 MG/DL — SIGNIFICANT CHANGE UP (ref 2.5–4.5)
PHOSPHATE SERPL-MCNC: 3.4 MG/DL — SIGNIFICANT CHANGE UP (ref 2.5–4.5)
PLATELET # BLD AUTO: 134 K/UL — LOW (ref 150–400)
PLATELET # BLD AUTO: 145 K/UL — LOW (ref 150–400)
PLATELET # BLD AUTO: 184 K/UL — SIGNIFICANT CHANGE UP (ref 150–400)
POTASSIUM SERPL-MCNC: 3.3 MMOL/L — LOW (ref 3.5–5.3)
POTASSIUM SERPL-MCNC: 3.3 MMOL/L — LOW (ref 3.5–5.3)
POTASSIUM SERPL-MCNC: 3.5 MMOL/L — SIGNIFICANT CHANGE UP (ref 3.5–5.3)
POTASSIUM SERPL-SCNC: 3.3 MMOL/L — LOW (ref 3.5–5.3)
POTASSIUM SERPL-SCNC: 3.3 MMOL/L — LOW (ref 3.5–5.3)
POTASSIUM SERPL-SCNC: 3.5 MMOL/L — SIGNIFICANT CHANGE UP (ref 3.5–5.3)
PROT SERPL-MCNC: 4.7 G/DL — LOW (ref 6–8.3)
PROT SERPL-MCNC: 5.2 G/DL — LOW (ref 6–8.3)
PROT SERPL-MCNC: 5.3 G/DL — LOW (ref 6–8.3)
RBC # BLD: 3 M/UL — LOW (ref 3.8–5.2)
RBC # BLD: 3.13 M/UL — LOW (ref 3.8–5.2)
RBC # BLD: 3.56 M/UL — LOW (ref 3.8–5.2)
RBC # FLD: 15.5 % — HIGH (ref 10.3–14.5)
RBC # FLD: 15.8 % — HIGH (ref 10.3–14.5)
RBC # FLD: 15.9 % — HIGH (ref 10.3–14.5)
RH IG SCN BLD-IMP: POSITIVE — SIGNIFICANT CHANGE UP
SODIUM SERPL-SCNC: 139 MMOL/L — SIGNIFICANT CHANGE UP (ref 135–145)
SODIUM SERPL-SCNC: 141 MMOL/L — SIGNIFICANT CHANGE UP (ref 135–145)
SODIUM SERPL-SCNC: 142 MMOL/L — SIGNIFICANT CHANGE UP (ref 135–145)
SPECIMEN SOURCE: SIGNIFICANT CHANGE UP
VANCOMYCIN FLD-MCNC: 9.1 UG/ML — SIGNIFICANT CHANGE UP
WBC # BLD: 10.55 K/UL — HIGH (ref 3.8–10.5)
WBC # BLD: 4.34 K/UL — SIGNIFICANT CHANGE UP (ref 3.8–10.5)
WBC # BLD: 8.52 K/UL — SIGNIFICANT CHANGE UP (ref 3.8–10.5)
WBC # FLD AUTO: 10.55 K/UL — HIGH (ref 3.8–10.5)
WBC # FLD AUTO: 4.34 K/UL — SIGNIFICANT CHANGE UP (ref 3.8–10.5)
WBC # FLD AUTO: 8.52 K/UL — SIGNIFICANT CHANGE UP (ref 3.8–10.5)

## 2022-08-14 PROCEDURE — 99223 1ST HOSP IP/OBS HIGH 75: CPT

## 2022-08-14 PROCEDURE — 93970 EXTREMITY STUDY: CPT | Mod: 26

## 2022-08-14 PROCEDURE — 99291 CRITICAL CARE FIRST HOUR: CPT

## 2022-08-14 RX ORDER — MIDODRINE HYDROCHLORIDE 2.5 MG/1
10 TABLET ORAL EVERY 8 HOURS
Refills: 0 | Status: DISCONTINUED | OUTPATIENT
Start: 2022-08-14 | End: 2022-08-22

## 2022-08-14 RX ORDER — POTASSIUM CHLORIDE 20 MEQ
20 PACKET (EA) ORAL ONCE
Refills: 0 | Status: COMPLETED | OUTPATIENT
Start: 2022-08-14 | End: 2022-08-14

## 2022-08-14 RX ORDER — SODIUM CHLORIDE 9 MG/ML
1000 INJECTION, SOLUTION INTRAVENOUS ONCE
Refills: 0 | Status: COMPLETED | OUTPATIENT
Start: 2022-08-14 | End: 2022-08-14

## 2022-08-14 RX ORDER — SODIUM,POTASSIUM PHOSPHATES 278-250MG
1 POWDER IN PACKET (EA) ORAL ONCE
Refills: 0 | Status: COMPLETED | OUTPATIENT
Start: 2022-08-14 | End: 2022-08-14

## 2022-08-14 RX ORDER — ALBUMIN HUMAN 25 %
100 VIAL (ML) INTRAVENOUS
Refills: 0 | Status: COMPLETED | OUTPATIENT
Start: 2022-08-14 | End: 2022-08-14

## 2022-08-14 RX ADMIN — PIPERACILLIN AND TAZOBACTAM 25 GRAM(S): 4; .5 INJECTION, POWDER, LYOPHILIZED, FOR SOLUTION INTRAVENOUS at 08:40

## 2022-08-14 RX ADMIN — Medication 20 MILLIEQUIVALENT(S): at 04:43

## 2022-08-14 RX ADMIN — Medication 1 PACKET(S): at 22:02

## 2022-08-14 RX ADMIN — PANTOPRAZOLE SODIUM 40 MILLIGRAM(S): 20 TABLET, DELAYED RELEASE ORAL at 06:10

## 2022-08-14 RX ADMIN — Medication 50 MILLILITER(S): at 11:10

## 2022-08-14 RX ADMIN — PANTOPRAZOLE SODIUM 40 MILLIGRAM(S): 20 TABLET, DELAYED RELEASE ORAL at 17:39

## 2022-08-14 RX ADMIN — MIDODRINE HYDROCHLORIDE 10 MILLIGRAM(S): 2.5 TABLET ORAL at 14:20

## 2022-08-14 RX ADMIN — Medication 50 MILLILITER(S): at 12:35

## 2022-08-14 RX ADMIN — MIDODRINE HYDROCHLORIDE 10 MILLIGRAM(S): 2.5 TABLET ORAL at 22:02

## 2022-08-14 RX ADMIN — Medication 20 MILLIEQUIVALENT(S): at 22:02

## 2022-08-14 RX ADMIN — PHENYLEPHRINE HYDROCHLORIDE 1.3 MICROGRAM(S)/KG/MIN: 10 INJECTION INTRAVENOUS at 04:30

## 2022-08-14 RX ADMIN — CHLORHEXIDINE GLUCONATE 1 APPLICATION(S): 213 SOLUTION TOPICAL at 11:08

## 2022-08-14 RX ADMIN — SODIUM CHLORIDE 1000 MILLILITER(S): 9 INJECTION, SOLUTION INTRAVENOUS at 10:58

## 2022-08-14 NOTE — CHART NOTE - NSCHARTNOTEFT_GEN_A_CORE
MICU Transfer Note  ---------------------------    Transfer from: MICU  Transfer to:  (  ) Medicine    (  ) Telemetry    (  ) RCU    (  ) Palliative    (  ) Stroke Unit    (  ) _______________  [facility]      [facility]  82 y/o F PMH dementia (A&Ox2 at baseline), HTN, recent DVT (on Eliquis), p/w vomiting and AMS found to be hypotensive despite IVF resuscitation and was admitted to MICU for management of hypotension requiring pressors. Unclear etiology of shock but likely secondary to hypovolemia. BCx 8/11 returned with staph hominis but repeat cx 8/13 NGTD. Given empiric vanc and zosyn. ID consulted, monitoring off antibiotics.  Appears improved with IVF resuscitation, s/p charli.  Lactate is improved. Medically stable for transfer.    Follow up:   [ ] f/u ID recs  [ ] monitor Cr  [ ] monitor H&H      OBJECTIVE --  Vital Signs Last 24 Hrs  T(C): 36.6 (14 Aug 2022 16:00), Max: 37.1 (14 Aug 2022 04:00)  T(F): 97.9 (14 Aug 2022 16:00), Max: 98.7 (14 Aug 2022 04:00)  HR: 67 (14 Aug 2022 17:00) (52 - 83)  BP: --  BP(mean): --  RR: 21 (14 Aug 2022 17:00) (14 - 24)  SpO2: 99% (14 Aug 2022 16:13) (94% - 100%)    Parameters below as of 14 Aug 2022 08:00  Patient On (Oxygen Delivery Method): room air        I&O's Summary    13 Aug 2022 07:01  -  14 Aug 2022 07:00  --------------------------------------------------------  IN: 3341 mL / OUT: 1535 mL / NET: 1806 mL    14 Aug 2022 07:01  -  14 Aug 2022 17:11  --------------------------------------------------------  IN: 1399.2 mL / OUT: 365 mL / NET: 1034.2 mL        MEDICATIONS  (STANDING):  chlorhexidine 2% Cloths 1 Application(s) Topical daily  midodrine 10 milliGRAM(s) Oral every 8 hours  pantoprazole    Tablet 40 milliGRAM(s) Oral two times a day  phenylephrine    Infusion 0.1 MICROgram(s)/kG/Min (1.3 mL/Hr) IV Continuous <Continuous>    MEDICATIONS  (PRN):        LABS                                            7.6                   Neurophils% (auto):   x      (08-14 @ 15:00):    8.52 )-----------(145          Lymphocytes% (auto):  x                                             23.3                   Eosinphils% (auto):   x        Manual%: Neutrophils x    ; Lymphocytes x    ; Eosinophils x    ; Bands%: x    ; Blasts x                                    141    |  105    |  44                  Calcium: 9.1   / iCa: x      (08-14 @ 15:00)    ----------------------------<  162       Magnesium: 1.80                             3.5     |  24     |  1.34             Phosphorous: 2.5      TPro  5.2    /  Alb  3.4    /  TBili  0.4    /  DBili  x      /  AST  66     /  ALT  40     /  AlkPhos  66     14 Aug 2022 15:00            ASSESSMENT & PLAN:   82 y/o F PMH dementia (A&Ox2 at baseline), HTN, recent DVT (on eliquis), p/w vomiting and AMS found to be hypotensive despite 4 L NS and was admitted to MICU for management of hypotension requiring pressors. Hypotension likely 2/2 hypovolemic shock (in the setting of poor PO intake/vomiting vs. GI bleed), with significant improvement after IV fluid resuscitation. VANDANA and acidosis improving with fluid administration. Patient now just on Charli drip for BP support. CTH notable for chronic infarcts but no acute process; CT chest/AP show no acute process. Blood cultures show coagulase-negative cocci, likely contaminate. hypotension improving with continued fluid administration.    #Neuro  1) AMS  - Patient is A&Ox2, which is baseline per chart review  - Per brother (Collin), patient baseline is variable, as she often forgets where she is or what day/season it is.  - monitor for acute changes in mental status    #Cardiovascular   1) Shock likely 2/2 hypovolemia in the setting of poor PO intake with vomiting  - limited POCUS showed collapses IVC  - Hypotension improving after further fluid resuscitation  - On Charli, down trending  - further fluid resuscitation with l liter of LR and 2 doses of albumin     #Respiratory  - no active issues at this time, satting 100% on RA  - is able to protect airway well    #Gentiourinary  1) VANDANA likely pre-renal in setting of hypotension and hypovolemia  - baseline from 2020 Cr 0.7  -  Initial CR on presentation 6.63. Downtrending, 1.44 on 08/14  - F/u urine lytes (??)  - contine to trend Cr and maintain blood pressure     2) Acidosis RESOLVED  - Initial Bicarbonate 9 with VBG pH 7.14.   - Improved to 25, pH 7.4 after HCO3 drip  - Possibly 2/2 use of acidic fluid for repletion   - if requiring IVF, use LR instead of NS     3) Hyponatremia RESOLVED  - initial Na 125, 08/14 139. Stable for past 36 hours   - trend and monitor    #Gastrointestinal  1) Concern for GI bleed with Coffee ground emesis seen coming from NGT   - no signs of lui bleeding for past several days, NG tube?  - from 08/13 to 08/14, dropped 2g of hgb  - maintain active type and screen     #ID   -Patient presented with fever in ED and WBC 13 (last WBC was 3.5)   -WBC improving to normal range with fluid resusc  - c/w zosyn pending BCx,   - CT CAP neg for infx source  - UA negative   - Coagulase-negative cocci BCx, staph hominis, repeat NGTD, will consult ID for this    #Heme/Onc  - Elevated coags   - hold home eliquis in setting of bleed  - Hgb dropped two gram in the past 24 hours. No further bleeding noted. Suspect this might be dilutional effect since WBC and platelet count also decreased with Hgb. Will continued to monitor.    DVT ppx  - SCD. if Hgb hold steady, start lovonox  - doppler US, as pt has history of DVT    #Ethics  - [facility] conversation with brother  - social work consult for home situation. Might have exceed ability of family member to care for at home  - full code for now    [ ]   [ ] MICU Transfer Note  ---------------------------    Transfer from: MICU  Transfer to:  (  ) Medicine    (  ) Telemetry    (  ) RCU    (  ) Palliative    (  ) Stroke Unit    (  ) _______________  [facility]  80 y/o F PMH dementia (A&Ox2 at baseline), HTN, recent DVT (on Eliquis), p/w vomiting and AMS found to be hypotensive despite IVF resuscitation and was admitted to MICU for management of hypotension requiring pressors. Unclear etiology of shock but likely secondary to hypovolemia. VANDANA also improved with fluid resuscitation, likely also 2/2 to hypovolemia. BCx 8/11 returned with staph hominis but repeat cx 8/13 NGTD. Given empiric vanc and zosyn. ID consulted, monitoring off antibiotics.  Appears improved with IVF resuscitation, s/p charli.  Lactate is improved. Medically stable for transfer.     Follow up:   [ ] f/u ID recs  [ ] monitor Cr  [ ] monitor H&H  [ ] was on home eliquis for previous DVT, considered restarting if H&H is stable      OBJECTIVE --  Vital Signs Last 24 Hrs  T(C): 36.6 (14 Aug 2022 16:00), Max: 37.1 (14 Aug 2022 04:00)  T(F): 97.9 (14 Aug 2022 16:00), Max: 98.7 (14 Aug 2022 04:00)  HR: 67 (14 Aug 2022 17:00) (52 - 83)  BP: --  BP(mean): --  RR: 21 (14 Aug 2022 17:00) (14 - 24)  SpO2: 99% (14 Aug 2022 16:13) (94% - 100%)    Parameters below as of 14 Aug 2022 08:00  Patient On (Oxygen Delivery Method): room air        I&O's Summary    13 Aug 2022 07:01  -  14 Aug 2022 07:00  --------------------------------------------------------  IN: 3341 mL / OUT: 1535 mL / NET: 1806 mL    14 Aug 2022 07:01  -  14 Aug 2022 17:11  --------------------------------------------------------  IN: 1399.2 mL / OUT: 365 mL / NET: 1034.2 mL        MEDICATIONS  (STANDING):  chlorhexidine 2% Cloths 1 Application(s) Topical daily  midodrine 10 milliGRAM(s) Oral every 8 hours  pantoprazole    Tablet 40 milliGRAM(s) Oral two times a day  phenylephrine    Infusion 0.1 MICROgram(s)/kG/Min (1.3 mL/Hr) IV Continuous <Continuous>    MEDICATIONS  (PRN):        LABS                                            7.6                   Neurophils% (auto):   x      (08-14 @ 15:00):    8.52 )-----------(145          Lymphocytes% (auto):  x                                             23.3                   Eosinphils% (auto):   x        Manual%: Neutrophils x    ; Lymphocytes x    ; Eosinophils x    ; Bands%: x    ; Blasts x                                    141    |  105    |  44                  Calcium: 9.1   / iCa: x      (08-14 @ 15:00)    ----------------------------<  162       Magnesium: 1.80                             3.5     |  24     |  1.34             Phosphorous: 2.5      TPro  5.2    /  Alb  3.4    /  TBili  0.4    /  DBili  x      /  AST  66     /  ALT  40     /  AlkPhos  66     14 Aug 2022 15:00            ASSESSMENT & PLAN:   80 y/o F PMH dementia (A&Ox2 at baseline), HTN, recent DVT (on eliquis), p/w vomiting and AMS found to be hypotensive despite 4 L NS and was admitted to MICU for management of hypotension requiring pressors. Hypotension likely 2/2 hypovolemic shock (in the setting of poor PO intake/vomiting vs. GI bleed), with significant improvement after IV fluid resuscitation. VANDANA and acidosis improving with fluid administration. Patient now just on Charli drip for BP support. CTH notable for chronic infarcts but no acute process; CT chest/AP show no acute process. Blood cultures show coagulase-negative cocci, likely contaminate. hypotension improving with continued fluid administration.    #Neuro  1) AMS  - Patient is A&Ox2, which is baseline per chart review  - Per brother (Collin), patient baseline is variable, as she often forgets where she is or what day/season it is.  - monitor for acute changes in mental status    #Cardiovascular   1) Shock likely 2/2 hypovolemia in the setting of poor PO intake with vomiting  - limited POCUS showed collapses IVC  - Hypotension improving after further fluid resuscitation  - continued wean off charli  - further fluid resuscitation with l liter of LR and 2 doses of albumin     #Respiratory  - no active issues at this time, satting 100% on RA  - is able to protect airway well    #Genitourinary  1) VANDANA likely pre-renal in setting of hypotension and hypovolemia  - baseline from 2020 Cr 0.7  -  Initial CR on presentation 6.63. Downtrending, 1.44 on 08/14  - continue to trend Cr and maintain blood pressure     2) Acidosis RESOLVED  - Initial Bicarbonate 9 with VBG pH 7.14.   - Improved to 25, pH 7.4 after HCO3 drip  - Possibly 2/2 use of acidic fluid for repletion   - if requiring IVF, use LR instead of NS     3) Hyponatremia RESOLVED  - initial Na 125, 08/14 139. Stable for past 36 hours   - trend and monitor    #Gastrointestinal  1) Concern for GI bleed with Coffee ground emesis seen coming from NGT   - no signs of lui bleeding for past several days  - hgb downtrending, suspect dilutional  - maintain active type and screen     #ID   -Patient presented with fever in ED and WBC 13 (last WBC was 3.5)   -WBC improving to normal range with fluid resusc  - c/w zosyn pending BCx,   - CT CAP neg for infx source  - UA negative   - Coagulase-negative cocci BCx, staph hominis, repeat NGTD, will consult ID for this    #Heme/Onc  - Elevated coags   - hold home eliquis in setting of bleed  - Hgb dropped two gram in the past 24 hours. No further bleeding noted. Suspect this might be dilutional effect since WBC and platelet count also decreased with Hgb. Will continued to monitor.    DVT ppx  - SCD. if Hgb hold steady, start lovonox  - doppler US, as pt has history of DVT    #Ethics  - [facility] conversation with brother  - social work consult for home situation. Might have exceed ability of family member to care for at home  - full code for now MICU Transfer Note  ---------------------------    Transfer from: MICU  Transfer to:  ( x ) Medicine    (  ) Telemetry    (  ) RCU    (  ) Palliative    (  ) Stroke Unit    (  ) _______________  Accepting Physician: Dr. Harman      [facility]  80 y/o F PMH dementia (A&Ox2 at baseline), HTN, recent DVT (on Eliquis), p/w vomiting and AMS found to be hypotensive despite IVF resuscitation and was admitted to MICU for management of hypotension requiring pressors. Unclear etiology of shock but likely secondary to hypovolemia. VANDANA also improved with fluid resuscitation, likely also 2/2 to hypovolemia. BCx 8/11 returned with staph hominis but repeat cx 8/13 NGTD. Given empiric vanc and zosyn. ID consulted, monitoring off antibiotics.  Appears improved with IVF resuscitation, s/p charli.  Lactate is improved. Medically stable for transfer.     Follow up:   [ ] f/u ID recs  [ ] monitor Cr  [ ] monitor H&H  [ ] was on home eliquis for previous DVT, considered restarting if H&H is stable      OBJECTIVE --  Vital Signs Last 24 Hrs  T(C): 36.6 (14 Aug 2022 16:00), Max: 37.1 (14 Aug 2022 04:00)  T(F): 97.9 (14 Aug 2022 16:00), Max: 98.7 (14 Aug 2022 04:00)  HR: 67 (14 Aug 2022 17:00) (52 - 83)  BP: --  BP(mean): --  RR: 21 (14 Aug 2022 17:00) (14 - 24)  SpO2: 99% (14 Aug 2022 16:13) (94% - 100%)    Parameters below as of 14 Aug 2022 08:00  Patient On (Oxygen Delivery Method): room air        I&O's Summary    13 Aug 2022 07:01  -  14 Aug 2022 07:00  --------------------------------------------------------  IN: 3341 mL / OUT: 1535 mL / NET: 1806 mL    14 Aug 2022 07:01  -  14 Aug 2022 17:11  --------------------------------------------------------  IN: 1399.2 mL / OUT: 365 mL / NET: 1034.2 mL        MEDICATIONS  (STANDING):  chlorhexidine 2% Cloths 1 Application(s) Topical daily  midodrine 10 milliGRAM(s) Oral every 8 hours  pantoprazole    Tablet 40 milliGRAM(s) Oral two times a day  phenylephrine    Infusion 0.1 MICROgram(s)/kG/Min (1.3 mL/Hr) IV Continuous <Continuous>    MEDICATIONS  (PRN):        LABS                                            7.6                   Neurophils% (auto):   x      (08-14 @ 15:00):    8.52 )-----------(145          Lymphocytes% (auto):  x                                             23.3                   Eosinphils% (auto):   x        Manual%: Neutrophils x    ; Lymphocytes x    ; Eosinophils x    ; Bands%: x    ; Blasts x                                    141    |  105    |  44                  Calcium: 9.1   / iCa: x      (08-14 @ 15:00)    ----------------------------<  162       Magnesium: 1.80                             3.5     |  24     |  1.34             Phosphorous: 2.5      TPro  5.2    /  Alb  3.4    /  TBili  0.4    /  DBili  x      /  AST  66     /  ALT  40     /  AlkPhos  66     14 Aug 2022 15:00            ASSESSMENT & PLAN:   80 y/o F PMH dementia (A&Ox2 at baseline), HTN, recent DVT (on eliquis), p/w vomiting and AMS found to be hypotensive despite 4 L NS and was admitted to MICU for management of hypotension requiring pressors. Hypotension likely 2/2 hypovolemic shock (in the setting of poor PO intake/vomiting vs. GI bleed), with significant improvement after IV fluid resuscitation. VANDANA and acidosis improving with fluid administration. Patient now just on Charli drip for BP support. CTH notable for chronic infarcts but no acute process; CT chest/AP show no acute process. Blood cultures show coagulase-negative cocci, likely contaminate. hypotension improving with continued fluid administration.    #Neuro  1) AMS  - Patient is A&Ox2, which is baseline per chart review  - Per brother (Collin), patient baseline is variable, as she often forgets where she is or what day/season it is.  - monitor for acute changes in mental status    #Cardiovascular   1) Shock likely 2/2 hypovolemia in the setting of poor PO intake with vomiting  - limited POCUS showed collapses IVC  - Hypotension improving after further fluid resuscitation  - continued wean off charli  - further fluid resuscitation with l liter of LR and 2 doses of albumin     #Respiratory  - no active issues at this time, satting 100% on RA  - is able to protect airway well    #Genitourinary  1) VANDANA likely pre-renal in setting of hypotension and hypovolemia  - baseline from 2020 Cr 0.7  -  Initial CR on presentation 6.63. Downtrending, 1.44 on 08/14  - continue to trend Cr and maintain blood pressure     2) Acidosis RESOLVED  - Initial Bicarbonate 9 with VBG pH 7.14.   - Improved to 25, pH 7.4 after HCO3 drip  - Possibly 2/2 use of acidic fluid for repletion   - if requiring IVF, use LR instead of NS     3) Hyponatremia RESOLVED  - initial Na 125, 08/14 139. Stable for past 36 hours   - trend and monitor    #Gastrointestinal  1) Concern for GI bleed with Coffee ground emesis seen coming from NGT   - no signs of lui bleeding for past several days  - hgb downtrending, suspect dilutional  - maintain active type and screen     #ID   -Patient presented with fever in ED and WBC 13 (last WBC was 3.5)   -WBC improving to normal range with fluid resusc  - c/w zosyn pending BCx,   - CT CAP neg for infx source  - UA negative   - Coagulase-negative cocci BCx, staph hominis, repeat NGTD, will consult ID for this    #Heme/Onc  - Elevated coags   - hold home eliquis in setting of bleed  - Hgb dropped two gram in the past 24 hours. No further bleeding noted. Suspect this might be dilutional effect since WBC and platelet count also decreased with Hgb. Will continued to monitor.    DVT ppx  - SCD. if Hgb hold steady, start lovonox  - doppler US, as pt has history of DVT    #Ethics  - GOC conversation with brother  - social work consult for home situation. Might have exceed ability of family member to care for at home  - full code for now

## 2022-08-14 NOTE — PHYSICAL THERAPY INITIAL EVALUATION ADULT - DIAGNOSIS, PT EVAL
Upon evaluation, pt presents with impairments in functional mobility, strength, balance, and gait. Pt would benefit from skilled PT services in the acute care setting to address impairments to facilitate return to prior level of function.

## 2022-08-14 NOTE — PHYSICAL THERAPY INITIAL EVALUATION ADULT - IMPAIRMENTS FOUND, PT EVAL
aerobic capacity/endurance/decreased midline orientation/ergonomics and body mechanics/fine motor/gait, locomotion, and balance/gross motor/muscle strength/posture

## 2022-08-14 NOTE — PHYSICAL THERAPY INITIAL EVALUATION ADULT - PATIENT PROFILE REVIEW, REHAB EVAL
PT initial evaluation received and chart review completed. Pt agreeable to participate in PT evaluation. Pt cleared by RN Marium/yes

## 2022-08-14 NOTE — PHYSICAL THERAPY INITIAL EVALUATION ADULT - GENERAL OBSERVATIONS, REHAB EVAL
Upon entry, pt semi-supine in bed in NAD; + telemetry monitoring, + a-line, + rectal tube, + fu. Pt left in recliner with all tubes/lines intact, call bell in reach and in NAD. BAUTISTA king.

## 2022-08-14 NOTE — CONSULT NOTE ADULT - SUBJECTIVE AND OBJECTIVE BOX
INFECTIOUS DISEASE CONSULT NOTE    Patient is a 81y old  Female who presents with a chief complaint of Altered Mental Status (14 Aug 2022 05:37)    HPI:  82 y/o F PMH demenntia (A&Ox2 at baseline), HTN, recent DVT on eliquis), p/w vomiting and AMS as per brother after a strongly caffeinated drink. According to brother, patient is at baseline confused (will randomly remove clothing), but appears more altered today.      In the ED, patient was hypotensive with SBP in the 60s. Patient received 4 L of NS with no avail and persistently low blood pressure. Patient was then started on Levophed and MICU consulted for management of hypotension with pressors in setting of AMS.     In the ED, patient was also noted to be febrile and broad spectrum antibiotics were started (Zosyn).   Additionally, patient was noted to have a few cc coffee ground emesis during NGT placement and was started on protonix drip.     Of note, in 2020 patient had a similar admission for hypotension in the setting of GI bleed 2/2 esophageal ulcer. At that time, patient's hemoglobin dropped from 14 to 7.7. Patient was noted to have pre-renal VANDANA, started on bicarb drip. At that time, nephrology did not deem candidate for dialysis.    (11 Aug 2022 22:05)       REVIEW OF SYSTEMS:  CONSTITUTIONAL: No fever or chills  HEENT: No sore throat  RESPIRATORY: No cough, no shortness of breath  CARDIOVASCULAR: No chest pain or palpitations  GASTROINTESTINAL: No abdominal or epigastric pain  GENITOURINARY: No dysuria  NEUROLOGICAL: No headache/dizziness  MSK: No joint pain, erythema, or swelling; no back pain  SKIN: No itching, rashes  All other ROS negative except noted above    Prior hospital charts reviewed [Yes]  Primary team notes reviewed [Yes]  Other consultant notes reviewed [Yes]    PAST MEDICAL & SURGICAL HISTORY:  Hypertension      Dementia      DVT, lower extremity  on eliquis      No significant past surgical history          SOCIAL HISTORY:  - Lives with brother  - No recent travel  - Denies tobacco use  - Denies alcohol use  - Denies illicit drug use    FAMILY HISTORY:  Unable to answer due to cognitive impairment        Allergies:  No Known Allergies      ANTIMICROBIALS:  piperacillin/tazobactam IVPB.. 3.375 every 12 hours      ANTIMICROBIALS (past 90 days):  MEDICATIONS  (STANDING):  cefTRIAXone   IVPB   100 mL/Hr IV Intermittent (08-11-22 @ 19:30)    piperacillin/tazobactam IVPB.   200 mL/Hr IV Intermittent (08-12-22 @ 00:03)    piperacillin/tazobactam IVPB..   25 mL/Hr IV Intermittent (08-14-22 @ 08:40)   25 mL/Hr IV Intermittent (08-13-22 @ 20:09)   25 mL/Hr IV Intermittent (08-13-22 @ 08:27)   25 mL/Hr IV Intermittent (08-12-22 @ 23:13)   25 mL/Hr IV Intermittent (08-12-22 @ 08:56)    vancomycin  IVPB   250 mL/Hr IV Intermittent (08-12-22 @ 05:48)    vancomycin  IVPB   250 mL/Hr IV Intermittent (08-13-22 @ 03:00)        OTHER MEDS:   MEDICATIONS  (STANDING):  midodrine 10 every 8 hours  pantoprazole    Tablet 40 two times a day  phenylephrine    Infusion 0.1 <Continuous>      VITALS:  Vital Signs Last 24 Hrs  T(F): 97.9 (08-14-22 @ 08:00), Max: 100.6 (08-11-22 @ 18:34)    Vital Signs Last 24 Hrs  HR: 55 (08-14-22 @ 13:57) (52 - 89)  BP: --  RR: 19 (08-14-22 @ 13:57)  SpO2: 100% (08-14-22 @ 13:57) (94% - 100%)  Wt(kg): --    EXAM:  GENERAL: NAD, lying in bed comfortably  HEAD: No head lesions  EYES: Conjunctiva pink and cornea white  ENT: Normal external ears and nose, no discharges; moist mucous membranes  NECK: Supple, nontender to palpation; no JVD  CHEST/LUNG: Clear to auscultation bilaterally  HEART: S1 S2  ABDOMEN: Soft, nontender, nondistended; normoactive bowel sounds  EXTREMITIES: No clubbing, cyanosis, or petal edema  NERVOUS SYSTEM: Alert and oriented to person, time, place and situation, speech clear. No focal deficits   MSK: No joint erythema, swelling or pain  SKIN: No rashes or lesions, no superficial thrombophlebitis    Labs:                        9.1    10.55 )-----------( 184      ( 14 Aug 2022 02:45 )             27.4     08-14    139  |  102  |  43<H>  ----------------------------<  111<H>  3.3<L>   |  25  |  1.44<H>    Ca    8.5      14 Aug 2022 02:45  Phos  3.4     08-14  Mg     1.90     08-14    TPro  4.7<L>  /  Alb  2.3<L>  /  TBili  0.4  /  DBili  x   /  AST  61<H>  /  ALT  47<H>  /  AlkPhos  64  08-14      WBC Trend:  WBC Count: 10.55 (08-14-22 @ 02:45)  WBC Count: 12.66 (08-13-22 @ 05:25)  WBC Count: 12.19 (08-13-22 @ 01:10)  WBC Count: 11.52 (08-12-22 @ 21:55)      Band Neutrophils %: 7.1 % (08-12-22 @ 18:20)  Auto Neutrophil #: 11.39 K/uL (08-12-22 @ 01:53)  Auto Neutrophil #: 12.07 K/uL (08-11-22 @ 17:46)      Creatine Trend:  Creatinine, Serum: 1.44 (08-14)  Creatinine, Serum: 1.95 (08-13)  Creatinine, Serum: 2.11 (08-13)  Creatinine, Serum: 2.36 (08-12)      Liver Biochemical Testing Trend:  Alanine Aminotransferase (ALT/SGPT): 47 *H* (08-14)  Alanine Aminotransferase (ALT/SGPT): 57 *H* (08-13)  Alanine Aminotransferase (ALT/SGPT): 57 *H* (08-13)  Alanine Aminotransferase (ALT/SGPT): 58 *H* (08-12)  Alanine Aminotransferase (ALT/SGPT): 56 *H* (08-12)  Aspartate Aminotransferase (AST/SGOT): 61 (08-14-22 @ 02:45)  Aspartate Aminotransferase (AST/SGOT): 72 (08-13-22 @ 05:25)  Aspartate Aminotransferase (AST/SGOT): 74 (08-13-22 @ 01:10)  Aspartate Aminotransferase (AST/SGOT): 80 (08-12-22 @ 21:55)  Aspartate Aminotransferase (AST/SGOT): 80 (08-12-22 @ 18:20)  Bilirubin Total, Serum: 0.4 (08-14)  Bilirubin Total, Serum: 0.6 (08-13)  Bilirubin Total, Serum: 0.7 (08-13)  Bilirubin Total, Serum: 0.7 (08-12)  Bilirubin Total, Serum: 0.7 (08-12)      Trend LDH      Auto Eosinophil %: 1.3 % (08-12-22 @ 01:53)  Auto Eosinophil %: 0.0 % (08-11-22 @ 17:46)          MICROBIOLOGY:  Vancomycin Level, Random: 9.1 (08-14 @ 04:20)    MRSA PCR Result.: NotDetec (08-12-22 @ 06:48)      Culture - Blood (collected 13 Aug 2022 03:00)  Source: .Blood Blood-Peripheral  Preliminary Report:    No growth to date.    Culture - Blood (collected 13 Aug 2022 02:40)  Source: .Blood Blood-Peripheral  Preliminary Report:    No growth to date.    Culture - Urine (collected 11 Aug 2022 21:10)  Source: Catheterized Catheterized  Final Report:    10,000 - 49,000 CFU/mL Streptococcus anginosus    "Susceptibilities not performed"    Culture - Blood (collected 11 Aug 2022 18:45)  Source: .Blood Blood  Final Report:    Growth in aerobic bottle: Staphylococcus hominis    Coag Negative Staphylococcus    Single set isolate, possible contaminant. Contact    Microbiology if susceptibility testing clinically    indicated.    ***Blood Panel PCR results on this specimen are available    approximately 3 hours after the Gram stain result.***    Gram stain, PCR, and/or culture results may not always    correspond due to difference in methodologies.    ************************************************************    This PCR assay was performed by multiplex PCR. This    Assay tests for 66 bacterial and resistance gene targets.    Please refer to the [facility] test directory    at [email or web address] for details.  Organism: Blood Culture PCR  Organism: Blood Culture PCR    Sensitivities:      -  Coagulase negative Staphylococcus: Detec      Method Type: PCR    Culture - Blood (collected 11 Aug 2022 17:00)  Source: .Blood Blood  Preliminary Report:    Growth in anaerobic bottle: Staphylococcus hominis    COVID-19 PCR: NotDetec (08-11-22 @ 22:38)    Troponin T, High Sensitivity Result: 81 (08-11)  Troponin T, High Sensitivity Result: 99 (08-11)    Blood Gas Arterial, Lactate: 1.0 (08-14 @ 04:20)  Blood Gas Arterial, Lactate: 1.9 (08-13 @ 05:25)  Blood Gas Arterial, Lactate: 2.6 (08-13 @ 01:10)  Blood Gas Arterial, Lactate: 3.9 (08-12 @ 21:55)        RADIOLOGY:  imaging below personally reviewed    < from: CT Chest No Cont (08.11.22 @ 22:34) >  CHEST:  LUNGS AND LARGE AIRWAYS: Patent central airways. Right upper lobe   calcified granuloma.  PLEURA: No pleural effusion.  VESSELS: Atherosclerotic changes of the aorta and coronary arteries.  HEART: Heart size is normal. No pericardial effusion.  MEDIASTINUM AND DEBRA: No lymphadenopathy.  CHEST WALL AND LOWER NECK: Within normal limits.    ABDOMEN AND PELVIS:  LIVER: Within normal limits.  BILE DUCTS: Pneumobilia, likely related to prior ERCP/sphincterotomy.  GALLBLADDER: Cholecystectomy.  SPLEEN: Within normal limits.  PANCREAS: Within normal limits.  ADRENALS: Bilateral adrenal gland thickening, nonspecific.  KIDNEYS/URETERS: No hydronephrosis.    BLADDER: Metcalf catheter.  REPRODUCTIVE ORGANS: Uterus and adnexa within normal limits.    BOWEL: Moderate to large hiatal hernia. Enteric tube terminates just   below the diaphragmatic hiatus, and appears outside the stomach, however   there is no adjacent extraluminal air, unclear if this is related to   tenting of the gastric wall. Colonic diverticulosis. No bowel   obstruction. Appendix is normal.  PERITONEUM: No ascites.  VESSELS: Atherosclerotic changes.  RETROPERITONEUM/LYMPH NODES: No lymphadenopathy.  ABDOMINAL WALL: Fat-containing right inguinal hernia.  BONES: Degenerative changes.    IMPRESSION:  Moderate to large hiatal hernia. Enteric tube terminates just below the   diaphragmatic hiatus, and appears outside the stomach, unclear if this is   related to tenting of the gastric wall. Consider venous repositioning   versus fluoroscopic NG tube study with water soluble contrast.    No acute findings in the chest.    < end of copied text >   INFECTIOUS DISEASE CONSULT NOTE    Patient is a 81y old  Female who presents with a chief complaint of Altered Mental Status (14 Aug 2022 05:37)    HPI:  80 y/o F PMH demenntia (A&Ox2 at baseline), HTN, recent DVT on eliquis), p/w vomiting and AMS as per brother after a strongly caffeinated drink. According to brother, patient is at baseline confused (will randomly remove clothing), but appears more altered today.      In the ED, patient was hypotensive with SBP in the 60s. Patient received 4 L of NS with no avail and persistently low blood pressure. Patient was then started on Levophed and MICU consulted for management of hypotension with pressors in setting of AMS.     In the ED, patient was also noted to be febrile and broad spectrum antibiotics were started (Zosyn).   Additionally, patient was noted to have a few cc coffee ground emesis during NGT placement and was started on protonix drip.     Of note, in 2020 patient had a similar admission for hypotension in the setting of GI bleed 2/2 esophageal ulcer. At that time, patient's hemoglobin dropped from 14 to 7.7. Patient was noted to have pre-renal VANDANA, started on bicarb drip. At that time, nephrology did not deem candidate for dialysis.    (11 Aug 2022 22:05)     Feels fine, only slight abdominal pain, no chills or fevers.       REVIEW OF SYSTEMS:  CONSTITUTIONAL: No fever or chills  HEENT: No sore throat  RESPIRATORY: No cough, no shortness of breath  CARDIOVASCULAR: No chest pain or palpitations  GASTROINTESTINAL: abdominal pain  GENITOURINARY: No dysuria  NEUROLOGICAL: No headache/dizziness  MSK: No joint pain, erythema, or swelling; no back pain  SKIN: No rashes, abrasions, lesions   All other ROS negative except noted above    Prior hospital charts reviewed [Yes]  Primary team notes reviewed [Yes]  Other consultant notes reviewed [Yes]    PAST MEDICAL & SURGICAL HISTORY:  Hypertension      Dementia      DVT, lower extremity  on eliquis      No significant past surgical history          SOCIAL HISTORY:  - Lives with brother  - No recent travel  - Denies tobacco use  - Denies alcohol use  - Denies illicit drug use    FAMILY HISTORY:  Unable to answer due to cognitive impairment        Allergies:  No Known Allergies      ANTIMICROBIALS:  piperacillin/tazobactam IVPB.. 3.375 every 12 hours      ANTIMICROBIALS (past 90 days):  MEDICATIONS  (STANDING):  cefTRIAXone   IVPB   100 mL/Hr IV Intermittent (08-11-22 @ 19:30)    piperacillin/tazobactam IVPB.   200 mL/Hr IV Intermittent (08-12-22 @ 00:03)    piperacillin/tazobactam IVPB..   25 mL/Hr IV Intermittent (08-14-22 @ 08:40)   25 mL/Hr IV Intermittent (08-13-22 @ 20:09)   25 mL/Hr IV Intermittent (08-13-22 @ 08:27)   25 mL/Hr IV Intermittent (08-12-22 @ 23:13)   25 mL/Hr IV Intermittent (08-12-22 @ 08:56)    vancomycin  IVPB   250 mL/Hr IV Intermittent (08-12-22 @ 05:48)    vancomycin  IVPB   250 mL/Hr IV Intermittent (08-13-22 @ 03:00)        OTHER MEDS:   MEDICATIONS  (STANDING):  midodrine 10 every 8 hours  pantoprazole    Tablet 40 two times a day  phenylephrine    Infusion 0.1 <Continuous>      VITALS:  Vital Signs Last 24 Hrs  T(F): 97.9 (08-14-22 @ 08:00), Max: 100.6 (08-11-22 @ 18:34)    Vital Signs Last 24 Hrs  HR: 55 (08-14-22 @ 13:57) (52 - 89)  BP: --  RR: 19 (08-14-22 @ 13:57)  SpO2: 100% (08-14-22 @ 13:57) (94% - 100%)  Wt(kg): --    EXAM:  GENERAL: NAD, lying in bed comfortably  HEAD: atraumatic   EYES: clear   ENT: no gross deformities   NECK: Supple, nontender to palpation; no JVD  CHEST/LUNG: grossly Clear to auscultation bilaterally  HEART: S1 S2  ABDOMEN: Soft, nontender, nondistended; normoactive bowel sounds  EXTREMITIES: No clubbing, cyanosis, or petal edema  NERVOUS SYSTEM: Alert and oriented to person  MSK: No joint erythema, swelling or pain  SKIN: No rashes or lesions, no superficial thrombophlebitis    Labs:                        9.1    10.55 )-----------( 184      ( 14 Aug 2022 02:45 )             27.4     08-14    139  |  102  |  43<H>  ----------------------------<  111<H>  3.3<L>   |  25  |  1.44<H>    Ca    8.5      14 Aug 2022 02:45  Phos  3.4     08-14  Mg     1.90     08-14    TPro  4.7<L>  /  Alb  2.3<L>  /  TBili  0.4  /  DBili  x   /  AST  61<H>  /  ALT  47<H>  /  AlkPhos  64  08-14      WBC Trend:  WBC Count: 10.55 (08-14-22 @ 02:45)  WBC Count: 12.66 (08-13-22 @ 05:25)  WBC Count: 12.19 (08-13-22 @ 01:10)  WBC Count: 11.52 (08-12-22 @ 21:55)      Band Neutrophils %: 7.1 % (08-12-22 @ 18:20)  Auto Neutrophil #: 11.39 K/uL (08-12-22 @ 01:53)  Auto Neutrophil #: 12.07 K/uL (08-11-22 @ 17:46)      Creatine Trend:  Creatinine, Serum: 1.44 (08-14)  Creatinine, Serum: 1.95 (08-13)  Creatinine, Serum: 2.11 (08-13)  Creatinine, Serum: 2.36 (08-12)      Liver Biochemical Testing Trend:  Alanine Aminotransferase (ALT/SGPT): 47 *H* (08-14)  Alanine Aminotransferase (ALT/SGPT): 57 *H* (08-13)  Alanine Aminotransferase (ALT/SGPT): 57 *H* (08-13)  Alanine Aminotransferase (ALT/SGPT): 58 *H* (08-12)  Alanine Aminotransferase (ALT/SGPT): 56 *H* (08-12)  Aspartate Aminotransferase (AST/SGOT): 61 (08-14-22 @ 02:45)  Aspartate Aminotransferase (AST/SGOT): 72 (08-13-22 @ 05:25)  Aspartate Aminotransferase (AST/SGOT): 74 (08-13-22 @ 01:10)  Aspartate Aminotransferase (AST/SGOT): 80 (08-12-22 @ 21:55)  Aspartate Aminotransferase (AST/SGOT): 80 (08-12-22 @ 18:20)  Bilirubin Total, Serum: 0.4 (08-14)  Bilirubin Total, Serum: 0.6 (08-13)  Bilirubin Total, Serum: 0.7 (08-13)  Bilirubin Total, Serum: 0.7 (08-12)  Bilirubin Total, Serum: 0.7 (08-12)      Trend LDH      Auto Eosinophil %: 1.3 % (08-12-22 @ 01:53)  Auto Eosinophil %: 0.0 % (08-11-22 @ 17:46)          MICROBIOLOGY:  Vancomycin Level, Random: 9.1 (08-14 @ 04:20)    MRSA PCR Result.: NotDetec (08-12-22 @ 06:48)      Culture - Blood (collected 13 Aug 2022 03:00)  Source: .Blood Blood-Peripheral  Preliminary Report:    No growth to date.    Culture - Blood (collected 13 Aug 2022 02:40)  Source: .Blood Blood-Peripheral  Preliminary Report:    No growth to date.    Culture - Urine (collected 11 Aug 2022 21:10)  Source: Catheterized Catheterized  Final Report:    10,000 - 49,000 CFU/mL Streptococcus anginosus    "Susceptibilities not performed"    Culture - Blood (collected 11 Aug 2022 18:45)  Source: .Blood Blood  Final Report:    Growth in aerobic bottle: Staphylococcus hominis    Coag Negative Staphylococcus    Single set isolate, possible contaminant. Contact    Microbiology if susceptibility testing clinically    indicated.    ***Blood Panel PCR results on this specimen are available    approximately 3 hours after the Gram stain result.***    Gram stain, PCR, and/or culture results may not always    correspond due to difference in methodologies.    ************************************************************    This PCR assay was performed by multiplex PCR. This    Assay tests for 66 bacterial and resistance gene targets.    Please refer to the [facility] test directory    at [email or web address] for details.  Organism: Blood Culture PCR  Organism: Blood Culture PCR    Sensitivities:      -  Coagulase negative Staphylococcus: Detec      Method Type: PCR    Culture - Blood (collected 11 Aug 2022 17:00)  Source: .Blood Blood  Preliminary Report:    Growth in anaerobic bottle: Staphylococcus hominis    COVID-19 PCR: NotDetec (08-11-22 @ 22:38)    Troponin T, High Sensitivity Result: 81 (08-11)  Troponin T, High Sensitivity Result: 99 (08-11)    Blood Gas Arterial, Lactate: 1.0 (08-14 @ 04:20)  Blood Gas Arterial, Lactate: 1.9 (08-13 @ 05:25)  Blood Gas Arterial, Lactate: 2.6 (08-13 @ 01:10)  Blood Gas Arterial, Lactate: 3.9 (08-12 @ 21:55)        RADIOLOGY:  imaging below personally reviewed    < from: CT Chest No Cont (08.11.22 @ 22:34) >  CHEST:  LUNGS AND LARGE AIRWAYS: Patent central airways. Right upper lobe   calcified granuloma.  PLEURA: No pleural effusion.  VESSELS: Atherosclerotic changes of the aorta and coronary arteries.  HEART: Heart size is normal. No pericardial effusion.  MEDIASTINUM AND DEBRA: No lymphadenopathy.  CHEST WALL AND LOWER NECK: Within normal limits.    ABDOMEN AND PELVIS:  LIVER: Within normal limits.  BILE DUCTS: Pneumobilia, likely related to prior ERCP/sphincterotomy.  GALLBLADDER: Cholecystectomy.  SPLEEN: Within normal limits.  PANCREAS: Within normal limits.  ADRENALS: Bilateral adrenal gland thickening, nonspecific.  KIDNEYS/URETERS: No hydronephrosis.    BLADDER: Metcalf catheter.  REPRODUCTIVE ORGANS: Uterus and adnexa within normal limits.    BOWEL: Moderate to large hiatal hernia. Enteric tube terminates just   below the diaphragmatic hiatus, and appears outside the stomach, however   there is no adjacent extraluminal air, unclear if this is related to   tenting of the gastric wall. Colonic diverticulosis. No bowel   obstruction. Appendix is normal.  PERITONEUM: No ascites.  VESSELS: Atherosclerotic changes.  RETROPERITONEUM/LYMPH NODES: No lymphadenopathy.  ABDOMINAL WALL: Fat-containing right inguinal hernia.  BONES: Degenerative changes.    IMPRESSION:  Moderate to large hiatal hernia. Enteric tube terminates just below the   diaphragmatic hiatus, and appears outside the stomach, unclear if this is   related to tenting of the gastric wall. Consider venous repositioning   versus fluoroscopic NG tube study with water soluble contrast.    No acute findings in the chest.    < end of copied text >

## 2022-08-14 NOTE — PHYSICAL THERAPY INITIAL EVALUATION ADULT - ADDITIONAL COMMENTS
Unable to obtain information regarding living situation and previous level of function as patient's speech is incoherent and garbled.

## 2022-08-14 NOTE — CONSULT NOTE ADULT - ASSESSMENT
80 y/o F PMH demenntia (A&Ox2 at baseline), HTN, recent DVT on eliquis), p/w vomiting and AMS    ID is consulted for positive blood culture  Febrile on admission, with leukocytosis  Hypotensive requiring pressors  Questionable coffee ground emesis in NGT, now with dark liquid stool  Afebrile now, with minimal leukocytosis  VANDANA resolving, but with acute anemia  UA no pyuria, UCx strep anginosis likely contamination  BCx (8/11) 2/4 S. hominis  BCx (8/13) NGTD  CT Chest/A/P unremarkable    Overall positive blood culture is believed to be contamination  No prosthetic devices, no wound  Fever and leukocytosis could be from stress  Hypotension could be from dehydration vs GI bleed  Currently no signs of infection so can observe off abx      IMPRESSION:  Positive blood culture  Leukocytosis  Fever    RECOMMENDATIONS:  - D/C Zosyn. Monitor off antibiotics  - If fever recurs or worsening leukocytosis, repeat 2 sets of blood cultures  - GI follow up for possible GI bleed  - Trend WBC, fever curve, transaminases, creatinine daily  - Will continue to follow      Patient is seen and examined with attending and case is discussed with primary team      Criss Salazar D.O.  PGY-5 Infectious Diseases Fellow  Please contact me via page or text through [employer] Teams  If after 5PM or on weekends, please call [phone]

## 2022-08-14 NOTE — PROGRESS NOTE ADULT - ASSESSMENT
80 y/o F PMH dementia (A&Ox2 at baseline), HTN, recent DVT (on eliquis), p/w vomiting and AMS found to be hypotensive despite 4 L NS and was admitted to MICU for management of hypotension requiring pressors. Hypotension likely 2/2 hypovolemic shock (in the setting of poor PO intake/vomiting vs. GI bleed), with significant improvement after IV fluid resuscitation. VANDANA and acidosis improving with fluid administration. Patient now just on Charli drip for BP support. CTH notable for chronic infarcts but no acute process; CT chest/AP show no acute process. Blood cultures show coagulase-negative cocci, likely contaminate. hypotension improving with continued fluid administration.    #Neuro  1) AMS  - Patient is A&Ox2, which is baseline per chart review  - Per brother (Collin), patient baseline is variable, as she often forgets where she is or what day/season it is.  - monitor for acute changes in mental status    #Cardiovascular   1) Shock likely 2/2 hypovolemia in the setting of poor PO intake with vomiting  - limited POCUS showed collapses IVC  - Hypotension improving after further fluid resuscitation  - On Charli, 8--> 1.4  - f/u Q6 CBC     #Respiratory  - no active issues at this time, satting 100% on RA  - is able to protect airway well    #Gentiourinary  1) VANDANA likely pre-renal in setting of hypotension and hypovolemia  - baseline from 2020 Cr 0.7  -  Initial CR on presentation 6.63. Downtrending, 1.44 on 08/14  - F/u urine lytes (??)  - contine to trend Cr and maintain blood pressure   2) Acidosis RESOLVED  - Initial Bicarbonate 9 with VBG pH 7.14.   - Improved to 25, pH 7.4 after HCO3 drip  - Possibly 2/2 use of acidic fluid for repletion   - if requiring IVF, use LR instead of NS   - monitor/replete K+ as bicarbonate can cause potassium shifts into cell.   - Q6 BMP  3) Hyponatremia RESOLVED  - initial Na 125, 08/14 139. Stable for past 36 hours   - trend and monitor    #Gastrointestinal  1) Concern for GI bleed with Coffee ground emesis seen coming from NGT   - no signs of lui bleeding for past several days, NG tube?  - from 08/13 to 08/14, dropped 2g of hgb  - maintain active type and screen     #ID   -Patient presented with fever in ED and WBC 13 (last WBC was 3.5)   -WBC improving to normal range with fluid resusc  - c/w zosyn pending BCx,   - CT CAP neg for infx source  - UA negative   1) Coagulase-negative cocci BCx, staph hominis   - Likely contaminant  - patient received 1 dose of Vancomycin while awaiting coagulase results    #Heme/Onc  - Elevated coags   - hold home eliquis in setting of bleed  - no DVT PPx at this time    #Ethics  - [facility] conversation with brother  - social work consult for home situation. Might have exceed ability of family member to care for at home  - full code for now     80 y/o F PMH dementia (A&Ox2 at baseline), HTN, recent DVT (on eliquis), p/w vomiting and AMS found to be hypotensive despite 4 L NS and was admitted to MICU for management of hypotension requiring pressors. Hypotension likely 2/2 hypovolemic shock (in the setting of poor PO intake/vomiting vs. GI bleed), with significant improvement after IV fluid resuscitation. VANDANA and acidosis improving with fluid administration. Patient now just on Charli drip for BP support. CTH notable for chronic infarcts but no acute process; CT chest/AP show no acute process. Blood cultures show coagulase-negative cocci, likely contaminate. hypotension improving with continued fluid administration.    #Neuro  1) AMS  - Patient is A&Ox2, which is baseline per chart review  - Per brother (Collin), patient baseline is variable, as she often forgets where she is or what day/season it is.  - monitor for acute changes in mental status    #Cardiovascular   1) Shock likely 2/2 hypovolemia in the setting of poor PO intake with vomiting  - limited POCUS showed collapses IVC  - Hypotension improving after further fluid resuscitation  - On Charli, down trending  - further fluid resuscitation with l liter of LR and 2 doses of albumin     #Respiratory  - no active issues at this time, satting 100% on RA  - is able to protect airway well    #Gentiourinary  1) VANDANA likely pre-renal in setting of hypotension and hypovolemia  - baseline from 2020 Cr 0.7  -  Initial CR on presentation 6.63. Downtrending, 1.44 on 08/14  - F/u urine lytes (??)  - contine to trend Cr and maintain blood pressure     2) Acidosis RESOLVED  - Initial Bicarbonate 9 with VBG pH 7.14.   - Improved to 25, pH 7.4 after HCO3 drip  - Possibly 2/2 use of acidic fluid for repletion   - if requiring IVF, use LR instead of NS     3) Hyponatremia RESOLVED  - initial Na 125, 08/14 139. Stable for past 36 hours   - trend and monitor    #Gastrointestinal  1) Concern for GI bleed with Coffee ground emesis seen coming from NGT   - no signs of lui bleeding for past several days, NG tube?  - from 08/13 to 08/14, dropped 2g of hgb  - maintain active type and screen     #ID   -Patient presented with fever in ED and WBC 13 (last WBC was 3.5)   -WBC improving to normal range with fluid resusc  - c/w zosyn pending BCx,   - CT CAP neg for infx source  - UA negative   - Coagulase-negative cocci BCx, staph hominis, repeat NGTD, will consult ID for this    #Heme/Onc  - Elevated coags   - hold home eliquis in setting of bleed  - Hgb dropped two gram in the past 24 hours. No further bleeding noted. Suspect this might be dilutional effect since WBC and platelet count also decreased with Hgb. Will continued to monitor.    DVT ppx  - SCD. if Hgb hold steady, start lovonox  - doppler US, as pt has history of DVT    #Ethics  - [facility] conversation with brother  - social work consult for home situation. Might have exceed ability of family member to care for at home  - full code for now     82 y/o F PMH dementia (A&Ox2 at baseline), HTN, recent DVT (on eliquis), p/w vomiting and AMS found to be hypotensive despite 4 L NS and was admitted to MICU for management of hypotension requiring pressors. Hypotension likely 2/2 hypovolemic shock (in the setting of poor PO intake/vomiting vs. GI bleed), with significant improvement after IV fluid resuscitation. VANDANA and acidosis improving with fluid administration. Patient now just on Charli drip for BP support. CTH notable for chronic infarcts but no acute process; CT chest/AP show no acute process. Blood cultures show coagulase-negative cocci, likely contaminate. hypotension improving with continued fluid administration.    #Neuro  1) AMS  - Patient is A&Ox2, which is baseline per chart review  - Per brother (Collin), patient baseline is variable, as she often forgets where she is or what day/season it is.  - monitor for acute changes in mental status    #Cardiovascular   1) Shock likely 2/2 hypovolemia in the setting of poor PO intake with vomiting  - limited POCUS showed collapses IVC  - Hypotension improving after further fluid resuscitation  - On Charli, down trending  - further fluid resuscitation with l liter of LR and 2 doses of albumin     #Respiratory  - no active issues at this time, satting 100% on RA  - is able to protect airway well    #Gentiourinary  1) VANDANA likely pre-renal in setting of hypotension and hypovolemia  - baseline from 2020 Cr 0.7  -  Initial CR on presentation 6.63. Downtrending, 1.44 on 08/14  - contine to trend Cr and maintain blood pressure     2) Acidosis RESOLVED  - Initial Bicarbonate 9 with VBG pH 7.14.   - Improved to 25, pH 7.4 after HCO3 drip  - Possibly 2/2 use of acidic fluid for repletion   - if requiring IVF, use LR instead of NS     3) Hyponatremia RESOLVED  - initial Na 125, 08/14 139. Stable for past 36 hours   - trend and monitor    #Gastrointestinal  1) Concern for GI bleed with Coffee ground emesis seen coming from NGT   - no signs of lui bleeding for past several days  - Hgb downtrending. suspect dilutional effect  - maintain active type and screen     #ID   -Patient presented with fever in ED and WBC 13 (last WBC was 3.5)   -WBC improving to normal range with fluid resusc  - c/w zosyn pending BCx,   - CT CAP neg for infx source  - UA negative   - Coagulase-negative cocci BCx, staph hominis, repeat NGTD, will consult ID for this    #Heme/Onc  - Elevated coags   - hold home eliquis in setting of bleed  - Hgb dropped two gram in the past 24 hours. No further bleeding noted. Suspect this might be dilutional effect since WBC and platelet count also decreased with Hgb. Will continued to monitor.    DVT ppx  - SCD. if Hgb hold steady, start lovonox  - doppler US, as pt has history of DVT    #Ethics  - [facility] conversation with brother  - social work consult for home situation. Might have exceed ability of family member to care for at home  - full code for now

## 2022-08-14 NOTE — PHYSICAL THERAPY INITIAL EVALUATION ADULT - PERTINENT HX OF CURRENT PROBLEM, REHAB EVAL
Pt is an 81 year old female presenting with vomiting and AMS as per brother after a strongly caffeinated drink. In ED, patient found to be hypotensive; admitted to MICU. CTH notable for chronic infarcts but no acute process; CT chest/AP show no acute process.

## 2022-08-14 NOTE — PROGRESS NOTE ADULT - SUBJECTIVE AND OBJECTIVE BOX
Progress Note    08-11-22 (3d)    Patient is a 81y old  Female who presents with a chief complaint of Altered Mental Status (13 Aug 2022 08:06)      Subjective / Overnight Events :  - No acute events overnight.  - Pt seen and examined at bedside.     Additional ROS (if any):    MEDICATIONS  (STANDING):  chlorhexidine 2% Cloths 1 Application(s) Topical daily  midodrine 10 milliGRAM(s) Oral every 8 hours  pantoprazole    Tablet 40 milliGRAM(s) Oral two times a day  phenylephrine    Infusion 0.1 MICROgram(s)/kG/Min (1.3 mL/Hr) IV Continuous <Continuous>  piperacillin/tazobactam IVPB.. 3.375 Gram(s) IV Intermittent every 12 hours    MEDICATIONS  (PRN):      CAPILLARY BLOOD GLUCOSE        I&O's Summary    12 Aug 2022 07:01  -  13 Aug 2022 07:00  --------------------------------------------------------  IN: 5704.2 mL / OUT: 2605 mL / NET: 3099.2 mL    13 Aug 2022 07:01  -  14 Aug 2022 05:38  --------------------------------------------------------  IN: 3287 mL / OUT: 1095 mL / NET: 2192 mL        PHYSICAL EXAM:  Vital Signs Last 24 Hrs  T(C): 36.1 (14 Aug 2022 00:00), Max: 37.4 (13 Aug 2022 08:00)  T(F): 97 (14 Aug 2022 00:00), Max: 99.4 (13 Aug 2022 08:00)  HR: 54 (14 Aug 2022 04:00) (54 - 102)  BP: --  BP(mean): --  RR: 16 (14 Aug 2022 04:00) (14 - 26)  SpO2: 100% (14 Aug 2022 04:00) (95% - 100%)    Parameters below as of 14 Aug 2022 04:00  Patient On (Oxygen Delivery Method): room air        General: NAD, non-toxic appearing   HEENT: PERRLA, EOMi, no scleral icterus  CV: RRR, normal S1 and S2, no m/r/g  Lungs: normal respiratory effort. CTAB, no wheezes, rales, or rhonchi  Abd: soft, nontender, nondistended  Ext: no edema, 2+ peripheral pulses   Pysch: AAOx3, appropriate affect   Neuro: grossly non-focal, moving all extremities spontaneously   Skin: no rashes or lesions     LABS:                          9.1    10.55 )-----------( 184      ( 14 Aug 2022 02:45 )             27.4       WBC Trend: 10.55<--, 12.66<--, 12.19<--  Hb Trend: 9.1<--, 10.9<--, 11.0<--, 11.3<--, 11.0<--    08-14    139  |  102  |  43<H>  ----------------------------<  111<H>  3.3<L>   |  25  |  1.44<H>    Ca    8.5      14 Aug 2022 02:45  Phos  3.4     08-14  Mg     1.90     08-14    TPro  4.7<L>  /  Alb  2.3<L>  /  TBili  0.4  /  DBili  x   /  AST  61<H>  /  ALT  47<H>  /  AlkPhos  64  08-14    PT/INR - ( 13 Aug 2022 01:10 )   PT: 23.3 sec;   INR: 1.99 ratio         PTT - ( 13 Aug 2022 01:10 )  PTT:29.3 sec          Culture - Urine (collected 11 Aug 2022 21:10)  Source: Catheterized Catheterized  Final Report (13 Aug 2022 20:40):    10,000 - 49,000 CFU/mL Streptococcus anginosus    "Susceptibilities not performed"    Culture - Blood (collected 11 Aug 2022 18:45)  Source: .Blood Blood  Gram Stain (12 Aug 2022 19:57):    Growth in aerobic bottle: Gram Positive Cocci in Clusters  Final Report (13 Aug 2022 21:11):    Growth in aerobic bottle: Staphylococcus hominis    Coag Negative Staphylococcus    Single set isolate, possible contaminant. Contact    Microbiology if susceptibility testing clinically    indicated.    ***Blood Panel PCR results on this specimen are available    approximately 3 hours after the Gram stain result.***    Gram stain, PCR, and/or culture results may not always    correspond due to difference in methodologies.    ************************************************************    This PCR assay was performed by multiplex PCR. This    Assay tests for 66 bacterial and resistance gene targets.    Please refer to the [facility] test directory    at [email or web address] for details.  Organism: Blood Culture PCR (13 Aug 2022 21:11)  Organism: Blood Culture PCR (13 Aug 2022 21:11)    Culture - Blood (collected 11 Aug 2022 17:00)  Source: .Blood Blood  Gram Stain (13 Aug 2022 01:57):    Growth in anaerobic bottle: Gram Positive Cocci in Clusters  Preliminary Report (13 Aug 2022 22:23):    Growth in anaerobic bottle: Staphylococcus hominis      Blood Gas Arterial Comprehensive Result: Performed In Lab (08-14-22 @ 04:20)      RADIOLOGY & ADDITIONAL TESTS: Reviewed Progress Note    08-11-22 (3d)    Patient is a 81y old  Female who presents with a chief complaint of Altered Mental Status (13 Aug 2022 08:06)      Subjective / Overnight Events :  - No acute events overnight.  - Pt seen and examined at bedside.     Additional ROS (if any):    MEDICATIONS  (STANDING):  chlorhexidine 2% Cloths 1 Application(s) Topical daily  midodrine 10 milliGRAM(s) Oral every 8 hours  pantoprazole    Tablet 40 milliGRAM(s) Oral two times a day  phenylephrine    Infusion 0.1 MICROgram(s)/kG/Min (1.3 mL/Hr) IV Continuous <Continuous>  piperacillin/tazobactam IVPB.. 3.375 Gram(s) IV Intermittent every 12 hours    MEDICATIONS  (PRN):      CAPILLARY BLOOD GLUCOSE        I&O's Summary    12 Aug 2022 07:01  -  13 Aug 2022 07:00  --------------------------------------------------------  IN: 5704.2 mL / OUT: 2605 mL / NET: 3099.2 mL    13 Aug 2022 07:01  -  14 Aug 2022 05:38  --------------------------------------------------------  IN: 3287 mL / OUT: 1095 mL / NET: 2192 mL        PHYSICAL EXAM:  Vital Signs Last 24 Hrs  T(C): 36.1 (14 Aug 2022 00:00), Max: 37.4 (13 Aug 2022 08:00)  T(F): 97 (14 Aug 2022 00:00), Max: 99.4 (13 Aug 2022 08:00)  HR: 54 (14 Aug 2022 04:00) (54 - 102)  BP: --  BP(mean): --  RR: 16 (14 Aug 2022 04:00) (14 - 26)  SpO2: 100% (14 Aug 2022 04:00) (95% - 100%)    Parameters below as of 14 Aug 2022 04:00  Patient On (Oxygen Delivery Method): room air        General: NAD, non-toxic appearing   HEENT: PERRL, EOMi, no scleral icterus  CV: Regular rhythm, bradycardia, normal S1 and S2, no m/r/g  Lungs: normal respiratory effort. CTAB, no wheezes, rales, or rhonchi  Abd: soft, nontender, nondistended  Ext: no edema, 2+ peripheral pulses   Pysch: AAOx1, appropriate affect   Neuro: grossly non-focal, moving all extremities spontaneously   Skin: no rashes or lesions     LABS:                          9.1    10.55 )-----------( 184      ( 14 Aug 2022 02:45 )             27.4       WBC Trend: 10.55<--, 12.66<--, 12.19<--  Hb Trend: 9.1<--, 10.9<--, 11.0<--, 11.3<--, 11.0<--    08-14    139  |  102  |  43<H>  ----------------------------<  111<H>  3.3<L>   |  25  |  1.44<H>    Ca    8.5      14 Aug 2022 02:45  Phos  3.4     08-14  Mg     1.90     08-14    TPro  4.7<L>  /  Alb  2.3<L>  /  TBili  0.4  /  DBili  x   /  AST  61<H>  /  ALT  47<H>  /  AlkPhos  64  08-14    PT/INR - ( 13 Aug 2022 01:10 )   PT: 23.3 sec;   INR: 1.99 ratio         PTT - ( 13 Aug 2022 01:10 )  PTT:29.3 sec          Culture - Urine (collected 11 Aug 2022 21:10)  Source: Catheterized Catheterized  Final Report (13 Aug 2022 20:40):    10,000 - 49,000 CFU/mL Streptococcus anginosus    "Susceptibilities not performed"    Culture - Blood (collected 11 Aug 2022 18:45)  Source: .Blood Blood  Gram Stain (12 Aug 2022 19:57):    Growth in aerobic bottle: Gram Positive Cocci in Clusters  Final Report (13 Aug 2022 21:11):    Growth in aerobic bottle: Staphylococcus hominis    Coag Negative Staphylococcus    Single set isolate, possible contaminant. Contact    Microbiology if susceptibility testing clinically    indicated.    ***Blood Panel PCR results on this specimen are available    approximately 3 hours after the Gram stain result.***    Gram stain, PCR, and/or culture results may not always    correspond due to difference in methodologies.    ************************************************************    This PCR assay was performed by multiplex PCR. This    Assay tests for 66 bacterial and resistance gene targets.    Please refer to the [facility] test directory    at [email or web address] for details.  Organism: Blood Culture PCR (13 Aug 2022 21:11)  Organism: Blood Culture PCR (13 Aug 2022 21:11)    Culture - Blood (collected 11 Aug 2022 17:00)  Source: .Blood Blood  Gram Stain (13 Aug 2022 01:57):    Growth in anaerobic bottle: Gram Positive Cocci in Clusters  Preliminary Report (13 Aug 2022 22:23):    Growth in anaerobic bottle: Staphylococcus hominis      Blood Gas Arterial Comprehensive Result: Performed In Lab (08-14-22 @ 04:20)      RADIOLOGY & ADDITIONAL TESTS: Reviewed Progress Note    08-11-22 (3d)    Patient is a 81y old  Female who presents with a chief complaint of Altered Mental Status (13 Aug 2022 08:06)      Subjective / Overnight Events :  - No acute events overnight.  - Pt seen and examined at bedside.     Additional ROS (if any):    MEDICATIONS  (STANDING):  chlorhexidine 2% Cloths 1 Application(s) Topical daily  midodrine 10 milliGRAM(s) Oral every 8 hours  pantoprazole    Tablet 40 milliGRAM(s) Oral two times a day  phenylephrine    Infusion 0.1 MICROgram(s)/kG/Min (1.3 mL/Hr) IV Continuous <Continuous>  piperacillin/tazobactam IVPB.. 3.375 Gram(s) IV Intermittent every 12 hours    MEDICATIONS  (PRN):      CAPILLARY BLOOD GLUCOSE        I&O's Summary    12 Aug 2022 07:01  -  13 Aug 2022 07:00  --------------------------------------------------------  IN: 5704.2 mL / OUT: 2605 mL / NET: 3099.2 mL    13 Aug 2022 07:01  -  14 Aug 2022 05:38  --------------------------------------------------------  IN: 3287 mL / OUT: 1095 mL / NET: 2192 mL        PHYSICAL EXAM:  Vital Signs Last 24 Hrs  T(C): 36.1 (14 Aug 2022 00:00), Max: 37.4 (13 Aug 2022 08:00)  T(F): 97 (14 Aug 2022 00:00), Max: 99.4 (13 Aug 2022 08:00)  HR: 54 (14 Aug 2022 04:00) (54 - 102)  BP: --  BP(mean): --  RR: 16 (14 Aug 2022 04:00) (14 - 26)  SpO2: 100% (14 Aug 2022 04:00) (95% - 100%)    Parameters below as of 14 Aug 2022 04:00  Patient On (Oxygen Delivery Method): room air        General: NAD, non-toxic appearing   HEENT: PERRL, EOMi, no scleral icterus, dry mucous membrane  CV: Regular rhythm, bradycardia, normal S1 and S2, no m/r/g  Lungs: normal respiratory effort. CTAB, no wheezes, rales, or rhonchi  Abd: soft, nontender, nondistended  Ext: no edema, 2+ peripheral pulses   Pysch: AAOx1, appropriate affect   Neuro: grossly non-focal, moving all extremities spontaneously   Skin: no rashes or lesions     LABS:                          9.1    10.55 )-----------( 184      ( 14 Aug 2022 02:45 )             27.4       WBC Trend: 10.55<--, 12.66<--, 12.19<--  Hb Trend: 9.1<--, 10.9<--, 11.0<--, 11.3<--, 11.0<--    08-14    139  |  102  |  43<H>  ----------------------------<  111<H>  3.3<L>   |  25  |  1.44<H>    Ca    8.5      14 Aug 2022 02:45  Phos  3.4     08-14  Mg     1.90     08-14    TPro  4.7<L>  /  Alb  2.3<L>  /  TBili  0.4  /  DBili  x   /  AST  61<H>  /  ALT  47<H>  /  AlkPhos  64  08-14    PT/INR - ( 13 Aug 2022 01:10 )   PT: 23.3 sec;   INR: 1.99 ratio         PTT - ( 13 Aug 2022 01:10 )  PTT:29.3 sec          Culture - Urine (collected 11 Aug 2022 21:10)  Source: Catheterized Catheterized  Final Report (13 Aug 2022 20:40):    10,000 - 49,000 CFU/mL Streptococcus anginosus    "Susceptibilities not performed"    Culture - Blood (collected 11 Aug 2022 18:45)  Source: .Blood Blood  Gram Stain (12 Aug 2022 19:57):    Growth in aerobic bottle: Gram Positive Cocci in Clusters  Final Report (13 Aug 2022 21:11):    Growth in aerobic bottle: Staphylococcus hominis    Coag Negative Staphylococcus    Single set isolate, possible contaminant. Contact    Microbiology if susceptibility testing clinically    indicated.    ***Blood Panel PCR results on this specimen are available    approximately 3 hours after the Gram stain result.***    Gram stain, PCR, and/or culture results may not always    correspond due to difference in methodologies.    ************************************************************    This PCR assay was performed by multiplex PCR. This    Assay tests for 66 bacterial and resistance gene targets.    Please refer to the [facility] test directory    at [email or web address] for details.  Organism: Blood Culture PCR (13 Aug 2022 21:11)  Organism: Blood Culture PCR (13 Aug 2022 21:11)    Culture - Blood (collected 11 Aug 2022 17:00)  Source: .Blood Blood  Gram Stain (13 Aug 2022 01:57):    Growth in anaerobic bottle: Gram Positive Cocci in Clusters  Preliminary Report (13 Aug 2022 22:23):    Growth in anaerobic bottle: Staphylococcus hominis      Blood Gas Arterial Comprehensive Result: Performed In Lab (08-14-22 @ 04:20)      RADIOLOGY & ADDITIONAL TESTS: Reviewed

## 2022-08-15 DIAGNOSIS — D64.9 ANEMIA, UNSPECIFIED: ICD-10-CM

## 2022-08-15 DIAGNOSIS — I82.409 ACUTE EMBOLISM AND THROMBOSIS OF UNSPECIFIED DEEP VEINS OF UNSPECIFIED LOWER EXTREMITY: ICD-10-CM

## 2022-08-15 DIAGNOSIS — F03.90 UNSPECIFIED DEMENTIA WITHOUT BEHAVIORAL DISTURBANCE: ICD-10-CM

## 2022-08-15 DIAGNOSIS — E87.8 OTHER DISORDERS OF ELECTROLYTE AND FLUID BALANCE, NOT ELSEWHERE CLASSIFIED: ICD-10-CM

## 2022-08-15 DIAGNOSIS — N17.9 ACUTE KIDNEY FAILURE, UNSPECIFIED: ICD-10-CM

## 2022-08-15 LAB
ALBUMIN SERPL ELPH-MCNC: 3 G/DL — LOW (ref 3.3–5)
ALP SERPL-CCNC: 79 U/L — SIGNIFICANT CHANGE UP (ref 40–120)
ALT FLD-CCNC: 39 U/L — HIGH (ref 4–33)
ANION GAP SERPL CALC-SCNC: 10 MMOL/L — SIGNIFICANT CHANGE UP (ref 7–14)
ANISOCYTOSIS BLD QL: SLIGHT — SIGNIFICANT CHANGE UP
AST SERPL-CCNC: 54 U/L — HIGH (ref 4–32)
BASOPHILS # BLD AUTO: 0 K/UL — SIGNIFICANT CHANGE UP (ref 0–0.2)
BASOPHILS NFR BLD AUTO: 0 % — SIGNIFICANT CHANGE UP (ref 0–2)
BILIRUB SERPL-MCNC: 0.6 MG/DL — SIGNIFICANT CHANGE UP (ref 0.2–1.2)
BUN SERPL-MCNC: 40 MG/DL — HIGH (ref 7–23)
BURR CELLS BLD QL SMEAR: PRESENT — SIGNIFICANT CHANGE UP
CALCIUM SERPL-MCNC: 9 MG/DL — SIGNIFICANT CHANGE UP (ref 8.4–10.5)
CHLORIDE SERPL-SCNC: 108 MMOL/L — HIGH (ref 98–107)
CO2 SERPL-SCNC: 24 MMOL/L — SIGNIFICANT CHANGE UP (ref 22–31)
CREAT SERPL-MCNC: 1.17 MG/DL — SIGNIFICANT CHANGE UP (ref 0.5–1.3)
EGFR: 47 ML/MIN/1.73M2 — LOW
ELLIPTOCYTES BLD QL SMEAR: SLIGHT — SIGNIFICANT CHANGE UP
EOSINOPHIL # BLD AUTO: 0.04 K/UL — SIGNIFICANT CHANGE UP (ref 0–0.5)
EOSINOPHIL NFR BLD AUTO: 0.9 % — SIGNIFICANT CHANGE UP (ref 0–6)
FERRITIN SERPL-MCNC: 359 NG/ML — HIGH (ref 15–150)
GLUCOSE SERPL-MCNC: 92 MG/DL — SIGNIFICANT CHANGE UP (ref 70–99)
HCT VFR BLD CALC: 24.1 % — LOW (ref 34.5–45)
HCT VFR BLD CALC: 27 % — LOW (ref 34.5–45)
HGB BLD-MCNC: 7.9 G/DL — LOW (ref 11.5–15.5)
HGB BLD-MCNC: 8.5 G/DL — LOW (ref 11.5–15.5)
IANC: 4.03 K/UL — SIGNIFICANT CHANGE UP (ref 1.8–7.4)
IRON SATN MFR SERPL: 23 % — SIGNIFICANT CHANGE UP (ref 14–50)
IRON SATN MFR SERPL: 25 UG/DL — LOW (ref 30–160)
LYMPHOCYTES # BLD AUTO: 0.41 K/UL — LOW (ref 1–3.3)
LYMPHOCYTES # BLD AUTO: 8.7 % — LOW (ref 13–44)
MAGNESIUM SERPL-MCNC: 1.7 MG/DL — SIGNIFICANT CHANGE UP (ref 1.6–2.6)
MCHC RBC-ENTMCNC: 25.2 PG — LOW (ref 27–34)
MCHC RBC-ENTMCNC: 25.3 PG — LOW (ref 27–34)
MCHC RBC-ENTMCNC: 31.5 GM/DL — LOW (ref 32–36)
MCHC RBC-ENTMCNC: 32.8 GM/DL — SIGNIFICANT CHANGE UP (ref 32–36)
MCV RBC AUTO: 77.2 FL — LOW (ref 80–100)
MCV RBC AUTO: 80.1 FL — SIGNIFICANT CHANGE UP (ref 80–100)
MICROCYTES BLD QL: SLIGHT — SIGNIFICANT CHANGE UP
MONOCYTES # BLD AUTO: 0.2 K/UL — SIGNIFICANT CHANGE UP (ref 0–0.9)
MONOCYTES NFR BLD AUTO: 4.3 % — SIGNIFICANT CHANGE UP (ref 2–14)
NEUTROPHILS # BLD AUTO: 4.01 K/UL — SIGNIFICANT CHANGE UP (ref 1.8–7.4)
NEUTROPHILS NFR BLD AUTO: 79.1 % — HIGH (ref 43–77)
NEUTS BAND # BLD: 7 % — HIGH (ref 0–6)
NRBC # BLD: 0 /100 WBCS — SIGNIFICANT CHANGE UP
NRBC # FLD: 0 K/UL — SIGNIFICANT CHANGE UP
OVALOCYTES BLD QL SMEAR: SLIGHT — SIGNIFICANT CHANGE UP
PHOSPHATE SERPL-MCNC: 2.2 MG/DL — LOW (ref 2.5–4.5)
PLAT MORPH BLD: NORMAL — SIGNIFICANT CHANGE UP
PLATELET # BLD AUTO: 105 K/UL — LOW (ref 150–400)
PLATELET # BLD AUTO: 131 K/UL — LOW (ref 150–400)
PLATELET COUNT - ESTIMATE: ABNORMAL
POIKILOCYTOSIS BLD QL AUTO: SLIGHT — SIGNIFICANT CHANGE UP
POLYCHROMASIA BLD QL SMEAR: SLIGHT — SIGNIFICANT CHANGE UP
POTASSIUM SERPL-MCNC: 3.5 MMOL/L — SIGNIFICANT CHANGE UP (ref 3.5–5.3)
POTASSIUM SERPL-SCNC: 3.5 MMOL/L — SIGNIFICANT CHANGE UP (ref 3.5–5.3)
PROT SERPL-MCNC: 5.2 G/DL — LOW (ref 6–8.3)
RBC # BLD: 3.12 M/UL — LOW (ref 3.8–5.2)
RBC # BLD: 3.37 M/UL — LOW (ref 3.8–5.2)
RBC # FLD: 15.9 % — HIGH (ref 10.3–14.5)
RBC # FLD: 16.1 % — HIGH (ref 10.3–14.5)
RBC BLD AUTO: ABNORMAL
SARS-COV-2 RNA SPEC QL NAA+PROBE: SIGNIFICANT CHANGE UP
SCHISTOCYTES BLD QL AUTO: SLIGHT — SIGNIFICANT CHANGE UP
SODIUM SERPL-SCNC: 142 MMOL/L — SIGNIFICANT CHANGE UP (ref 135–145)
TIBC SERPL-MCNC: 111 UG/DL — LOW (ref 220–430)
UIBC SERPL-MCNC: 86 UG/DL — LOW (ref 110–370)
WBC # BLD: 4.66 K/UL — SIGNIFICANT CHANGE UP (ref 3.8–10.5)
WBC # BLD: 4.96 K/UL — SIGNIFICANT CHANGE UP (ref 3.8–10.5)
WBC # FLD AUTO: 4.66 K/UL — SIGNIFICANT CHANGE UP (ref 3.8–10.5)
WBC # FLD AUTO: 4.96 K/UL — SIGNIFICANT CHANGE UP (ref 3.8–10.5)

## 2022-08-15 PROCEDURE — 99222 1ST HOSP IP/OBS MODERATE 55: CPT | Mod: GC

## 2022-08-15 PROCEDURE — 99233 SBSQ HOSP IP/OBS HIGH 50: CPT

## 2022-08-15 PROCEDURE — 99231 SBSQ HOSP IP/OBS SF/LOW 25: CPT

## 2022-08-15 RX ORDER — PANTOPRAZOLE SODIUM 20 MG/1
40 TABLET, DELAYED RELEASE ORAL
Refills: 0 | Status: DISCONTINUED | OUTPATIENT
Start: 2022-08-15 | End: 2022-08-16

## 2022-08-15 RX ORDER — MAGNESIUM OXIDE 400 MG ORAL TABLET 241.3 MG
400 TABLET ORAL
Refills: 0 | Status: COMPLETED | OUTPATIENT
Start: 2022-08-15 | End: 2022-08-16

## 2022-08-15 RX ORDER — SODIUM,POTASSIUM PHOSPHATES 278-250MG
1 POWDER IN PACKET (EA) ORAL
Refills: 0 | Status: COMPLETED | OUTPATIENT
Start: 2022-08-15 | End: 2022-08-16

## 2022-08-15 RX ADMIN — PANTOPRAZOLE SODIUM 40 MILLIGRAM(S): 20 TABLET, DELAYED RELEASE ORAL at 19:04

## 2022-08-15 RX ADMIN — CHLORHEXIDINE GLUCONATE 1 APPLICATION(S): 213 SOLUTION TOPICAL at 12:44

## 2022-08-15 RX ADMIN — Medication 1 TABLET(S): at 19:01

## 2022-08-15 RX ADMIN — PANTOPRAZOLE SODIUM 40 MILLIGRAM(S): 20 TABLET, DELAYED RELEASE ORAL at 05:40

## 2022-08-15 RX ADMIN — MIDODRINE HYDROCHLORIDE 10 MILLIGRAM(S): 2.5 TABLET ORAL at 05:40

## 2022-08-15 RX ADMIN — MAGNESIUM OXIDE 400 MG ORAL TABLET 400 MILLIGRAM(S): 241.3 TABLET ORAL at 19:04

## 2022-08-15 RX ADMIN — MIDODRINE HYDROCHLORIDE 10 MILLIGRAM(S): 2.5 TABLET ORAL at 21:35

## 2022-08-15 RX ADMIN — MIDODRINE HYDROCHLORIDE 10 MILLIGRAM(S): 2.5 TABLET ORAL at 15:02

## 2022-08-15 RX ADMIN — Medication 1 TABLET(S): at 12:44

## 2022-08-15 NOTE — PROGRESS NOTE ADULT - ASSESSMENT
81F mild dementia.   Here 8/11 for vomiting, abdominal pain, encephalopathy.   Found to be febrile 100.6F and hypotensive.   Concern for septic shock from abdominal infection but workup unremarkable and rapidly improved.   Transient process? Passed stone? Bilirubin was a little elevated.   Aspiration pneumonitis? Lungs clear, on room air.   Doubt Staph hominis on blood cultures or Strep anginosus in urine represent true infections.   Doing well.     Received Ceftriaxone 8/11, Vanc 8/12-13, Zosyn 8/12-14.     Suggest  -monitor clinically off antibiotics     Will sign off, call back if needed    Jean Blank MD   Infectious Disease   Available on TEAMS. After 5PM and on weekends please page fellow on call or call [phone]

## 2022-08-15 NOTE — PROGRESS NOTE ADULT - SUBJECTIVE AND OBJECTIVE BOX
Follow Up: positive blood cultures    Interval History/ROS: Afebrile. Downgraded from MICU. No pain, feels fine. No cough, rhinorrhea, sore throat, diarrhea, dysuria, abdominal pain.     Allergies  No Known Allergies        ANTIMICROBIALS:      OTHER MEDS:  chlorhexidine 2% Cloths 1 Application(s) Topical daily  magnesium oxide 400 milliGRAM(s) Oral two times a day with meals  midodrine 10 milliGRAM(s) Oral every 8 hours  pantoprazole  Injectable 40 milliGRAM(s) IV Push two times a day  potassium phosphate / sodium phosphate Tablet (K-PHOS No. 2) 1 Tablet(s) Oral three times a day with meals      Vital Signs Last 24 Hrs  T(C): 36.6 (15 Aug 2022 14:00), Max: 37 (15 Aug 2022 10:33)  T(F): 97.9 (15 Aug 2022 14:00), Max: 98.6 (15 Aug 2022 10:33)  HR: 80 (15 Aug 2022 14:00) (67 - 81)  BP: 127/58 (15 Aug 2022 14:00) (92/34 - 127/58)  BP(mean): 83 (15 Aug 2022 03:00) (51 - 93)  RR: 18 (15 Aug 2022 14:00) (14 - 23)  SpO2: 95% (15 Aug 2022 14:00) (95% - 100%)    Parameters below as of 15 Aug 2022 14:00  Patient On (Oxygen Delivery Method): room air        Physical Exam:  General: non toxic  Cardio: regular rate   Respiratory: nonlabored, on room air, grossly clear   abd: nondistended, soft, nontender  Musculoskeletal: no focal joint tenderness   Skin: no rash                          7.9    4.66  )-----------( 131      ( 15 Aug 2022 03:15 )             24.1       08-15    142  |  108<H>  |  40<H>  ----------------------------<  92  3.5   |  24  |  1.17    Ca    9.0      15 Aug 2022 03:15  Phos  2.2     08-15  Mg     1.70     08-15    TPro  5.2<L>  /  Alb  3.0<L>  /  TBili  0.6  /  DBili  x   /  AST  54<H>  /  ALT  39<H>  /  AlkPhos  79  08-15          MICROBIOLOGY:  Culture - Blood (collected 08-13-22 @ 03:00)  Source: .Blood Blood-Peripheral  Preliminary Report (08-14-22 @ 07:01):    No growth to date.    Culture - Blood (collected 08-13-22 @ 02:40)  Source: .Blood Blood-Peripheral  Preliminary Report (08-14-22 @ 07:01):    No growth to date.    Culture - Urine (collected 08-11-22 @ 21:10)  Source: Catheterized Catheterized  Final Report (08-13-22 @ 20:40):    10,000 - 49,000 CFU/mL Streptococcus anginosus    "Susceptibilities not performed"    Culture - Blood (collected 08-11-22 @ 18:45)  Source: .Blood Blood  Gram Stain (08-12-22 @ 19:57):    Growth in aerobic bottle: Gram Positive Cocci in Clusters  Final Report (08-13-22 @ 21:11):    Growth in aerobic bottle: Staphylococcus hominis    Coag Negative Staphylococcus    Single set isolate, possible contaminant. Contact    Microbiology if susceptibility testing clinically    indicated.    ***Blood Panel PCR results on this specimen are available    approximately 3 hours after the Gram stain result.***    Gram stain, PCR, and/or culture results may not always    correspond due to difference in methodologies.    ************************************************************    This PCR assay was performed by multiplex PCR. This    Assay tests for 66 bacterial and resistance gene targets.    Please refer to the [facility] test directory    at [email or web address] for details.  Organism: Blood Culture PCR (08-13-22 @ 21:11)  Organism: Blood Culture PCR (08-13-22 @ 21:11)      -  Coagulase negative Staphylococcus: Detec      Method Type: PCR    Culture - Blood (collected 08-11-22 @ 17:00)  Source: .Blood Blood  Gram Stain (08-13-22 @ 01:57):    Growth in anaerobic bottle: Gram Positive Cocci in Clusters  Final Report (08-14-22 @ 20:39):    Growth in anaerobic bottle: Staphylococcus hominis  Organism: Staphylococcus hominis (08-14-22 @ 20:39)  Organism: Staphylococcus hominis (08-14-22 @ 20:39)      -  Ampicillin/Sulbactam: S <=8/4      -  Cefazolin: S <=4      -  Clindamycin: S <=0.25      -  Erythromycin: R >4      -  Gentamicin: S <=1 Should not be used as monotherapy      -  Oxacillin: S <=0.25      -  Penicillin: R 2      -  Rifampin: S <=1 Should not be used as monotherapy      -  Tetra/Doxy: S <=1      -  Trimethoprim/Sulfamethoxazole: S <=0.5/9.5      -  Vancomycin: S 2      Method Type: LARRY    RADIOLOGY:  Images below reviewed personally  US Duplex Venous Lower Ext Complete, Bilateral (08.14.22 @ 16:23)   No evidence of deep venous thrombosis in either lower extremity.  Limited evaluation of the left calf veins.    CT Chest Abdomen No Cont (08.11.22 @ 22:34)   Moderate to large hiatal hernia. Enteric tube terminates just below the   diaphragmatic hiatus, and appears outside the stomach, unclear if this is   related to tenting of the gastric wall. Consider venous repositioning   versus fluoroscopic NG tube study with water soluble contrast.  No acute findings in the chest.

## 2022-08-15 NOTE — CHART NOTE - NSCHARTNOTEFT_GEN_A_CORE
MAR Accept Note  Transfer to: Medicine  Accepting Attending Physician: Dr. Cole  Assigned Room: 866B    Patient seen and examined.   Labs and data reviewed.   No findings precluding transfer of service.       HPI/MICU COURSE:   Please refer to MICU transfer note for full details. Briefly, this is a 81 year-old female with history of dementia (A&Ox2 at baseline), HTN, recent DVT (on Eliquis), whom presented with vomiting and AMS and found to be hypotensive despite IVF resuscitation. Patient was admitted to the MICU for shock-state requiring pressors.     In the MICU, patient had an unclear etiology for her shock but likely was secondary to hypovolemia. Patient's BP eventually responded to fluid resuscitation and VANDANA also improved s/p fluid resuscitation (likely also 2/2 to hypovolemia). BCx from 8/11 positive with staph hominis but repeat BCx on 8/13 NGTD. Pt was started on vanc and zosyn and ID was consulted initially, pt now being monitored off antibiotics.       FOR FOLLOW-UP:  [ ] F/u ID recs  [ ] Monitor Cr  [ ] Monitor H&H  [ ] Was on home eliquis for prior DVT, consider restarting if H&H remains stable  [ ] F/u  consult for home situation (may exceed capability to care for home)    Yves Barrera MD  Internal Medicine PGY-3  [phone] / [phone]

## 2022-08-15 NOTE — CONSULT NOTE ADULT - SUBJECTIVE AND OBJECTIVE BOX
HPI:  Georgia Elliott is a 81 year old female with a past medical history notable for dementia (A&Ox2 at baseline), HTN, DVT 2020 (remains on eliquis) presenting to the hospital with hypovolemic 2/2 shock k in the setting of poor PO intake/vomiting vs. GI bleed) now transferred back to the floor. GI consulted for further management of acute anemia and c/f GI bleed.     Notably, patient had an admission during 2020 with abdominal pain, nausea, vomiting "black fluids" x 3 days with patient found to be in acute renal failure thought to be secondary to hypovolemia that improved with supportive care. During that admission she had   LUE US venous/arterial ordered for LUE pain, found to have DVT in L brachial and radial veins, superficial venous thrombosis in L basilic and cephalic, and R basilic veins. She was started on a heparin gtt although c/b an   episode of melena Hgb downtrend 14.3 on admission to 7.7 and underwent EGD in addition to ERCP for workup of her anemia and choledocholithiasis incidentally found on CT where she underwenr removal of CBD stone but only esophagitis noted on EGD. Patient placed back on heparin ggt although had two episodes of hematemesis with mixture of vomitus and blood associated with decrease in Hgb to 7.1 requiring a unit of pRBCs and underwent repeat EGD on  with findings of esophageal ulcer (BICAP) with overlying clot and bleeding at sphincterotomy site (s/p clip_ - both sites of bleeding treated after which she was discharged on Apixaban     Pt, represented to the hospital on  with complaints of AMS found to be hypotensive with BPs of 60/40. She was admitted to the MICU, where she was given 4 L of NS and started on pressors. Workup including CTH notable for chronic infarcts but no acute process in addition to a CT torso that was without any acute process.  She was initially started on Zosyn although infectious workup was largely unremarkable  with coagulase-negative cocci, likely contaminate.  On initial presentation she was noted to have a few cc coffee ground emesis during NGT placement and was started on protonix drip. Labs overall notable for decreasing hgb from 13->8 with MCV 77 in addition to an elevated BUN as compared to baseline. Her home DOAC and ASA have been held.         Home Medications:  alendronate 70 mg oral tablet: 1 tab(s) orally once a week (13 Aug 2020 15:48)  aspirin 81 mg oral tablet:  (13 Aug 2020 15:48)  atorvastatin 80 mg oral tablet: 1 tab(s) orally once a day (13 Aug 2020 15:48)  omeprazole 20 mg oral delayed release tablet: 1 tab(s) orally once a day (13 Aug 2020 15:48)  Oysco 500 with D 500 mg-200 intl units (5 mcg) oral tablet: 1 tab(s) orally 2 times a day (13 Aug 2020 15:48)  Apixiban   Carafate     Allergies:  No Known Allergies    Hospital Medications:  chlorhexidine 2% Cloths 1 Application(s) Topical daily  magnesium oxide 400 milliGRAM(s) Oral two times a day with meals  midodrine 10 milliGRAM(s) Oral every 8 hours  pantoprazole    Tablet 40 milliGRAM(s) Oral two times a day  potassium phosphate / sodium phosphate Tablet (K-PHOS No. 2) 1 Tablet(s) Oral three times a day with meals      PMHX/PSHX:    Hypertension  HLD   DVT 2020  Dementia   CCY     Denies family history of colon cancer/polyps, stomach cancer/polyps, pancreatic cancer/masses, liver cancer/disease, ovarian cancer and endometrial cancer.    Social History:   Tob: Denies  EtOH: Denies  Illicit Drugs: Denies      PHYSICAL EXAM:     GENERAL:  No acute distress  HEENT:  NCAT, no scleral icterus   CHEST:  no respiratory distress  HEART:  Regular rate and rhythm  ABDOMEN:  Soft, non-tender, non-distended, normoactive bowel sounds,  no masses  EXTREMITIES: No edema  SKIN:  No rash/erythema/ecchymoses/petechiae/wounds/abscess/warm/dry  NEURO:  Alert and oriented x 3, no asterixis    Vital Signs:  Vital Signs Last 24 Hrs  T(C): 37 (15 Aug 2022 10:33), Max: 37 (15 Aug 2022 10:33)  T(F): 98.6 (15 Aug 2022 10:33), Max: 98.6 (15 Aug 2022 10:33)  HR: 76 (15 Aug 2022 10:33) (55 - 81)  BP: 125/63 (15 Aug 2022 10:33) (92/34 - 126/71)  BP(mean): 83 (15 Aug 2022 03:00) (51 - 93)  RR: 18 (15 Aug 2022 10:33) (14 - 23)  SpO2: 97% (15 Aug 2022 10:33) (97% - 100%)    Parameters below as of 15 Aug 2022 10:33  Patient On (Oxygen Delivery Method): room air      Daily     Daily Weight in k.2 (15 Aug 2022 08:00)    LABS:                        7.9    4.66  )-----------( 131      ( 15 Aug 2022 03:15 )             24.1     Mean Cell Volume: 77.2 fL (08-15- @ 03:15)    08-15    142  |  108<H>  |  40<H>  ----------------------------<  92  3.5   |  24  |  1.17    Ca    9.0      15 Aug 2022 03:15  Phos  2.2     0815  Mg     1.70     0815    TPro  5.2<L>  /  Alb  3.0<L>  /  TBili  0.6  /  DBili  x   /  AST  54<H>  /  ALT  39<H>  /  AlkPhos  79  08-15    LIVER FUNCTIONS - ( 15 Aug 2022 03:15 )  Alb: 3.0 g/dL / Pro: 5.2 g/dL / ALK PHOS: 79 U/L / ALT: 39 U/L / AST: 54 U/L / GGT: x                                       7.9    4.66  )-----------( 131      ( 15 Aug 2022 03:15 )             24.1                         7.9    4.34  )-----------( 134      ( 14 Aug 2022 20:30 )             24.1                         7.6    8.52  )-----------( 145      ( 14 Aug 2022 15:00 )             23.3                         9.1    10.55 )-----------( 184      ( 14 Aug 2022 02:45 )             27.4                         10.9   12.66 )-----------( 232      ( 13 Aug 2022 05:25 )             33.6       Imaging:  CT Torso   CHEST:  LUNGS AND LARGE AIRWAYS: Patent central airways. Right upper lobe   calcified granuloma.  PLEURA: No pleural effusion.  VESSELS: Atherosclerotic changes of the aorta and coronary arteries.  HEART: Heart size is normal. No pericardial effusion.  MEDIASTINUM AND DEBRA: No lymphadenopathy.  CHEST WALL AND LOWER NECK: Within normal limits.    ABDOMEN AND PELVIS:  LIVER: Within normal limits.  BILE DUCTS: Pneumobilia, likely related to prior ERCP/sphincterotomy.  GALLBLADDER: Cholecystectomy.  SPLEEN: Within normal limits.  PANCREAS: Within normal limits.  ADRENALS: Bilateral adrenal gland thickening, nonspecific.  KIDNEYS/URETERS: No hydronephrosis.    BLADDER: Metcalf catheter.  REPRODUCTIVE ORGANS: Uterus and adnexa within normal limits.    BOWEL: Moderate to large hiatal hernia. Enteric tube terminates just   below the diaphragmatic hiatus, and appears outside the stomach, however   there is no adjacent extraluminal air, unclear if this is related to   tenting of the gastric wall. Colonic diverticulosis. No bowel   obstruction. Appendix is normal.  PERITONEUM: No ascites.  VESSELS: Atherosclerotic changes.  RETROPERITONEUM/LYMPH NODES: No lymphadenopathy.  ABDOMINAL WALL: Fat-containing right inguinal hernia.  BONES: Degenerative changes.    IMPRESSION:  Moderate to large hiatal hernia. Enteric tube terminates just below the   diaphragmatic hiatus, and appears outside the stomach, unclear if this is   related to tenting of the gastric wall. Consider venous repositioning   versus fluoroscopic NG tube study with water soluble contrast.    No acute findings in the chest.             HPI:  Georgia Elliott is a 81 year old female with a past medical history notable for dementia (A&Ox2 at baseline), HTN, DVT 2020 (remains on eliquis) presenting to the hospital with hypovolemic 2/2 shock k in the setting of poor PO intake/vomiting vs. GI bleed) now transferred back to the floor. GI consulted for further management of acute anemia and c/f GI bleed.     Notably, patient had an admission during 2020 with abdominal pain, nausea, vomiting "black fluids" x 3 days with patient found to be in acute renal failure thought to be secondary to hypovolemia that improved with supportive care. During that admission she had   LUE US venous/arterial ordered for LUE pain, found to have DVT in L brachial and radial veins, superficial venous thrombosis in L basilic and cephalic, and R basilic veins. She was started on a heparin gtt although c/b an   episode of melena Hgb downtrend 14.3 on admission to 7.7 and underwent EGD in addition to ERCP for workup of her anemia and choledocholithiasis incidentally found on CT where she underwenr removal of CBD stone but only esophagitis noted on EGD. Patient placed back on heparin ggt although had two episodes of hematemesis with mixture of vomitus and blood associated with decrease in Hgb to 7.1 requiring a unit of pRBCs and underwent repeat EGD on  with findings of esophageal ulcer (BICAP) with overlying clot and bleeding at sphincterotomy site (s/p clip_ - both sites of bleeding treated after which she was discharged on Apixaban     Pt, represented to the hospital on  with complaints of AMS found to be hypotensive with BPs of 60/40. She was admitted to the MICU, where she was given 4 L of NS and started on pressors. Workup including CTH notable for chronic infarcts but no acute process in addition to a CT torso that was without any acute process.  She was initially started on Zosyn although infectious workup was largely unremarkable  with coagulase-negative cocci, likely contaminate.  On initial presentation she was noted to have a few cc coffee ground emesis during NGT placement and was started on protonix drip. Labs overall notable for decreasing hgb from 13->8 with MCV 77 in addition to an elevated BUN as compared to baseline. Her home DOAC and ASA have been held.     Per discussion with nursing staff, she has not had any further episodes of hematemesis since that isolated incident in the MICU. She has not had any episodes of melena or hematochezia. Tolerating a modified diet without any issues including n/v or abdominal pain. Currently, has brown stool in her flexiseal. Otherwise, per        Home Medications:  alendronate 70 mg oral tablet: 1 tab(s) orally once a week (13 Aug 2020 15:48)  aspirin 81 mg oral tablet:  (13 Aug 2020 15:48)  atorvastatin 80 mg oral tablet: 1 tab(s) orally once a day (13 Aug 2020 15:48)  omeprazole 20 mg oral delayed release tablet: 1 tab(s) orally once a day (13 Aug 2020 15:48)  Oysco 500 with D 500 mg-200 intl units (5 mcg) oral tablet: 1 tab(s) orally 2 times a day (13 Aug 2020 15:48)  Apixiban BID   Carafate     Allergies:  No Known Allergies    Hospital Medications:  chlorhexidine 2% Cloths 1 Application(s) Topical daily  magnesium oxide 400 milliGRAM(s) Oral two times a day with meals  midodrine 10 milliGRAM(s) Oral every 8 hours  pantoprazole    Tablet 40 milliGRAM(s) Oral two times a day  potassium phosphate / sodium phosphate Tablet (K-PHOS No. 2) 1 Tablet(s) Oral three times a day with meals      PMHX/PSHX:    Hypertension  HLD   DVT ()  Dementia   CCY     Denies family history of colon cancer/polyps, stomach cancer/polyps, pancreatic cancer/masses, liver cancer/disease, ovarian cancer and endometrial cancer.    Social History:   Tob: Denies  EtOH: Denies  Illicit Drugs: Denies      PHYSICAL EXAM:     GENERAL:  No acute distress  HEENT:  NCAT, no scleral icterus   CHEST:  no respiratory distress  HEART:  Regular rate and rhythm  ABDOMEN:  Soft, non-tender, non-distended, normoactive bowel sounds,  no masses  EXTREMITIES: No edema  SKIN:  No rash/erythema/ecchymoses/petechiae/wounds/abscess/warm/dry  NEURO:  Alert and oriented x 3, no asterixis    Vital Signs:  Vital Signs Last 24 Hrs  T(C): 37 (15 Aug 2022 10:33), Max: 37 (15 Aug 2022 10:33)  T(F): 98.6 (15 Aug 2022 10:33), Max: 98.6 (15 Aug 2022 10:33)  HR: 76 (15 Aug 2022 10:33) (55 - 81)  BP: 125/63 (15 Aug 2022 10:33) (92/34 - 126/71)  BP(mean): 83 (15 Aug 2022 03:00) (51 - 93)  RR: 18 (15 Aug 2022 10:33) (14 - 23)  SpO2: 97% (15 Aug 2022 10:33) (97% - 100%)    Parameters below as of 15 Aug 2022 10:33  Patient On (Oxygen Delivery Method): room air      Daily     Daily Weight in k.2 (15 Aug 2022 08:00)    LABS:                        7.9    4.66  )-----------( 131      ( 15 Aug 2022 03:15 )             24.1     Mean Cell Volume: 77.2 fL (08-15- @ 03:15)    08-15    142  |  108<H>  |  40<H>  ----------------------------<  92  3.5   |  24  |  1.17    Ca    9.0      15 Aug 2022 03:15  Phos  2.2     15  Mg     1.70     08-15    TPro  5.2<L>  /  Alb  3.0<L>  /  TBili  0.6  /  DBili  x   /  AST  54<H>  /  ALT  39<H>  /  AlkPhos  79  15    LIVER FUNCTIONS - ( 15 Aug 2022 03:15 )  Alb: 3.0 g/dL / Pro: 5.2 g/dL / ALK PHOS: 79 U/L / ALT: 39 U/L / AST: 54 U/L / GGT: x                                       7.9    4.66  )-----------( 131      ( 15 Aug 2022 03:15 )             24.1                         7.9    4.34  )-----------( 134      ( 14 Aug 2022 20:30 )             24.1                         7.6    8.52  )-----------( 145      ( 14 Aug 2022 15:00 )             23.3                         9.1    10.55 )-----------( 184      ( 14 Aug 2022 02:45 )             27.4                         10.9   12.66 )-----------( 232      ( 13 Aug 2022 05:25 )             33.6       Imaging:  CT Torso   CHEST:  LUNGS AND LARGE AIRWAYS: Patent central airways. Right upper lobe   calcified granuloma.  PLEURA: No pleural effusion.  VESSELS: Atherosclerotic changes of the aorta and coronary arteries.  HEART: Heart size is normal. No pericardial effusion.  MEDIASTINUM AND DEBRA: No lymphadenopathy.  CHEST WALL AND LOWER NECK: Within normal limits.    ABDOMEN AND PELVIS:  LIVER: Within normal limits.  BILE DUCTS: Pneumobilia, likely related to prior ERCP/sphincterotomy.  GALLBLADDER: Cholecystectomy.  SPLEEN: Within normal limits.  PANCREAS: Within normal limits.  ADRENALS: Bilateral adrenal gland thickening, nonspecific.  KIDNEYS/URETERS: No hydronephrosis.    BLADDER: Metcalf catheter.  REPRODUCTIVE ORGANS: Uterus and adnexa within normal limits.    BOWEL: Moderate to large hiatal hernia. Enteric tube terminates just   below the diaphragmatic hiatus, and appears outside the stomach, however   there is no adjacent extraluminal air, unclear if this is related to   tenting of the gastric wall. Colonic diverticulosis. No bowel   obstruction. Appendix is normal.  PERITONEUM: No ascites.  VESSELS: Atherosclerotic changes.  RETROPERITONEUM/LYMPH NODES: No lymphadenopathy.  ABDOMINAL WALL: Fat-containing right inguinal hernia.  BONES: Degenerative changes.    IMPRESSION:  Moderate to large hiatal hernia. Enteric tube terminates just below the   diaphragmatic hiatus, and appears outside the stomach, unclear if this is   related to tenting of the gastric wall. Consider venous repositioning   versus fluoroscopic NG tube study with water soluble contrast.    No acute findings in the chest.             HPI:  Georgia Elliott is a 81 year old female with a past medical history notable for dementia (A&Ox2 at baseline), HTN, DVT 2020 (remains on eliquis) presenting to the hospital with hypovolemic 2/2 shock k in the setting of poor PO intake/vomiting vs. GI bleed) now transferred back to the floor. GI consulted for further management of acute anemia and c/f GI bleed.     Notably, patient had an admission during 2020 with abdominal pain, nausea, vomiting "black fluids" x 3 days with patient found to be in acute renal failure thought to be secondary to hypovolemia that improved with supportive care. During that admission she had   LUE US venous/arterial ordered for LUE pain, found to have DVT in L brachial and radial veins, superficial venous thrombosis in L basilic and cephalic, and R basilic veins. She was started on a heparin gtt although c/b an   episode of melena Hgb downtrend 14.3 on admission to 7.7 and underwent EGD in addition to ERCP for workup of her anemia and choledocholithiasis incidentally found on CT where she underwenr removal of CBD stone but only esophagitis noted on EGD. Patient placed back on heparin ggt although had two episodes of hematemesis with mixture of vomitus and blood associated with decrease in Hgb to 7.1 requiring a unit of pRBCs and underwent repeat EGD on  with findings of esophageal ulcer (BICAP) with overlying clot and bleeding at sphincterotomy site (s/p clip) - both sites of bleeding treated after which she was discharged on Apixaban.     Pt, represented to the hospital on  with complaints of AMS found to be hypotensive with BPs of 60/40. She was admitted to the MICU, where she was given 4 L of NS and started on pressors. Workup including CTH notable for chronic infarcts but no acute process in addition to a CT torso that was without any acute process.  She was initially started on Zosyn although infectious workup was largely unremarkable  with coagulase-negative cocci, likely contaminate.  On initial presentation she was noted to have a few cc coffee ground emesis during NGT placement and was started on protonix drip. Labs overall notable for decreasing hgb from 13->8 with MCV 77 in addition to an elevated BUN as compared to baseline. Her home DOAC and ASA have been held.     Per discussion with nursing staff, she has not had any further episodes of hematemesis since that isolated incident in the MICU. She has not had any episodes of melena or hematochezia. Tolerating a modified diet without any issues including n/v or abdominal pain. Currently, has brown stool in her Flexiseal. Otherwise, per discussion with her brohter (HCP)      Home Medications:  alendronate 70 mg oral tablet: 1 tab(s) orally once a week (13 Aug 2020 15:48)  aspirin 81 mg oral tablet:  (13 Aug 2020 15:48)  atorvastatin 80 mg oral tablet: 1 tab(s) orally once a day (13 Aug 2020 15:48)  omeprazole 20 mg oral delayed release tablet: 1 tab(s) orally once a day (13 Aug 2020 15:48)  Oysco 500 with D 500 mg-200 intl units (5 mcg) oral tablet: 1 tab(s) orally 2 times a day (13 Aug 2020 15:48)  Apixiban BID   Carafate     Allergies:  No Known Allergies    Hospital Medications:  chlorhexidine 2% Cloths 1 Application(s) Topical daily  magnesium oxide 400 milliGRAM(s) Oral two times a day with meals  midodrine 10 milliGRAM(s) Oral every 8 hours  pantoprazole    Tablet 40 milliGRAM(s) Oral two times a day  potassium phosphate / sodium phosphate Tablet (K-PHOS No. 2) 1 Tablet(s) Oral three times a day with meals      PMHX/PSHX:    Hypertension  HLD   DVT ()  Dementia   CCY     Denies family history of colon cancer/polyps, stomach cancer/polyps, pancreatic cancer/masses, liver cancer/disease, ovarian cancer and endometrial cancer.    Social History:   Tob: Denies  EtOH: Denies  Illicit Drugs: Denies      PHYSICAL EXAM:     GENERAL:  No acute distress  HEENT:  NCAT, no scleral icterus   CHEST:  no respiratory distress  HEART:  Regular rate and rhythm  ABDOMEN:  Soft, non-tender, non-distended, normoactive bowel sounds,  no masses  EXTREMITIES: No edema  SKIN:  No rash/erythema/ecchymoses/petechiae/wounds/abscess/warm/dry  NEURO:  Alert and oriented x 3, no asterixis    Vital Signs:  Vital Signs Last 24 Hrs  T(C): 37 (15 Aug 2022 10:33), Max: 37 (15 Aug 2022 10:33)  T(F): 98.6 (15 Aug 2022 10:33), Max: 98.6 (15 Aug 2022 10:33)  HR: 76 (15 Aug 2022 10:) (55 - 81)  BP: 125/63 (15 Aug 2022 10:33) (92/34 - 126/71)  BP(mean): 83 (15 Aug 2022 03:00) (51 - 93)  RR: 18 (15 Aug 2022 10:) (14 - 23)  SpO2: 97% (15 Aug 2022 10:) (97% - 100%)    Parameters below as of 15 Aug 2022 10:33  Patient On (Oxygen Delivery Method): room air      Daily     Daily Weight in k.2 (15 Aug 2022 08:00)    LABS:                        7.9    4.66  )-----------( 131      ( 15 Aug 2022 03:15 )             24.1     Mean Cell Volume: 77.2 fL (08-15- @ 03:15)    08-15    142  |  108<H>  |  40<H>  ----------------------------<  92  3.5   |  24  |  1.17    Ca    9.0      15 Aug 2022 03:15  Phos  2.2     15  Mg     1.70     08-15    TPro  5.2<L>  /  Alb  3.0<L>  /  TBili  0.6  /  DBili  x   /  AST  54<H>  /  ALT  39<H>  /  AlkPhos  79  15    LIVER FUNCTIONS - ( 15 Aug 2022 03:15 )  Alb: 3.0 g/dL / Pro: 5.2 g/dL / ALK PHOS: 79 U/L / ALT: 39 U/L / AST: 54 U/L / GGT: x                                       7.9    4.66  )-----------( 131      ( 15 Aug 2022 03:15 )             24.1                         7.9    4.34  )-----------( 134      ( 14 Aug 2022 20:30 )             24.1                         7.6    8.52  )-----------( 145      ( 14 Aug 2022 15:00 )             23.3                         9.1    10.55 )-----------( 184      ( 14 Aug 2022 02:45 )             27.4                         10.9   12.66 )-----------( 232      ( 13 Aug 2022 05:25 )             33.6       Imaging:  CT Torso   CHEST:  LUNGS AND LARGE AIRWAYS: Patent central airways. Right upper lobe   calcified granuloma.  PLEURA: No pleural effusion.  VESSELS: Atherosclerotic changes of the aorta and coronary arteries.  HEART: Heart size is normal. No pericardial effusion.  MEDIASTINUM AND DEBRA: No lymphadenopathy.  CHEST WALL AND LOWER NECK: Within normal limits.    ABDOMEN AND PELVIS:  LIVER: Within normal limits.  BILE DUCTS: Pneumobilia, likely related to prior ERCP/sphincterotomy.  GALLBLADDER: Cholecystectomy.  SPLEEN: Within normal limits.  PANCREAS: Within normal limits.  ADRENALS: Bilateral adrenal gland thickening, nonspecific.  KIDNEYS/URETERS: No hydronephrosis.    BLADDER: Metcalf catheter.  REPRODUCTIVE ORGANS: Uterus and adnexa within normal limits.    BOWEL: Moderate to large hiatal hernia. Enteric tube terminates just   below the diaphragmatic hiatus, and appears outside the stomach, however   there is no adjacent extraluminal air, unclear if this is related to   tenting of the gastric wall. Colonic diverticulosis. No bowel   obstruction. Appendix is normal.  PERITONEUM: No ascites.  VESSELS: Atherosclerotic changes.  RETROPERITONEUM/LYMPH NODES: No lymphadenopathy.  ABDOMINAL WALL: Fat-containing right inguinal hernia.  BONES: Degenerative changes.    IMPRESSION:  Moderate to large hiatal hernia. Enteric tube terminates just below the   diaphragmatic hiatus, and appears outside the stomach, unclear if this is   related to tenting of the gastric wall. Consider venous repositioning   versus fluoroscopic NG tube study with water soluble contrast.    No acute findings in the chest.             HPI:  Georgia Elliott is a 81 year old female with a past medical history notable for dementia (A&Ox2 at baseline), HTN, DVT 2020 (remains on eliquis) presenting to the hospital with hypovolemic 2/2 shock k in the setting of poor PO intake/vomiting vs. GI bleed) now transferred back to the floor. GI consulted for further management of acute anemia and c/f GI bleed.     Notably, patient had an admission during 2020 with abdominal pain, nausea, vomiting "black fluids" x 3 days with patient found to be in acute renal failure thought to be secondary to hypovolemia that improved with supportive care. During that admission she had   LUE US venous/arterial ordered for LUE pain, found to have DVT in L brachial and radial veins, superficial venous thrombosis in L basilic and cephalic, and R basilic veins. She was started on a heparin gtt although c/b an   episode of melena Hgb downtrend 14.3 on admission to 7.7 and underwent EGD in addition to ERCP for workup of her anemia and choledocholithiasis incidentally found on CT where she underwenr removal of CBD stone but only esophagitis noted on EGD. Patient placed back on heparin ggt although had two episodes of hematemesis with mixture of vomitus and blood associated with decrease in Hgb to 7.1 requiring a unit of pRBCs and underwent repeat EGD on  with findings of esophageal ulcer (BICAP) with overlying clot and bleeding at sphincterotomy site (s/p clip) - both sites of bleeding treated after which she was discharged on Apixaban.     Pt, represented to the hospital on  with complaints of AMS found to be hypotensive with BPs of 60/40. She was admitted to the MICU, where she was given 4 L of NS and started on pressors. Workup including CTH notable for chronic infarcts but no acute process in addition to a CT torso that was without any acute process.  She was initially started on Zosyn although infectious workup was largely unremarkable  with coagulase-negative cocci, likely contaminate.  On initial presentation she was noted to have a few cc coffee ground emesis during NGT placement and was started on protonix drip. Labs overall notable for decreasing hgb from 13->8 with MCV 77 in addition to an elevated BUN as compared to baseline. Her home DOAC and ASA have been held.     Per discussion with nursing staff, she has not had any further episodes of hematemesis since that isolated incident in the MICU. She has not had any episodes of melena or hematochezia. Tolerating a modified diet without any issues including n/v or abdominal pain. Currently, has brown stool in her Flexiseal. Otherwise, per discussion with her brother (HCP) she was vomiting some dark emesis for a couple days prior to admission. Otherwise, taking all of her medications as prescribed       Home Medications:  alendronate 70 mg oral tablet: 1 tab(s) orally once a week (13 Aug 2020 15:48)  aspirin 81 mg oral tablet:  (13 Aug 2020 15:48)  atorvastatin 80 mg oral tablet: 1 tab(s) orally once a day (13 Aug 2020 15:48)  omeprazole 20 mg oral delayed release tablet: 1 tab(s) orally once a day (13 Aug 2020 15:48)  Oysco 500 with D 500 mg-200 intl units (5 mcg) oral tablet: 1 tab(s) orally 2 times a day (13 Aug 2020 15:48)  Apixiban BID   Carafate     Allergies:  No Known Allergies    Hospital Medications:  chlorhexidine 2% Cloths 1 Application(s) Topical daily  magnesium oxide 400 milliGRAM(s) Oral two times a day with meals  midodrine 10 milliGRAM(s) Oral every 8 hours  pantoprazole    Tablet 40 milliGRAM(s) Oral two times a day  potassium phosphate / sodium phosphate Tablet (K-PHOS No. 2) 1 Tablet(s) Oral three times a day with meals      PMHX/PSHX:    Hypertension  HLD   DVT ()  Dementia   CCY     Denies family history of colon cancer/polyps, stomach cancer/polyps, pancreatic cancer/masses, liver cancer/disease, ovarian cancer and endometrial cancer.    Social History:   Tob: Denies  EtOH: Denies  Illicit Drugs: Denies      PHYSICAL EXAM:   GENERAL:  No acute distress  HEENT:  NCAT, no scleral icterus   CHEST:  no respiratory distress  HEART:  Regular rate and rhythm  ABDOMEN:  Soft, non-tender, non-distended, normoactive bowel sounds,  no masses  EXTREMITIES: No edema  NEURO:  Alert and oriented x 1    Vital Signs:  Vital Signs Last 24 Hrs  T(C): 37 (15 Aug 2022 10:33), Max: 37 (15 Aug 2022 10:33)  T(F): 98.6 (15 Aug 2022 10:33), Max: 98.6 (15 Aug 2022 10:33)  HR: 76 (15 Aug 2022 10:33) (55 - 81)  BP: 125/63 (15 Aug 2022 10:33) (92/34 - 126/71)  BP(mean): 83 (15 Aug 2022 03:00) (51 - 93)  RR: 18 (15 Aug 2022 10:33) (14 - 23)  SpO2: 97% (15 Aug 2022 10:33) (97% - 100%)    Parameters below as of 15 Aug 2022 10:33  Patient On (Oxygen Delivery Method): room air      Daily     Daily Weight in k.2 (15 Aug 2022 08:00)    LABS:                        7.9    4.66  )-----------( 131      ( 15 Aug 2022 03:15 )             24.1     Mean Cell Volume: 77.2 fL (08-15- @ 03:15)    08-15    142  |  108<H>  |  40<H>  ----------------------------<  92  3.5   |  24  |  1.17    Ca    9.0      15 Aug 2022 03:15  Phos  2.2     15  Mg     1.70     08-15    TPro  5.2<L>  /  Alb  3.0<L>  /  TBili  0.6  /  DBili  x   /  AST  54<H>  /  ALT  39<H>  /  AlkPhos  79  15    LIVER FUNCTIONS - ( 15 Aug 2022 03:15 )  Alb: 3.0 g/dL / Pro: 5.2 g/dL / ALK PHOS: 79 U/L / ALT: 39 U/L / AST: 54 U/L / GGT: x                                       7.9    4.66  )-----------( 131      ( 15 Aug 2022 03:15 )             24.1                         7.9    4.34  )-----------( 134      ( 14 Aug 2022 20:30 )             24.1                         7.6    8.52  )-----------( 145      ( 14 Aug 2022 15:00 )             23.3                         9.1    10.55 )-----------( 184      ( 14 Aug 2022 02:45 )             27.4                         10.9   12.66 )-----------( 232      ( 13 Aug 2022 05:25 )             33.6       Imaging:  CT Torso   CHEST:  LUNGS AND LARGE AIRWAYS: Patent central airways. Right upper lobe   calcified granuloma.  PLEURA: No pleural effusion.  VESSELS: Atherosclerotic changes of the aorta and coronary arteries.  HEART: Heart size is normal. No pericardial effusion.  MEDIASTINUM AND DEBRA: No lymphadenopathy.  CHEST WALL AND LOWER NECK: Within normal limits.    ABDOMEN AND PELVIS:  LIVER: Within normal limits.  BILE DUCTS: Pneumobilia, likely related to prior ERCP/sphincterotomy.  GALLBLADDER: Cholecystectomy.  SPLEEN: Within normal limits.  PANCREAS: Within normal limits.  ADRENALS: Bilateral adrenal gland thickening, nonspecific.  KIDNEYS/URETERS: No hydronephrosis.    BLADDER: Metcalf catheter.  REPRODUCTIVE ORGANS: Uterus and adnexa within normal limits.    BOWEL: Moderate to large hiatal hernia. Enteric tube terminates just   below the diaphragmatic hiatus, and appears outside the stomach, however   there is no adjacent extraluminal air, unclear if this is related to   tenting of the gastric wall. Colonic diverticulosis. No bowel   obstruction. Appendix is normal.  PERITONEUM: No ascites.  VESSELS: Atherosclerotic changes.  RETROPERITONEUM/LYMPH NODES: No lymphadenopathy.  ABDOMINAL WALL: Fat-containing right inguinal hernia.  BONES: Degenerative changes.    IMPRESSION:  Moderate to large hiatal hernia. Enteric tube terminates just below the   diaphragmatic hiatus, and appears outside the stomach, unclear if this is   related to tenting of the gastric wall. Consider venous repositioning   versus fluoroscopic NG tube study with water soluble contrast.    No acute findings in the chest.             HPI:  Georgia Elliott is a 81 year old female with a past medical history notable for dementia (A&Ox2 at baseline), HTN, DVT 2020 (remains on eliquis) presenting to the hospital with hypovolemic 2/2 shock k in the setting of poor PO intake/vomiting vs. GI bleed) now transferred back to the floor. GI consulted for further management of acute anemia and c/f GI bleed.     Notably, patient had an admission during 2020 with abdominal pain, nausea, vomiting "black fluids" x 3 days with patient found to be in acute renal failure thought to be secondary to hypovolemia that improved with supportive care. During that admission she had   LUE US venous/arterial ordered for LUE pain, found to have DVT in L brachial and radial veins, superficial venous thrombosis in L basilic and cephalic, and R basilic veins. She was started on a heparin gtt although c/b an   episode of melena Hgb downtrend 14.3 on admission to 7.7 and underwent EGD in addition to ERCP for workup of her anemia and choledocholithiasis incidentally found on CT where she underwenr removal of CBD stone but only esophagitis noted on EGD. Patient placed back on heparin ggt although had two episodes of hematemesis with mixture of vomitus and blood associated with decrease in Hgb to 7.1 requiring a unit of pRBCs and underwent repeat EGD on  with findings of esophageal ulcer (BICAP) with overlying clot and bleeding at sphincterotomy site (s/p clip) - both sites of bleeding treated after which she was discharged on Apixaban.     Pt, represented to the hospital on  with complaints of AMS found to be hypotensive with BPs of 60/40. She was admitted to the MICU, where she was given 4 L of NS and started on pressors. Workup including CTH notable for chronic infarcts but no acute process in addition to a CT torso that was without any acute process.  She was initially started on Zosyn although infectious workup was largely unremarkable  with coagulase-negative cocci, likely contaminate.  On initial presentation she was noted to have a few cc coffee ground emesis during NGT placement and was started on protonix drip. Labs overall notable for decreasing hgb from 13->8 with MCV 77 in addition to an elevated BUN as compared to baseline. Her home DOAC and ASA have been held.     Per discussion with nursing staff, she has not had any further episodes of hematemesis since that isolated incident in the MICU. She has not had any episodes of melena or hematochezia. Tolerating a modified diet without any issues including n/v or abdominal pain. Currently, has brown stool in her Flexiseal. Otherwise, per discussion with her brother (HCP) she was vomiting some dark emesis for a couple days prior to admission. Otherwise, taking all of her medications as prescribed       Home Medications:  alendronate 70 mg oral tablet: 1 tab(s) orally once a week (13 Aug 2020 15:48)  aspirin 81 mg oral tablet:  (13 Aug 2020 15:48)  atorvastatin 80 mg oral tablet: 1 tab(s) orally once a day (13 Aug 2020 15:48)  omeprazole 20 mg oral delayed release tablet: 1 tab(s) orally once a day (13 Aug 2020 15:48)  Oysco 500 with D 500 mg-200 intl units (5 mcg) oral tablet: 1 tab(s) orally 2 times a day (13 Aug 2020 15:48)  Apixiban BID   Carafate     Allergies:  No Known Allergies    Hospital Medications:  chlorhexidine 2% Cloths 1 Application(s) Topical daily  magnesium oxide 400 milliGRAM(s) Oral two times a day with meals  midodrine 10 milliGRAM(s) Oral every 8 hours  pantoprazole    Tablet 40 milliGRAM(s) Oral two times a day  potassium phosphate / sodium phosphate Tablet (K-PHOS No. 2) 1 Tablet(s) Oral three times a day with meals      PMHX/PSHX:    Hypertension  HLD   DVT ()  Dementia   CCY     Denies family history of colon cancer/polyps, stomach cancer/polyps, pancreatic cancer/masses, liver cancer/disease, ovarian cancer and endometrial cancer.    Social History:   Tob: Denies  EtOH: Denies  Illicit Drugs: Denies      PHYSICAL EXAM:   GENERAL: NAD, conversant  HENT: NCAT; MMM  EYES: anicteric sclerae, moist conjunctivae; no lid-lag  NECK: Trachea midline; FROM, supple  CHEST:  no respiratory distress, clear lungs  HEART:  Regular rate and rhythm, no murmur  ABDOMEN:  Soft, non-tender, non-distended, normoactive bowel sounds,  no masses  EXTREMITIES: No edema  PSYCH: Appropriate affect, alert and oriented x 1  NEURO: No tremor, asterixis     Vital Signs:  Vital Signs Last 24 Hrs  T(C): 37 (15 Aug 2022 10:33), Max: 37 (15 Aug 2022 10:33)  T(F): 98.6 (15 Aug 2022 10:33), Max: 98.6 (15 Aug 2022 10:33)  HR: 76 (15 Aug 2022 10:33) (55 - 81)  BP: 125/63 (15 Aug 2022 10:33) (92/34 - 126/71)  BP(mean): 83 (15 Aug 2022 03:00) (51 - 93)  RR: 18 (15 Aug 2022 10:33) (14 - 23)  SpO2: 97% (15 Aug 2022 10:) (97% - 100%)    Parameters below as of 15 Aug 2022 10:33  Patient On (Oxygen Delivery Method): room air      Daily     Daily Weight in k.2 (15 Aug 2022 08:00)    LABS:                        7.9    4.66  )-----------( 131      ( 15 Aug 2022 03:15 )             24.1     Mean Cell Volume: 77.2 fL (08-15-22 @ 03:15)    08-15    142  |  108<H>  |  40<H>  ----------------------------<  92  3.5   |  24  |  1.17    Ca    9.0      15 Aug 2022 03:15  Phos  2.2     0815  Mg     1.70     15    TPro  5.2<L>  /  Alb  3.0<L>  /  TBili  0.6  /  DBili  x   /  AST  54<H>  /  ALT  39<H>  /  AlkPhos  79  08-15    LIVER FUNCTIONS - ( 15 Aug 2022 03:15 )  Alb: 3.0 g/dL / Pro: 5.2 g/dL / ALK PHOS: 79 U/L / ALT: 39 U/L / AST: 54 U/L / GGT: x                                       7.9    4.66  )-----------( 131      ( 15 Aug 2022 03:15 )             24.1                         7.9    4.34  )-----------( 134      ( 14 Aug 2022 20:30 )             24.1                         7.6    8.52  )-----------( 145      ( 14 Aug 2022 15:00 )             23.3                         9.1    10.55 )-----------( 184      ( 14 Aug 2022 02:45 )             27.4                         10.9   12.66 )-----------( 232      ( 13 Aug 2022 05:25 )             33.6       Imaging:  CT Torso   CHEST:  LUNGS AND LARGE AIRWAYS: Patent central airways. Right upper lobe   calcified granuloma.  PLEURA: No pleural effusion.  VESSELS: Atherosclerotic changes of the aorta and coronary arteries.  HEART: Heart size is normal. No pericardial effusion.  MEDIASTINUM AND DEBRA: No lymphadenopathy.  CHEST WALL AND LOWER NECK: Within normal limits.    ABDOMEN AND PELVIS:  LIVER: Within normal limits.  BILE DUCTS: Pneumobilia, likely related to prior ERCP/sphincterotomy.  GALLBLADDER: Cholecystectomy.  SPLEEN: Within normal limits.  PANCREAS: Within normal limits.  ADRENALS: Bilateral adrenal gland thickening, nonspecific.  KIDNEYS/URETERS: No hydronephrosis.    BLADDER: Metcalf catheter.  REPRODUCTIVE ORGANS: Uterus and adnexa within normal limits.    BOWEL: Moderate to large hiatal hernia. Enteric tube terminates just   below the diaphragmatic hiatus, and appears outside the stomach, however   there is no adjacent extraluminal air, unclear if this is related to   tenting of the gastric wall. Colonic diverticulosis. No bowel   obstruction. Appendix is normal.  PERITONEUM: No ascites.  VESSELS: Atherosclerotic changes.  RETROPERITONEUM/LYMPH NODES: No lymphadenopathy.  ABDOMINAL WALL: Fat-containing right inguinal hernia.  BONES: Degenerative changes.    IMPRESSION:  Moderate to large hiatal hernia. Enteric tube terminates just below the   diaphragmatic hiatus, and appears outside the stomach, unclear if this is   related to tenting of the gastric wall. Consider venous repositioning   versus fluoroscopic NG tube study with water soluble contrast.    No acute findings in the chest.

## 2022-08-15 NOTE — CONSULT NOTE ADULT - ATTENDING COMMENTS
Fever and shock.   Associated belly pain and vomiting suggests abdominal focus but CT unremarkable. Rapidly improved. Transient process? Pneumobilia and bilirubin was a bit up. Lipase not very high and pancrease looked normal.   I don't think the Staph hominis on blood cultures are a real bacteremia.   Monitor off antibiotics.     Jean Blank MD   Infectious Disease   Available on TEAMS. After 5PM and on weekends please page fellow on call or call [phone]
#Acute on chronic anemia, dark coffee-ground material after NGT placement, prior EGD with HH and esophageal ulcer. Possibly in setting of underlying PUD, recurrent esophagitis given known hernia.    --Tentatively plan for EGD tomorrow to assess for source of blood loss/coffee grounds; son/HCP agreeable  --PPI BID for now  --Continue to hold Eliquis if no absolute contraindication  --Please ensure early AM labs to avoid any delays  --Avoid carafate/sucralfate pre-endoscopy

## 2022-08-15 NOTE — PROGRESS NOTE ADULT - ASSESSMENT
82 y/o F PMH dementia (A&Ox2 at baseline), HTN, recent DVT (on eliquis), p/w vomiting and AMS found to be hypotensive despite 4 L NS and was admitted to MICU for management of hypotension requiring pressors. Hypotension likely 2/2 hypovolemic shock (in the setting of poor PO intake/vomiting vs. GI bleed), with significant improvement after IV fluid resuscitation. VANDANA and acidosis improving with fluid administration. CTH notable for chronic infarcts but no acute process; CT chest/AP show no acute process. Blood cultures show coagulase-negative cocci, likely contaminate. Hypotension improved, now transferred to the floor for further management.

## 2022-08-15 NOTE — CONSULT NOTE ADULT - ASSESSMENT
Georgia Elliott is a 81 year old female with a past medical history notable for dementia (A&Ox2 at baseline), HTN, DVT 8/2020 (remains on eliquis) presenting to the hospital with hypovolemic 2/2 shock k in the setting of poor PO intake/vomiting vs. GI bleed) now transferred back to the floor. GI consulted for further management of acute anemia and c/f GI bleed.     #UGIB    Georgia Elliott is a 81 year old female with a past medical history notable for dementia (A&Ox2 at baseline), HTN, DVT 8/2020 (remains on eliquis) presenting to the hospital with hypovolemic 2/2 shock k in the setting of poor PO intake/vomiting vs. GI bleed) now transferred back to the floor. GI consulted for further management of acute anemia and c/f GI bleed.     #UGIB   Presenting with hypovolemic shock with hospital course c/b acute on chronic anemia with hgb down from 14->8 on admission with a baseline of around 9 complicated by an episode of coffee ground emesis after NG tube placement earlier this admission in the background of ongoing ASA and DOAC use. Labs notable for hgb of 7.9 with an MCV 77 in addition to an elevated BUN as compared to baseline. Ddx of acute anemia includes MW tear from vomiting or NG tube trauma, esophagitis given noted hiatal hernia on imaging,    peptic ulcer disease given ongoing ASA/DOAC use.  Reassuringly, HDS with stable hgb and no further signs of bleeding. Re    Recommendations:  - Keep NPO plan for EGD tomorrow  - Trend CBC q8h  - Maintain active type & screen  - Transfuse to maintain Hbg > 7.0 or > 8 inaptient with pre-existing cardivascular conditions.  - Plt goal > 50  - Check serum iron, TIBC, ferritin  - Maintain access with 2 x Large bore IV  - Continue Pantoprazole 40 mg IV q12H              Georgia Elliott is a 81 year old female with a past medical history notable for dementia (A&Ox2 at baseline), HTN, DVT 8/2020 (remains on eliquis) presenting to the hospital with hypovolemic 2/2 shock k in the setting of poor PO intake/vomiting vs. GI bleed) now transferred back to the floor. GI consulted for further management of acute anemia and c/f GI bleed.     #UGIB   Presenting with hypovolemic shock with hospital course c/b acute on chronic anemia with hgb down from 14->8 on admission with a baseline of around 9 complicated by an episode of coffee ground emesis after NG tube placement earlier this admission in the background of ongoing ASA and DOAC use. Labs notable for hgb of 7.9 with an MCV 77 in addition to an elevated BUN as compared to baseline. Ddx of acute anemia includes MW tear from vomiting or NG tube trauma, esophagitis given noted hiatal hernia on imaging,    peptic ulcer disease given ongoing ASA/DOAC use.  Reassuringly, HDS with stable hgb and no further signs of bleeding. Reasonable to proceed with EGD for further evaluation of underlying etiology. Plan to discussed with HCP who is agreement with plan. Would otherwise continue to hold ASA and Apixaban for now.     Recommendations:  - Please make patient NPO after MN with plan for EGD on 8/16/2022  - Please send COVID-19 panel   - Continue holding ASA/Apixiban   - IV PPI 40 mg  BID   - Trend CBC q8h, transfuse to maintain Hbg > 7.0   - Maintain active type & screen  - Check serum iron, TIBC, ferritin  - Maintain access with 2 x Large bore IV    All recommendations are tentative until note is attested by attending.     Catrachito Mcarthur, PGY-4  Gastroenterology/Hepatology Fellow  Available on [employer] Teams   [phone] (Long Range Pager)  [phone] (Short Range Pager [facility])    After 5pm, please contact the on-call GI fellow. [phone]                Georgia Elliott is a 81 year old female with a past medical history notable for dementia (A&Ox2 at baseline), HTN, DVT 8/2020 (remains on eliquis) presenting to the hospital with hypovolemic 2/2 shock k in the setting of poor PO intake/vomiting vs. GI bleed) now transferred back to the floor. GI consulted for further management of acute anemia and c/f GI bleed.     #UGIB   Presenting with hypovolemic shock with hospital course c/b acute on chronic anemia with hgb down from 14->8 on admission with a baseline of around 9 complicated by an episode of coffee ground emesis after NG tube placement earlier this admission in the background of ongoing ASA and DOAC use. Labs notable for hgb of 7.9 with an MCV 77 in addition to an elevated BUN as compared to baseline. Ddx of acute anemia includes MW tear from vomiting or NG tube trauma, esophagitis given noted hiatal hernia on imaging,    peptic ulcer disease given ongoing ASA/DOAC use.  Reassuringly, HDS with stable hgb and no further signs of bleeding. Reasonable to proceed with EGD for further evaluation of underlying etiology. Plan to discussed with HCP who is agreement with plan. Would otherwise continue to hold ASA and Apixaban for now.     Recommendations:  - Please make patient NPO after MN with plan for EGD on 8/16/2022  - Please send COVID-19 panel   - Continue holding Apixiban; ASA 81mg OK if medically indicated   - IV PPI 40 mg  BID   - Trend CBC q8h, transfuse to maintain Hbg > 7.0   - Maintain active type & screen  - Check serum iron, TIBC, ferritin  - Maintain access with 2 x Large bore IV    All recommendations are tentative until note is attested by attending.     Catrachito Mcarthur, PGY-4  Gastroenterology/Hepatology Fellow  Available on Microsoft Teams   [phone] (Long Range Pager)  [phone] (Short Range Pager [facility])    After 5pm, please contact the on-call GI fellow. [phone]

## 2022-08-16 ENCOUNTER — RESULT REVIEW (OUTPATIENT)
Age: 82
End: 2022-08-16

## 2022-08-16 LAB
ALBUMIN SERPL ELPH-MCNC: 2.7 G/DL — LOW (ref 3.3–5)
ALP SERPL-CCNC: 173 U/L — HIGH (ref 40–120)
ALT FLD-CCNC: 54 U/L — HIGH (ref 4–33)
ANION GAP SERPL CALC-SCNC: 10 MMOL/L — SIGNIFICANT CHANGE UP (ref 7–14)
AST SERPL-CCNC: 85 U/L — HIGH (ref 4–32)
BILIRUB SERPL-MCNC: 0.7 MG/DL — SIGNIFICANT CHANGE UP (ref 0.2–1.2)
BUN SERPL-MCNC: 27 MG/DL — HIGH (ref 7–23)
CALCIUM SERPL-MCNC: 8.7 MG/DL — SIGNIFICANT CHANGE UP (ref 8.4–10.5)
CHLORIDE SERPL-SCNC: 110 MMOL/L — HIGH (ref 98–107)
CO2 SERPL-SCNC: 24 MMOL/L — SIGNIFICANT CHANGE UP (ref 22–31)
CREAT SERPL-MCNC: 0.93 MG/DL — SIGNIFICANT CHANGE UP (ref 0.5–1.3)
EGFR: 62 ML/MIN/1.73M2 — SIGNIFICANT CHANGE UP
GLUCOSE SERPL-MCNC: 79 MG/DL — SIGNIFICANT CHANGE UP (ref 70–99)
HCT VFR BLD CALC: 25.3 % — LOW (ref 34.5–45)
HGB BLD-MCNC: 7.9 G/DL — LOW (ref 11.5–15.5)
MAGNESIUM SERPL-MCNC: 1.6 MG/DL — SIGNIFICANT CHANGE UP (ref 1.6–2.6)
MCHC RBC-ENTMCNC: 25.2 PG — LOW (ref 27–34)
MCHC RBC-ENTMCNC: 31.2 GM/DL — LOW (ref 32–36)
MCV RBC AUTO: 80.6 FL — SIGNIFICANT CHANGE UP (ref 80–100)
NRBC # BLD: 0 /100 WBCS — SIGNIFICANT CHANGE UP
NRBC # FLD: 0 K/UL — SIGNIFICANT CHANGE UP
PHOSPHATE SERPL-MCNC: 2.7 MG/DL — SIGNIFICANT CHANGE UP (ref 2.5–4.5)
PLATELET # BLD AUTO: 93 K/UL — LOW (ref 150–400)
POTASSIUM SERPL-MCNC: 3.5 MMOL/L — SIGNIFICANT CHANGE UP (ref 3.5–5.3)
POTASSIUM SERPL-SCNC: 3.5 MMOL/L — SIGNIFICANT CHANGE UP (ref 3.5–5.3)
PROT SERPL-MCNC: 5.2 G/DL — LOW (ref 6–8.3)
RBC # BLD: 3.14 M/UL — LOW (ref 3.8–5.2)
RBC # FLD: 16.1 % — HIGH (ref 10.3–14.5)
SODIUM SERPL-SCNC: 144 MMOL/L — SIGNIFICANT CHANGE UP (ref 135–145)
WBC # BLD: 3.99 K/UL — SIGNIFICANT CHANGE UP (ref 3.8–10.5)
WBC # FLD AUTO: 3.99 K/UL — SIGNIFICANT CHANGE UP (ref 3.8–10.5)

## 2022-08-16 PROCEDURE — 88305 TISSUE EXAM BY PATHOLOGIST: CPT | Mod: 26

## 2022-08-16 PROCEDURE — 43239 EGD BIOPSY SINGLE/MULTIPLE: CPT | Mod: GC

## 2022-08-16 PROCEDURE — 99233 SBSQ HOSP IP/OBS HIGH 50: CPT

## 2022-08-16 RX ORDER — PANTOPRAZOLE SODIUM 20 MG/1
40 TABLET, DELAYED RELEASE ORAL
Refills: 0 | Status: DISCONTINUED | OUTPATIENT
Start: 2022-08-16 | End: 2022-08-24

## 2022-08-16 RX ADMIN — MAGNESIUM OXIDE 400 MG ORAL TABLET 400 MILLIGRAM(S): 241.3 TABLET ORAL at 05:26

## 2022-08-16 RX ADMIN — PANTOPRAZOLE SODIUM 40 MILLIGRAM(S): 20 TABLET, DELAYED RELEASE ORAL at 05:27

## 2022-08-16 RX ADMIN — Medication 1 TABLET(S): at 06:40

## 2022-08-16 NOTE — PROGRESS NOTE ADULT - SUBJECTIVE AND OBJECTIVE BOX
Patient is a 81y old  Female who presents with a chief complaint of Altered Mental Status (15 Aug 2022 16:45)      SUBJECTIVE / OVERNIGHT EVENTS: Pt seen and examined at 12:35pm in the endoscopy suite, no overnight events. Pt is sedated post procedure, denies any pain when aroused. No reports of bleeding Per nsg, Passed TOV per nsg, no other new issues reported.        MEDICATIONS  (STANDING):  chlorhexidine 2% Cloths 1 Application(s) Topical daily  midodrine 10 milliGRAM(s) Oral every 8 hours  pantoprazole  Injectable 40 milliGRAM(s) IV Push two times a day    MEDICATIONS  (PRN):      Vital Signs Last 24 Hrs  T(C): 36.3 (16 Aug 2022 11:59), Max: 36.8 (15 Aug 2022 20:52)  T(F): 97.4 (16 Aug 2022 11:59), Max: 98.2 (15 Aug 2022 20:52)  HR: 85 (16 Aug 2022 12:45) (73 - 85)  BP: 153/84 (16 Aug 2022 12:45) (127/58 - 154/71)  BP(mean): --  RR: 20 (16 Aug 2022 12:45) (17 - 24)  SpO2: 99% (16 Aug 2022 11:59) (92% - 99%)    Parameters below as of 16 Aug 2022 12:45  Patient On (Oxygen Delivery Method): room air      CAPILLARY BLOOD GLUCOSE        I&O's Summary    15 Aug 2022 07:01  -  16 Aug 2022 07:00  --------------------------------------------------------  IN: 50 mL / OUT: 1000 mL / NET: -950 mL    16 Aug 2022 07:01  -  16 Aug 2022 13:45  --------------------------------------------------------  IN: 0 mL / OUT: 50 mL / NET: -50 mL        PHYSICAL EXAM:  GENERAL: NAD, well-developed  CHEST/LUNG: Clear to auscultation bilaterally; No wheeze  HEART: Regular rate and rhythm  ABDOMEN: Soft, Nontender, Nondistended  EXTREMITIES: no LE edema  PSYCH: Calm  NEUROLOGY: Sedated but arousable  SKIN: No rashes or lesions      LABS:                        7.9    3.99  )-----------( 93       ( 16 Aug 2022 07:25 )             25.3     08-16    144  |  110<H>  |  27<H>  ----------------------------<  79  3.5   |  24  |  0.93    Ca    8.7      16 Aug 2022 07:25  Phos  2.7     08-16  Mg     1.60     08-16    TPro  5.2<L>  /  Alb  2.7<L>  /  TBili  0.7  /  DBili  x   /  AST  85<H>  /  ALT  54<H>  /  AlkPhos  173<H>  08-16              RADIOLOGY & ADDITIONAL TESTS:  < from: Upper Endoscopy (08.16.22 @ 10:43) >     - LA Grade D esophagitis. Likely source of recent coffee                        ground emesis.                       - Z-line irregular.                       - Esophageal mucosal changes suspicious for                        short-segment Lemos's esophagus.                       -Gastroesophageal flap valve classified as Hill Grade                        IV (no fold, wide open lumen, hiatal hernia present).                       - Gastropathy. Biopsied.                       - One non-bleeding duodenal ulcer with no stigmata of                        bleeding. Potential source of recent coffee ground                        emesis and anemia.    < end of copied text >  < from: Upper Endoscopy (08.16.22 @ 10:43) >       < end of copied text >    Imaging Personally Reviewed:    Consultant(s) Notes Reviewed:      Care Discussed with Consultants/Other Providers:

## 2022-08-16 NOTE — PROGRESS NOTE ADULT - ASSESSMENT
80 y/o F PMH dementia (A&Ox2 at baseline), HTN, recent DVT (on eliquis), p/w vomiting and AMS found to be hypotensive despite 4 L NS and was admitted to MICU for management of hypotension requiring pressors. Hypotension likely 2/2 hypovolemic shock (in the setting of poor PO intake/vomiting vs. GI bleed), with significant improvement after IV fluid resuscitation. VANDANA and acidosis improving with fluid administration. CTH notable for chronic infarcts but no acute process; CT chest/AP show no acute process. Blood cultures show coagulase-negative cocci, likely contaminate. Hypotension improved, now transferred to the floor for further management.

## 2022-08-16 NOTE — PROVIDER CONTACT NOTE (OTHER) - SITUATION
pt's IV line (TATIANA arrow) infiltrated in endo, line removed as per policy. multiple attempt to insert an IV was made but unsuccessful.

## 2022-08-17 LAB
ALBUMIN SERPL ELPH-MCNC: 2.8 G/DL — LOW (ref 3.3–5)
ALP SERPL-CCNC: 181 U/L — HIGH (ref 40–120)
ALT FLD-CCNC: 120 U/L — HIGH (ref 4–33)
ANION GAP SERPL CALC-SCNC: 12 MMOL/L — SIGNIFICANT CHANGE UP (ref 7–14)
AST SERPL-CCNC: 141 U/L — HIGH (ref 4–32)
BASOPHILS # BLD AUTO: 0.01 K/UL — SIGNIFICANT CHANGE UP (ref 0–0.2)
BASOPHILS NFR BLD AUTO: 0.2 % — SIGNIFICANT CHANGE UP (ref 0–2)
BILIRUB SERPL-MCNC: 0.7 MG/DL — SIGNIFICANT CHANGE UP (ref 0.2–1.2)
BUN SERPL-MCNC: 19 MG/DL — SIGNIFICANT CHANGE UP (ref 7–23)
CALCIUM SERPL-MCNC: 8.7 MG/DL — SIGNIFICANT CHANGE UP (ref 8.4–10.5)
CHLORIDE SERPL-SCNC: 111 MMOL/L — HIGH (ref 98–107)
CO2 SERPL-SCNC: 23 MMOL/L — SIGNIFICANT CHANGE UP (ref 22–31)
CREAT SERPL-MCNC: 0.88 MG/DL — SIGNIFICANT CHANGE UP (ref 0.5–1.3)
EGFR: 66 ML/MIN/1.73M2 — SIGNIFICANT CHANGE UP
EOSINOPHIL # BLD AUTO: 0.12 K/UL — SIGNIFICANT CHANGE UP (ref 0–0.5)
EOSINOPHIL NFR BLD AUTO: 2.2 % — SIGNIFICANT CHANGE UP (ref 0–6)
GLUCOSE SERPL-MCNC: 68 MG/DL — LOW (ref 70–99)
HCT VFR BLD CALC: 26.2 % — LOW (ref 34.5–45)
HGB BLD-MCNC: 8.2 G/DL — LOW (ref 11.5–15.5)
IANC: 4.01 K/UL — SIGNIFICANT CHANGE UP (ref 1.8–7.4)
IMM GRANULOCYTES NFR BLD AUTO: 1.3 % — SIGNIFICANT CHANGE UP (ref 0–1.5)
LYMPHOCYTES # BLD AUTO: 0.67 K/UL — LOW (ref 1–3.3)
LYMPHOCYTES # BLD AUTO: 12.5 % — LOW (ref 13–44)
MAGNESIUM SERPL-MCNC: 1.7 MG/DL — SIGNIFICANT CHANGE UP (ref 1.6–2.6)
MCHC RBC-ENTMCNC: 25.2 PG — LOW (ref 27–34)
MCHC RBC-ENTMCNC: 31.3 GM/DL — LOW (ref 32–36)
MCV RBC AUTO: 80.6 FL — SIGNIFICANT CHANGE UP (ref 80–100)
MONOCYTES # BLD AUTO: 0.46 K/UL — SIGNIFICANT CHANGE UP (ref 0–0.9)
MONOCYTES NFR BLD AUTO: 8.6 % — SIGNIFICANT CHANGE UP (ref 2–14)
NEUTROPHILS # BLD AUTO: 4.01 K/UL — SIGNIFICANT CHANGE UP (ref 1.8–7.4)
NEUTROPHILS NFR BLD AUTO: 75.2 % — SIGNIFICANT CHANGE UP (ref 43–77)
NRBC # BLD: 0 /100 WBCS — SIGNIFICANT CHANGE UP (ref 0–0)
NRBC # FLD: 0 K/UL — SIGNIFICANT CHANGE UP (ref 0–0)
PHOSPHATE SERPL-MCNC: 2.6 MG/DL — SIGNIFICANT CHANGE UP (ref 2.5–4.5)
PLATELET # BLD AUTO: 95 K/UL — LOW (ref 150–400)
POTASSIUM SERPL-MCNC: 3 MMOL/L — LOW (ref 3.5–5.3)
POTASSIUM SERPL-SCNC: 3 MMOL/L — LOW (ref 3.5–5.3)
PROT SERPL-MCNC: 5.3 G/DL — LOW (ref 6–8.3)
RBC # BLD: 3.25 M/UL — LOW (ref 3.8–5.2)
RBC # FLD: 16.1 % — HIGH (ref 10.3–14.5)
SODIUM SERPL-SCNC: 146 MMOL/L — HIGH (ref 135–145)
WBC # BLD: 5.34 K/UL — SIGNIFICANT CHANGE UP (ref 3.8–10.5)
WBC # FLD AUTO: 5.34 K/UL — SIGNIFICANT CHANGE UP (ref 3.8–10.5)

## 2022-08-17 PROCEDURE — 99233 SBSQ HOSP IP/OBS HIGH 50: CPT

## 2022-08-17 PROCEDURE — 76705 ECHO EXAM OF ABDOMEN: CPT | Mod: 26

## 2022-08-17 RX ORDER — POTASSIUM CHLORIDE 20 MEQ
20 PACKET (EA) ORAL ONCE
Refills: 0 | Status: COMPLETED | OUTPATIENT
Start: 2022-08-17 | End: 2022-08-17

## 2022-08-17 RX ORDER — POTASSIUM CHLORIDE 20 MEQ
10 PACKET (EA) ORAL ONCE
Refills: 0 | Status: COMPLETED | OUTPATIENT
Start: 2022-08-17 | End: 2022-08-17

## 2022-08-17 RX ADMIN — MIDODRINE HYDROCHLORIDE 10 MILLIGRAM(S): 2.5 TABLET ORAL at 06:17

## 2022-08-17 RX ADMIN — PANTOPRAZOLE SODIUM 40 MILLIGRAM(S): 20 TABLET, DELAYED RELEASE ORAL at 06:11

## 2022-08-17 RX ADMIN — Medication 20 MILLIEQUIVALENT(S): at 11:15

## 2022-08-17 RX ADMIN — MIDODRINE HYDROCHLORIDE 10 MILLIGRAM(S): 2.5 TABLET ORAL at 23:15

## 2022-08-17 RX ADMIN — PANTOPRAZOLE SODIUM 40 MILLIGRAM(S): 20 TABLET, DELAYED RELEASE ORAL at 18:43

## 2022-08-17 RX ADMIN — Medication 100 MILLIEQUIVALENT(S): at 11:15

## 2022-08-17 NOTE — PROGRESS NOTE ADULT - SUBJECTIVE AND OBJECTIVE BOX
Gastroenterology Progress Note    Interval Events:   EGD 8/16 with LA Grade D esophagitis with  one non-bleeding duodenal ulcer with no stigmata of bleeding thought to be the potential source of recent coffee ground emesis and anemia. Remains without any signs of ongoing bleeding with stable hgb ~8.                         Hospital Medications:  chlorhexidine 2% Cloths 1 Application(s) Topical daily  midodrine 10 milliGRAM(s) Oral every 8 hours  pantoprazole   Suspension 40 milliGRAM(s) Oral two times a day    Allergies:  No Known Allergies    ROS: 14 point ROS negative unless otherwise state in subjective    PHYSICAL EXAM:   Vital Signs:  Vital Signs Last 24 Hrs  T(C): 36.8 (17 Aug 2022 06:23), Max: 36.8 (16 Aug 2022 09:55)  T(F): 98.2 (17 Aug 2022 06:23), Max: 98.2 (16 Aug 2022 09:55)  HR: 82 (17 Aug 2022 06:23) (73 - 98)  BP: 135/76 (17 Aug 2022 06:23) (135/76 - 154/71)  BP(mean): --  RR: 17 (17 Aug 2022 06:23) (17 - 24)  SpO2: 98% (17 Aug 2022 06:23) (92% - 99%)    Parameters below as of 17 Aug 2022 06:23  Patient On (Oxygen Delivery Method): room air      Daily Height in cm: 154.9 (16 Aug 2022 09:55)    Daily     GENERAL:  No acute distress  HEENT:  NCAT, no scleral icterus  CHEST: no resp distress  HEART:  RRR  ABDOMEN:  Soft, non-tender, non-distended, normoactive bowel sounds, no masses  NEURO:  Alert and oriented x 2    LABS:                        8.2    5.34  )-----------( 95       ( 17 Aug 2022 06:05 )             26.2     Mean Cell Volume: 80.6 fL (08-17-22 @ 06:05)    08-17    146<H>  |  111<H>  |  19  ----------------------------<  68<L>  3.0<L>   |  23  |  0.88    Ca    8.7      17 Aug 2022 06:05  Phos  2.6     08-17  Mg     1.70     08-17    TPro  5.3<L>  /  Alb  2.8<L>  /  TBili  0.7  /  DBili  x   /  AST  141<H>  /  ALT  120<H>  /  AlkPhos  181<H>  08-17    LIVER FUNCTIONS - ( 17 Aug 2022 06:05 )  Alb: 2.8 g/dL / Pro: 5.3 g/dL / ALK PHOS: 181 U/L / ALT: 120 U/L / AST: 141 U/L / GGT: x           Imaging:  EGD 8/16/2022       The exam of the duodenum was otherwise normal.                                                                                   Impression:          - LA Grade D esophagitis. Likely source of recent coffee                        ground emesis.                       - Z-line irregular.                       - Esophageal mucosal changes suspicious for                        short-segment Lemos's esophagus.                       -Gastroesophageal flap valve classified as Hill Grade                        IV (no fold, wide open lumen, hiatal hernia present).                       - Gastropathy. Biopsied.                       - One non-bleeding duodenal ulcer with no stigmata of                        bleeding. Potential source of recent coffee ground                        emesis and anemia.  Recommendation:      - Return patient to [facility] for ongoing care.                       - Advance diet as tolerated.                - PPI 40mg BID for at least 8 weeks, then transition to                        PPI daily. Given history of esophagitis, now with                        recurrent/ongoing esophagitis and large hiatal hernia,                        would recommend lifelong PPI if no absolute                        contraindication.                       - Consider repeat EGD in 2 months to assess healing of                        esophagitis and rule out underlying Lemos's if within         GOC.           Gastroenterology Progress Note    Interval Events:   EGD 8/16 with LA Grade D esophagitis with  one non-bleeding duodenal ulcer with no stigmata of bleeding thought to be the potential source of recent coffee ground emesis and anemia. Remains without any signs of ongoing bleeding with stable hgb ~8. Pt w/o any acute complaints this AM.                  Hospital Medications:  chlorhexidine 2% Cloths 1 Application(s) Topical daily  midodrine 10 milliGRAM(s) Oral every 8 hours  pantoprazole   Suspension 40 milliGRAM(s) Oral two times a day    Allergies:  No Known Allergies    ROS: 14 point ROS negative unless otherwise state in subjective    PHYSICAL EXAM:   Vital Signs:  Vital Signs Last 24 Hrs  T(C): 36.8 (17 Aug 2022 06:23), Max: 36.8 (16 Aug 2022 09:55)  T(F): 98.2 (17 Aug 2022 06:23), Max: 98.2 (16 Aug 2022 09:55)  HR: 82 (17 Aug 2022 06:23) (73 - 98)  BP: 135/76 (17 Aug 2022 06:23) (135/76 - 154/71)  BP(mean): --  RR: 17 (17 Aug 2022 06:23) (17 - 24)  SpO2: 98% (17 Aug 2022 06:23) (92% - 99%)    Parameters below as of 17 Aug 2022 06:23  Patient On (Oxygen Delivery Method): room air      Daily Height in cm: 154.9 (16 Aug 2022 09:55)    Daily     GENERAL:  No acute distress  HEENT:  NCAT, no scleral icterus  CHEST: no resp distress  HEART:  RRR  ABDOMEN:  Soft, non-tender, non-distended, normoactive bowel sounds, no masses  NEURO:  Alert and oriented x 2    LABS:                        8.2    5.34  )-----------( 95       ( 17 Aug 2022 06:05 )             26.2     Mean Cell Volume: 80.6 fL (08-17-22 @ 06:05)    08-17    146<H>  |  111<H>  |  19  ----------------------------<  68<L>  3.0<L>   |  23  |  0.88    Ca    8.7      17 Aug 2022 06:05  Phos  2.6     08-17  Mg     1.70     08-17    TPro  5.3<L>  /  Alb  2.8<L>  /  TBili  0.7  /  DBili  x   /  AST  141<H>  /  ALT  120<H>  /  AlkPhos  181<H>  08-17    LIVER FUNCTIONS - ( 17 Aug 2022 06:05 )  Alb: 2.8 g/dL / Pro: 5.3 g/dL / ALK PHOS: 181 U/L / ALT: 120 U/L / AST: 141 U/L / GGT: x           Imaging:  EGD 8/16/2022       The exam of the duodenum was otherwise normal.                                                                                   Impression:          - LA Grade D esophagitis. Likely source of recent coffee                        ground emesis.                       - Z-line irregular.                       - Esophageal mucosal changes suspicious for                        short-segment Lemos's esophagus.                       -Gastroesophageal flap valve classified as Hill Grade                        IV (no fold, wide open lumen, hiatal hernia present).                       - Gastropathy. Biopsied.                       - One non-bleeding duodenal ulcer with no stigmata of                        bleeding. Potential source of recent coffee ground                        emesis and anemia.  Recommendation:      - Return patient to [facility] for ongoing care.                       - Advance diet as tolerated.                - PPI 40mg BID for at least 8 weeks, then transition to                        PPI daily. Given history of esophagitis, now with                        recurrent/ongoing esophagitis and large hiatal hernia,                        would recommend lifelong PPI if no absolute                        contraindication.                       - Consider repeat EGD in 2 months to assess healing of                        esophagitis and rule out underlying Lemos's if within         GOC.           Gastroenterology Progress Note    Interval Events:   EGD 8/16 with LA Grade D esophagitis with one non-bleeding duodenal ulcer with no stigmata of bleeding thought to be the potential source of recent coffee ground emesis and anemia. Remains without any signs of ongoing bleeding with stable hgb ~8. Pt w/o any acute complaints this AM.                  Hospital Medications:  chlorhexidine 2% Cloths 1 Application(s) Topical daily  midodrine 10 milliGRAM(s) Oral every 8 hours  pantoprazole   Suspension 40 milliGRAM(s) Oral two times a day    Allergies:  No Known Allergies    ROS: 14 point ROS negative unless otherwise state in subjective    PHYSICAL EXAM:   Vital Signs:  Vital Signs Last 24 Hrs  T(C): 36.8 (17 Aug 2022 06:23), Max: 36.8 (16 Aug 2022 09:55)  T(F): 98.2 (17 Aug 2022 06:23), Max: 98.2 (16 Aug 2022 09:55)  HR: 82 (17 Aug 2022 06:23) (73 - 98)  BP: 135/76 (17 Aug 2022 06:23) (135/76 - 154/71)  BP(mean): --  RR: 17 (17 Aug 2022 06:23) (17 - 24)  SpO2: 98% (17 Aug 2022 06:23) (92% - 99%)    Parameters below as of 17 Aug 2022 06:23  Patient On (Oxygen Delivery Method): room air      Daily Height in cm: 154.9 (16 Aug 2022 09:55)    Daily     GENERAL:  No acute distress  HEENT:  NCAT, no scleral icterus  CHEST: no resp distress  HEART:  RRR  ABDOMEN:  Soft, non-tender, non-distended, normoactive bowel sounds, no masses  NEURO:  Alert and oriented x 2    LABS:                        8.2    5.34  )-----------( 95       ( 17 Aug 2022 06:05 )             26.2     Mean Cell Volume: 80.6 fL (08-17-22 @ 06:05)    08-17    146<H>  |  111<H>  |  19  ----------------------------<  68<L>  3.0<L>   |  23  |  0.88    Ca    8.7      17 Aug 2022 06:05  Phos  2.6     08-17  Mg     1.70     08-17    TPro  5.3<L>  /  Alb  2.8<L>  /  TBili  0.7  /  DBili  x   /  AST  141<H>  /  ALT  120<H>  /  AlkPhos  181<H>  08-17    LIVER FUNCTIONS - ( 17 Aug 2022 06:05 )  Alb: 2.8 g/dL / Pro: 5.3 g/dL / ALK PHOS: 181 U/L / ALT: 120 U/L / AST: 141 U/L / GGT: x           Imaging:  EGD 8/16/2022       The exam of the duodenum was otherwise normal.                                                                                   Impression:          - LA Grade D esophagitis. Likely source of recent coffee                        ground emesis.                       - Z-line irregular.                       - Esophageal mucosal changes suspicious for                        short-segment Lemos's esophagus.                       -Gastroesophageal flap valve classified as Hill Grade                        IV (no fold, wide open lumen, hiatal hernia present).                       - Gastropathy. Biopsied.                       - One non-bleeding duodenal ulcer with no stigmata of                        bleeding. Potential source of recent coffee ground                        emesis and anemia.  Recommendation:      - Return patient to [facility] for ongoing care.                       - Advance diet as tolerated.                - PPI 40mg BID for at least 8 weeks, then transition to                        PPI daily. Given history of esophagitis, now with                        recurrent/ongoing esophagitis and large hiatal hernia,                        would recommend lifelong PPI if no absolute                        contraindication.                       - Consider repeat EGD in 2 months to assess healing of                        esophagitis and rule out underlying Lemos's if within         GOC.

## 2022-08-17 NOTE — PROGRESS NOTE ADULT - ASSESSMENT
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 GI bleed) now transferred back to the floor. GI consulted for further management of acute anemia s/p EGD with LA Grade D esophagitis and one non-bleeding duodenal ulcer likely the source of coffee ground emesis and anemia.      #UGIB   #Grade D Esophagitis   #Non-bleeding Duodenal Dlcer   Presented to the hosptial with with hypovolemic shock with hospital course c/b acute on chronic anemia with hgb down from 14->8 on admission with a baseline of around 9 complicated by an episode of coffee ground emesis after NG tube placement earlier this admission in the background of ongoing ASA and DOAC use. EGD 8/16/2022 LA Grade D esophagitis and one non-bleeding duodenal ulcer likely the source of coffee ground emesis and anemia. Continue with  PO PPI 40mg BID for at least 8 weeks, then transition to PPI daily. Pt overall low risk for rebleeding with resumuption anti-platelet and DOAC if clinically needed although will noted that DVT in 202 with no signs of ongoing thrombosis noted on ED this admission.     #Short-segment Lemos's esophagus: EGD 8/16/2022 notable for esophageal mucosal changes suspicious for short-segment Lemos's esophagus. Given history of esophagitis, now with recurrent/ongoing esophagitis and large hiatal hernia,  would recommend lifelong PPI if no absolute contraindication. Could repeat EGD in 2 months to assess healing of esophagitis and rule out underlying Lemos's if within GOC.    Recommendations:  - Regular diet   - PO PPI BID x 8 weeks followed by PO PPI QD lifelong    -Low risk of rebleeding with resumption of ASA/Apixiban if clinically needed   - Follow up gastric biopsies   - Repeat EGD in 2 months if within GOC to asses for short segment BE     Our team will plan to sign off. Please reach out to our team with any follow up questions.  All recommendations are tentative until note is attested by attending.     Catrachito Mcarthur, PGY-4  Gastroenterology/Hepatology Fellow  Available on Microsoft Teams   [phone] (Long Range Pager)  [phone] (Short Range Pager [facility])    After 5pm, please contact the on-call GI fellow. [phone]   Georgia Elliott is a 81 year old female with a past medical history notable for dementia (A&Ox2 at baseline), HTN, DVT 8/2020 (remains on Eliquis) presenting to the hospital with hypovolemic 2/2 shock k in the setting of poor PO intake/vomiting vs. GI bleed) now transferred back to the floor. GI consulted for further management of acute anemia s/p EGD with LA Grade D esophagitis and one non-bleeding duodenal ulcer likely the source of coffee ground emesis and anemia.      #UGIB   #Grade D Esophagitis   #Non-bleeding Duodenal Dlcer   Presented to the hosptial with with hypovolemic shock with hospital course c/b acute on chronic anemia with hgb down from 14->8 on admission with a baseline of around 9 complicated by an episode of coffee ground emesis after NG tube placement earlier this admission in the background of ongoing ASA and DOAC use. EGD 8/16/2022 LA Grade D esophagitis and one non-bleeding duodenal ulcer likely the source of coffee ground emesis and anemia. Continue with  PO PPI 40mg BID for at least 8 weeks, then transition to PPI daily. Pt overall low risk for rebleeding with resumuption anti-platelet and DOAC if clinically needed although will noted that DVT in 202 with no signs of ongoing thrombosis noted on ED this admission.     #Short-segment Lemos's esophagus: EGD 8/16/2022 notable for esophageal mucosal changes suspicious for short-segment Lemos's esophagus. Given history of esophagitis, now with recurrent/ongoing esophagitis and large hiatal hernia,  would recommend lifelong PPI if no absolute contraindication. Could repeat EGD in 2 months to assess healing of esophagitis and rule out underlying Lemos's if within GOC.    Recommendations:  - Regular diet   - PO PPI BID x 8 weeks followed by PO PPI QD lifelong    -Low risk of rebleeding with resumption of ASA/Apixiban if clinically needed   - Follow up gastric biopsies   - Repeat EGD in 2 months if within GOC to asses for short segment BE     Our team will plan to sign off. Please reach out to our team with any follow up questions.Please provide patient with Gastroenterology Clinic number to arrange appointment; [phone] ([facility] at [address] [place] [address]) or [phone] ([facility] at [address] [place] [address]) or [phone] ([facility] at [address] [place] [address]).     All recommendations are tentative until note is attested by attending.     Catrachito Mcarthur, PGY-4  Gastroenterology/Hepatology Fellow  Available on Microsoft Teams   [phone] (Long Range Pager)  [phone] (Short Range Pager [facility])    After 5pm, please contact the on-call GI fellow. [phone]   Georgia Elliott is a 81 year old female with a past medical history notable for dementia (A&Ox2 at baseline), HTN, DVT 8/2020 (remains on Eliquis) presenting to the hospital with hypovolemic 2/2 shock k in the setting of poor PO intake/vomiting vs. GI bleed) now transferred back to the floor. GI consulted for further management of acute anemia s/p EGD with LA Grade D esophagitis and one non-bleeding duodenal ulcer likely the source of coffee ground emesis and anemia.      #UGIB   #Grade D Esophagitis   #Non-bleeding Duodenal Dlcer   Presented to the hosptial with with hypovolemic shock with hospital course c/b acute on chronic anemia with hgb down from 14->8 on admission with a baseline of around 9 complicated by an episode of coffee ground emesis after NG tube placement earlier this admission in the background of ongoing ASA and DOAC use. EGD 8/16/2022 LA Grade D esophagitis and one non-bleeding duodenal ulcer likely the source of coffee ground emesis and anemia. Continue with  PO PPI 40mg BID for at least 8 weeks, then transition to PPI daily. Pt overall low risk for rebleeding with resumuption anti-platelet and DOAC if clinically needed although will noted that DVT in 202 with no signs of ongoing thrombosis noted on ED this admission.     #Elevated LFTs   Labs notable for mixed liver injury with elevated AST/ALT and ALP levels. Pt without any abdominal pain on exam although given prior hx of stones and choledochitises would obtain a repeat RUQUS for further evaluation     #Short-segment Lemos's esophagus: EGD 8/16/2022 notable for esophageal mucosal changes suspicious for short-segment Lemos's esophagus. Given history of esophagitis, now with recurrent/ongoing esophagitis and large hiatal hernia,  would recommend lifelong PPI if no absolute contraindication. Could repeat EGD in 2 months to assess healing of esophagitis and rule out underlying Lemos's if within GOC.    Recommendations:  - Please obtain RUQUS to r/o any ongoing billary pathology   - Regular diet   - PO PPI BID x 8 weeks followed by PO PPI QD lifelong    -Low risk of rebleeding with resumption of ASA/Apixiban if clinically needed   - Follow up gastric biopsies   - Repeat EGD in 2 months if within GOC to asses for short segment BE     All recommendations are tentative until note is attested by attending.     Catrachito Mcarthur, PGY-4  Gastroenterology/Hepatology Fellow  Available on Microsoft Teams   [phone] (Long Range Pager)  [phone] (Short Range Pager [facility])    After 5pm, please contact the on-call GI fellow. [phone]     Georgia Elliott is a 81 year old female with a past medical history notable for dementia (A&Ox2 at baseline), HTN, DVT 8/2020 (remains on Eliquis) presenting to the hospital with hypovolemic 2/2 shock k in the setting of poor PO intake/vomiting vs. GI bleed) now transferred back to the floor. GI consulted for further management of acute anemia s/p EGD with LA Grade D esophagitis and one non-bleeding duodenal ulcer likely the source of coffee ground emesis and anemia.    #UGIB   #Grade D Esophagitis   #Non-bleeding Duodenal Dlcer   Presented to the hosptial with with hypovolemic shock with hospital course c/b acute on chronic anemia with hgb down from 14->8 on admission with a baseline of around 9 complicated by an episode of coffee ground emesis after NG tube placement earlier this admission in the background of ongoing ASA and DOAC use. EGD 8/16/2022 LA Grade D esophagitis and one non-bleeding duodenal ulcer likely the source of coffee ground emesis and anemia. Continue with  PO PPI 40mg BID for at least 8 weeks, then transition to PPI daily. Pt overall low risk for rebleeding with resumuption anti-platelet and DOAC if clinically needed although will noted that DVT in 202 with no signs of ongoing thrombosis noted on ED this admission.     #Elevated LFTs   Labs notable for mixed liver injury with elevated AST/ALT and ALP levels. Pt without any abdominal pain on exam although given prior hx of stones and choledochitises would obtain a repeat RUQUS for further evaluation     #Short-segment Lemos's esophagus: EGD 8/16/2022 notable for esophageal mucosal changes suspicious for short-segment Lemos's esophagus. Given history of esophagitis, now with recurrent/ongoing esophagitis and large hiatal hernia,  would recommend lifelong PPI if no absolute contraindication. Could repeat EGD in 2 months to assess healing of esophagitis and rule out underlying Lemos's if within GOC.    Recommendations:  - Please obtain RUQ US to r/o any ongoing billary pathology   - Regular diet   - PO PPI BID x 8 weeks followed by PO PPI daily lifelong   - No GI contraindication for ASA/Apixiban if clinically needed   - Follow up gastric biopsies   - Can consider EGD in 2 months if within GOC to assess for short segment BE; however, risks of procedure likely outweigh benefits  - Trend liver tests    All recommendations are tentative until note is attested by attending.     Catrachito Mcarthur, PGY-4  Gastroenterology/Hepatology Fellow  Available on Microsoft Teams   [phone] (Long Range Pager)  [phone] (Short Range Pager [facility])    After 5pm, please contact the on-call GI fellow. [phone]

## 2022-08-17 NOTE — PROGRESS NOTE ADULT - SUBJECTIVE AND OBJECTIVE BOX
Patient is a 81y old  Female who presents with a chief complaint of Altered Mental Status (17 Aug 2022 08:17)      SUBJECTIVE / OVERNIGHT EVENTS: Pt seen and examined at 11:50am, no overnight events. Pt is alert, awake, denies any abdominal pain, No reports of bleeding Per nsg, no other new issues reported.      MEDICATIONS  (STANDING):  midodrine 10 milliGRAM(s) Oral every 8 hours  pantoprazole   Suspension 40 milliGRAM(s) Oral two times a day    MEDICATIONS  (PRN):      Vital Signs Last 24 Hrs  T(C): 36.9 (17 Aug 2022 10:41), Max: 36.9 (17 Aug 2022 10:41)  T(F): 98.5 (17 Aug 2022 10:41), Max: 98.5 (17 Aug 2022 10:41)  HR: 84 (17 Aug 2022 10:41) (80 - 98)  BP: 149/84 (17 Aug 2022 10:41) (135/76 - 149/84)  BP(mean): --  RR: 18 (17 Aug 2022 10:41) (17 - 19)  SpO2: 97% (17 Aug 2022 10:41) (95% - 98%)    Parameters below as of 17 Aug 2022 10:41  Patient On (Oxygen Delivery Method): room air      CAPILLARY BLOOD GLUCOSE        I&O's Summary    16 Aug 2022 07:01  -  17 Aug 2022 07:00  --------------------------------------------------------  IN: 240 mL / OUT: 95 mL / NET: 145 mL        PHYSICAL EXAM:  GENERAL: NAD, well-developed  CHEST/LUNG: Clear to auscultation bilaterally; No wheeze  HEART: Regular rate and rhythm  ABDOMEN: Soft, Nontender, Nondistended  EXTREMITIES: no LE edema  PSYCH: Calm  NEUROLOGY: AA oriented to self and knows she is in the hospital  SKIN: No rashes or lesions      LABS:                        8.2    5.34  )-----------( 95       ( 17 Aug 2022 06:05 )             26.2     08-17    146<H>  |  111<H>  |  19  ----------------------------<  68<L>  3.0<L>   |  23  |  0.88    Ca    8.7      17 Aug 2022 06:05  Phos  2.6     08-17  Mg     1.70     08-17    TPro  5.3<L>  /  Alb  2.8<L>  /  TBili  0.7  /  DBili  x   /  AST  141<H>  /  ALT  120<H>  /  AlkPhos  181<H>  08-17              RADIOLOGY & ADDITIONAL TESTS:    Imaging Personally Reviewed:    Consultant(s) Notes Reviewed:      Care Discussed with Consultants/Other Providers:

## 2022-08-18 LAB
ALBUMIN SERPL ELPH-MCNC: 2.7 G/DL — LOW (ref 3.3–5)
ALP SERPL-CCNC: 145 U/L — HIGH (ref 40–120)
ALT FLD-CCNC: 171 U/L — HIGH (ref 4–33)
ANION GAP SERPL CALC-SCNC: 11 MMOL/L — SIGNIFICANT CHANGE UP (ref 7–14)
AST SERPL-CCNC: 143 U/L — HIGH (ref 4–32)
BASOPHILS # BLD AUTO: 0.04 K/UL — SIGNIFICANT CHANGE UP (ref 0–0.2)
BASOPHILS NFR BLD AUTO: 0.6 % — SIGNIFICANT CHANGE UP (ref 0–2)
BILIRUB SERPL-MCNC: 0.6 MG/DL — SIGNIFICANT CHANGE UP (ref 0.2–1.2)
BUN SERPL-MCNC: 13 MG/DL — SIGNIFICANT CHANGE UP (ref 7–23)
CALCIUM SERPL-MCNC: 8.5 MG/DL — SIGNIFICANT CHANGE UP (ref 8.4–10.5)
CHLORIDE SERPL-SCNC: 112 MMOL/L — HIGH (ref 98–107)
CO2 SERPL-SCNC: 26 MMOL/L — SIGNIFICANT CHANGE UP (ref 22–31)
CREAT SERPL-MCNC: 0.79 MG/DL — SIGNIFICANT CHANGE UP (ref 0.5–1.3)
CULTURE RESULTS: SIGNIFICANT CHANGE UP
CULTURE RESULTS: SIGNIFICANT CHANGE UP
EGFR: 75 ML/MIN/1.73M2 — SIGNIFICANT CHANGE UP
EOSINOPHIL # BLD AUTO: 0.14 K/UL — SIGNIFICANT CHANGE UP (ref 0–0.5)
EOSINOPHIL NFR BLD AUTO: 1.9 % — SIGNIFICANT CHANGE UP (ref 0–6)
GLUCOSE BLDC GLUCOMTR-MCNC: 101 MG/DL — HIGH (ref 70–99)
GLUCOSE SERPL-MCNC: 89 MG/DL — SIGNIFICANT CHANGE UP (ref 70–99)
HAV IGM SER-ACNC: SIGNIFICANT CHANGE UP
HBV CORE IGM SER-ACNC: SIGNIFICANT CHANGE UP
HBV SURFACE AG SER-ACNC: SIGNIFICANT CHANGE UP
HCT VFR BLD CALC: 28.2 % — LOW (ref 34.5–45)
HCV AB S/CO SERPL IA: 0.06 S/CO — SIGNIFICANT CHANGE UP (ref 0–0.99)
HCV AB SERPL-IMP: SIGNIFICANT CHANGE UP
HGB BLD-MCNC: 8.6 G/DL — LOW (ref 11.5–15.5)
IANC: 5.82 K/UL — SIGNIFICANT CHANGE UP (ref 1.8–7.4)
IMM GRANULOCYTES NFR BLD AUTO: 1.1 % — SIGNIFICANT CHANGE UP (ref 0–1.5)
LYMPHOCYTES # BLD AUTO: 0.78 K/UL — LOW (ref 1–3.3)
LYMPHOCYTES # BLD AUTO: 10.8 % — LOW (ref 13–44)
MAGNESIUM SERPL-MCNC: 1.5 MG/DL — LOW (ref 1.6–2.6)
MCHC RBC-ENTMCNC: 24.8 PG — LOW (ref 27–34)
MCHC RBC-ENTMCNC: 30.5 GM/DL — LOW (ref 32–36)
MCV RBC AUTO: 81.3 FL — SIGNIFICANT CHANGE UP (ref 80–100)
MONOCYTES # BLD AUTO: 0.36 K/UL — SIGNIFICANT CHANGE UP (ref 0–0.9)
MONOCYTES NFR BLD AUTO: 5 % — SIGNIFICANT CHANGE UP (ref 2–14)
NEUTROPHILS # BLD AUTO: 5.82 K/UL — SIGNIFICANT CHANGE UP (ref 1.8–7.4)
NEUTROPHILS NFR BLD AUTO: 80.6 % — HIGH (ref 43–77)
NRBC # BLD: 0 /100 WBCS — SIGNIFICANT CHANGE UP (ref 0–0)
NRBC # FLD: 0 K/UL — SIGNIFICANT CHANGE UP (ref 0–0)
PHOSPHATE SERPL-MCNC: 2.5 MG/DL — SIGNIFICANT CHANGE UP (ref 2.5–4.5)
PLATELET # BLD AUTO: 125 K/UL — LOW (ref 150–400)
POTASSIUM SERPL-MCNC: 3.6 MMOL/L — SIGNIFICANT CHANGE UP (ref 3.5–5.3)
POTASSIUM SERPL-SCNC: 3.6 MMOL/L — SIGNIFICANT CHANGE UP (ref 3.5–5.3)
PROT SERPL-MCNC: 5.7 G/DL — LOW (ref 6–8.3)
RBC # BLD: 3.47 M/UL — LOW (ref 3.8–5.2)
RBC # FLD: 16.2 % — HIGH (ref 10.3–14.5)
SODIUM SERPL-SCNC: 149 MMOL/L — HIGH (ref 135–145)
SPECIMEN SOURCE: SIGNIFICANT CHANGE UP
SPECIMEN SOURCE: SIGNIFICANT CHANGE UP
WBC # BLD: 7.22 K/UL — SIGNIFICANT CHANGE UP (ref 3.8–10.5)
WBC # FLD AUTO: 7.22 K/UL — SIGNIFICANT CHANGE UP (ref 3.8–10.5)

## 2022-08-18 PROCEDURE — 99233 SBSQ HOSP IP/OBS HIGH 50: CPT

## 2022-08-18 RX ORDER — SODIUM CHLORIDE 9 MG/ML
1000 INJECTION, SOLUTION INTRAVENOUS
Refills: 0 | Status: DISCONTINUED | OUTPATIENT
Start: 2022-08-18 | End: 2022-08-22

## 2022-08-18 RX ORDER — MAGNESIUM SULFATE 500 MG/ML
1 VIAL (ML) INJECTION ONCE
Refills: 0 | Status: COMPLETED | OUTPATIENT
Start: 2022-08-18 | End: 2022-08-18

## 2022-08-18 RX ADMIN — MIDODRINE HYDROCHLORIDE 10 MILLIGRAM(S): 2.5 TABLET ORAL at 05:29

## 2022-08-18 RX ADMIN — PANTOPRAZOLE SODIUM 40 MILLIGRAM(S): 20 TABLET, DELAYED RELEASE ORAL at 05:29

## 2022-08-18 RX ADMIN — PANTOPRAZOLE SODIUM 40 MILLIGRAM(S): 20 TABLET, DELAYED RELEASE ORAL at 18:52

## 2022-08-18 RX ADMIN — MIDODRINE HYDROCHLORIDE 10 MILLIGRAM(S): 2.5 TABLET ORAL at 21:05

## 2022-08-18 RX ADMIN — SODIUM CHLORIDE 60 MILLILITER(S): 9 INJECTION, SOLUTION INTRAVENOUS at 18:22

## 2022-08-18 RX ADMIN — Medication 100 GRAM(S): at 18:21

## 2022-08-18 NOTE — DIETITIAN INITIAL EVALUATION ADULT - ADD RECOMMEND
Continue diet as ordered. Will provide magic cup 1x daily (290 kcal, 9 g pro) to ensure nutritional needs are met. Document PO intake to monitor trend.

## 2022-08-18 NOTE — DIETITIAN INITIAL EVALUATION ADULT - OTHER INFO
81 year old female with a PMH of dementia (A&Ox2 at baseline), HTN p/w vomiting and AMS found to be hypotensive, admitted to MICU for management of hypotension requiring pressors. Blood cultures show coagulase-negative cocci, likely contaminate. Hypotension improved, now transferred to the floor for further management, s/p swallow evaluation with recommendation for pureed/mildly thick liquids per chart.    Per RN, patient consumed breakfast this morning. Noted with 1 intake % per RN flow sheet. No GI distress reported. Has no food allergies. Patient unsure of UBW at this time. Per HIE, noted with weight 78.8 kg (8/12/20). ABW is 68 kg (8/16), 68.2 kg (8/15), 74.7 kg (8/13), 76.9 kg (8/14), 74.7 kg (8/13) and 69.4 kg + 65 kg (8/11) per chart, ?accuracy of weights at this time, will monitor. Noted with +1 L/R hand edema and no pressure injuries per RN flow sheet.

## 2022-08-18 NOTE — PROGRESS NOTE ADULT - SUBJECTIVE AND OBJECTIVE BOX
Patient is a 81y old  Female who presents with a chief complaint of Altered Mental Status (17 Aug 2022 14:09)      SUBJECTIVE / OVERNIGHT EVENTS: Pt seen and examined at 11:30am, no overnight events. Pt is alert, awake, denies any abdominal pain, No reports of bleeding Per nsg, unsuccessful in getting labs today per nsg, no other new issues reported.        MEDICATIONS  (STANDING):  midodrine 10 milliGRAM(s) Oral every 8 hours  pantoprazole   Suspension 40 milliGRAM(s) Oral two times a day    MEDICATIONS  (PRN):      Vital Signs Last 24 Hrs  T(C): 37 (18 Aug 2022 10:00), Max: 37 (18 Aug 2022 10:00)  T(F): 98.6 (18 Aug 2022 10:00), Max: 98.6 (18 Aug 2022 10:00)  HR: 94 (18 Aug 2022 10:00) (84 - 94)  BP: 137/80 (18 Aug 2022 10:00) (132/82 - 144/82)  BP(mean): --  RR: 18 (18 Aug 2022 10:00) (18 - 18)  SpO2: 99% (18 Aug 2022 10:00) (98% - 99%)    Parameters below as of 18 Aug 2022 10:00  Patient On (Oxygen Delivery Method): room air      CAPILLARY BLOOD GLUCOSE      POCT Blood Glucose.: 101 mg/dL (18 Aug 2022 07:06)    I&O's Summary    17 Aug 2022 07:01  -  18 Aug 2022 07:00  --------------------------------------------------------  IN: 820 mL / OUT: 40 mL / NET: 780 mL        PHYSICAL EXAM:  GENERAL: NAD, well-developed  CHEST/LUNG: Clear to auscultation bilaterally; No wheeze  HEART: Regular rate and rhythm  ABDOMEN: Soft, Nontender, Nondistended  EXTREMITIES: no LE edema  PSYCH: Calm  NEUROLOGY: AA oriented to self and knows she is in the hospital  SKIN: No rashes or lesions    LABS:                        8.6    7.22  )-----------( 125      ( 18 Aug 2022 12:20 )             28.2     08-17    146<H>  |  111<H>  |  19  ----------------------------<  68<L>  3.0<L>   |  23  |  0.88    Ca    8.7      17 Aug 2022 06:05  Phos  2.6     08-17  Mg     1.70     08-17    TPro  5.3<L>  /  Alb  2.8<L>  /  TBili  0.7  /  DBili  x   /  AST  141<H>  /  ALT  120<H>  /  AlkPhos  181<H>  08-17              RADIOLOGY & ADDITIONAL TESTS:    Imaging Personally Reviewed:    Consultant(s) Notes Reviewed:      Care Discussed with Consultants/Other Providers:

## 2022-08-18 NOTE — CHART NOTE - NSCHARTNOTEFT_GEN_A_CORE
Called family member Michael for MRI consent recc by GI, left voicemail . Unable to get MRI consent over the phone.

## 2022-08-18 NOTE — DIETITIAN INITIAL EVALUATION ADULT - PERTINENT LABORATORY DATA
08-18    149<H>  |  112<H>  |  13  ----------------------------<  89  3.6   |  26  |  0.79    Ca    8.5      18 Aug 2022 12:20  Phos  2.5     08-18  Mg     1.50     08-18    TPro  5.7<L>  /  Alb  2.7<L>  /  TBili  0.6  /  DBili  x   /  AST  143<H>  /  ALT  171<H>  /  AlkPhos  145<H>  08-18  POCT Blood Glucose.: 101 mg/dL (08-18-22 @ 07:06)

## 2022-08-18 NOTE — DIETITIAN INITIAL EVALUATION ADULT - NSFNSGIIOFT_GEN_A_CORE
08-17-22 @ 07:01  -  08-18-22 @ 07:00  --------------------------------------------------------  OUT:    Rectal Tube (mL): 40 mL  Total OUT: 40 mL    Total NET: -40 mL

## 2022-08-18 NOTE — DIETITIAN INITIAL EVALUATION ADULT - NSICDXPASTSURGICALHX_GEN_ALL_CORE_FT
Oriented - self; Oriented - place; Oriented - time
PAST SURGICAL HISTORY:  No significant past surgical history

## 2022-08-18 NOTE — DIETITIAN INITIAL EVALUATION ADULT - PERTINENT MEDS FT
MEDICATIONS  (STANDING):  midodrine 10 milliGRAM(s) Oral every 8 hours  pantoprazole   Suspension 40 milliGRAM(s) Oral two times a day    MEDICATIONS  (PRN):

## 2022-08-18 NOTE — DIETITIAN INITIAL EVALUATION ADULT - ORAL INTAKE PTA/DIET HISTORY
Patient seen for assessment. Patient able to answer some nutrition related questions. Other information obtained from RN.

## 2022-08-18 NOTE — DIETITIAN INITIAL EVALUATION ADULT - FUNCTIONAL SCREEN CURRENT LEVEL: SWALLOWING (IF SCORE 2 OR MORE FOR ANY ITEM, CONSULT REHAB SERVICES), MLM)
Intervention: Patient Satisfied  Vital Signs  Patient Currently in Pain: Yes   Orientation  Orientation  Overall Orientation Status: Within Functional Limits  Objective    ADL  Grooming: Supervision(to wash hands in stance at sink)  Toileting: Supervision        Balance  Sitting Balance: Modified independent   Standing Balance: Supervision(no AD)  Standing Balance  Time: 5 minutes, 7 minutes  Activity: functional mobility room>gym, transfer to/from bathroom, grooming at sink, standing to complete crossword puzzle  Comment: no AD, no LOB  Functional Mobility  Functional - Mobility Device: No device  Activity: To/from bathroom; To/From therapy gym  Assist Level: Supervision  Toilet Transfers  Toilet - Technique: Ambulating  Equipment Used: Standard toilet  Toilet Transfer: Supervision  Bed mobility  Sit to Supine: Modified independent  Transfers  Stand Step Transfers: Supervision  Sit to stand: Supervision  Stand to sit: Supervision        Coordination  Fine Motor: fair to poor coordination for writing on vertical crossword puzzle, less than 50% legibility              Cognition  Overall Cognitive Status: Exceptions  Arousal/Alertness: Appropriate responses to stimuli  Following Commands:  Follows multistep commands with increased time  Attention Span: Attends with cues to redirect  Memory: Decreased recall of recent events;Decreased short term memory  Safety Judgement: Decreased awareness of need for assistance  Problem Solving: Assistance required to generate solutions  Insights: Fully aware of deficits  Initiation: Does not require cues  Sequencing: Requires cues for some  Cognition Comment: Pt required min VCs for problem solving for crossword puzzle                    Type of ROM/Therapeutic Exercise  Type of ROM/Therapeutic Exercise: Free weights  Comment: 2#  Exercises  Shoulder Flexion: x15  Shoulder Extension: x15  Horizontal ABduction: x15  Horizontal ADduction: x15  Elbow Flexion: x15  Elbow Extension: x15  Supination: x15  Pronation: x15  Wrist Flexion: x15  Wrist Extension: x15  Other: chest press x15                    Plan   Plan  Times per week: 5/7 days/week  Plan weeks: 10 days  Current Treatment Recommendations: Strengthening, Patient/Caregiver Education & Training, Home Management Training, Equipment Evaluation, Education, & procurement, ROM, Balance Training, Functional Mobility Training, Endurance Training, Safety Education & Training, Self-Care / ADL    Goals  Short term goals  Time Frame for Short term goals: 1/2/21- 5 days  Short term goal 1: Pt will complete toileting with SBA. -GOAL MET, SBA with RW 1/1  Short term goal 2: Pt will perform functional transfers with SBA.- GOAL MET, 1/2  Long term goals  Time Frame for Long term goals : 1/7/21- 10 days  Long term goal 1: Pt will perform functional transfers with supervision. Long term goal 2: Pt will complete full body bathing with setup. Long term goal 3: Pt will perform full body dressing with setup. Long term goal 4: Pt will complete grooming with mod I.  Long term goal 5: Pt will perform simple homemaking task with supervision.   Patient Goals   Patient goals : \"get better, get stronger, more independent\"       Therapy Time   Individual Concurrent Group Co-treatment   Time In 0830         Time Out 0930         Minutes 60         Timed Code Treatment Minutes: 60 Minutes    Second Session Therapy Time:   Individual Concurrent Group Co-treatment   Time In 7155         Time Out 1300         Minutes 25           Timed Code Treatment Minutes:  25 Minutes    Total Treatment Minutes:  85 minutes      Mane Yeboah OT 2 = difficulty swallowing liquids/foods

## 2022-08-18 NOTE — CHART NOTE - NSCHARTNOTEFT_GEN_A_CORE
RUQUS on 8/17/2022 with common bile duct measures 8 mm with redemonstarated  pneumobilia and absent gallbladder. LFTs still elevated in similar pattern as compared to yesterday. Would plan for MRCP for further assessment      Catrachito Mcarthur, PGY-4  Gastroenterology/Hepatology Fellow  Available on [employer] Teams   [phone] (Long Range Pager)  [phone] (Short Range Pager [facility])    After 5pm, please contact the on-call GI fellow. [phone]

## 2022-08-19 DIAGNOSIS — R79.89 OTHER SPECIFIED ABNORMAL FINDINGS OF BLOOD CHEMISTRY: ICD-10-CM

## 2022-08-19 DIAGNOSIS — R19.7 DIARRHEA, UNSPECIFIED: ICD-10-CM

## 2022-08-19 LAB
ALBUMIN SERPL ELPH-MCNC: 2.7 G/DL — LOW (ref 3.3–5)
ALP SERPL-CCNC: 122 U/L — HIGH (ref 40–120)
ALT FLD-CCNC: 137 U/L — HIGH (ref 4–33)
ANION GAP SERPL CALC-SCNC: 10 MMOL/L — SIGNIFICANT CHANGE UP (ref 7–14)
ANION GAP SERPL CALC-SCNC: 11 MMOL/L — SIGNIFICANT CHANGE UP (ref 7–14)
AST SERPL-CCNC: 87 U/L — HIGH (ref 4–32)
BASOPHILS # BLD AUTO: 0.01 K/UL — SIGNIFICANT CHANGE UP (ref 0–0.2)
BASOPHILS NFR BLD AUTO: 0.1 % — SIGNIFICANT CHANGE UP (ref 0–2)
BILIRUB DIRECT SERPL-MCNC: <0.2 MG/DL — SIGNIFICANT CHANGE UP (ref 0–0.3)
BILIRUB INDIRECT FLD-MCNC: >0.4 MG/DL — SIGNIFICANT CHANGE UP (ref 0–1)
BILIRUB SERPL-MCNC: 0.6 MG/DL — SIGNIFICANT CHANGE UP (ref 0.2–1.2)
BUN SERPL-MCNC: 10 MG/DL — SIGNIFICANT CHANGE UP (ref 7–23)
BUN SERPL-MCNC: 9 MG/DL — SIGNIFICANT CHANGE UP (ref 7–23)
CALCIUM SERPL-MCNC: 7.9 MG/DL — LOW (ref 8.4–10.5)
CALCIUM SERPL-MCNC: 8 MG/DL — LOW (ref 8.4–10.5)
CHLORIDE SERPL-SCNC: 105 MMOL/L — SIGNIFICANT CHANGE UP (ref 98–107)
CHLORIDE SERPL-SCNC: 105 MMOL/L — SIGNIFICANT CHANGE UP (ref 98–107)
CO2 SERPL-SCNC: 25 MMOL/L — SIGNIFICANT CHANGE UP (ref 22–31)
CO2 SERPL-SCNC: 26 MMOL/L — SIGNIFICANT CHANGE UP (ref 22–31)
CREAT SERPL-MCNC: 0.8 MG/DL — SIGNIFICANT CHANGE UP (ref 0.5–1.3)
CREAT SERPL-MCNC: 0.81 MG/DL — SIGNIFICANT CHANGE UP (ref 0.5–1.3)
EGFR: 73 ML/MIN/1.73M2 — SIGNIFICANT CHANGE UP
EGFR: 74 ML/MIN/1.73M2 — SIGNIFICANT CHANGE UP
EOSINOPHIL # BLD AUTO: 0.18 K/UL — SIGNIFICANT CHANGE UP (ref 0–0.5)
EOSINOPHIL NFR BLD AUTO: 2.5 % — SIGNIFICANT CHANGE UP (ref 0–6)
GLUCOSE SERPL-MCNC: 101 MG/DL — HIGH (ref 70–99)
GLUCOSE SERPL-MCNC: 112 MG/DL — HIGH (ref 70–99)
HCT VFR BLD CALC: 24.9 % — LOW (ref 34.5–45)
HCT VFR BLD CALC: 25.8 % — LOW (ref 34.5–45)
HGB BLD-MCNC: 7.7 G/DL — LOW (ref 11.5–15.5)
HGB BLD-MCNC: 8 G/DL — LOW (ref 11.5–15.5)
IANC: 5.52 K/UL — SIGNIFICANT CHANGE UP (ref 1.8–7.4)
IMM GRANULOCYTES NFR BLD AUTO: 1 % — SIGNIFICANT CHANGE UP (ref 0–1.5)
LYMPHOCYTES # BLD AUTO: 0.93 K/UL — LOW (ref 1–3.3)
LYMPHOCYTES # BLD AUTO: 13.1 % — SIGNIFICANT CHANGE UP (ref 13–44)
MAGNESIUM SERPL-MCNC: 1.5 MG/DL — LOW (ref 1.6–2.6)
MAGNESIUM SERPL-MCNC: 1.6 MG/DL — SIGNIFICANT CHANGE UP (ref 1.6–2.6)
MCHC RBC-ENTMCNC: 24.9 PG — LOW (ref 27–34)
MCHC RBC-ENTMCNC: 25 PG — LOW (ref 27–34)
MCHC RBC-ENTMCNC: 30.9 GM/DL — LOW (ref 32–36)
MCHC RBC-ENTMCNC: 31 GM/DL — LOW (ref 32–36)
MCV RBC AUTO: 80.4 FL — SIGNIFICANT CHANGE UP (ref 80–100)
MCV RBC AUTO: 80.8 FL — SIGNIFICANT CHANGE UP (ref 80–100)
MONOCYTES # BLD AUTO: 0.37 K/UL — SIGNIFICANT CHANGE UP (ref 0–0.9)
MONOCYTES NFR BLD AUTO: 5.2 % — SIGNIFICANT CHANGE UP (ref 2–14)
NEUTROPHILS # BLD AUTO: 5.52 K/UL — SIGNIFICANT CHANGE UP (ref 1.8–7.4)
NEUTROPHILS NFR BLD AUTO: 78.1 % — HIGH (ref 43–77)
NRBC # BLD: 0 /100 WBCS — SIGNIFICANT CHANGE UP (ref 0–0)
NRBC # BLD: 0 /100 WBCS — SIGNIFICANT CHANGE UP (ref 0–0)
NRBC # FLD: 0 K/UL — SIGNIFICANT CHANGE UP (ref 0–0)
NRBC # FLD: 0.02 K/UL — HIGH (ref 0–0)
PHOSPHATE SERPL-MCNC: 1.8 MG/DL — LOW (ref 2.5–4.5)
PHOSPHATE SERPL-MCNC: 2 MG/DL — LOW (ref 2.5–4.5)
PLATELET # BLD AUTO: 141 K/UL — LOW (ref 150–400)
PLATELET # BLD AUTO: 154 K/UL — SIGNIFICANT CHANGE UP (ref 150–400)
POTASSIUM SERPL-MCNC: 2.9 MMOL/L — CRITICAL LOW (ref 3.5–5.3)
POTASSIUM SERPL-MCNC: 3.4 MMOL/L — LOW (ref 3.5–5.3)
POTASSIUM SERPL-SCNC: 2.9 MMOL/L — CRITICAL LOW (ref 3.5–5.3)
POTASSIUM SERPL-SCNC: 3.4 MMOL/L — LOW (ref 3.5–5.3)
PROT SERPL-MCNC: 5.4 G/DL — LOW (ref 6–8.3)
RBC # BLD: 3.08 M/UL — LOW (ref 3.8–5.2)
RBC # BLD: 3.21 M/UL — LOW (ref 3.8–5.2)
RBC # FLD: 16 % — HIGH (ref 10.3–14.5)
RBC # FLD: 16.1 % — HIGH (ref 10.3–14.5)
SODIUM SERPL-SCNC: 140 MMOL/L — SIGNIFICANT CHANGE UP (ref 135–145)
SODIUM SERPL-SCNC: 142 MMOL/L — SIGNIFICANT CHANGE UP (ref 135–145)
WBC # BLD: 6.72 K/UL — SIGNIFICANT CHANGE UP (ref 3.8–10.5)
WBC # BLD: 7.08 K/UL — SIGNIFICANT CHANGE UP (ref 3.8–10.5)
WBC # FLD AUTO: 6.72 K/UL — SIGNIFICANT CHANGE UP (ref 3.8–10.5)
WBC # FLD AUTO: 7.08 K/UL — SIGNIFICANT CHANGE UP (ref 3.8–10.5)

## 2022-08-19 PROCEDURE — 99233 SBSQ HOSP IP/OBS HIGH 50: CPT

## 2022-08-19 RX ORDER — SODIUM,POTASSIUM PHOSPHATES 278-250MG
1 POWDER IN PACKET (EA) ORAL ONCE
Refills: 0 | Status: COMPLETED | OUTPATIENT
Start: 2022-08-19 | End: 2022-08-19

## 2022-08-19 RX ORDER — POTASSIUM CHLORIDE 20 MEQ
10 PACKET (EA) ORAL
Refills: 0 | Status: COMPLETED | OUTPATIENT
Start: 2022-08-19 | End: 2022-08-19

## 2022-08-19 RX ADMIN — MIDODRINE HYDROCHLORIDE 10 MILLIGRAM(S): 2.5 TABLET ORAL at 21:24

## 2022-08-19 RX ADMIN — Medication 100 MILLIEQUIVALENT(S): at 12:46

## 2022-08-19 RX ADMIN — PANTOPRAZOLE SODIUM 40 MILLIGRAM(S): 20 TABLET, DELAYED RELEASE ORAL at 05:05

## 2022-08-19 RX ADMIN — PANTOPRAZOLE SODIUM 40 MILLIGRAM(S): 20 TABLET, DELAYED RELEASE ORAL at 18:43

## 2022-08-19 RX ADMIN — Medication 1 PACKET(S): at 12:05

## 2022-08-19 RX ADMIN — Medication 100 MILLIEQUIVALENT(S): at 14:30

## 2022-08-19 RX ADMIN — MIDODRINE HYDROCHLORIDE 10 MILLIGRAM(S): 2.5 TABLET ORAL at 05:04

## 2022-08-19 RX ADMIN — Medication 100 MILLIEQUIVALENT(S): at 11:45

## 2022-08-19 NOTE — PROGRESS NOTE ADULT - SUBJECTIVE AND OBJECTIVE BOX
[facility] Division of Hospital Medicine  Trish Olivo MD  Pager [phone]    Patient is a 81y old  Female who presents with a chief complaint of Hypovolemic shock      SUBJECTIVE / OVERNIGHT EVENTS: pt without complaints; RN about to hang potassium supplement and d/w pt not to pull out IV if it is burning, which she has done in the past      MEDICATIONS  (STANDING):  dextrose 5%. 1000 milliLiter(s) (60 mL/Hr) IV Continuous <Continuous>  midodrine 10 milliGRAM(s) Oral every 8 hours  pantoprazole   Suspension 40 milliGRAM(s) Oral two times a day  potassium chloride  10 mEq/100 mL IVPB 10 milliEquivalent(s) IV Intermittent every 1 hour  potassium phosphate / sodium phosphate Powder (PHOS-NaK) 1 Packet(s) Oral once        PHYSICAL EXAM:  Vital Signs Last 24 Hrs  T(F): 98.6 (19 Aug 2022 10:00), Max: 100 (18 Aug 2022 21:42)  HR: 84 (19 Aug 2022 10:00) (84 - 93)  BP: 129/78 (19 Aug 2022 10:00) (123/71 - 136/80)  RR: 18 (19 Aug 2022 10:00) (18 - 18)  SpO2: 97% (19 Aug 2022 10:00) (95% - 100%)    Parameters below as of 19 Aug 2022 10:00  Patient On (Oxygen Delivery Method): room air        CONSTITUTIONAL: NAD, appears comfortable  EYES: PERRLA; conjunctiva and sclera clear  ENMT: Moist oral mucosa; poor dentition  RESPIRATORY: Normal respiratory effort; grossly b/l AE ant/lat  CARDIOVASCULAR: Regular rate and rhythm; No lower extremity edema;   ABDOMEN: Nontender to palpation, normoactive bowel sounds, soft; flexiseal in place, brown stool  MUSCULOSKELETAL:  no clubbing or cyanosis of digits; no joint swelling or tenderness to palpation  PSYCH: calm, coop; affect appropriate  NEUROLOGY: CN 2-12 are intact and symmetric; no gross sensory deficits   SKIN: No rashes; no palpable lesions    LABS:                        7.7    7.08  )-----------( 141      ( 19 Aug 2022 07:16 )             24.9     08-19    142  |  105  |  10  ----------------------------<  101<H>  2.9<LL>   |  26  |  0.81    Ca    7.9<L>      19 Aug 2022 07:16  Phos  2.0     08-19  Mg     1.60     08-19    TPro  5.4<L>  /  Alb  2.7<L>  /  TBili  0.6  /  DBili  <0.2  /  AST  87<H>  /  ALT  137<H>  /  AlkPhos  122<H>  08-19

## 2022-08-20 LAB
ANION GAP SERPL CALC-SCNC: 8 MMOL/L — SIGNIFICANT CHANGE UP (ref 7–14)
BILIRUB DIRECT SERPL-MCNC: <0.2 MG/DL — SIGNIFICANT CHANGE UP (ref 0–0.3)
BILIRUB SERPL-MCNC: 0.4 MG/DL — SIGNIFICANT CHANGE UP (ref 0.2–1.2)
BUN SERPL-MCNC: 8 MG/DL — SIGNIFICANT CHANGE UP (ref 7–23)
CALCIUM SERPL-MCNC: 7.7 MG/DL — LOW (ref 8.4–10.5)
CHLORIDE SERPL-SCNC: 104 MMOL/L — SIGNIFICANT CHANGE UP (ref 98–107)
CO2 SERPL-SCNC: 26 MMOL/L — SIGNIFICANT CHANGE UP (ref 22–31)
CREAT SERPL-MCNC: 0.82 MG/DL — SIGNIFICANT CHANGE UP (ref 0.5–1.3)
CULTURE RESULTS: SIGNIFICANT CHANGE UP
EGFR: 72 ML/MIN/1.73M2 — SIGNIFICANT CHANGE UP
GLUCOSE SERPL-MCNC: 79 MG/DL — SIGNIFICANT CHANGE UP (ref 70–99)
HCT VFR BLD CALC: 26.5 % — LOW (ref 34.5–45)
HGB BLD-MCNC: 8.2 G/DL — LOW (ref 11.5–15.5)
MAGNESIUM SERPL-MCNC: 1.4 MG/DL — LOW (ref 1.6–2.6)
MCHC RBC-ENTMCNC: 24.8 PG — LOW (ref 27–34)
MCHC RBC-ENTMCNC: 30.9 GM/DL — LOW (ref 32–36)
MCV RBC AUTO: 80.3 FL — SIGNIFICANT CHANGE UP (ref 80–100)
NRBC # BLD: 0 /100 WBCS — SIGNIFICANT CHANGE UP (ref 0–0)
NRBC # FLD: 0 K/UL — SIGNIFICANT CHANGE UP (ref 0–0)
PHOSPHATE SERPL-MCNC: 2 MG/DL — LOW (ref 2.5–4.5)
PLATELET # BLD AUTO: 173 K/UL — SIGNIFICANT CHANGE UP (ref 150–400)
POTASSIUM SERPL-MCNC: 3.2 MMOL/L — LOW (ref 3.5–5.3)
POTASSIUM SERPL-SCNC: 3.2 MMOL/L — LOW (ref 3.5–5.3)
RBC # BLD: 3.3 M/UL — LOW (ref 3.8–5.2)
RBC # FLD: 16.3 % — HIGH (ref 10.3–14.5)
SODIUM SERPL-SCNC: 138 MMOL/L — SIGNIFICANT CHANGE UP (ref 135–145)
SPECIMEN SOURCE: SIGNIFICANT CHANGE UP
WBC # BLD: 6.13 K/UL — SIGNIFICANT CHANGE UP (ref 3.8–10.5)
WBC # FLD AUTO: 6.13 K/UL — SIGNIFICANT CHANGE UP (ref 3.8–10.5)

## 2022-08-20 PROCEDURE — 99232 SBSQ HOSP IP/OBS MODERATE 35: CPT

## 2022-08-20 RX ORDER — POTASSIUM CHLORIDE 20 MEQ
40 PACKET (EA) ORAL ONCE
Refills: 0 | Status: COMPLETED | OUTPATIENT
Start: 2022-08-20 | End: 2022-08-20

## 2022-08-20 RX ORDER — POTASSIUM CHLORIDE 20 MEQ
40 PACKET (EA) ORAL ONCE
Refills: 0 | Status: DISCONTINUED | OUTPATIENT
Start: 2022-08-20 | End: 2022-08-20

## 2022-08-20 RX ORDER — MAGNESIUM SULFATE 500 MG/ML
1 VIAL (ML) INJECTION ONCE
Refills: 0 | Status: COMPLETED | OUTPATIENT
Start: 2022-08-20 | End: 2022-08-20

## 2022-08-20 RX ADMIN — Medication 100 GRAM(S): at 12:23

## 2022-08-20 RX ADMIN — MIDODRINE HYDROCHLORIDE 10 MILLIGRAM(S): 2.5 TABLET ORAL at 05:00

## 2022-08-20 RX ADMIN — MIDODRINE HYDROCHLORIDE 10 MILLIGRAM(S): 2.5 TABLET ORAL at 14:54

## 2022-08-20 RX ADMIN — PANTOPRAZOLE SODIUM 40 MILLIGRAM(S): 20 TABLET, DELAYED RELEASE ORAL at 18:02

## 2022-08-20 RX ADMIN — PANTOPRAZOLE SODIUM 40 MILLIGRAM(S): 20 TABLET, DELAYED RELEASE ORAL at 05:00

## 2022-08-20 RX ADMIN — Medication 40 MILLIEQUIVALENT(S): at 14:54

## 2022-08-20 RX ADMIN — MIDODRINE HYDROCHLORIDE 10 MILLIGRAM(S): 2.5 TABLET ORAL at 21:58

## 2022-08-20 NOTE — PROGRESS NOTE ADULT - SUBJECTIVE AND OBJECTIVE BOX
[facility] Division of Hospital Medicine  Trish Olivo MD  Pager [phone]    Patient is a 81y old  Female who presents with a chief complaint of Altered Mental Status       SUBJECTIVE / OVERNIGHT EVENTS: denies abd pain; feeling well      MEDICATIONS  (STANDING):  dextrose 5%. 1000 milliLiter(s) (60 mL/Hr) IV Continuous <Continuous>  midodrine 10 milliGRAM(s) Oral every 8 hours  pantoprazole   Suspension 40 milliGRAM(s) Oral two times a day  potassium chloride    Tablet ER 40 milliEquivalent(s) Oral once    PHYSICAL EXAM:  Vital Signs Last 24 Hrs  T(F): 98.8 (20 Aug 2022 04:45), Max: 98.8 (20 Aug 2022 04:45)  HR: 91 (20 Aug 2022 04:45) (83 - 92)  BP: 128/79 (20 Aug 2022 04:45) (120/83 - 130/79)  RR: 18 (20 Aug 2022 04:45) (18 - 18)  SpO2: 96% (20 Aug 2022 04:45) (96% - 98%)    Parameters below as of 20 Aug 2022 04:45  Patient On (Oxygen Delivery Method): room air        CONSTITUTIONAL: NAD, appears comfortable  EYES: PERRLA; conjunctiva and sclera clear  ENMT: Moist oral mucosa; normal dentition  RESPIRATORY: Normal respiratory effort; grossly b/l AE   CARDIOVASCULAR: Regular rate and rhythm; No lower extremity edema;   ABDOMEN: Nontender to palpation, normoactive bowel sounds  MUSCULOSKELETAL:  no clubbing or cyanosis of digits; no joint swelling or tenderness to palpation  PSYCH: calm, coop; affect appropriate  NEUROLOGY: CN 2-12 are intact and symmetric; no gross sensory deficits   SKIN: No rashes; no palpable lesions    LABS:                        8.2    6.13  )-----------( 173      ( 20 Aug 2022 06:25 )             26.5     08-20    138  |  104  |  8   ----------------------------<  79  3.2<L>   |  26  |  0.82    Ca    7.7<L>      20 Aug 2022 06:25  Phos  2.0     08-20  Mg     1.40     08-20    TPro  5.4<L>  /  Alb  2.7<L>  /  TBili  0.6  /  DBili  <0.2  /  AST  87<H>  /  ALT  137<H>  /  AlkPhos  122<H>  08-19              GI PCR Panel, Stool (collected 19 Aug 2022 15:00)  Source: .Stool Feces  Final Report (20 Aug 2022 01:24):    GI PCR Results: NOT detected    *******Please Note:*******    GI panel PCR evaluates for:    Campylobacter, Plesiomonas shigelloides, Salmonella,    Vibrio, Yersinia enterocolitica, Enteroaggregative    Escherichia coli (EAEC), Enteropathogenic E.coli (EPEC),    Enterotoxigenic E. coli (ETEC) lt/st, Shiga-like    toxin-producing E. coli (STEC) stx1/stx2,    Shigella/ Enteroinvasive E. coli (EIEC), Cryptosporidium,    Cyclospora cayetanensis, Entamoeba histolytica,    Giardia lamblia, Adenovirus F 40/41, Astrovirus,    Norovirus GI/GII, Rotavirus A, Sapovirus

## 2022-08-21 LAB
ALBUMIN SERPL ELPH-MCNC: 2.5 G/DL — LOW (ref 3.3–5)
ALP SERPL-CCNC: 110 U/L — SIGNIFICANT CHANGE UP (ref 40–120)
ALT FLD-CCNC: 79 U/L — HIGH (ref 4–33)
ANION GAP SERPL CALC-SCNC: 9 MMOL/L — SIGNIFICANT CHANGE UP (ref 7–14)
AST SERPL-CCNC: 43 U/L — HIGH (ref 4–32)
BILIRUB DIRECT SERPL-MCNC: <0.2 MG/DL — SIGNIFICANT CHANGE UP (ref 0–0.3)
BILIRUB INDIRECT FLD-MCNC: >0.1 MG/DL — SIGNIFICANT CHANGE UP (ref 0–1)
BILIRUB SERPL-MCNC: 0.3 MG/DL — SIGNIFICANT CHANGE UP (ref 0.2–1.2)
BUN SERPL-MCNC: 8 MG/DL — SIGNIFICANT CHANGE UP (ref 7–23)
CALCIUM SERPL-MCNC: 7.9 MG/DL — LOW (ref 8.4–10.5)
CHLORIDE SERPL-SCNC: 105 MMOL/L — SIGNIFICANT CHANGE UP (ref 98–107)
CO2 SERPL-SCNC: 25 MMOL/L — SIGNIFICANT CHANGE UP (ref 22–31)
CREAT SERPL-MCNC: 0.78 MG/DL — SIGNIFICANT CHANGE UP (ref 0.5–1.3)
EGFR: 76 ML/MIN/1.73M2 — SIGNIFICANT CHANGE UP
GLUCOSE SERPL-MCNC: 81 MG/DL — SIGNIFICANT CHANGE UP (ref 70–99)
HCT VFR BLD CALC: 25.9 % — LOW (ref 34.5–45)
HGB BLD-MCNC: 7.9 G/DL — LOW (ref 11.5–15.5)
MAGNESIUM SERPL-MCNC: 1.7 MG/DL — SIGNIFICANT CHANGE UP (ref 1.6–2.6)
MCHC RBC-ENTMCNC: 24.8 PG — LOW (ref 27–34)
MCHC RBC-ENTMCNC: 30.5 GM/DL — LOW (ref 32–36)
MCV RBC AUTO: 81.4 FL — SIGNIFICANT CHANGE UP (ref 80–100)
NRBC # BLD: 0 /100 WBCS — SIGNIFICANT CHANGE UP (ref 0–0)
NRBC # FLD: 0 K/UL — SIGNIFICANT CHANGE UP (ref 0–0)
PHOSPHATE SERPL-MCNC: 2.1 MG/DL — LOW (ref 2.5–4.5)
PLATELET # BLD AUTO: 193 K/UL — SIGNIFICANT CHANGE UP (ref 150–400)
POTASSIUM SERPL-MCNC: 3.9 MMOL/L — SIGNIFICANT CHANGE UP (ref 3.5–5.3)
POTASSIUM SERPL-SCNC: 3.9 MMOL/L — SIGNIFICANT CHANGE UP (ref 3.5–5.3)
PROT SERPL-MCNC: 5.2 G/DL — LOW (ref 6–8.3)
RBC # BLD: 3.18 M/UL — LOW (ref 3.8–5.2)
RBC # FLD: 16.2 % — HIGH (ref 10.3–14.5)
SODIUM SERPL-SCNC: 139 MMOL/L — SIGNIFICANT CHANGE UP (ref 135–145)
WBC # BLD: 4.87 K/UL — SIGNIFICANT CHANGE UP (ref 3.8–10.5)
WBC # FLD AUTO: 4.87 K/UL — SIGNIFICANT CHANGE UP (ref 3.8–10.5)

## 2022-08-21 PROCEDURE — 99232 SBSQ HOSP IP/OBS MODERATE 35: CPT

## 2022-08-21 RX ADMIN — PANTOPRAZOLE SODIUM 40 MILLIGRAM(S): 20 TABLET, DELAYED RELEASE ORAL at 05:20

## 2022-08-21 RX ADMIN — PANTOPRAZOLE SODIUM 40 MILLIGRAM(S): 20 TABLET, DELAYED RELEASE ORAL at 17:19

## 2022-08-21 RX ADMIN — MIDODRINE HYDROCHLORIDE 10 MILLIGRAM(S): 2.5 TABLET ORAL at 14:49

## 2022-08-21 RX ADMIN — MIDODRINE HYDROCHLORIDE 10 MILLIGRAM(S): 2.5 TABLET ORAL at 05:19

## 2022-08-21 RX ADMIN — MIDODRINE HYDROCHLORIDE 10 MILLIGRAM(S): 2.5 TABLET ORAL at 21:19

## 2022-08-21 RX ADMIN — SODIUM CHLORIDE 60 MILLILITER(S): 9 INJECTION, SOLUTION INTRAVENOUS at 12:09

## 2022-08-21 NOTE — PROGRESS NOTE ADULT - SUBJECTIVE AND OBJECTIVE BOX
[facility] Division of Hospital Medicine  Trish Olivo MD  Pager [phone]    Patient is a 81y old  Female who presents with a chief complaint of Altered Mental Status      SUBJECTIVE / OVERNIGHT EVENTS: pt denies abd pain, diarrhea has resolved      MEDICATIONS  (STANDING):  dextrose 5%. 1000 milliLiter(s) (60 mL/Hr) IV Continuous <Continuous>  midodrine 10 milliGRAM(s) Oral every 8 hours  pantoprazole   Suspension 40 milliGRAM(s) Oral two times a day        PHYSICAL EXAM:  Vital Signs Last 24 Hrs  T(F): 98.3 (21 Aug 2022 06:00), Max: 98.7 (20 Aug 2022 21:31)  HR: 91 (21 Aug 2022 06:00) (81 - 91)  BP: 124/83 (21 Aug 2022 06:00) (117/72 - 124/83)  RR: 18 (21 Aug 2022 06:00) (18 - 18)  SpO2: 100% (21 Aug 2022 06:00) (100% - 100%)    Parameters below as of 21 Aug 2022 06:00  Patient On (Oxygen Delivery Method): room air        CONSTITUTIONAL: NAD, appears comfortable  EYES: PERRLA; conjunctiva and sclera clear  ENMT: Moist oral mucosa; poor dentition  RESPIRATORY: Normal respiratory effort; grossly b/l AE  CARDIOVASCULAR: Regular rate and rhythm; No lower extremity edema;  ABDOMEN: Nontender to palpation, normoactive bowel sounds  MUSCULOSKELETAL:  no clubbing or cyanosis of digits; no joint swelling or tenderness to palpation  PSYCH: calm, coop; affect appropriate  NEUROLOGY: CN 2-12 are intact and symmetric; no gross sensory deficits   SKIN: No rashes; no palpable lesions    LABS:                        7.9    4.87  )-----------( 193      ( 21 Aug 2022 05:45 )             25.9     08-21    139  |  105  |  8   ----------------------------<  81  3.9   |  25  |  0.78    Ca    7.9<L>      21 Aug 2022 05:45  Phos  2.1     08-21  Mg     1.70     08-21    TPro  5.2<L>  /  Alb  2.5<L>  /  TBili  0.3  /  DBili  <0.2  /  AST  43<H>  /  ALT  79<H>  /  AlkPhos  110  08-21              GI PCR Panel, Stool (collected 19 Aug 2022 15:00)  Source: .Stool Feces  Final Report (20 Aug 2022 01:24):    GI PCR Results: NOT detected    *******Please Note:*******    GI panel PCR evaluates for:    Campylobacter, Plesiomonas shigelloides, Salmonella,    Vibrio, Yersinia enterocolitica, Enteroaggregative    Escherichia coli (EAEC), Enteropathogenic E.coli (EPEC),    Enterotoxigenic E. coli (ETEC) lt/st, Shiga-like    toxin-producing E. coli (STEC) stx1/stx2,    Shigella/ Enteroinvasive E. coli (EIEC), Cryptosporidium,    Cyclospora cayetanensis, Entamoeba histolytica,    Giardia lamblia, Adenovirus F 40/41, Astrovirus,    Norovirus GI/GII, Rotavirus A, Sapovirus

## 2022-08-22 LAB
ALBUMIN SERPL ELPH-MCNC: 2.5 G/DL — LOW (ref 3.3–5)
ALP SERPL-CCNC: 113 U/L — SIGNIFICANT CHANGE UP (ref 40–120)
ALT FLD-CCNC: 60 U/L — HIGH (ref 4–33)
ANION GAP SERPL CALC-SCNC: 9 MMOL/L — SIGNIFICANT CHANGE UP (ref 7–14)
AST SERPL-CCNC: 37 U/L — HIGH (ref 4–32)
BILIRUB DIRECT SERPL-MCNC: <0.2 MG/DL — SIGNIFICANT CHANGE UP (ref 0–0.3)
BILIRUB INDIRECT FLD-MCNC: >0.1 MG/DL — SIGNIFICANT CHANGE UP (ref 0–1)
BILIRUB SERPL-MCNC: 0.3 MG/DL — SIGNIFICANT CHANGE UP (ref 0.2–1.2)
BUN SERPL-MCNC: 6 MG/DL — LOW (ref 7–23)
CALCIUM SERPL-MCNC: 8.2 MG/DL — LOW (ref 8.4–10.5)
CHLORIDE SERPL-SCNC: 109 MMOL/L — HIGH (ref 98–107)
CO2 SERPL-SCNC: 25 MMOL/L — SIGNIFICANT CHANGE UP (ref 22–31)
CREAT SERPL-MCNC: 0.77 MG/DL — SIGNIFICANT CHANGE UP (ref 0.5–1.3)
EGFR: 77 ML/MIN/1.73M2 — SIGNIFICANT CHANGE UP
GLUCOSE BLDC GLUCOMTR-MCNC: 89 MG/DL — SIGNIFICANT CHANGE UP (ref 70–99)
GLUCOSE SERPL-MCNC: 87 MG/DL — SIGNIFICANT CHANGE UP (ref 70–99)
HCT VFR BLD CALC: 25.8 % — LOW (ref 34.5–45)
HGB BLD-MCNC: 8 G/DL — LOW (ref 11.5–15.5)
MAGNESIUM SERPL-MCNC: 1.5 MG/DL — LOW (ref 1.6–2.6)
MCHC RBC-ENTMCNC: 25.5 PG — LOW (ref 27–34)
MCHC RBC-ENTMCNC: 31 GM/DL — LOW (ref 32–36)
MCV RBC AUTO: 82.2 FL — SIGNIFICANT CHANGE UP (ref 80–100)
NRBC # BLD: 0 /100 WBCS — SIGNIFICANT CHANGE UP (ref 0–0)
NRBC # FLD: 0 K/UL — SIGNIFICANT CHANGE UP (ref 0–0)
PHOSPHATE SERPL-MCNC: 2.2 MG/DL — LOW (ref 2.5–4.5)
PLATELET # BLD AUTO: 244 K/UL — SIGNIFICANT CHANGE UP (ref 150–400)
POTASSIUM SERPL-MCNC: 4.5 MMOL/L — SIGNIFICANT CHANGE UP (ref 3.5–5.3)
POTASSIUM SERPL-SCNC: 4.5 MMOL/L — SIGNIFICANT CHANGE UP (ref 3.5–5.3)
PROT SERPL-MCNC: 5.2 G/DL — LOW (ref 6–8.3)
RBC # BLD: 3.14 M/UL — LOW (ref 3.8–5.2)
RBC # FLD: 16.2 % — HIGH (ref 10.3–14.5)
SARS-COV-2 RNA SPEC QL NAA+PROBE: SIGNIFICANT CHANGE UP
SODIUM SERPL-SCNC: 143 MMOL/L — SIGNIFICANT CHANGE UP (ref 135–145)
WBC # BLD: 4.38 K/UL — SIGNIFICANT CHANGE UP (ref 3.8–10.5)
WBC # FLD AUTO: 4.38 K/UL — SIGNIFICANT CHANGE UP (ref 3.8–10.5)

## 2022-08-22 PROCEDURE — 99232 SBSQ HOSP IP/OBS MODERATE 35: CPT

## 2022-08-22 PROCEDURE — 99232 SBSQ HOSP IP/OBS MODERATE 35: CPT | Mod: GC

## 2022-08-22 RX ORDER — MIDODRINE HYDROCHLORIDE 2.5 MG/1
5 TABLET ORAL EVERY 8 HOURS
Refills: 0 | Status: DISCONTINUED | OUTPATIENT
Start: 2022-08-22 | End: 2022-08-23

## 2022-08-22 RX ORDER — MAGNESIUM OXIDE 400 MG ORAL TABLET 241.3 MG
400 TABLET ORAL
Refills: 0 | Status: DISCONTINUED | OUTPATIENT
Start: 2022-08-22 | End: 2022-08-22

## 2022-08-22 RX ORDER — SODIUM,POTASSIUM PHOSPHATES 278-250MG
1 POWDER IN PACKET (EA) ORAL ONCE
Refills: 0 | Status: COMPLETED | OUTPATIENT
Start: 2022-08-22 | End: 2022-08-22

## 2022-08-22 RX ORDER — MAGNESIUM OXIDE 400 MG ORAL TABLET 241.3 MG
400 TABLET ORAL
Refills: 0 | Status: COMPLETED | OUTPATIENT
Start: 2022-08-22 | End: 2022-08-22

## 2022-08-22 RX ORDER — ENOXAPARIN SODIUM 100 MG/ML
40 INJECTION SUBCUTANEOUS EVERY 24 HOURS
Refills: 0 | Status: DISCONTINUED | OUTPATIENT
Start: 2022-08-22 | End: 2022-08-24

## 2022-08-22 RX ADMIN — MAGNESIUM OXIDE 400 MG ORAL TABLET 400 MILLIGRAM(S): 241.3 TABLET ORAL at 11:42

## 2022-08-22 RX ADMIN — PANTOPRAZOLE SODIUM 40 MILLIGRAM(S): 20 TABLET, DELAYED RELEASE ORAL at 18:28

## 2022-08-22 RX ADMIN — Medication 1 PACKET(S): at 09:49

## 2022-08-22 RX ADMIN — MAGNESIUM OXIDE 400 MG ORAL TABLET 400 MILLIGRAM(S): 241.3 TABLET ORAL at 18:28

## 2022-08-22 RX ADMIN — MIDODRINE HYDROCHLORIDE 10 MILLIGRAM(S): 2.5 TABLET ORAL at 06:30

## 2022-08-22 RX ADMIN — ENOXAPARIN SODIUM 40 MILLIGRAM(S): 100 INJECTION SUBCUTANEOUS at 16:24

## 2022-08-22 RX ADMIN — PANTOPRAZOLE SODIUM 40 MILLIGRAM(S): 20 TABLET, DELAYED RELEASE ORAL at 06:29

## 2022-08-22 RX ADMIN — MAGNESIUM OXIDE 400 MG ORAL TABLET 400 MILLIGRAM(S): 241.3 TABLET ORAL at 09:50

## 2022-08-22 NOTE — CHART NOTE - NSCHARTNOTEFT_GEN_A_CORE
Path from recent EGD resulted as below:     Final Diagnosis   1 . Stomach, biopsy:   - Gastric oxyntic mucosa with chronic inactive inflammation   - No Helicobacter microorganisms identified   - No evidence of intestinal metaplasia    No change in management based on path results. Please reach out to inpatient GI consult team for further recommendations.     Maria M Gonzalez MD  Attending, Division of Gastroenterology

## 2022-08-22 NOTE — PROGRESS NOTE ADULT - ASSESSMENT
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 GI bleed) now transferred back to the floor. GI consulted for further management of acute anemia s/p EGD with LA Grade D esophagitis and one non-bleeding duodenal ulcer likely the source of coffee ground emesis and anemia.    #UGIB   #Grade D Esophagitis   #Non-bleeding Duodenal Dlcer   Presented to the hospital with with hypovolemic shock with hospital course c/b acute on chronic anemia with hgb down from 14->8 on admission with a baseline of around 9 complicated by an episode of coffee ground emesis after NG tube placement earlier this admission in the background of ongoing ASA and DOAC use. EGD 8/16/2022 LA Grade D esophagitis and one non-bleeding duodenal ulcer likely the source of coffee ground emesis and anemia. Stomach biopsy with sastric oxyntic mucosa with chronic inactive inflammationwith no Helicobacter microorganisms identified or evidence of intestinal metaplasiaContinue with  PO PPI 40mg BID for at least 8 weeks, then transition to PPI daily. Pt overall low risk for rebleeding with resumption anti-platelet and DOAC if clinically needed although will noted that DVT in 2020 with no signs of ongoing thrombosis noted on ED this admission.     #Elevated LFTs   Labs notable for mixed liver injury with elevated AST/ALT and ALP levels. Pt without any abdominal pain on exam although given prior hx of stones and choledochitises. RUQUS     #Short-segment Lemos's esophagus: EGD 8/16/2022 notable for esophageal mucosal changes suspicious for short-segment Lemos's esophagus. Given history of esophagitis, now with recurrent/ongoing esophagitis and large hiatal hernia,  would recommend lifelong PPI if no absolute contraindication. Could repeat EGD in 2 months to assess healing of esophagitis and rule out underlying Lemos's if within GOC.    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 GI bleed) now transferred back to the floor. GI consulted for further management of acute anemia s/p EGD with LA Grade D esophagitis and one non-bleeding duodenal ulcer likely the source of coffee ground emesis and anemia.    #UGIB   #Grade D Esophagitis   #Non-bleeding Duodenal Dlcer   Presented to the hospital with with hypovolemic shock with hospital course c/b acute on chronic anemia with hgb down from 14->8 on admission with a baseline of around 9 complicated by an episode of coffee ground emesis after NG tube placement earlier this admission in the background of ongoing ASA and DOAC use. EGD 8/16/2022 LA Grade D esophagitis and one non-bleeding duodenal ulcer likely the source of coffee ground emesis and anemia. Stomach biopsy with sastric oxyntic mucosa with chronic inactive inflammationwith no Helicobacter microorganisms identified or evidence of intestinal metaplasiaContinue with  PO PPI 40mg BID for at least 8 weeks, then transition to PPI daily. Pt overall low risk for rebleeding with resumption anti-platelet and DOAC if clinically needed although will noted that DVT in 2020 with no signs of ongoing thrombosis noted on ED this admission.     #Elevated LFTs   Labs notable for mixed liver injury with elevated AST/ALT and ALP levels. Pt without any abdominal pain on exam although given prior hx of stones and choledochitises. RUQUS is without any stone or billary obstruction. MRCP pending although LFTs reassuringly down trending. Suspect etiology likely transient hepatic insult which appears to be improving. Can hold off on MRCP for now although low threshold to obtain if patient develop any recurrent abdominal pain or LFTs began to uptrend.     #Short-segment Lemos's esophagus: EGD 8/16/2022 notable for esophageal mucosal changes suspicious for short-segment Lemos's esophagus. Given history of esophagitis, now with recurrent/ongoing esophagitis and large hiatal hernia,  would recommend lifelong PPI if no absolute contraindication. Could repeat EGD in 2 months to assess healing of esophagitis and rule out underlying Lemos's if within GOC.    Recommendations:  - Regular diet   - PO PPI BID x 8 weeks followed by PO PPI daily lifelong   - No GI contraindication for ASA/Apixiban if clinically needed   - Can consider EGD in 2 months if within GOC to assess for short segment BE; however, risks of procedure likely outweigh benefits  - Can hold on MRCP for now although would trend daily LFTs. If up trending would obtain MRCP     GI will plan to sign off at this time. Please call back with any follow up questions. All recommendations are tentative until note is attested by attending.     Catrachito Mcarthur, PGY-4  Gastroenterology/Hepatology Fellow  Available on [employer] Teams   [phone] (Long Range Pager)  [phone] (Short Range Pager [facility])    After 5pm, please contact the on-call GI fellow. [phone]

## 2022-08-22 NOTE — PROGRESS NOTE ADULT - SUBJECTIVE AND OBJECTIVE BOX
Mar Javed  [facility] Medicine  Pager #[phone]  Available on [employer] Teams    Patient is a 81y old  Female who presents with a chief complaint of Altered Mental Status (22 Aug 2022 10:08)      SUBJECTIVE / OVERNIGHT EVENTS: Patient seen and examined at bedside. Patient feels well, but still with poor appetite and not eating much food. Denies nausea, vomiting or abdominal pain.     ADDITIONAL REVIEW OF SYSTEMS:    MEDICATIONS  (STANDING):  enoxaparin Injectable 40 milliGRAM(s) SubCutaneous every 24 hours  magnesium oxide 400 milliGRAM(s) Oral three times a day with meals  midodrine 5 milliGRAM(s) Oral every 8 hours  pantoprazole   Suspension 40 milliGRAM(s) Oral two times a day    MEDICATIONS  (PRN):      CAPILLARY BLOOD GLUCOSE      POCT Blood Glucose.: 89 mg/dL (22 Aug 2022 07:31)    I&O's Summary    21 Aug 2022 07:01  -  22 Aug 2022 07:00  --------------------------------------------------------  IN: 0 mL / OUT: 0 mL / NET: 0 mL        PHYSICAL EXAM:    Vital Signs Last 24 Hrs  T(C): 36.7 (22 Aug 2022 06:00), Max: 36.7 (21 Aug 2022 22:00)  T(F): 98 (22 Aug 2022 06:00), Max: 98 (21 Aug 2022 22:00)  HR: 91 (22 Aug 2022 06:00) (73 - 91)  BP: 128/76 (22 Aug 2022 06:00) (115/61 - 128/76)  BP(mean): --  RR: 18 (22 Aug 2022 06:00) (17 - 18)  SpO2: 100% (22 Aug 2022 06:00) (99% - 100%)    Parameters below as of 22 Aug 2022 06:00  Patient On (Oxygen Delivery Method): room air      CONSTITUTIONAL: NAD, well-developed  EYES: Conjunctiva and sclera clear  ENMT: Moist oral mucosa  RESPIRATORY: Normal respiratory effort; lungs are clear to auscultation bilaterally  CARDIOVASCULAR: Regular rate and rhythm, normal S1 and S2, no murmur/rub/gallop; No lower extremity edema  ABDOMEN: Soft, nontender to palpation, normoactive bowel sounds  PSYCH: A+O to person, place  NEUROLOGY: No focal deficits  SKIN: No rashes; no palpable lesions    LABS:                        8.0    4.38  )-----------( 244      ( 22 Aug 2022 06:42 )             25.8     08-22    143  |  109<H>  |  6<L>  ----------------------------<  87  4.5   |  25  |  0.77    Ca    8.2<L>      22 Aug 2022 06:42  Phos  2.2     08-22  Mg     1.50     08-22    TPro  5.2<L>  /  Alb  2.5<L>  /  TBili  0.3  /  DBili  <0.2  /  AST  43<H>  /  ALT  79<H>  /  AlkPhos  110  08-21      GI PCR Panel, Stool (collected 19 Aug 2022 15:00)  Source: .Stool Feces  Final Report (20 Aug 2022 01:24):    GI PCR Results: NOT detected    *******Please Note:*******    GI panel PCR evaluates for:    Campylobacter, Plesiomonas shigelloides, Salmonella,    Vibrio, Yersinia enterocolitica, Enteroaggregative    Escherichia coli (EAEC), Enteropathogenic E.coli (EPEC),    Enterotoxigenic E. coli (ETEC) lt/st, Shiga-like    toxin-producing E. coli (STEC) stx1/stx2,    Shigella/ Enteroinvasive E. coli (EIEC), Cryptosporidium,    Cyclospora cayetanensis, Entamoeba histolytica,    Giardia lamblia, Adenovirus F 40/41, Astrovirus,    Norovirus GI/GII, Rotavirus A, Sapovirus      RADIOLOGY & ADDITIONAL TESTS: No new imaging  Results Reviewed: Yes  Imaging Personally Reviewed:  Electrocardiogram Personally Reviewed:    COORDINATION OF CARE:  Care Discussed with Consultants/Other Providers [Y/N]:  Prior or Outpatient Records Reviewed [Y/N]:

## 2022-08-22 NOTE — PROGRESS NOTE ADULT - PROBLEM SELECTOR PLAN 8
ID was consulted  concern for abdominal focus but CT unremarkable. Rapidly improved. Transient process? Pneumobilia   Staph hominis on blood cultures likely contaminant  Monitor off antibiotics    D/c planning to [facility]  Updated patient's brother Michael on 8/22
ID was consulted per ID  concern for abdominal focus but CT unremarkable. Rapidly improved. Transient process? Pneumobilia   Staph hominis on blood cultures likely contaminant  Monitor off antibiotics.

## 2022-08-22 NOTE — PROGRESS NOTE ADULT - ATTENDING COMMENTS
Impression:   # Gastrointestinal Hemorrhage: Likely 2/2 to Duodenal ulcer seen on EGD (8/16) - No high risk stigmata or active bleeding. Biopsies negative for H. Pylori   # Duodenal Ulcer   # Grade D Esophagitis: Likely 2/2 to vomiting, but may also be source of GI bleeding   # Abnormal Liver Enzymes: Resolving. Unclear etiology; In the setting of hypotension, suspect transient insult to liver which has resolved. 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   # Dementia   # DVT (on Eliquis)      Recommendation:   - Trend CMP   - Diet as tolerated   - No need for MRCP at this time. Can pursue if patient develops abdominal pain or worsening liver enzymes   - PPI BID x 8 weeks, then daily   - Anticoagulation per GOC. No GI absolute contraindication.   - If consistent with GOC, repeat EGD in 2 months to assess esophagitis healing and evaluate Lemos’s.   - Supportive care     Please call with questions     Jennifer Hurt MD  Advanced Endoscopy / GI
80 y/o F PMH dementia (A&Ox2 at baseline), HTN, recent DVT (on Eliquis), p/w vomiting and AMS found to be hypotensive despite IVF resuscitation and was admitted to MICU for management of hypotension requiring pressors. Shock unclear etiology but likely secondary to hypovolemia.  Appears improved with IVF resuscitation and now just on jessica with MAP>65.  Lactate is improved.  Follow up cultures and cont empiric antibiotics.  Acute renal failure secondary to prerenal etiology, appears improved with IVF resuscitation.  Patient examined and case reviewed in detail on bedside rounds. Frequent bedside visits with therapy change today.  Prognosis guarded.
82 y/o F PMH dementia (A&Ox2 at baseline), HTN, recent DVT (on Eliquis), p/w vomiting and AMS found to be hypotensive despite 4 L NS and was admitted to MICU for management of hypotension requiring pressors. Shock unclear etiology but likely secondary to hypovolemia.  Appears improved with IVF resuscitation.  Lactate is improved.  Follow up official CT a/p read.  Follow up cultures and cont empiric antibiotics.  Acute renal failure secondary to prerenal etiology, assess response to IVF resuscitation.  Patient examined and case reviewed in detail on bedside rounds. Frequent bedside visits with therapy change today.  Prognosis guarded.
82 y/o F PMH dementia (A&Ox2 at baseline), HTN, recent DVT (on Eliquis), p/w vomiting and AMS found to be hypotensive despite IVF resuscitation and was admitted to MICU for management of hypotension requiring pressors. Shock unclear etiology but likely secondary to hypovolemia.  Appears improved with IVF resuscitation and now just on jessica with MAP>65.  IVC appeared collapsed this AM on bedside POCUS, will give another liter of LR.  Lactate is improved.  Follow up cultures and cont empiric antibiotics.  Acute renal failure secondary to prerenal etiology, appears improved with IVF resuscitation.  Patient examined and case reviewed in detail on bedside rounds. Frequent bedside visits with therapy change today.  Prognosis guarded.
S/p EGD yesterday with large hiatal hernia, esophagitis, and clean-based superficial duodenal ulcer. No further overt bleeding and Hgb remains stable. Liver tests noted to be uptrending. Patient has history of choledocholithiasis s/p ERCP and prior cholecystectomy. Would recommend high dose PPI for EGD findings and RUQ US.

## 2022-08-22 NOTE — PROGRESS NOTE ADULT - SUBJECTIVE AND OBJECTIVE BOX
Gastroenterology Progress Note    Interval Events:   Feeling well today without any acute complaints. Tolerating a regular diet without any issues including n/v or abdominal pain.     Allergies:  No Known Allergies      Hospital Medications:  dextrose 5%. 1000 milliLiter(s) IV Continuous <Continuous>  magnesium oxide 400 milliGRAM(s) Oral three times a day with meals  midodrine 10 milliGRAM(s) Oral every 8 hours  pantoprazole   Suspension 40 milliGRAM(s) Oral two times a day      ROS: 14 point ROS negative unless otherwise state in subjective    PHYSICAL EXAM:   Vital Signs:  Vital Signs Last 24 Hrs  T(C): 36.7 (22 Aug 2022 06:00), Max: 36.7 (21 Aug 2022 22:00)  T(F): 98 (22 Aug 2022 06:00), Max: 98 (21 Aug 2022 22:00)  HR: 91 (22 Aug 2022 06:00) (73 - 91)  BP: 128/76 (22 Aug 2022 06:00) (115/61 - 128/76)  BP(mean): --  RR: 18 (22 Aug 2022 06:00) (17 - 18)  SpO2: 100% (22 Aug 2022 06:00) (99% - 100%)    Parameters below as of 22 Aug 2022 06:00  Patient On (Oxygen Delivery Method): room air    GENERAL:  No acute distress  HEENT:  NCAT, no scleral icterus  CHEST: no resp distress  HEART:  RRR  ABDOMEN:  Soft, non-tender, non-distended, normoactive bowel sounds, no masses  NEURO:  Alert and oriented x 3, no asterixis, no tremor    LABS:                        8.0    4.38  )-----------( 244      ( 22 Aug 2022 06:42 )             25.8     Mean Cell Volume: 82.2 fL (08-22-22 @ 06:42)    08-22    143  |  109<H>  |  6<L>  ----------------------------<  87  4.5   |  25  |  0.77    Ca    8.2<L>      22 Aug 2022 06:42  Phos  2.2     08-22  Mg     1.50     08-22    TPro  5.2<L>  /  Alb  2.5<L>  /  TBili  0.3  /  DBili  <0.2  /  AST  43<H>  /  ALT  79<H>  /  AlkPhos  110  08-21    LIVER FUNCTIONS - ( 21 Aug 2022 05:45 )  Alb: 2.5 g/dL / Pro: 5.2 g/dL / ALK PHOS: 110 U/L / ALT: 79 U/L / AST: 43 U/L / GGT: x           Imaging: Awaiting results of MRCP

## 2022-08-23 ENCOUNTER — TRANSCRIPTION ENCOUNTER (OUTPATIENT)
Age: 82
End: 2022-08-23

## 2022-08-23 LAB
ALBUMIN SERPL ELPH-MCNC: 2.6 G/DL — LOW (ref 3.3–5)
ALP SERPL-CCNC: 116 U/L — SIGNIFICANT CHANGE UP (ref 40–120)
ALT FLD-CCNC: 52 U/L — HIGH (ref 4–33)
ANION GAP SERPL CALC-SCNC: 8 MMOL/L — SIGNIFICANT CHANGE UP (ref 7–14)
AST SERPL-CCNC: 29 U/L — SIGNIFICANT CHANGE UP (ref 4–32)
BASOPHILS # BLD AUTO: 0.02 K/UL — SIGNIFICANT CHANGE UP (ref 0–0.2)
BASOPHILS NFR BLD AUTO: 0.5 % — SIGNIFICANT CHANGE UP (ref 0–2)
BILIRUB SERPL-MCNC: 0.3 MG/DL — SIGNIFICANT CHANGE UP (ref 0.2–1.2)
BLD GP AB SCN SERPL QL: NEGATIVE — SIGNIFICANT CHANGE UP
BUN SERPL-MCNC: 7 MG/DL — SIGNIFICANT CHANGE UP (ref 7–23)
CALCIUM SERPL-MCNC: 8.6 MG/DL — SIGNIFICANT CHANGE UP (ref 8.4–10.5)
CHLORIDE SERPL-SCNC: 106 MMOL/L — SIGNIFICANT CHANGE UP (ref 98–107)
CO2 SERPL-SCNC: 25 MMOL/L — SIGNIFICANT CHANGE UP (ref 22–31)
CREAT SERPL-MCNC: 0.84 MG/DL — SIGNIFICANT CHANGE UP (ref 0.5–1.3)
EGFR: 70 ML/MIN/1.73M2 — SIGNIFICANT CHANGE UP
EOSINOPHIL # BLD AUTO: 0.06 K/UL — SIGNIFICANT CHANGE UP (ref 0–0.5)
EOSINOPHIL NFR BLD AUTO: 1.5 % — SIGNIFICANT CHANGE UP (ref 0–6)
GLUCOSE SERPL-MCNC: 75 MG/DL — SIGNIFICANT CHANGE UP (ref 70–99)
HCT VFR BLD CALC: 27.4 % — LOW (ref 34.5–45)
HGB BLD-MCNC: 8.4 G/DL — LOW (ref 11.5–15.5)
IANC: 2.37 K/UL — SIGNIFICANT CHANGE UP (ref 1.8–7.4)
IMM GRANULOCYTES NFR BLD AUTO: 1 % — SIGNIFICANT CHANGE UP (ref 0–1.5)
LYMPHOCYTES # BLD AUTO: 1.07 K/UL — SIGNIFICANT CHANGE UP (ref 1–3.3)
LYMPHOCYTES # BLD AUTO: 26.1 % — SIGNIFICANT CHANGE UP (ref 13–44)
MAGNESIUM SERPL-MCNC: 1.6 MG/DL — SIGNIFICANT CHANGE UP (ref 1.6–2.6)
MCHC RBC-ENTMCNC: 24.9 PG — LOW (ref 27–34)
MCHC RBC-ENTMCNC: 30.7 GM/DL — LOW (ref 32–36)
MCV RBC AUTO: 81.3 FL — SIGNIFICANT CHANGE UP (ref 80–100)
MONOCYTES # BLD AUTO: 0.54 K/UL — SIGNIFICANT CHANGE UP (ref 0–0.9)
MONOCYTES NFR BLD AUTO: 13.2 % — SIGNIFICANT CHANGE UP (ref 2–14)
NEUTROPHILS # BLD AUTO: 2.37 K/UL — SIGNIFICANT CHANGE UP (ref 1.8–7.4)
NEUTROPHILS NFR BLD AUTO: 57.7 % — SIGNIFICANT CHANGE UP (ref 43–77)
NRBC # BLD: 0 /100 WBCS — SIGNIFICANT CHANGE UP (ref 0–0)
NRBC # FLD: 0 K/UL — SIGNIFICANT CHANGE UP (ref 0–0)
PHOSPHATE SERPL-MCNC: 2.7 MG/DL — SIGNIFICANT CHANGE UP (ref 2.5–4.5)
PLATELET # BLD AUTO: 324 K/UL — SIGNIFICANT CHANGE UP (ref 150–400)
POTASSIUM SERPL-MCNC: 4.4 MMOL/L — SIGNIFICANT CHANGE UP (ref 3.5–5.3)
POTASSIUM SERPL-SCNC: 4.4 MMOL/L — SIGNIFICANT CHANGE UP (ref 3.5–5.3)
PROT SERPL-MCNC: 5.7 G/DL — LOW (ref 6–8.3)
RBC # BLD: 3.37 M/UL — LOW (ref 3.8–5.2)
RBC # FLD: 16.1 % — HIGH (ref 10.3–14.5)
RH IG SCN BLD-IMP: POSITIVE — SIGNIFICANT CHANGE UP
SODIUM SERPL-SCNC: 139 MMOL/L — SIGNIFICANT CHANGE UP (ref 135–145)
WBC # BLD: 4.1 K/UL — SIGNIFICANT CHANGE UP (ref 3.8–10.5)
WBC # FLD AUTO: 4.1 K/UL — SIGNIFICANT CHANGE UP (ref 3.8–10.5)

## 2022-08-23 PROCEDURE — 99232 SBSQ HOSP IP/OBS MODERATE 35: CPT

## 2022-08-23 RX ADMIN — PANTOPRAZOLE SODIUM 40 MILLIGRAM(S): 20 TABLET, DELAYED RELEASE ORAL at 19:21

## 2022-08-23 RX ADMIN — MIDODRINE HYDROCHLORIDE 5 MILLIGRAM(S): 2.5 TABLET ORAL at 06:17

## 2022-08-23 RX ADMIN — PANTOPRAZOLE SODIUM 40 MILLIGRAM(S): 20 TABLET, DELAYED RELEASE ORAL at 06:18

## 2022-08-23 RX ADMIN — ENOXAPARIN SODIUM 40 MILLIGRAM(S): 100 INJECTION SUBCUTANEOUS at 16:40

## 2022-08-23 NOTE — SWALLOW BEDSIDE ASSESSMENT ADULT - COMMENTS
Hospitalist 8/23 "80 y/o F PMH dementia (A&Ox2 at baseline), HTN, recent DVT (on eliquis), p/w vomiting and AMS found to be hypotensive despite 4 L NS and was admitted to MICU for management of hypotension requiring pressors. Hypotension likely 2/2 hypovolemic shock (in the setting of poor PO intake/vomiting vs. GI bleed), with significant improvement after IV fluid resuscitation. VANDANA and acidosis improving with fluid administration. CTH notable for chronic infarcts but no acute process; CT chest/AP show no acute process. Blood cultures show coagulase-negative cocci, likely contaminate. Hypotension improved, now transferred to the floor for further management."    WBC is WFL. CXR 8/12 "The lungs are clear."    Patient seen on 8/14/22 for initial clinical swallow evaluation with recommendations for puree and mildly thick liquids (see report for details).     Patient seen at bedside, awake/alert, during clinical swallow re-evaluation this PM. Patient able to follow directions and answer questions. Patient denied difficulties swallowing and denied pain.
MICU 8/14 "82 y/o F PMH dementia (A&Ox2 at baseline), HTN, recent DVT (on eliquis), p/w vomiting and AMS found to be hypotensive despite 4 L NS and was admitted to MICU for management of hypotension requiring pressors. Hypotension likely 2/2 hypovolemic shock (in the setting of poor PO intake/vomiting vs. GI bleed), with significant improvement after IV fluid resuscitation. VANDANA and acidosis improving with fluid administration. Patient now just on Charli drip for BP support. CTH notable for chronic infarcts but no acute process; CT chest/AP show no acute process. Blood cultures show coagulase-negative cocci, likely contaminate. hypotension improving with continued fluid administration."    CXR 8/12 "The lungs are clear."    Patient seen at bedside, awake/alert, during clinical swallow evaluation this AM. Patient noted with some confusion, however able to follow simple directives. Per RN, patient tolerated PO meals with no observed difficulties. Patient was cooperative and accepted all PO trials.

## 2022-08-23 NOTE — DISCHARGE NOTE PROVIDER - HOSPITAL COURSE
80 y/o F PMH dementia (A&Ox2 at baseline), HTN, recent DVT (on eliquis), p/w vomiting and AMS found to be hypotensive despite 4 L NS and was admitted to MICU for management of hypotension requiring pressors. Hypotension likely 2/2 hypovolemic shock (in the setting of poor PO intake/vomiting vs. GI bleed), with significant improvement after IV fluid resuscitation. VANDANA and acidosis improving with fluid administration. CTH notable for chronic infarcts but no acute process; CT chest/AP show no acute process. Blood cultures show coagulase-negative cocci, likely contaminate. Hypotension improved, now transferred to the floor for further management.    Anemia in the setting of Esophagitis   -Concern for GI bleed with coffee ground emesis  -S/p EGD 8/18 showing Grade D esophagitis. Likely source of recent coffee ground emesis. Z-line irregular. 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 One non-bleeding duodenal ulcer with no stigmata of bleeding. Potential source of recent coffee ground emesis and anemia.  -C/w PPI BID for 8 weeks, then daily afterward  -Consider repeat EGD in 2 months vs lifelong therapy with PPI- d/w patient's brother  -Hgb stable at this time  -Restarted Lovenox for DVT ppx.    Stomach, biopsy:   - Gastric oxyntic mucosa with chronic inactive inflammation   - No Helicobacter microorganisms identified   - No evidence of intestinal metaplasia    VANDANA (acute kidney injury).   -VANDANA likely pre-renal in setting of hypotension and hypovolemia, c/w ATN    Deep vein thrombosis (DVT)   -Pt with prior h/o dvt 2020, was on Eliquis  -US of LE was neg for DVT  -Will d/c Eliquis as it has been 2 years since DVT was diagnosed.    Dementia  -Patient is A&Ox2, which is baseline per chart review  - monitor for acute changes in mental status.    Electrolyte abnormality.   -In setting of poor PO intake  -hypokalemia, hypomagnesemia with supplementation    Elevated LFTs   -Labs notable for mixed liver injury with elevated AST/ALT and ALP levels.   -Pt without any abdominal pain on exam although given prior hx of stones and choledochitises.   -RUQUS is without any stone or billary obstruction.   -Suspect etiology likely transient hepatic insult which appears to be improving. Can hold off on MRCP for now although low threshold to obtain if patient develop any recurrent abdominal pain or LFTs began to uptrend.     Fever R/O Bacteremia  -ID was consulted  -concern for abdominal focus but CT unremarkable. Rapidly improved. Transient process? Pneumobilia   -Staph hominis on blood cultures likely contaminant  -Monitor off antibiotics    Case discussed with  *****. Reviewed discharge medications with patient; All new medications requiring new prescription sent to pharmacy of patients choice. Reviewed need for prescription for previous home medication and new prescriptions sent if requested. Patient in agreement and understands.   82 y/o F PMH dementia (A&Ox2 at baseline), HTN, recent DVT (on eliquis), p/w vomiting and AMS found to be hypotensive despite 4 L NS and was admitted to MICU for management of hypotension requiring pressors. Hypotension likely 2/2 hypovolemic shock (in the setting of poor PO intake/vomiting vs. GI bleed), with significant improvement after IV fluid resuscitation. VANDANA and acidosis improving with fluid administration. CTH notable for chronic infarcts but no acute process; CT chest/AP show no acute process. Blood cultures show coagulase-negative cocci, likely contaminate. Hypotension improved, now transferred to the floor for further management.    Anemia in the setting of Esophagitis   -Concern for GI bleed with coffee ground emesis  -S/p EGD 8/18 showing Grade D esophagitis. Likely source of recent coffee ground emesis. Z-line irregular. 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 One non-bleeding duodenal ulcer with no stigmata of bleeding. Potential source of recent coffee ground emesis and anemia.  -C/w PPI BID for 8 weeks, then daily afterward  -Consider repeat EGD in 2 months vs lifelong therapy with PPI- d/w patient's brother  -Hgb stable at this time    Stomach, biopsy:   - Gastric oxyntic mucosa with chronic inactive inflammation   - No Helicobacter microorganisms identified   - No evidence of intestinal metaplasia    VANDANA (acute kidney injury).   -VANDANA likely pre-renal in setting of hypotension and hypovolemia, c/w ATN    Deep vein thrombosis (DVT)   -Pt with prior h/o dvt 2020, was on Eliquis  -US of LE was neg for DVT  -Will d/c Eliquis as it has been 2 years since DVT was diagnosed.    Dementia  -Patient is A&Ox2, which is baseline per chart review  - monitor for acute changes in mental status.    Electrolyte abnormality.   -In setting of poor PO intake  -hypokalemia, hypomagnesemia with supplementation    Elevated LFTs   -Labs notable for mixed liver injury with elevated AST/ALT and ALP levels.   -Pt without any abdominal pain on exam although given prior hx of stones and choledochitises.   -RUQUS is without any stone or billary obstruction.   -Suspect etiology likely transient hepatic insult which appears to be improving. Can hold off on MRCP for now although low threshold to obtain if patient develop any recurrent abdominal pain or LFTs began to uptrend.     Fever   -ID was consulted  -concern for abdominal focus but CT unremarkable.  -Staph hominis on blood cultures likely contaminant  -Monitor off antibiotics per ID; fever resolved    Case discussed with Dr Burt on 8/24/22. Reviewed discharge medications with patient; All new medications requiring new prescription sent to pharmacy of patients choice. Reviewed need for prescription for previous home medication and new prescriptions sent if requested. Patient in agreement and understands.

## 2022-08-23 NOTE — PROGRESS NOTE ADULT - SUBJECTIVE AND OBJECTIVE BOX
Mar Javed  [facility] Medicine  Pager #[phone]  Available on [employer] Teams    Patient is a 81y old  Female who presents with a chief complaint of Altered Mental Status (22 Aug 2022 13:35)      SUBJECTIVE / OVERNIGHT EVENTS: Patient seen and examined at bedside. Patient feels well. Does not feel that hungry. Denies nausea, vomiting, abdominal pain, constipation.    ADDITIONAL REVIEW OF SYSTEMS:    MEDICATIONS  (STANDING):  enoxaparin Injectable 40 milliGRAM(s) SubCutaneous every 24 hours  midodrine 5 milliGRAM(s) Oral every 8 hours  pantoprazole   Suspension 40 milliGRAM(s) Oral two times a day    MEDICATIONS  (PRN):      CAPILLARY BLOOD GLUCOSE        I&O's Summary    22 Aug 2022 07:01  -  23 Aug 2022 07:00  --------------------------------------------------------  IN: 0 mL / OUT: 0 mL / NET: 0 mL        PHYSICAL EXAM:    Vital Signs Last 24 Hrs  T(C): 36.9 (23 Aug 2022 10:00), Max: 37.1 (23 Aug 2022 06:12)  T(F): 98.4 (23 Aug 2022 10:00), Max: 98.8 (23 Aug 2022 06:12)  HR: 90 (23 Aug 2022 10:00) (68 - 105)  BP: 133/79 (23 Aug 2022 10:00) (113/73 - 138/72)  BP(mean): --  RR: 18 (23 Aug 2022 10:00) (18 - 18)  SpO2: 100% (23 Aug 2022 10:00) (97% - 100%)    Parameters below as of 23 Aug 2022 06:12  Patient On (Oxygen Delivery Method): room air    CONSTITUTIONAL: NAD, well-developed, obese  EYES: Conjunctiva and sclera clear  ENMT: Moist oral mucosa  RESPIRATORY: Normal respiratory effort; lungs are clear to auscultation bilaterally  CARDIOVASCULAR: Regular rate and rhythm, normal S1 and S2, no murmur/rub/gallop; No lower extremity edema  ABDOMEN: Soft, nontender to palpation, normoactive bowel sounds  PSYCH: A+O to person, place  NEUROLOGY: No focal deficits  SKIN: No rashes; no palpable lesions    LABS:                        8.4    4.10  )-----------( 324      ( 23 Aug 2022 06:05 )             27.4     08-23    139  |  106  |  7   ----------------------------<  75  4.4   |  25  |  0.84    Ca    8.6      23 Aug 2022 06:05  Phos  2.7     08-23  Mg     1.60     08-23    TPro  5.7<L>  /  Alb  2.6<L>  /  TBili  0.3  /  DBili  x   /  AST  29  /  ALT  52<H>  /  AlkPhos  116  08-23      RADIOLOGY & ADDITIONAL TESTS: No new imaging  Results Reviewed: Yes  Imaging Personally Reviewed:  Electrocardiogram Personally Reviewed:    COORDINATION OF CARE:  Care Discussed with Consultants/Other Providers [Y/N]:  Prior or Outpatient Records Reviewed [Y/N]:

## 2022-08-23 NOTE — SWALLOW BEDSIDE ASSESSMENT ADULT - ORAL PREPARATORY PHASE
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  They request that we proceed 9/27/2022 \par Discussed should he need removal of bladder lesion he would have to remain on aspirin and plavix (too high risk of having a stroke with the stents in place need  to be on antiplatelet medication for at least 6months)- at this point ok to stop eliquis a few days prior to the procedure and re start after. Risks and benefits of stopping the elquis with patient in son.  Decreased mastication ability Within functional limits

## 2022-08-23 NOTE — DISCHARGE NOTE PROVIDER - NSDCCPCAREPLAN_GEN_ALL_CORE_FT
PRINCIPAL DISCHARGE DIAGNOSIS  Diagnosis: Hypovolemic shock  Assessment and Plan of Treatment: During your stay it was determined you had a bleed in the upper GI tract. We had our GI doctors following you      SECONDARY DISCHARGE DIAGNOSES  Diagnosis: Anemia  Assessment and Plan of Treatment: During your stay you were transfused     PRINCIPAL DISCHARGE DIAGNOSIS  Diagnosis: Duodenal ulcer  Assessment and Plan of Treatment: During your stay we has our GI team follow you, it was determined you had a recent bleed in your GI tract and had an EGD performed with a biopsy. You were started on a medication called pantoprazole to help treat the ulcer. You will take this twice a day for 8 weeks and then will go down to taking this medication once a day. Follow up with GI outpatient after discharge and your PCP in 1-2 weeks.      SECONDARY DISCHARGE DIAGNOSES  Diagnosis: Elevated LFTs  Assessment and Plan of Treatment: During your stay your liver enzymes were elevated. GI followed you in the hospital and determined an MRCP was not necessary as you were not having abdominal pain and the liver enzymes improved. If you were to develop any abominal pain, you may be considered for an MRCP given your history of gallstones.    Diagnosis: Anemia  Assessment and Plan of Treatment: During your stay you were monitored for anemia, this was likely due to an old bleed in your GI tract. You symptomatically improved after recieving medications that stabilize blood pressure and were given IV fluids. Continue with your pantoprazole and follow up with GI. Monitor for any new symptoms of bleeding in your stool.     PRINCIPAL DISCHARGE DIAGNOSIS  Diagnosis: Duodenal ulcer  Assessment and Plan of Treatment: During your stay you were evaluated by our GI doctors who determined you had a recent bleed in your GI tract and had an endoscopy performed with a biopsy. You had severe esophagitis, possibly from acid reflux. You were started on a medication called pantoprazole to help treat the ulcer. You will take this twice a day for 8 weeks and then will go down to taking this medication once a day. You can continue to take pantoprazole lifelong or have a repeat endoscopy done in 2 months to evaluate for ulcer healing. Follow up with GI outpatient after discharge and your PCP in 1-2 weeks.      SECONDARY DISCHARGE DIAGNOSES  Diagnosis: Anemia  Assessment and Plan of Treatment: During your stay you were monitored for anemia, this was likely due to an old bleed in your GI tract. You symptomatically improved after recieving medications that stabilize blood pressure and were given IV fluids. Continue with your pantoprazole and follow up with GI. Monitor for any new symptoms of bleeding in your stool.    Diagnosis: Elevated LFTs  Assessment and Plan of Treatment: During your stay your liver enzymes were elevated. GI followed you in the hospital and determined an MRCP was not necessary as you were not having abdominal pain and the liver enzymes improved. If you were to develop any abominal pain, you may be considered for an MRCP given your history of gallstones.

## 2022-08-23 NOTE — DISCHARGE NOTE PROVIDER - CARE PROVIDER_API CALL
Jennifer Hurt)  Internal Medicine  [address] [place] [address]  Phone: [phone]  Fax: [phone]  Follow Up Time:

## 2022-08-23 NOTE — SWALLOW BEDSIDE ASSESSMENT ADULT - SWALLOW EVAL: DIAGNOSIS
1) Mild oral dysphagia for soft and bite sized solids marked by prolonged manipulation, slow chewing, and reduced collection resulting in delayed posterior bolus transfer. Lingual stasis noted which was cleared with liquid wash. 2) Functional oral stage of swallow for minced and moist, puree, mildly thick fluids, and thin fluids marked by adequate containment, bolus manipulation/mastication, and coordination. Anterior to posterior oral transit/transfer noted. 3) Functional pharyngeal stage of swallow for soft and bite sized, minced and moist, puree, mildly thick fluids, and thin fluids marked by initiation of pharyngeal swallow trigger and hyolaryngeal excursion noted upon digital palpation. No overt signs/symptoms of penetration/aspiration noted.
1) Mild oral dysphagia for puree, mildly thick liquids, and thin liquids marked by reduced utensil stripping, prolonged bolus manipulation, and reduced coordination. Delayed posterior bolus transfer. Adequate oral clearance noted. 2) Functional pharyngeal stage of swallow for puree, and mildly thick fluids marked by initiation of pharyngeal swallow trigger and hyolaryngeal excursion noted upon digital palpation. No overt signs/symptoms of penetration/aspiration noted. 3) Moderate to Severe pharyngeal dysphagia for thin liquids marked by a suspected delayed pharyngeal swallow trigger with hyolaryngeal elevation noted upon digital palpation. Patient with throat clearing following oral intake suggestive of airway penetration/aspiration.

## 2022-08-23 NOTE — SWALLOW BEDSIDE ASSESSMENT ADULT - SWALLOW EVAL: RECOMMENDED FEEDING/EATING TECHNIQUES
allow for swallow between intakes/check mouth frequently for oral residue/pocketing/crush medication (when feasible)/maintain upright posture during/after eating for 30 mins/oral hygiene/position upright (90 degrees)/small sips/bites
allow for swallow between intakes/alternate food with liquid/oral hygiene/position upright (90 degrees)/small sips/bites

## 2022-08-23 NOTE — DISCHARGE NOTE PROVIDER - NSDCFUADDAPPT_GEN_ALL_CORE_FT
GI () in 1-2 weeks after discharge  Your PCP in 1-2 weeks  GI (Dr. Hurt) in 1-2 weeks after discharge  Your PCP in 1-2 weeks

## 2022-08-23 NOTE — SWALLOW BEDSIDE ASSESSMENT ADULT - ADDITIONAL RECOMMENDATIONS
1) Monitor for PO tolerance and intake 2) This service to follow up as schedule permits for dysphagia management 3) Reconsult this service as needed
1) Monitor for PO tolerance and intake. 2) This service to follow up as schedule permits for dysphagia management. 3) Reconsult this service as needed.

## 2022-08-23 NOTE — SWALLOW BEDSIDE ASSESSMENT ADULT - ASR SWALLOW RECOMMEND DIAG
Objective testing NOT warranted given patient with overt s/sx of penetration/aspiration
Objective testing NOT warranted given no observed s/sx of penetration/aspiration

## 2022-08-23 NOTE — DISCHARGE NOTE PROVIDER - NSDCMRMEDTOKEN_GEN_ALL_CORE_FT
alendronate 70 mg oral tablet: 1 tab(s) orally once a week  apixaban 5 mg oral tablet: 1 tab(s) orally every 12 hours  will need total 3 months   aspirin 81 mg oral tablet:   atorvastatin 80 mg oral tablet: 1 tab(s) orally once a day  omeprazole 20 mg oral delayed release tablet: 1 tab(s) orally once a day  Oysco 500 with D 500 mg-200 intl units (5 mcg) oral tablet: 1 tab(s) orally 2 times a day  rolling walker:   sucralfate 1 g/10 mL oral suspension: 10 milliliter(s) orally 4 times a day   alendronate 70 mg oral tablet: 1 tab(s) orally once a week  Aspirin Enteric Coated 81 mg oral delayed release tablet: 1 tab(s) orally once a day  atorvastatin 80 mg oral tablet: 1 tab(s) orally once a day  enoxaparin: 40 milligram(s) subcutaneous once a day  Oysco 500 with D 500 mg-200 intl units (5 mcg) oral tablet: 1 tab(s) orally 2 times a day  Protonix 40 mg oral delayed release tablet: 1 tab(s) orally 2 times a day; for 8 weeks, then daily afterward

## 2022-08-23 NOTE — DISCHARGE NOTE PROVIDER - NSFOLLOWUPCLINICS_GEN_ALL_ED_FT
[facility]  Gastroenterology  [address] [place] [address] [address]  [place] [address]  Phone: [phone]  Fax:

## 2022-08-24 ENCOUNTER — TRANSCRIPTION ENCOUNTER (OUTPATIENT)
Age: 82
End: 2022-08-24

## 2022-08-24 VITALS
SYSTOLIC BLOOD PRESSURE: 134 MMHG | OXYGEN SATURATION: 97 % | RESPIRATION RATE: 18 BRPM | HEART RATE: 88 BPM | DIASTOLIC BLOOD PRESSURE: 87 MMHG | TEMPERATURE: 98 F

## 2022-08-24 LAB
ALBUMIN SERPL ELPH-MCNC: 2.5 G/DL — LOW (ref 3.3–5)
ALP SERPL-CCNC: 111 U/L — SIGNIFICANT CHANGE UP (ref 40–120)
ALT FLD-CCNC: 43 U/L — HIGH (ref 4–33)
ANION GAP SERPL CALC-SCNC: 8 MMOL/L — SIGNIFICANT CHANGE UP (ref 7–14)
AST SERPL-CCNC: 23 U/L — SIGNIFICANT CHANGE UP (ref 4–32)
BILIRUB SERPL-MCNC: 0.3 MG/DL — SIGNIFICANT CHANGE UP (ref 0.2–1.2)
BUN SERPL-MCNC: 8 MG/DL — SIGNIFICANT CHANGE UP (ref 7–23)
CALCIUM SERPL-MCNC: 8.4 MG/DL — SIGNIFICANT CHANGE UP (ref 8.4–10.5)
CHLORIDE SERPL-SCNC: 104 MMOL/L — SIGNIFICANT CHANGE UP (ref 98–107)
CO2 SERPL-SCNC: 23 MMOL/L — SIGNIFICANT CHANGE UP (ref 22–31)
CREAT SERPL-MCNC: 0.74 MG/DL — SIGNIFICANT CHANGE UP (ref 0.5–1.3)
EGFR: 81 ML/MIN/1.73M2 — SIGNIFICANT CHANGE UP
GLUCOSE SERPL-MCNC: 79 MG/DL — SIGNIFICANT CHANGE UP (ref 70–99)
HCT VFR BLD CALC: 26.3 % — LOW (ref 34.5–45)
HGB BLD-MCNC: 8.3 G/DL — LOW (ref 11.5–15.5)
MAGNESIUM SERPL-MCNC: 1.6 MG/DL — SIGNIFICANT CHANGE UP (ref 1.6–2.6)
MCHC RBC-ENTMCNC: 25.4 PG — LOW (ref 27–34)
MCHC RBC-ENTMCNC: 31.6 GM/DL — LOW (ref 32–36)
MCV RBC AUTO: 80.4 FL — SIGNIFICANT CHANGE UP (ref 80–100)
NRBC # BLD: 0 /100 WBCS — SIGNIFICANT CHANGE UP (ref 0–0)
NRBC # FLD: 0 K/UL — SIGNIFICANT CHANGE UP (ref 0–0)
PHOSPHATE SERPL-MCNC: 2.5 MG/DL — SIGNIFICANT CHANGE UP (ref 2.5–4.5)
PLATELET # BLD AUTO: 360 K/UL — SIGNIFICANT CHANGE UP (ref 150–400)
POTASSIUM SERPL-MCNC: 3.5 MMOL/L — SIGNIFICANT CHANGE UP (ref 3.5–5.3)
POTASSIUM SERPL-SCNC: 3.5 MMOL/L — SIGNIFICANT CHANGE UP (ref 3.5–5.3)
PROT SERPL-MCNC: 5.8 G/DL — LOW (ref 6–8.3)
RBC # BLD: 3.27 M/UL — LOW (ref 3.8–5.2)
RBC # FLD: 16 % — HIGH (ref 10.3–14.5)
SODIUM SERPL-SCNC: 135 MMOL/L — SIGNIFICANT CHANGE UP (ref 135–145)
WBC # BLD: 3.72 K/UL — LOW (ref 3.8–10.5)
WBC # FLD AUTO: 3.72 K/UL — LOW (ref 3.8–10.5)

## 2022-08-24 PROCEDURE — 99239 HOSP IP/OBS DSCHRG MGMT >30: CPT

## 2022-08-24 RX ORDER — ASPIRIN/CALCIUM CARB/MAGNESIUM 324 MG
1 TABLET ORAL
Qty: 0 | Refills: 0 | DISCHARGE

## 2022-08-24 RX ORDER — ENOXAPARIN SODIUM 100 MG/ML
40 INJECTION SUBCUTANEOUS
Qty: 0 | Refills: 0 | DISCHARGE
Start: 2022-08-24

## 2022-08-24 RX ORDER — OMEPRAZOLE 10 MG/1
1 CAPSULE, DELAYED RELEASE ORAL
Qty: 0 | Refills: 0 | DISCHARGE

## 2022-08-24 RX ORDER — ASPIRIN/CALCIUM CARB/MAGNESIUM 324 MG
0 TABLET ORAL
Qty: 0 | Refills: 0 | DISCHARGE

## 2022-08-24 RX ORDER — PANTOPRAZOLE SODIUM 20 MG/1
1 TABLET, DELAYED RELEASE ORAL
Qty: 0 | Refills: 0 | DISCHARGE

## 2022-08-24 RX ADMIN — PANTOPRAZOLE SODIUM 40 MILLIGRAM(S): 20 TABLET, DELAYED RELEASE ORAL at 06:42

## 2022-08-24 NOTE — PROGRESS NOTE ADULT - PROBLEM SELECTOR PLAN 3
Pt with prior h/o dvt, was on eliquis, will hold for now given concern for GIB  US of LE was neg for dvt
Pt with prior h/o dvt 2020, was on eliquis, will hold for now given concern for GIB  US of LE was neg for dvt  unclear this needs to be continued once GI issues resolve
Pt with prior h/o dvt, was on eliquis, will hold for now given concern for GIB  US of LE was neg for dvt
Pt with prior h/o dvt 2020, was on eliquis, will hold for now given concern for GIB  US of LE was neg for dvt  unclear this needs to be continued once GI issues resolve
Pt with prior h/o dvt 2020, was on eliquis, will hold for now given concern for GIB  US of LE was neg for dvt  unclear this needs to be continued once GI issues resolve
Pt with prior h/o dvt 2020, was on Eliquis  US of LE was neg for DVT  Will d/c Eliquis as it has been 2 years since DVT was diagnosed
Pt with prior h/o dvt 2020, was on Eliquis  US of LE was neg for DVT  Will d/c Eliquis as it has been 2 years since DVT was diagnosed
Pt with prior h/o dvt 2020, was on Eliquis  US of LE was neg for DVT  Will d/c Eliquis

## 2022-08-24 NOTE — PROGRESS NOTE ADULT - PROBLEM SELECTOR PROBLEM 5
Electrolyte abnormality

## 2022-08-24 NOTE — PROGRESS NOTE ADULT - PROBLEM SELECTOR PROBLEM 2
VANDANA (acute kidney injury)

## 2022-08-24 NOTE — DISCHARGE NOTE NURSING/CASE MANAGEMENT/SOCIAL WORK - PATIENT PORTAL LINK FT
You can access the FollowMyHealth Patient Portal offered by [facility] by registering at the following website: [email or web address]. By joining [facility]’s FollowMyHealth portal, you will also be able to view your health information using other applications (apps) compatible with our system.

## 2022-08-24 NOTE — PROGRESS NOTE ADULT - PROBLEM SELECTOR PLAN 4
Patient is A&Ox2, which is baseline per chart review  - Updated brother Michael on 8/16  - monitor for acute changes in mental status
Patient is A&Ox2, which is baseline per chart review  - brother Michael updated on 8/16  - monitor for acute changes in mental status
Patient is A&Ox2, which is baseline per chart review  - Updated brother Michael on 8/16  - monitor for acute changes in mental status
Patient is A&Ox2, which is baseline per chart review  - Updated brother Michael on 8/16  - monitor for acute changes in mental status
Patient is A&Ox2, which is baseline per chart review  - Per brother (Collin), patient baseline is variable, as she often forgets where she is or what day/season it is.  - monitor for acute changes in mental status  -Left message for brother Frank at [phone] on 8/15
Patient is A&Ox2, which is baseline per chart review  - brother Michael updated on 8/16  - monitor for acute changes in mental status
Patient is A&Ox2, which is baseline per chart review  - brother Micheal updated on 8/16  - monitor for acute changes in mental status
Patient is A&Ox2, which is baseline per chart review  - monitor for acute changes in mental status
Patient is A&Ox2, which is baseline per chart review  - monitor for acute changes in mental status
Patient is A&Ox2, which is baseline per chart review  - brother Michael updated on 8/22  - monitor for acute changes in mental status

## 2022-08-24 NOTE — PROGRESS NOTE ADULT - PROBLEM SELECTOR PLAN 7
GI PCR neg
ID was consulted  concern for abdominal focus but CT unremarkable. Rapidly improved. Transient process? Pneumobilia   Staph hominis on blood cultures likely contaminant  Monitor off antibiotics    D/c planning to [facility]  Updated patient's brother Michael on 8/22
ID was consulted  concern for abdominal focus but CT unremarkable. Rapidly improved. Transient process? Pneumobilia   Staph hominis on blood cultures likely contaminant  Monitor off antibiotics    D/c planning to [facility]  Updated patient's brother Michael on 8/22
GI PCR neg  flexiseal dc'd
pt with flexiseal in place with liquid brown stool  send GI PCR
GI PCR neg  flexiseal dc'd

## 2022-08-24 NOTE — PROGRESS NOTE ADULT - PROBLEM SELECTOR PLAN 5
In setting of poor PO intake  monitor and replete prn  hypokalemia, hypomagnesemia, supplement
Hypophosphatemia- replete phos
In setting of poor PO intake  monitor and replete prn  hypokalemia, hypomagnesemia, supplement
monitor and replete prn
monitor and replete prn  hypokalemia, hypomagnesemia, supplement  monitor to resolution
monitor and replete prn
In setting of poor PO intake  monitor and replete prn  hypokalemia, hypomagnesemia, supplement  monitor to resolution
monitor and replete prn  hypokalemia, hypomagnesemia, supplement  monitor to resolution

## 2022-08-24 NOTE — PROGRESS NOTE ADULT - PROBLEM SELECTOR PLAN 6
unclear etiology, now downtrending  no abd pain  will hold off on MRCP if liver enzymes continue to downtrend today
unclear etiology, now downtrending  no abd pain  MRCP d/[facility]
ID was consulted per ID  concern for abdominal focus but CT unremarkable. Rapidly improved. Transient process? Pneumobilia   Staph hominis on blood cultures likely contaminant  Monitor off antibiotics.      -Abnormal lfts- cont to monitor for now, if persists will wkup  Plan discussed with ACP
poss related to sepsis with shock vs passed stone  no abd pain  await MRCP  now trending down
ID was consulted per ID  concern for abdominal focus but CT unremarkable. Rapidly improved. Transient process? Pneumobilia   Staph hominis on blood cultures likely contaminant  Monitor off antibiotics.    Tov tonight  Plan discussed with ACP
poss related to sepsis with shock vs passed stone  no abd pain  await MRCP  now trending down
poss related to sepsis with shock vs passed stone  no abd pain  await MRCP  now trending down
unclear etiology, now downtrending  no abd pain  MRCP d/[facility]
ID was consulted per ID  concern for abdominal focus but CT unremarkable. Rapidly improved. Transient process? Pneumobilia   Staph hominis on blood cultures likely contaminant  Monitor off antibiotics.      -Abnormal lfts- trending up, will check hep panel and RUQ sono    -Hypokalemia- replete k  Plan discussed with ACP
ID was consulted per ID  concern for abdominal focus but CT unremarkable. Rapidly improved. Transient process? Pneumobilia   Staph hominis on blood cultures likely contaminant  Monitor off antibiotics.      -Abnormal lfts- monitor lefts, hep panel neg and RUQ sono showed pneumobilia    -f/u today's labs  Plan discussed with ACP

## 2022-08-24 NOTE — DISCHARGE NOTE NURSING/CASE MANAGEMENT/SOCIAL WORK - NSDCPEFALRISK_GEN_ALL_CORE
For information on Fall & Injury Prevention, visit: [email or web address] OR  [email or web address] OR  https://www.cdc.gov/steadi/patient.html

## 2022-08-24 NOTE — PROGRESS NOTE ADULT - REASON FOR ADMISSION
Altered Mental Status

## 2022-08-24 NOTE — PROGRESS NOTE ADULT - SUBJECTIVE AND OBJECTIVE BOX
Mar Lee  [facility]  Pager #[phone]  Available on [employer] Teams    Patient is a 81y old  Female who presents with a chief complaint of Altered Mental Status (23 Aug 2022 13:12)      SUBJECTIVE / OVERNIGHT EVENTS: Patient seen and examined at bedside. Patient feels well, denies abdominal pain, nausea or vomiting. Feels eager to be discharged to rehab today.    ADDITIONAL REVIEW OF SYSTEMS:    MEDICATIONS  (STANDING):  enoxaparin Injectable 40 milliGRAM(s) SubCutaneous every 24 hours  pantoprazole   Suspension 40 milliGRAM(s) Oral two times a day    MEDICATIONS  (PRN):      CAPILLARY BLOOD GLUCOSE        I&O's Summary      PHYSICAL EXAM:    Vital Signs Last 24 Hrs  T(C): 36.7 (24 Aug 2022 09:56), Max: 37.5 (23 Aug 2022 17:42)  T(F): 98.1 (24 Aug 2022 09:56), Max: 99.5 (23 Aug 2022 17:42)  HR: 88 (24 Aug 2022 09:56) (88 - 93)  BP: 134/87 (24 Aug 2022 09:56) (130/74 - 138/71)  BP(mean): --  RR: 18 (24 Aug 2022 09:56) (17 - 18)  SpO2: 97% (24 Aug 2022 09:56) (97% - 100%)    Parameters below as of 24 Aug 2022 09:56  Patient On (Oxygen Delivery Method): room air    CONSTITUTIONAL: NAD, well-developed, obese  EYES: Conjunctiva and sclera clear  ENMT: Moist oral mucosa  RESPIRATORY: Normal respiratory effort; lungs are clear to auscultation bilaterally  CARDIOVASCULAR: Regular rate and rhythm, normal S1 and S2, no murmur/rub/gallop; No lower extremity edema  ABDOMEN: Soft, nontender to palpation, normoactive bowel sounds  PSYCH: A+O to person, place  NEUROLOGY: No focal deficits  SKIN: No rashes; no palpable lesions    LABS:                        8.3    3.72  )-----------( 360      ( 24 Aug 2022 06:40 )             26.3     08-24    135  |  104  |  8   ----------------------------<  79  3.5   |  23  |  0.74    Ca    8.4      24 Aug 2022 06:40  Phos  2.5     08-24  Mg     1.60     08-24    TPro  5.8<L>  /  Alb  2.5<L>  /  TBili  0.3  /  DBili  x   /  AST  23  /  ALT  43<H>  /  AlkPhos  111  08-24      RADIOLOGY & ADDITIONAL TESTS: No new imaging  Results Reviewed: Yes  Imaging Personally Reviewed:  Electrocardiogram Personally Reviewed:    COORDINATION OF CARE:  Care Discussed with Consultants/Other Providers [Y/N]:  Prior or Outpatient Records Reviewed [Y/N]:

## 2022-08-24 NOTE — PROGRESS NOTE ADULT - PROBLEM SELECTOR PLAN 2
VANDANA likely pre-renal in setting of hypotension and hypovolemia  - baseline from 2020 Cr 0.7  -  Initial CR on presentation 6.63. Downtrending, Creatinine 0.9 today  - contine to trend Cr and maintain blood pressure
VANDANA likely pre-renal in setting of hypotension and hypovolemia  - baseline from 2020 Cr 0.7  -  Initial CR on presentation 6.63. Downtrending, monitor creatinine  - contine to trend Cr and maintain blood pressure
VANDANA likely pre-renal in setting of hypotension and hypovolemia, c/w ATN  now resolved
VANDANA likely pre-renal in setting of hypotension and hypovolemia  - baseline from 2020 Cr 0.7  -  Initial CR on presentation 6.63. Downtrending, Creatinine 1.1 today  - contine to trend Cr and maintain blood pressure
VANDANA likely pre-renal in setting of hypotension and hypovolemia  - baseline from 2020 Cr 0.7  -  Initial CR on presentation 6.63. Downtrending, Creatinine 0.8 today  - contine to trend Cr and maintain blood pressure
VANDANA likely pre-renal in setting of hypotension and hypovolemia, c/w ATN  now resolved

## 2022-08-24 NOTE — PROGRESS NOTE ADULT - PROBLEM SELECTOR PROBLEM 6
R/O Bacteremia
R/O Bacteremia
Elevated LFTs
Elevated LFTs
R/O Bacteremia
R/O Bacteremia
Elevated LFTs

## 2022-08-24 NOTE — PROGRESS NOTE ADULT - PROBLEM SELECTOR PLAN 1
Hold eliquis for now   Concern for GI bleed with Coffee ground emesis during initial stay  GI consult appreciated, s/p egd 8/18 showed Grade D esophagitis. Likely source of recent coffee ground emesis. Z-line irregular. 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 Gastroesophageal flap valve, Gastropathy. Biopsied. One non-bleeding duodenal ulcer with no stigmata of bleeding. Potential source of recent coffee ground emesis and anemia.  PPI bid, f/u bx results  hg stable
Hold eliquis for now   Concern for GI bleed with Coffee ground emesis during initial stay  GI consult appreciated, s/p egd today showed Grade D esophagitis. Likely source of recent coffee ground emesis. Z-line irregular. 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 Gastroesophageal flap valve, Gastropathy. Biopsied. One non-bleeding duodenal ulcer with no stigmata of bleeding. Potential source of recent coffee ground emesis and anemia.  PPI bid, f/u bx results  mild drop in hg this AM, but overall stable, repeat this PM
Concern for GI bleed with coffee ground emesis  S/p EGD 8/18 showing Grade D esophagitis. Likely source of recent coffee ground emesis. Z-line irregular. 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 One non-bleeding duodenal ulcer with no stigmata of bleeding. Potential source of recent coffee ground emesis and anemia.  C/w PPI BID for 8 weeks, then daily afterward  Consider repeat EGD in 2 months vs lifelong therapy with PPI- d/w patient's brother Michael  Hgb stable  Restarted Lovenox for DVT ppx  Can restart ASA on discharge
Hold eliquis for now   Concern for GI bleed with Coffee ground emesis during initial stay  GI consult appreciated, s/p egd today showed Grade D esophagitis. Likely source of recent coffee ground emesis. Z-line irregular. 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 Gastroesophageal flap valve, Gastropathy. Biopsied. One non-bleeding duodenal ulcer with no stigmata of bleeding. Potential source of recent coffee ground emesis and anemia.  PPI bid, f/u bx results  Hb at 8.6 today, cont to monitor H&H closely
Hold eliquis for now   Concern for GI bleed with Coffee ground emesis during initial stay  GI consulted   per GI for egd tomorrow  Hb at 7.9 today, cont to monitor H&H closely, check guaiac
Hold eliquis for now   Concern for GI bleed with Coffee ground emesis during initial stay  GI consult appreciated, s/p egd today showed Grade D esophagitis. Likely source of recent coffee ground emesis. Z-line irregular. 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 Gastroesophageal flap valve, Gastropathy. Biopsied. One non-bleeding duodenal ulcer with no stigmata of bleeding. Potential source of recent coffee ground emesis and anemia.  PPI bid, f/u bx results  Hb at 8.2 today, cont to monitor H&H closely
Concern for GI bleed with coffee ground emesis  S/p EGD 8/18 showing Grade D esophagitis. Likely source of recent coffee ground emesis. Z-line irregular. 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 One non-bleeding duodenal ulcer with no stigmata of bleeding. Potential source of recent coffee ground emesis and anemia.  C/w PPI BID for 8 weeks, then daily afterward  Consider repeat EGD in 2 months vs lifelong therapy with PPI- d/w patient's brother  Hgb stable at this time  Restarted Lovenox for DVT ppx
Hold eliquis for now   Concern for GI bleed with Coffee ground emesis during initial stay  GI consult appreciated, s/p egd today showed Grade D esophagitis. Likely source of recent coffee ground emesis. Z-line irregular. 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 Gastroesophageal flap valve, Gastropathy. Biopsied. One non-bleeding duodenal ulcer with no stigmata of bleeding. Potential source of recent coffee ground emesis and anemia.  PPI bid, f/u bx results  Hb at 7.9 today, cont to monitor H&H closely
Hold eliquis for now   Concern for GI bleed with Coffee ground emesis during initial stay  GI consult appreciated, s/p egd 8/18 showed Grade D esophagitis. Likely source of recent coffee ground emesis. Z-line irregular. 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 Gastroesophageal flap valve, Gastropathy. Biopsied. One non-bleeding duodenal ulcer with no stigmata of bleeding. Potential source of recent coffee ground emesis and anemia.  PPI bid, f/u bx results  hg stable
Concern for GI bleed with Coffee ground emesis  S/p EGD 8/18 showing Grade D esophagitis. Likely source of recent coffee ground emesis. Z-line irregular. 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 One non-bleeding duodenal ulcer with no stigmata of bleeding. Potential source of recent coffee ground emesis and anemia.  C/w PPI BID for 8 weeks, then daily afterward  Consider repeat EGD in 2 months vs lifelong therapy with PPI- d/w patient's brother  Hgb stable at this time  Will start Lovenox for DVT ppx

## 2022-08-24 NOTE — PROGRESS NOTE ADULT - PROVIDER SPECIALTY LIST ADULT
Hospitalist
Infectious Disease
[facility]
Gastroenterology
[facility]
Gastroenterology
[facility]
Hospitalist

## 2022-11-18 NOTE — PROCEDURAL SAFETY CHECKLIST WITH OR WITHOUT SEDATION - NSSPECIALEQUIPSUPPLY_GEN_ALL_CORE
-- DO NOT REPLY / DO NOT REPLY ALL --  -- Message is from [facility] ([facility]) --      General Patient Message Patient is returning a call      Caller Information       Type Contact Phone/Fax    11/18/2022 08:10 AM CST Phone (Incoming) ErnieYanira (Self) [phone]        Alternative phone number    Can a detailed message be left? Yes    Message Turnaround:     Is it Working Hours? Yes - Working Hours     [place]:    Please give this turnaround time to the caller:   \"This message will be sent to [state Provider's name]. The clinical team will fulfill your request as soon as they review your message.\"    Patient is returning a call to nurse. See closed encounter 11-15              not applicable

## 2023-02-21 NOTE — PROGRESS NOTE ADULT - PROBLEM SELECTOR PLAN 8
DVT PPx: heparin ggt stopped due to concern for UGI bleed  Diet: DASH/TLC diet  Med Rec: Lotrel (amlodipine 10 mg / benazepril 20 mg) daily                Omeprazole 20 mg daily                asa 81 mg Pain Refusal Text: I offered to prescribe pain medication but the patient refused to take this medication.

## 2023-11-07 NOTE — PROGRESS NOTE ADULT - SUBJECTIVE AND OBJECTIVE BOX
S/w patient and let her know I do not have anything sooner with sandra and she stated she will wait to see her    *******************************************************  Kenny Stock MD PGY-1  Internal Medicine  Pager [phone] / [phone]  *******************************************************  Patient is a 79y old  Female who presents with a chief complaint of Acute renal failure, high anion gap metabolic acidosis (14 Aug 2020 07:43)    PI : [id number]    SUBJECTIVE / OVERNIGHT EVENTS:  - Patient seen and evaluated at bedside.  - No acute events overnight.   - [pending bedside  - Denies fevers/chills, headache, SOB at rest, chest pain, palpitations, abdominal pain, nausea/vomiting/diarrhea/constipation, dysuria    MEDICATIONS  (STANDING):  enoxaparin Injectable 80 milliGRAM(s) SubCutaneous two times a day  ondansetron Injectable 4 milliGRAM(s) IV Push once  pantoprazole  Injectable 40 milliGRAM(s) IV Push every 12 hours  sodium phosphate IVPB 30 milliMole(s) IV Intermittent once  sucralfate suspension 1 Gram(s) Oral four times a day    MEDICATIONS  (PRN):  aluminum hydroxide/magnesium hydroxide/simethicone Suspension 30 milliLiter(s) Oral every 6 hours PRN Dyspepsia      CAPILLARY BLOOD GLUCOSE        I&O's Summary    13 Aug 2020 07:01  -  14 Aug 2020 07:00  --------------------------------------------------------  IN: 755 mL / OUT: 900 mL / NET: -145 mL    14 Aug 2020 07:01  -  14 Aug 2020 12:51  --------------------------------------------------------  IN: 0 mL / OUT: 500 mL / NET: -500 mL      PHYSICAL EXAM:  Vital Signs Last 24 Hrs  T(C): 36.8 (14 Aug 2020 11:53), Max: 37 (13 Aug 2020 15:15)  T(F): 98.3 (14 Aug 2020 11:53), Max: 98.6 (13 Aug 2020 15:15)  HR: 72 (14 Aug 2020 11:53) (67 - 96)  BP: 133/66 (14 Aug 2020 11:53) (122/68 - 143/78)  RR: 17 (14 Aug 2020 11:53) (17 - 18)  SpO2: 98% (14 Aug 2020 11:53) (98% - 100%)      yesterday:  CONSTITUTIONAL: NAD, obese, resting comfortably in bed  RESPIRATORY: Normal respiratory effort; lungs are clear to auscultation bilaterally  CARDIOVASCULAR: Regular rate, normal S1 and S2, no murmur/rub/gallop; No JVD; No lower extremity edema; Peripheral pulses are 2+ bilaterally  ABDOMEN: Soft, nontender to palpation, normoactive bowel sounds, no rebound/guarding; No hepatosplenomegaly  MUSCLOSKELETAL: no joint swelling or tenderness to palpation, full strength all extremities.  EXTREMITIES: L hand still swollen but with good pulses, no erythema. hands/feet are warm, and without cyanosis or clubbing  SKIN: mild pallor. no visible rashes, diaphoresis, or jaundice  NEURO: No focal deficits; moving all extremities  PSYCH: A+O to person, place, and time; affect appropriate; cooperative    LABS:                        8.9    7.04  )-----------( 212      ( 14 Aug 2020 07:22 )             28.9     08-14    139  |  104  |  8   ----------------------------<  79  3.5   |  23  |  0.77    Ca    8.6      14 Aug 2020 07:22  Phos  1.7     08-14  Mg     1.7     08-14    TPro  5.5<L>  /  Alb  2.9<L>  /  TBili  0.6  /  DBili  x   /  AST  103<H>  /  ALT  165<H>  /  AlkPhos  231<H>  08-14    PT/INR - ( 14 Aug 2020 07:22 )   PT: 12.9 SEC;   INR: 1.14     PTT - ( 14 Aug 2020 07:22 )  PTT:25.9 SEC            RADIOLOGY & ADDITIONAL TESTS:  Results Reviewed: EGD    COORDINATION OF CARE:  Care Discussed with Consultants/Other Providers [Y]:   -Dr. Hurt (GI)  -Dr. Guthrie ([facility]) *INCOMPLETE NOTE*  *******************************************************  Kenny Stock MD PGY-1  Internal Medicine  Pager [phone] / [phone]  *******************************************************  Patient is a 79y old  Female who presents with a chief complaint of Acute renal failure, high anion gap metabolic acidosis (15 Aug 2020 08:40)      SUBJECTIVE / OVERNIGHT EVENTS:  - Patient seen and evaluated at bedside.  - No acute events overnight.   - Patient reports feeling well this AM  - Denies fevers/chills, headache, SOB at rest, chest pain, palpitations, abdominal pain, nausea/vomiting/diarrhea/constipation, dysuria    MEDICATIONS  (STANDING):  enoxaparin Injectable 80 milliGRAM(s) SubCutaneous two times a day  ondansetron Injectable 4 milliGRAM(s) IV Push once  pantoprazole  Injectable 40 milliGRAM(s) IV Push every 12 hours  potassium chloride    Tablet ER 40 milliEquivalent(s) Oral every 4 hours  sucralfate suspension 1 Gram(s) Oral four times a day    MEDICATIONS  (PRN):  aluminum hydroxide/magnesium hydroxide/simethicone Suspension 30 milliLiter(s) Oral every 6 hours PRN Dyspepsia      CAPILLARY BLOOD GLUCOSE        I&O's Summary    14 Aug 2020 07:01  -  15 Aug 2020 07:00  --------------------------------------------------------  IN: 900 mL / OUT: 1500 mL / NET: -600 mL    15 Aug 2020 07:01  -  15 Aug 2020 12:00  --------------------------------------------------------  IN: 120 mL / OUT: 300 mL / NET: -180 mL      Daily     Daily     PHYSICAL EXAM:  Vital Signs Last 24 Hrs  T(C): 37 (15 Aug 2020 04:50), Max: 37 (15 Aug 2020 04:50)  T(F): 98.6 (15 Aug 2020 04:50), Max: 98.6 (15 Aug 2020 04:50)  HR: 70 (15 Aug 2020 04:50) (70 - 71)  BP: 128/69 (15 Aug 2020 04:50) (120/63 - 137/70)  BP(mean): --  RR: 17 (15 Aug 2020 04:50) (17 - 17)  SpO2: 99% (15 Aug 2020 04:50) (98% - 99%)      CONSTITUTIONAL: NAD, obese, resting comfortably in bed  RESPIRATORY: Normal respiratory effort; lungs are clear to auscultation bilaterally  CARDIOVASCULAR: Regular rate, normal S1 and S2, no murmur/rub/gallop; No JVD; No lower extremity edema; Peripheral pulses are 2+ bilaterally  ABDOMEN: Soft, nontender to palpation, normoactive bowel sounds, no rebound/guarding; No hepatosplenomegaly  MUSCLOSKELETAL: no joint swelling or tenderness to palpation, full strength all extremities.  EXTREMITIES: L hand still swollen but with good pulses, no erythema. hands/feet are warm, and without cyanosis or clubbing  SKIN: mild pallor. no visible rashes, diaphoresis, or jaundice  NEURO: No focal deficits; moving all extremities  PSYCH: A+O to person, place, and time; affect appropriate; cooperative      LABS:                        8.3    5.51  )-----------( 225      ( 15 Aug 2020 05:25 )             27.0     Hemoglobin: 8.3 g/dL (08-15-20 @ 05:25)  Hemoglobin: 8.9 g/dL (08-14-20 @ 16:17)  Hemoglobin: 8.9 g/dL (08-14-20 @ 07:22)  Hemoglobin: 8.9 g/dL (08-13-20 @ 20:00)  Hemoglobin: 7.2 g/dL (08-13-20 @ 11:32) GOT BLOOD HERE  Hemoglobin: 7.4 g/dL (08-13-20 @ 01:00)  Hemoglobin: 7.7 g/dL (08-12-20 @ 07:20)  Hemoglobin: 8.4 g/dL (08-12-20 @ 01:00)  Hemoglobin: 8.0 g/dL (08-11-20 @ 16:51)      08-15    139  |  103  |  5<L>  ----------------------------<  80  2.9<LL>   |  24  |  0.74    Ca    8.4      15 Aug 2020 05:25  Phos  2.2     08-15  Mg     1.7     08-15    TPro  5.1<L>  /  Alb  2.7<L>  /  TBili  0.4  /  DBili  x   /  AST  51<H>  /  ALT  109<H>  /  AlkPhos  197<H>  08-15    PT/INR - ( 15 Aug 2020 05:25 )   PT: 13.2 SEC;   INR: 1.16     PTT - ( 15 Aug 2020 05:25 )  PTT:32.7 SEC      RADIOLOGY & ADDITIONAL TESTS:  Results Reviewed:   Imaging Personally Reviewed:  Electrocardiogram Personally Reviewed:    COORDINATION OF CARE:  Care Discussed with Consultants/Other Providers [Y/N]:   Prior or Outpatient Records Reviewed [Y/N]:

## 2023-12-11 NOTE — PATIENT PROFILE ADULT - TRANSPORTATION
[address], [address] [facility]  Telephone: [phone]     Discharge Instructions     Important reminders:     **If you have any signs and symptoms of illness (Cough, fever, congestion, nausea, vomiting, diarrhea, etc.) please call the wound care center prior to your appointment. 1. Increase Protein intake for optimal wound healing  2. No added salt to reduce any swelling  3. If diabetic, maintain good glucose control  4. If you smoke, smoking prohibits wound healing, we ask that you refrain from smoking. 5. When taking antibiotics take the entire prescription as ordered. Do not stop taking until medication is all gone unless otherwise instructed. 6. Exercise as tolerated. 7. Keep weight off wounds and reposition every 2 hours if applicable. 8. If wound(s) is on your lower extremity, elevate legs to the level of the heart or above for 30 minutes 4-5 times a day and/or when sitting. Avoid standing for long periods of time. 9. Do not get wounds wet in bath or shower unless otherwise instructed by your physician. If your wound is on your foot or leg, you may purchase a cast bag. Please ask at the pharmacy. If Vascular testing is ordered, please call [address] [place] [address] ([phone]) to schedule. Vascular tests ordered by Wound Care Physicians may take up to 2 hours to complete. Please keep that in mind when scheduling. If Vascular testing is scheduled, please bring supplies to replace your dressing after testing is done. The vascular department does not stock supplies. Wound: Right heel      With each dressing change, rinse wounds with 0.9% Saline. (May use wound wash or soft contact solution. Both can be purchased at a local drug store). If unable to obtain saline, may use a gentle soap and water. Dressing care: Do not put tape on skin. Wear surgical shoe. Santyl, dry dressing, 1 layer tubi - change daily.  Take a multivitamin
no

## 2024-10-13 NOTE — DIETITIAN INITIAL EVALUATION ADULT - FEEDING SKILL
ED TO INPATIENT SBAR HANDOFF    Patient Name: Lindsay Lilly   Preferred Name: Lindsay  : 1947  77 y.o.   Family/Caregiver Present: no   Code Status Order: No Order  PO Status: NPO:No  Telemetry Order: Yes  C-SSRS: Risk of Suicide: No Risk  Sitter no   Restraints:     Sepsis Risk Score      Situation  Chief Complaint   Patient presents with    Dizziness    Cerebrovascular Accident     Brief Description of Patient's Condition: pt came to ed as level 2 stroke alert, pt only reporting very mild numbness to R corner of lips, slurred speech and dizziness were also concerns when the pt came in, pt not reporting dizziness any longer, I no longer notice slurring of her speech.   Mental Status: oriented, alert, coherent, logical, thought processes intact, and able to concentrate and follow conversation  Arrived from:Home  Imaging:   CT HEAD WO CONTRAST   Final Result   No acute intracranial abnormality.      Electronically signed by Gavino Ellison        Abnormal labs:   Abnormal Labs Reviewed   CBC WITH AUTO DIFFERENTIAL - Abnormal; Notable for the following components:       Result Value    RBC 3.46 (*)     Hemoglobin 9.7 (*)     Hematocrit 30.7 (*)     All other components within normal limits   COMPREHENSIVE METABOLIC PANEL - Abnormal; Notable for the following components:    Potassium 5.3 (*)     Chloride 113 (*)     CO2 20 (*)     BUN 29 (*)     Creatinine 2.04 (*)     Est, Glom Filt Rate 25 (*)     Albumin 3.3 (*)     Globulin 4.5 (*)     Albumin/Globulin Ratio 0.7 (*)     All other components within normal limits   TROPONIN - Abnormal; Notable for the following components:    Troponin, High Sensitivity 70 (*)     All other components within normal limits   TROPONIN - Abnormal; Notable for the following components:    Troponin, High Sensitivity 63 (*)     All other components within normal limits       Background  Allergies:   Allergies   Allergen Reactions    Clindamycin Other (See Comments)     Other  total assistance

## 2024-10-24 NOTE — PATIENT PROFILE ADULT - HAVE YOU EXPERIENCED VIOLENCE OR A TRAUMATIC EVENT?
Physical Therapy Treatment    Patient Name: Megan Son  MRN: [id number]  Today's Date: 10/24/2024   Visit 4/MN  Time Calculation  Start Time: 1715  Stop Time: 1800  Time Calculation (min): 45 min  Dx: R knee pain, L ankle pain    PRECAUTIONS: LBP    SUBJECTIVE:  Knee pain same as last visit (4/10), ankle not bad.  Sx worst in mornings  Compliant with HEP? yes    OBJECTIVE:  Improved gait quality    TREATMENT:  - Therex:   Scifit (LE only) x 5 min  Gastroc and soleus stretch on slantboard x 1 min each  Rockerboard- AP and LR, static and dynamic  1/2 foam- AP balance  Heel and toe raises x 20 reps each  L SLS  Tandem stance  Cone taps at various angles in SLS L  Mat Ex:  -SLR R x 10 reps  -QS x 5 reps due to knee pain  -SAQ x 20 reps  -adductor ball squeeze x 20 reps  -resisted hip abd with BTB x 20 reps    Current HEP:  Passive DF stretch, 4 way resisted ankle with band, SLR, s/l hip abduction. Added SLS L, QS, and wall calf stretch    ASSESSMENT:   Knee pain is most limiting  EDUCATION:  Continued HEP  PLAN:    Continue with ankle and knee ROM, LE strength, gait and pain management.    Billing:     PT Therapeutic Procedures Time Entry  Therapeutic Exercise Time Entry: 45                   
no

## 2025-05-15 NOTE — PROGRESS NOTE ADULT - PROBLEM SELECTOR PLAN 6
Clear Resolved. resolved  - this am pt w/ BPs improved 141/81  - can c/w maintenance IVF if pt remains NPO for extended period of time  - Monitor urine output resolved  - this am pt w/ BPs improved 141/81  - can c/w maintenance IVF if pt remains NPO for extended period of time  - Monitor urine output  - Continue to hold home BP meds for now

## (undated) DEVICE — DRSG CURITY GAUZE SPONGE 4 X 4" 12-PLY NON-STERILE

## (undated) DEVICE — BASIN EMESIS 10IN GRADUATED MAUVE

## (undated) DEVICE — TUBING MEDI-VAC W MAXIGRIP CONNECTORS 1/4"X6'

## (undated) DEVICE — BIOPSY FORCEP COLD DISP

## (undated) DEVICE — GOWN LG

## (undated) DEVICE — DRSG BANDAID 0.75X3"

## (undated) DEVICE — SALIVA EJECTOR (BLUE)

## (undated) DEVICE — DENTURE CUP PINK

## (undated) DEVICE — PACK IV START WITH CHG

## (undated) DEVICE — BIOPSY FORCEP RADIAL JAW 4 STANDARD WITH NEEDLE

## (undated) DEVICE — TUBING IV SET GRAVITY 3Y 100" MACRO

## (undated) DEVICE — CONTAINER FORMALIN 80ML YELLOW

## (undated) DEVICE — LUBRICATING JELLY HR ONE SHOT 3G

## (undated) DEVICE — LINE BREATHE SAMPLNG

## (undated) DEVICE — UNDERPAD LINEN SAVER 17 X 24"

## (undated) DEVICE — CLAMP BX HOT RAD JAW 3

## (undated) DEVICE — FACESHIELD FULL VISOR

## (undated) DEVICE — BITE BLOCK ADULT 20 X 27MM (GREEN)

## (undated) DEVICE — DRSG 2X2

## (undated) DEVICE — ELCTR ECG CONDUCTIVE ADHESIVE

## (undated) DEVICE — CATH IV SAFE BC 22G X 1" (BLUE)